# Patient Record
Sex: FEMALE | Race: WHITE | NOT HISPANIC OR LATINO | Employment: OTHER | URBAN - METROPOLITAN AREA
[De-identification: names, ages, dates, MRNs, and addresses within clinical notes are randomized per-mention and may not be internally consistent; named-entity substitution may affect disease eponyms.]

---

## 2022-07-20 ENCOUNTER — HOSPITAL ENCOUNTER (INPATIENT)
Facility: HOSPITAL | Age: 87
LOS: 1 days | Discharge: HOME | End: 2022-07-23
Attending: EMERGENCY MEDICINE | Admitting: INTERNAL MEDICINE

## 2022-07-20 ENCOUNTER — APPOINTMENT (OUTPATIENT)
Dept: GENERAL RADIOLOGY | Facility: HOSPITAL | Age: 87
End: 2022-07-20

## 2022-07-20 DIAGNOSIS — R06.09 EXERTIONAL DYSPNEA: ICD-10-CM

## 2022-07-20 DIAGNOSIS — R00.1 BRADYCARDIA: Primary | ICD-10-CM

## 2022-07-20 DIAGNOSIS — E11.65 UNCONTROLLED TYPE 2 DIABETES MELLITUS WITH HYPERGLYCEMIA: ICD-10-CM

## 2022-07-20 DIAGNOSIS — N18.31 CHRONIC KIDNEY DISEASE, STAGE 3A: ICD-10-CM

## 2022-07-20 DIAGNOSIS — I44.1 MOBITZ I: ICD-10-CM

## 2022-07-20 LAB
ALBUMIN SERPL-MCNC: 4.7 G/DL (ref 3.5–5.2)
ALBUMIN/GLOB SERPL: 1.7 G/DL
ALP SERPL-CCNC: 87 U/L (ref 39–117)
ALT SERPL W P-5'-P-CCNC: 25 U/L (ref 1–33)
ANION GAP SERPL CALCULATED.3IONS-SCNC: 15.1 MMOL/L (ref 5–15)
AST SERPL-CCNC: 21 U/L (ref 1–32)
B PARAPERT DNA SPEC QL NAA+PROBE: NOT DETECTED
B PERT DNA SPEC QL NAA+PROBE: NOT DETECTED
BASOPHILS # BLD AUTO: 0.05 10*3/MM3 (ref 0–0.2)
BASOPHILS NFR BLD AUTO: 0.7 % (ref 0–1.5)
BILIRUB SERPL-MCNC: 0.6 MG/DL (ref 0–1.2)
BUN SERPL-MCNC: 27 MG/DL (ref 8–23)
BUN/CREAT SERPL: 17.6 (ref 7–25)
C PNEUM DNA NPH QL NAA+NON-PROBE: NOT DETECTED
CALCIUM SPEC-SCNC: 10.4 MG/DL (ref 8.6–10.5)
CHLORIDE SERPL-SCNC: 99 MMOL/L (ref 98–107)
CO2 SERPL-SCNC: 22.9 MMOL/L (ref 22–29)
CREAT SERPL-MCNC: 1.53 MG/DL (ref 0.57–1)
DEPRECATED RDW RBC AUTO: 52.6 FL (ref 37–54)
EGFRCR SERPLBLD CKD-EPI 2021: 33 ML/MIN/1.73
EOSINOPHIL # BLD AUTO: 0.08 10*3/MM3 (ref 0–0.4)
EOSINOPHIL NFR BLD AUTO: 1.1 % (ref 0.3–6.2)
ERYTHROCYTE [DISTWIDTH] IN BLOOD BY AUTOMATED COUNT: 14.8 % (ref 12.3–15.4)
FLUAV SUBTYP SPEC NAA+PROBE: NOT DETECTED
FLUBV RNA ISLT QL NAA+PROBE: NOT DETECTED
GLOBULIN UR ELPH-MCNC: 2.7 GM/DL
GLUCOSE BLDC GLUCOMTR-MCNC: 305 MG/DL (ref 70–130)
GLUCOSE SERPL-MCNC: 278 MG/DL (ref 65–99)
HADV DNA SPEC NAA+PROBE: NOT DETECTED
HCOV 229E RNA SPEC QL NAA+PROBE: NOT DETECTED
HCOV HKU1 RNA SPEC QL NAA+PROBE: NOT DETECTED
HCOV NL63 RNA SPEC QL NAA+PROBE: NOT DETECTED
HCOV OC43 RNA SPEC QL NAA+PROBE: NOT DETECTED
HCT VFR BLD AUTO: 38.7 % (ref 34–46.6)
HGB BLD-MCNC: 12.5 G/DL (ref 12–15.9)
HMPV RNA NPH QL NAA+NON-PROBE: NOT DETECTED
HOLD SPECIMEN: NORMAL
HOLD SPECIMEN: NORMAL
HPIV1 RNA ISLT QL NAA+PROBE: NOT DETECTED
HPIV2 RNA SPEC QL NAA+PROBE: NOT DETECTED
HPIV3 RNA NPH QL NAA+PROBE: NOT DETECTED
HPIV4 P GENE NPH QL NAA+PROBE: NOT DETECTED
IMM GRANULOCYTES # BLD AUTO: 0.02 10*3/MM3 (ref 0–0.05)
IMM GRANULOCYTES NFR BLD AUTO: 0.3 % (ref 0–0.5)
LYMPHOCYTES # BLD AUTO: 1.46 10*3/MM3 (ref 0.7–3.1)
LYMPHOCYTES NFR BLD AUTO: 19.4 % (ref 19.6–45.3)
M PNEUMO IGG SER IA-ACNC: NOT DETECTED
MCH RBC QN AUTO: 31.8 PG (ref 26.6–33)
MCHC RBC AUTO-ENTMCNC: 32.3 G/DL (ref 31.5–35.7)
MCV RBC AUTO: 98.5 FL (ref 79–97)
MONOCYTES # BLD AUTO: 0.93 10*3/MM3 (ref 0.1–0.9)
MONOCYTES NFR BLD AUTO: 12.3 % (ref 5–12)
NEUTROPHILS NFR BLD AUTO: 5 10*3/MM3 (ref 1.7–7)
NEUTROPHILS NFR BLD AUTO: 66.2 % (ref 42.7–76)
NRBC BLD AUTO-RTO: 0 /100 WBC (ref 0–0.2)
NT-PROBNP SERPL-MCNC: 3250 PG/ML (ref 0–1800)
PLATELET # BLD AUTO: 141 10*3/MM3 (ref 140–450)
PMV BLD AUTO: 10.5 FL (ref 6–12)
POTASSIUM SERPL-SCNC: 4.2 MMOL/L (ref 3.5–5.2)
PROT SERPL-MCNC: 7.4 G/DL (ref 6–8.5)
RBC # BLD AUTO: 3.93 10*6/MM3 (ref 3.77–5.28)
RHINOVIRUS RNA SPEC NAA+PROBE: NOT DETECTED
RSV RNA NPH QL NAA+NON-PROBE: NOT DETECTED
SARS-COV-2 RNA NPH QL NAA+NON-PROBE: NOT DETECTED
SODIUM SERPL-SCNC: 137 MMOL/L (ref 136–145)
TROPONIN T SERPL-MCNC: <0.01 NG/ML (ref 0–0.03)
WBC NRBC COR # BLD: 7.54 10*3/MM3 (ref 3.4–10.8)
WHOLE BLOOD HOLD COAG: NORMAL
WHOLE BLOOD HOLD SPECIMEN: NORMAL

## 2022-07-20 PROCEDURE — G0378 HOSPITAL OBSERVATION PER HR: HCPCS

## 2022-07-20 PROCEDURE — 0202U NFCT DS 22 TRGT SARS-COV-2: CPT | Performed by: PHYSICIAN ASSISTANT

## 2022-07-20 PROCEDURE — 93010 ELECTROCARDIOGRAM REPORT: CPT | Performed by: INTERNAL MEDICINE

## 2022-07-20 PROCEDURE — 71045 X-RAY EXAM CHEST 1 VIEW: CPT

## 2022-07-20 PROCEDURE — 93005 ELECTROCARDIOGRAM TRACING: CPT | Performed by: PHYSICIAN ASSISTANT

## 2022-07-20 PROCEDURE — 99285 EMERGENCY DEPT VISIT HI MDM: CPT

## 2022-07-20 PROCEDURE — 82962 GLUCOSE BLOOD TEST: CPT

## 2022-07-20 PROCEDURE — 83880 ASSAY OF NATRIURETIC PEPTIDE: CPT | Performed by: PHYSICIAN ASSISTANT

## 2022-07-20 PROCEDURE — 84484 ASSAY OF TROPONIN QUANT: CPT | Performed by: PHYSICIAN ASSISTANT

## 2022-07-20 PROCEDURE — 80053 COMPREHEN METABOLIC PANEL: CPT | Performed by: PHYSICIAN ASSISTANT

## 2022-07-20 PROCEDURE — 85025 COMPLETE CBC W/AUTO DIFF WBC: CPT | Performed by: PHYSICIAN ASSISTANT

## 2022-07-20 RX ORDER — SODIUM CHLORIDE 0.9 % (FLUSH) 0.9 %
10 SYRINGE (ML) INJECTION AS NEEDED
Status: DISCONTINUED | OUTPATIENT
Start: 2022-07-20 | End: 2022-07-23 | Stop reason: HOSPADM

## 2022-07-21 PROBLEM — N18.31 CHRONIC KIDNEY DISEASE, STAGE 3A: Status: ACTIVE | Noted: 2022-07-21

## 2022-07-21 PROBLEM — J96.11 CHRONIC RESPIRATORY FAILURE WITH HYPOXIA: Status: ACTIVE | Noted: 2022-07-21

## 2022-07-21 PROBLEM — C83.30 DIFFUSE LARGE B CELL LYMPHOMA: Status: ACTIVE | Noted: 2022-07-21

## 2022-07-21 PROBLEM — E03.9 HYPOTHYROIDISM (ACQUIRED): Status: ACTIVE | Noted: 2022-07-21

## 2022-07-21 PROBLEM — Z95.1 S/P CABG (CORONARY ARTERY BYPASS GRAFT): Status: ACTIVE | Noted: 2022-07-21

## 2022-07-21 PROBLEM — E11.9 TYPE 2 DIABETES MELLITUS, WITHOUT LONG-TERM CURRENT USE OF INSULIN: Status: ACTIVE | Noted: 2022-07-21

## 2022-07-21 PROBLEM — J43.9 PULMONARY EMPHYSEMA: Status: ACTIVE | Noted: 2022-07-21

## 2022-07-21 PROBLEM — I25.10 CORONARY ARTERY DISEASE INVOLVING NATIVE CORONARY ARTERY WITHOUT ANGINA PECTORIS: Status: ACTIVE | Noted: 2022-07-21

## 2022-07-21 LAB
ANION GAP SERPL CALCULATED.3IONS-SCNC: 10.5 MMOL/L (ref 5–15)
BUN SERPL-MCNC: 24 MG/DL (ref 8–23)
BUN/CREAT SERPL: 20.9 (ref 7–25)
CALCIUM SPEC-SCNC: 10.1 MG/DL (ref 8.6–10.5)
CHLORIDE SERPL-SCNC: 104 MMOL/L (ref 98–107)
CO2 SERPL-SCNC: 22.5 MMOL/L (ref 22–29)
CREAT SERPL-MCNC: 1.15 MG/DL (ref 0.57–1)
DEPRECATED RDW RBC AUTO: 51.9 FL (ref 37–54)
EGFRCR SERPLBLD CKD-EPI 2021: 46.5 ML/MIN/1.73
ERYTHROCYTE [DISTWIDTH] IN BLOOD BY AUTOMATED COUNT: 14.6 % (ref 12.3–15.4)
GLUCOSE BLDC GLUCOMTR-MCNC: 141 MG/DL (ref 70–130)
GLUCOSE BLDC GLUCOMTR-MCNC: 200 MG/DL (ref 70–130)
GLUCOSE BLDC GLUCOMTR-MCNC: 240 MG/DL (ref 70–130)
GLUCOSE BLDC GLUCOMTR-MCNC: 288 MG/DL (ref 70–130)
GLUCOSE SERPL-MCNC: 147 MG/DL (ref 65–99)
HBA1C MFR BLD: 10 % (ref 4.8–5.6)
HCT VFR BLD AUTO: 38.2 % (ref 34–46.6)
HGB BLD-MCNC: 12.6 G/DL (ref 12–15.9)
MCH RBC QN AUTO: 32.1 PG (ref 26.6–33)
MCHC RBC AUTO-ENTMCNC: 33 G/DL (ref 31.5–35.7)
MCV RBC AUTO: 97.4 FL (ref 79–97)
PLATELET # BLD AUTO: 130 10*3/MM3 (ref 140–450)
PMV BLD AUTO: 10.3 FL (ref 6–12)
POTASSIUM SERPL-SCNC: 3.5 MMOL/L (ref 3.5–5.2)
PROCALCITONIN SERPL-MCNC: 0.15 NG/ML (ref 0–0.25)
PTH-INTACT SERPL-MCNC: 25.7 PG/ML (ref 15–65)
QT INTERVAL: 490 MS
RBC # BLD AUTO: 3.92 10*6/MM3 (ref 3.77–5.28)
SODIUM SERPL-SCNC: 137 MMOL/L (ref 136–145)
TSH SERPL DL<=0.05 MIU/L-ACNC: 4.39 UIU/ML (ref 0.27–4.2)
WBC NRBC COR # BLD: 9.2 10*3/MM3 (ref 3.4–10.8)

## 2022-07-21 PROCEDURE — 85027 COMPLETE CBC AUTOMATED: CPT | Performed by: NURSE PRACTITIONER

## 2022-07-21 PROCEDURE — 82962 GLUCOSE BLOOD TEST: CPT

## 2022-07-21 PROCEDURE — 63710000001 INSULIN LISPRO (HUMAN) PER 5 UNITS: Performed by: NURSE PRACTITIONER

## 2022-07-21 PROCEDURE — 83036 HEMOGLOBIN GLYCOSYLATED A1C: CPT | Performed by: NURSE PRACTITIONER

## 2022-07-21 PROCEDURE — G0378 HOSPITAL OBSERVATION PER HR: HCPCS

## 2022-07-21 PROCEDURE — 83970 ASSAY OF PARATHORMONE: CPT | Performed by: NURSE PRACTITIONER

## 2022-07-21 PROCEDURE — 99204 OFFICE O/P NEW MOD 45 MIN: CPT | Performed by: INTERNAL MEDICINE

## 2022-07-21 PROCEDURE — 80048 BASIC METABOLIC PNL TOTAL CA: CPT | Performed by: NURSE PRACTITIONER

## 2022-07-21 PROCEDURE — 84145 PROCALCITONIN (PCT): CPT | Performed by: INTERNAL MEDICINE

## 2022-07-21 PROCEDURE — 84443 ASSAY THYROID STIM HORMONE: CPT | Performed by: NURSE PRACTITIONER

## 2022-07-21 RX ORDER — HYDRALAZINE HYDROCHLORIDE 50 MG/1
100 TABLET, FILM COATED ORAL 3 TIMES DAILY
Status: DISCONTINUED | OUTPATIENT
Start: 2022-07-21 | End: 2022-07-23 | Stop reason: HOSPADM

## 2022-07-21 RX ORDER — ALLOPURINOL 100 MG/1
100 TABLET ORAL 2 TIMES DAILY
Status: DISCONTINUED | OUTPATIENT
Start: 2022-07-21 | End: 2022-07-23 | Stop reason: HOSPADM

## 2022-07-21 RX ORDER — FAMOTIDINE 20 MG/1
20 TABLET, FILM COATED ORAL 2 TIMES DAILY
Status: DISCONTINUED | OUTPATIENT
Start: 2022-07-21 | End: 2022-07-23

## 2022-07-21 RX ORDER — DEXTROSE MONOHYDRATE 25 G/50ML
25 INJECTION, SOLUTION INTRAVENOUS
Status: DISCONTINUED | OUTPATIENT
Start: 2022-07-21 | End: 2022-07-23 | Stop reason: HOSPADM

## 2022-07-21 RX ORDER — NICOTINE POLACRILEX 4 MG
15 LOZENGE BUCCAL
Status: DISCONTINUED | OUTPATIENT
Start: 2022-07-21 | End: 2022-07-23 | Stop reason: HOSPADM

## 2022-07-21 RX ORDER — LATANOPROST 50 UG/ML
1 SOLUTION/ DROPS OPHTHALMIC NIGHTLY
COMMUNITY

## 2022-07-21 RX ORDER — METOPROLOL TARTRATE 50 MG/1
50 TABLET, FILM COATED ORAL 2 TIMES DAILY
COMMUNITY

## 2022-07-21 RX ORDER — GABAPENTIN 300 MG/1
600 CAPSULE ORAL NIGHTLY
COMMUNITY

## 2022-07-21 RX ORDER — DORZOLAMIDE HYDROCHLORIDE AND TIMOLOL MALEATE 20; 5 MG/ML; MG/ML
SOLUTION/ DROPS OPHTHALMIC 2 TIMES DAILY
Status: DISCONTINUED | OUTPATIENT
Start: 2022-07-21 | End: 2022-07-23 | Stop reason: HOSPADM

## 2022-07-21 RX ORDER — GLIPIZIDE 5 MG/1
5 TABLET, FILM COATED, EXTENDED RELEASE ORAL 2 TIMES DAILY
COMMUNITY

## 2022-07-21 RX ORDER — HYDROCODONE BITARTRATE AND ACETAMINOPHEN 5; 325 MG/1; MG/1
1 TABLET ORAL EVERY 8 HOURS PRN
Status: DISCONTINUED | OUTPATIENT
Start: 2022-07-21 | End: 2022-07-23 | Stop reason: HOSPADM

## 2022-07-21 RX ORDER — LATANOPROST 50 UG/ML
1 SOLUTION/ DROPS OPHTHALMIC NIGHTLY
Status: DISCONTINUED | OUTPATIENT
Start: 2022-07-21 | End: 2022-07-23 | Stop reason: HOSPADM

## 2022-07-21 RX ORDER — ACETAMINOPHEN 160 MG/5ML
650 SOLUTION ORAL EVERY 4 HOURS PRN
Status: DISCONTINUED | OUTPATIENT
Start: 2022-07-21 | End: 2022-07-23 | Stop reason: HOSPADM

## 2022-07-21 RX ORDER — INSULIN LISPRO 100 [IU]/ML
0-9 INJECTION, SOLUTION INTRAVENOUS; SUBCUTANEOUS
Status: DISCONTINUED | OUTPATIENT
Start: 2022-07-21 | End: 2022-07-23 | Stop reason: HOSPADM

## 2022-07-21 RX ORDER — ALLOPURINOL 100 MG/1
100 TABLET ORAL 2 TIMES DAILY
COMMUNITY

## 2022-07-21 RX ORDER — SODIUM CHLORIDE 0.9 % (FLUSH) 0.9 %
10 SYRINGE (ML) INJECTION EVERY 12 HOURS SCHEDULED
Status: DISCONTINUED | OUTPATIENT
Start: 2022-07-21 | End: 2022-07-23 | Stop reason: HOSPADM

## 2022-07-21 RX ORDER — ONDANSETRON 2 MG/ML
4 INJECTION INTRAMUSCULAR; INTRAVENOUS EVERY 6 HOURS PRN
Status: DISCONTINUED | OUTPATIENT
Start: 2022-07-21 | End: 2022-07-23 | Stop reason: HOSPADM

## 2022-07-21 RX ORDER — LEVOTHYROXINE SODIUM 0.05 MG/1
50 TABLET ORAL DAILY
COMMUNITY

## 2022-07-21 RX ORDER — MULTIPLE VITAMINS W/ MINERALS TAB 9MG-400MCG
1 TAB ORAL DAILY
COMMUNITY

## 2022-07-21 RX ORDER — HYDRALAZINE HYDROCHLORIDE 100 MG/1
100 TABLET, FILM COATED ORAL 3 TIMES DAILY
COMMUNITY

## 2022-07-21 RX ORDER — AMLODIPINE BESYLATE 10 MG/1
10 TABLET ORAL
Status: DISCONTINUED | OUTPATIENT
Start: 2022-07-21 | End: 2022-07-23 | Stop reason: HOSPADM

## 2022-07-21 RX ORDER — SODIUM CHLORIDE 0.9 % (FLUSH) 0.9 %
10 SYRINGE (ML) INJECTION AS NEEDED
Status: DISCONTINUED | OUTPATIENT
Start: 2022-07-21 | End: 2022-07-23 | Stop reason: HOSPADM

## 2022-07-21 RX ORDER — HYDROCODONE BITARTRATE AND ACETAMINOPHEN 5; 325 MG/1; MG/1
1 TABLET ORAL EVERY 8 HOURS PRN
COMMUNITY

## 2022-07-21 RX ORDER — GABAPENTIN 300 MG/1
600 CAPSULE ORAL NIGHTLY
Status: DISCONTINUED | OUTPATIENT
Start: 2022-07-21 | End: 2022-07-23 | Stop reason: HOSPADM

## 2022-07-21 RX ORDER — HYDRALAZINE HYDROCHLORIDE 50 MG/1
100 TABLET, FILM COATED ORAL ONCE
Status: COMPLETED | OUTPATIENT
Start: 2022-07-21 | End: 2022-07-21

## 2022-07-21 RX ORDER — GABAPENTIN 300 MG/1
300 CAPSULE ORAL EVERY MORNING
COMMUNITY

## 2022-07-21 RX ORDER — DOCUSATE SODIUM 100 MG/1
100 CAPSULE, LIQUID FILLED ORAL 2 TIMES DAILY
COMMUNITY

## 2022-07-21 RX ORDER — ONDANSETRON 4 MG/1
4 TABLET, FILM COATED ORAL EVERY 6 HOURS PRN
Status: DISCONTINUED | OUTPATIENT
Start: 2022-07-21 | End: 2022-07-23 | Stop reason: HOSPADM

## 2022-07-21 RX ORDER — GABAPENTIN 300 MG/1
300 CAPSULE ORAL DAILY
Status: DISCONTINUED | OUTPATIENT
Start: 2022-07-21 | End: 2022-07-23 | Stop reason: HOSPADM

## 2022-07-21 RX ORDER — AMLODIPINE AND OLMESARTAN MEDOXOMIL 10; 40 MG/1; MG/1
1 TABLET ORAL DAILY
COMMUNITY
End: 2022-07-23 | Stop reason: HOSPADM

## 2022-07-21 RX ORDER — ROSUVASTATIN CALCIUM 40 MG/1
40 TABLET, COATED ORAL DAILY
Status: DISCONTINUED | OUTPATIENT
Start: 2022-07-21 | End: 2022-07-23

## 2022-07-21 RX ORDER — EZETIMIBE 10 MG/1
10 TABLET ORAL DAILY
COMMUNITY

## 2022-07-21 RX ORDER — ASPIRIN 81 MG/1
81 TABLET ORAL DAILY
Status: DISCONTINUED | OUTPATIENT
Start: 2022-07-21 | End: 2022-07-23 | Stop reason: HOSPADM

## 2022-07-21 RX ORDER — ASPIRIN 81 MG/1
81 TABLET ORAL DAILY
COMMUNITY

## 2022-07-21 RX ORDER — DOCUSATE SODIUM 100 MG/1
100 CAPSULE, LIQUID FILLED ORAL 2 TIMES DAILY
Status: DISCONTINUED | OUTPATIENT
Start: 2022-07-21 | End: 2022-07-23 | Stop reason: HOSPADM

## 2022-07-21 RX ORDER — CALCIUM CARBONATE 200(500)MG
2 TABLET,CHEWABLE ORAL 2 TIMES DAILY PRN
Status: DISCONTINUED | OUTPATIENT
Start: 2022-07-21 | End: 2022-07-23 | Stop reason: HOSPADM

## 2022-07-21 RX ORDER — ACETAMINOPHEN 325 MG/1
650 TABLET ORAL EVERY 4 HOURS PRN
Status: DISCONTINUED | OUTPATIENT
Start: 2022-07-21 | End: 2022-07-23 | Stop reason: HOSPADM

## 2022-07-21 RX ORDER — DORZOLAMIDE HYDROCHLORIDE AND TIMOLOL MALEATE 20; 5 MG/ML; MG/ML
1 SOLUTION/ DROPS OPHTHALMIC 2 TIMES DAILY
COMMUNITY

## 2022-07-21 RX ORDER — NITROGLYCERIN 0.4 MG/1
0.4 TABLET SUBLINGUAL
Status: DISCONTINUED | OUTPATIENT
Start: 2022-07-21 | End: 2022-07-23 | Stop reason: HOSPADM

## 2022-07-21 RX ORDER — LEVOTHYROXINE SODIUM 0.05 MG/1
50 TABLET ORAL DAILY
Status: DISCONTINUED | OUTPATIENT
Start: 2022-07-21 | End: 2022-07-23 | Stop reason: HOSPADM

## 2022-07-21 RX ORDER — FAMOTIDINE 20 MG/1
20 TABLET, FILM COATED ORAL 2 TIMES DAILY
COMMUNITY

## 2022-07-21 RX ORDER — ROSUVASTATIN CALCIUM 20 MG/1
40 TABLET, COATED ORAL DAILY
COMMUNITY

## 2022-07-21 RX ORDER — ACETAMINOPHEN 650 MG/1
650 SUPPOSITORY RECTAL EVERY 4 HOURS PRN
Status: DISCONTINUED | OUTPATIENT
Start: 2022-07-21 | End: 2022-07-23 | Stop reason: HOSPADM

## 2022-07-21 RX ADMIN — ALLOPURINOL 100 MG: 100 TABLET ORAL at 11:52

## 2022-07-21 RX ADMIN — HYDRALAZINE HYDROCHLORIDE 100 MG: 50 TABLET, FILM COATED ORAL at 04:01

## 2022-07-21 RX ADMIN — LINAGLIPTIN 5 MG: 5 TABLET, FILM COATED ORAL at 11:52

## 2022-07-21 RX ADMIN — ALLOPURINOL 100 MG: 100 TABLET ORAL at 20:34

## 2022-07-21 RX ADMIN — DORZOLAMIDE HYDROCHLORIDE: 20 SOLUTION/ DROPS OPHTHALMIC at 20:35

## 2022-07-21 RX ADMIN — FAMOTIDINE 20 MG: 20 TABLET ORAL at 11:53

## 2022-07-21 RX ADMIN — DOCUSATE SODIUM 100 MG: 100 CAPSULE, LIQUID FILLED ORAL at 11:53

## 2022-07-21 RX ADMIN — LATANOPROST 1 DROP: 50 SOLUTION/ DROPS OPHTHALMIC at 20:34

## 2022-07-21 RX ADMIN — ROSUVASTATIN CALCIUM 40 MG: 40 TABLET, FILM COATED ORAL at 11:52

## 2022-07-21 RX ADMIN — ASPIRIN 81 MG: 81 TABLET, COATED ORAL at 11:52

## 2022-07-21 RX ADMIN — AMLODIPINE BESYLATE 10 MG: 10 TABLET ORAL at 11:52

## 2022-07-21 RX ADMIN — INSULIN LISPRO 6 UNITS: 100 INJECTION, SOLUTION INTRAVENOUS; SUBCUTANEOUS at 20:49

## 2022-07-21 RX ADMIN — INSULIN LISPRO 4 UNITS: 100 INJECTION, SOLUTION INTRAVENOUS; SUBCUTANEOUS at 17:54

## 2022-07-21 RX ADMIN — FAMOTIDINE 20 MG: 20 TABLET ORAL at 20:34

## 2022-07-21 RX ADMIN — Medication 10 ML: at 09:36

## 2022-07-21 RX ADMIN — LEVOTHYROXINE SODIUM 50 MCG: 0.05 TABLET ORAL at 11:53

## 2022-07-21 RX ADMIN — HYDRALAZINE HYDROCHLORIDE 100 MG: 50 TABLET, FILM COATED ORAL at 20:34

## 2022-07-21 RX ADMIN — GABAPENTIN 300 MG: 300 CAPSULE ORAL at 11:53

## 2022-07-21 RX ADMIN — GABAPENTIN 600 MG: 300 CAPSULE ORAL at 20:34

## 2022-07-21 RX ADMIN — Medication 10 ML: at 20:35

## 2022-07-21 RX ADMIN — Medication 10 ML: at 04:03

## 2022-07-21 RX ADMIN — DORZOLAMIDE HYDROCHLORIDE: 20 SOLUTION/ DROPS OPHTHALMIC at 11:54

## 2022-07-21 RX ADMIN — DOCUSATE SODIUM 100 MG: 100 CAPSULE, LIQUID FILLED ORAL at 20:34

## 2022-07-21 RX ADMIN — INSULIN LISPRO 4 UNITS: 100 INJECTION, SOLUTION INTRAVENOUS; SUBCUTANEOUS at 11:53

## 2022-07-21 RX ADMIN — HYDRALAZINE HYDROCHLORIDE 100 MG: 50 TABLET, FILM COATED ORAL at 11:52

## 2022-07-21 RX ADMIN — HYDRALAZINE HYDROCHLORIDE 100 MG: 50 TABLET, FILM COATED ORAL at 15:49

## 2022-07-22 ENCOUNTER — APPOINTMENT (OUTPATIENT)
Dept: GENERAL RADIOLOGY | Facility: HOSPITAL | Age: 87
End: 2022-07-22

## 2022-07-22 PROBLEM — I44.1 MOBITZ I: Status: ACTIVE | Noted: 2022-07-20

## 2022-07-22 LAB
ANION GAP SERPL CALCULATED.3IONS-SCNC: 13.3 MMOL/L (ref 5–15)
BASOPHILS # BLD AUTO: 0.05 10*3/MM3 (ref 0–0.2)
BASOPHILS NFR BLD AUTO: 0.5 % (ref 0–1.5)
BUN SERPL-MCNC: 24 MG/DL (ref 8–23)
BUN/CREAT SERPL: 16.8 (ref 7–25)
CALCIUM SPEC-SCNC: 9.8 MG/DL (ref 8.6–10.5)
CHLORIDE SERPL-SCNC: 105 MMOL/L (ref 98–107)
CO2 SERPL-SCNC: 22.7 MMOL/L (ref 22–29)
CREAT SERPL-MCNC: 1.43 MG/DL (ref 0.57–1)
DEPRECATED RDW RBC AUTO: 52.1 FL (ref 37–54)
EGFRCR SERPLBLD CKD-EPI 2021: 35.8 ML/MIN/1.73
EOSINOPHIL # BLD AUTO: 0.09 10*3/MM3 (ref 0–0.4)
EOSINOPHIL NFR BLD AUTO: 0.9 % (ref 0.3–6.2)
ERYTHROCYTE [DISTWIDTH] IN BLOOD BY AUTOMATED COUNT: 14.7 % (ref 12.3–15.4)
GLUCOSE BLDC GLUCOMTR-MCNC: 157 MG/DL (ref 70–130)
GLUCOSE BLDC GLUCOMTR-MCNC: 161 MG/DL (ref 70–130)
GLUCOSE BLDC GLUCOMTR-MCNC: 344 MG/DL (ref 70–130)
GLUCOSE BLDC GLUCOMTR-MCNC: 467 MG/DL (ref 70–130)
GLUCOSE BLDC GLUCOMTR-MCNC: 472 MG/DL (ref 70–130)
GLUCOSE SERPL-MCNC: 134 MG/DL (ref 65–99)
HCT VFR BLD AUTO: 41.4 % (ref 34–46.6)
HGB BLD-MCNC: 13.7 G/DL (ref 12–15.9)
IMM GRANULOCYTES # BLD AUTO: 0.02 10*3/MM3 (ref 0–0.05)
IMM GRANULOCYTES NFR BLD AUTO: 0.2 % (ref 0–0.5)
LYMPHOCYTES # BLD AUTO: 1.4 10*3/MM3 (ref 0.7–3.1)
LYMPHOCYTES NFR BLD AUTO: 14.6 % (ref 19.6–45.3)
MCH RBC QN AUTO: 32.3 PG (ref 26.6–33)
MCHC RBC AUTO-ENTMCNC: 33.1 G/DL (ref 31.5–35.7)
MCV RBC AUTO: 97.6 FL (ref 79–97)
MONOCYTES # BLD AUTO: 1.3 10*3/MM3 (ref 0.1–0.9)
MONOCYTES NFR BLD AUTO: 13.5 % (ref 5–12)
NEUTROPHILS NFR BLD AUTO: 6.74 10*3/MM3 (ref 1.7–7)
NEUTROPHILS NFR BLD AUTO: 70.3 % (ref 42.7–76)
NRBC BLD AUTO-RTO: 0.1 /100 WBC (ref 0–0.2)
PLATELET # BLD AUTO: 149 10*3/MM3 (ref 140–450)
PMV BLD AUTO: 10.1 FL (ref 6–12)
POTASSIUM SERPL-SCNC: 3.8 MMOL/L (ref 3.5–5.2)
QT INTERVAL: 446 MS
RBC # BLD AUTO: 4.24 10*6/MM3 (ref 3.77–5.28)
SODIUM SERPL-SCNC: 141 MMOL/L (ref 136–145)
WBC NRBC COR # BLD: 9.6 10*3/MM3 (ref 3.4–10.8)

## 2022-07-22 PROCEDURE — 25010000002 VANCOMYCIN PER 500 MG

## 2022-07-22 PROCEDURE — 63710000001 INSULIN LISPRO (HUMAN) PER 5 UNITS: Performed by: NURSE PRACTITIONER

## 2022-07-22 PROCEDURE — 71045 X-RAY EXAM CHEST 1 VIEW: CPT

## 2022-07-22 PROCEDURE — 0JH606Z INSERTION OF PACEMAKER, DUAL CHAMBER INTO CHEST SUBCUTANEOUS TISSUE AND FASCIA, OPEN APPROACH: ICD-10-PCS | Performed by: INTERNAL MEDICINE

## 2022-07-22 PROCEDURE — 25010000002 MIDAZOLAM PER 1 MG: Performed by: INTERNAL MEDICINE

## 2022-07-22 PROCEDURE — 80048 BASIC METABOLIC PNL TOTAL CA: CPT | Performed by: INTERNAL MEDICINE

## 2022-07-22 PROCEDURE — G0378 HOSPITAL OBSERVATION PER HR: HCPCS

## 2022-07-22 PROCEDURE — 02H63JZ INSERTION OF PACEMAKER LEAD INTO RIGHT ATRIUM, PERCUTANEOUS APPROACH: ICD-10-PCS | Performed by: INTERNAL MEDICINE

## 2022-07-22 PROCEDURE — 99152 MOD SED SAME PHYS/QHP 5/>YRS: CPT | Performed by: INTERNAL MEDICINE

## 2022-07-22 PROCEDURE — C1894 INTRO/SHEATH, NON-LASER: HCPCS | Performed by: INTERNAL MEDICINE

## 2022-07-22 PROCEDURE — 25010000002 METHYLPREDNISOLONE PER 40 MG: Performed by: INTERNAL MEDICINE

## 2022-07-22 PROCEDURE — C1769 GUIDE WIRE: HCPCS | Performed by: INTERNAL MEDICINE

## 2022-07-22 PROCEDURE — 33208 INSRT HEART PM ATRIAL & VENT: CPT | Performed by: INTERNAL MEDICINE

## 2022-07-22 PROCEDURE — C1898 LEAD, PMKR, OTHER THAN TRANS: HCPCS | Performed by: INTERNAL MEDICINE

## 2022-07-22 PROCEDURE — 99153 MOD SED SAME PHYS/QHP EA: CPT | Performed by: INTERNAL MEDICINE

## 2022-07-22 PROCEDURE — 82962 GLUCOSE BLOOD TEST: CPT

## 2022-07-22 PROCEDURE — 25010000002 FENTANYL CITRATE (PF) 50 MCG/ML SOLUTION: Performed by: INTERNAL MEDICINE

## 2022-07-22 PROCEDURE — 85025 COMPLETE CBC W/AUTO DIFF WBC: CPT | Performed by: INTERNAL MEDICINE

## 2022-07-22 PROCEDURE — C1887 CATHETER, GUIDING: HCPCS | Performed by: INTERNAL MEDICINE

## 2022-07-22 PROCEDURE — C1785 PMKR, DUAL, RATE-RESP: HCPCS | Performed by: INTERNAL MEDICINE

## 2022-07-22 PROCEDURE — 02HK3JZ INSERTION OF PACEMAKER LEAD INTO RIGHT VENTRICLE, PERCUTANEOUS APPROACH: ICD-10-PCS | Performed by: INTERNAL MEDICINE

## 2022-07-22 PROCEDURE — 93005 ELECTROCARDIOGRAM TRACING: CPT

## 2022-07-22 PROCEDURE — 63710000001 INSULIN LISPRO (HUMAN) PER 5 UNITS

## 2022-07-22 DEVICE — IMPLANTABLE DEVICE: Type: IMPLANTABLE DEVICE | Status: FUNCTIONAL

## 2022-07-22 DEVICE — LD PM CAPSUREFIX NOVUS5076 52CM: Type: IMPLANTABLE DEVICE | Status: FUNCTIONAL

## 2022-07-22 DEVICE — GEN PM AZURE XT SURESCAN DR MRI: Type: IMPLANTABLE DEVICE | Status: FUNCTIONAL

## 2022-07-22 RX ORDER — METOPROLOL TARTRATE 50 MG/1
50 TABLET, FILM COATED ORAL EVERY 12 HOURS SCHEDULED
Status: DISCONTINUED | OUTPATIENT
Start: 2022-07-22 | End: 2022-07-23 | Stop reason: HOSPADM

## 2022-07-22 RX ORDER — MIDAZOLAM HYDROCHLORIDE 1 MG/ML
INJECTION INTRAMUSCULAR; INTRAVENOUS AS NEEDED
Status: DISCONTINUED | OUTPATIENT
Start: 2022-07-22 | End: 2022-07-22 | Stop reason: HOSPADM

## 2022-07-22 RX ORDER — NITROGLYCERIN 0.4 MG/1
0.4 TABLET SUBLINGUAL
Status: DISCONTINUED | OUTPATIENT
Start: 2022-07-22 | End: 2022-07-23 | Stop reason: HOSPADM

## 2022-07-22 RX ORDER — INSULIN LISPRO 100 [IU]/ML
3 INJECTION, SOLUTION INTRAVENOUS; SUBCUTANEOUS ONCE
Status: COMPLETED | OUTPATIENT
Start: 2022-07-22 | End: 2022-07-22

## 2022-07-22 RX ORDER — ONDANSETRON 2 MG/ML
4 INJECTION INTRAMUSCULAR; INTRAVENOUS EVERY 6 HOURS PRN
Status: DISCONTINUED | OUTPATIENT
Start: 2022-07-22 | End: 2022-07-23 | Stop reason: HOSPADM

## 2022-07-22 RX ORDER — METHYLPREDNISOLONE SODIUM SUCCINATE 40 MG/ML
20 INJECTION, POWDER, LYOPHILIZED, FOR SOLUTION INTRAMUSCULAR; INTRAVENOUS ONCE
Status: COMPLETED | OUTPATIENT
Start: 2022-07-22 | End: 2022-07-22

## 2022-07-22 RX ORDER — VANCOMYCIN HYDROCHLORIDE 1 G/200ML
15 INJECTION, SOLUTION INTRAVENOUS ONCE
Status: COMPLETED | OUTPATIENT
Start: 2022-07-22 | End: 2022-07-22

## 2022-07-22 RX ORDER — LIDOCAINE HYDROCHLORIDE AND EPINEPHRINE 10; 10 MG/ML; UG/ML
INJECTION, SOLUTION INFILTRATION; PERINEURAL AS NEEDED
Status: DISCONTINUED | OUTPATIENT
Start: 2022-07-22 | End: 2022-07-22 | Stop reason: HOSPADM

## 2022-07-22 RX ORDER — METOPROLOL TARTRATE 5 MG/5ML
INJECTION INTRAVENOUS AS NEEDED
Status: DISCONTINUED | OUTPATIENT
Start: 2022-07-22 | End: 2022-07-22 | Stop reason: HOSPADM

## 2022-07-22 RX ORDER — ACETAMINOPHEN 650 MG/1
650 SUPPOSITORY RECTAL EVERY 4 HOURS PRN
Status: DISCONTINUED | OUTPATIENT
Start: 2022-07-22 | End: 2022-07-23 | Stop reason: SDUPTHER

## 2022-07-22 RX ORDER — SODIUM CHLORIDE 9 MG/ML
75 INJECTION, SOLUTION INTRAVENOUS CONTINUOUS
Status: ACTIVE | OUTPATIENT
Start: 2022-07-22 | End: 2022-07-23

## 2022-07-22 RX ORDER — FENTANYL CITRATE 50 UG/ML
INJECTION, SOLUTION INTRAMUSCULAR; INTRAVENOUS AS NEEDED
Status: DISCONTINUED | OUTPATIENT
Start: 2022-07-22 | End: 2022-07-22 | Stop reason: HOSPADM

## 2022-07-22 RX ORDER — ACETAMINOPHEN 325 MG/1
650 TABLET ORAL EVERY 4 HOURS PRN
Status: DISCONTINUED | OUTPATIENT
Start: 2022-07-22 | End: 2022-07-23 | Stop reason: SDUPTHER

## 2022-07-22 RX ADMIN — DOCUSATE SODIUM 100 MG: 100 CAPSULE, LIQUID FILLED ORAL at 18:30

## 2022-07-22 RX ADMIN — SODIUM CHLORIDE 75 ML/HR: 9 INJECTION, SOLUTION INTRAVENOUS at 12:52

## 2022-07-22 RX ADMIN — Medication 10 ML: at 09:02

## 2022-07-22 RX ADMIN — DORZOLAMIDE HYDROCHLORIDE: 20 SOLUTION/ DROPS OPHTHALMIC at 20:26

## 2022-07-22 RX ADMIN — GABAPENTIN 600 MG: 300 CAPSULE ORAL at 20:35

## 2022-07-22 RX ADMIN — METHYLPREDNISOLONE SODIUM SUCCINATE 20 MG: 40 INJECTION, POWDER, FOR SOLUTION INTRAMUSCULAR; INTRAVENOUS at 12:52

## 2022-07-22 RX ADMIN — ALLOPURINOL 100 MG: 100 TABLET ORAL at 22:23

## 2022-07-22 RX ADMIN — HYDRALAZINE HYDROCHLORIDE 100 MG: 50 TABLET, FILM COATED ORAL at 18:29

## 2022-07-22 RX ADMIN — Medication 10 ML: at 20:28

## 2022-07-22 RX ADMIN — INSULIN LISPRO 9 UNITS: 100 INJECTION, SOLUTION INTRAVENOUS; SUBCUTANEOUS at 21:43

## 2022-07-22 RX ADMIN — DORZOLAMIDE HYDROCHLORIDE: 20 SOLUTION/ DROPS OPHTHALMIC at 09:01

## 2022-07-22 RX ADMIN — METOPROLOL TARTRATE 50 MG: 50 TABLET, FILM COATED ORAL at 20:34

## 2022-07-22 RX ADMIN — INSULIN LISPRO 7 UNITS: 100 INJECTION, SOLUTION INTRAVENOUS; SUBCUTANEOUS at 17:00

## 2022-07-22 RX ADMIN — LEVOTHYROXINE SODIUM 50 MCG: 0.05 TABLET ORAL at 18:30

## 2022-07-22 RX ADMIN — INSULIN LISPRO 3 UNITS: 100 INJECTION, SOLUTION INTRAVENOUS; SUBCUTANEOUS at 22:43

## 2022-07-22 RX ADMIN — VANCOMYCIN HYDROCHLORIDE 1000 MG: 1 INJECTION, SOLUTION INTRAVENOUS at 13:45

## 2022-07-22 RX ADMIN — FAMOTIDINE 20 MG: 20 TABLET ORAL at 20:35

## 2022-07-23 ENCOUNTER — READMISSION MANAGEMENT (OUTPATIENT)
Dept: CALL CENTER | Facility: HOSPITAL | Age: 87
End: 2022-07-23

## 2022-07-23 VITALS
TEMPERATURE: 98.7 F | RESPIRATION RATE: 16 BRPM | OXYGEN SATURATION: 96 % | DIASTOLIC BLOOD PRESSURE: 69 MMHG | HEART RATE: 73 BPM | HEIGHT: 61 IN | BODY MASS INDEX: 26.73 KG/M2 | SYSTOLIC BLOOD PRESSURE: 141 MMHG | WEIGHT: 141.6 LBS

## 2022-07-23 PROBLEM — Z95.0 PRESENCE OF CARDIAC PACEMAKER: Status: ACTIVE | Noted: 2022-07-23

## 2022-07-23 LAB
ANION GAP SERPL CALCULATED.3IONS-SCNC: 10.8 MMOL/L (ref 5–15)
BACTERIA UR QL AUTO: ABNORMAL /HPF
BASOPHILS # BLD AUTO: 0.02 10*3/MM3 (ref 0–0.2)
BASOPHILS NFR BLD AUTO: 0.2 % (ref 0–1.5)
BILIRUB UR QL STRIP: NEGATIVE
BUN SERPL-MCNC: 28 MG/DL (ref 8–23)
BUN/CREAT SERPL: 17.5 (ref 7–25)
CALCIUM SPEC-SCNC: 9.8 MG/DL (ref 8.6–10.5)
CHLORIDE SERPL-SCNC: 103 MMOL/L (ref 98–107)
CLARITY UR: ABNORMAL
CO2 SERPL-SCNC: 24.2 MMOL/L (ref 22–29)
COLOR UR: YELLOW
CREAT SERPL-MCNC: 1.6 MG/DL (ref 0.57–1)
DEPRECATED RDW RBC AUTO: 50.5 FL (ref 37–54)
EGFRCR SERPLBLD CKD-EPI 2021: 31.3 ML/MIN/1.73
EOSINOPHIL # BLD AUTO: 0 10*3/MM3 (ref 0–0.4)
EOSINOPHIL NFR BLD AUTO: 0 % (ref 0.3–6.2)
ERYTHROCYTE [DISTWIDTH] IN BLOOD BY AUTOMATED COUNT: 14.4 % (ref 12.3–15.4)
GLUCOSE BLDC GLUCOMTR-MCNC: 169 MG/DL (ref 70–130)
GLUCOSE BLDC GLUCOMTR-MCNC: 173 MG/DL (ref 70–130)
GLUCOSE BLDC GLUCOMTR-MCNC: 258 MG/DL (ref 70–130)
GLUCOSE SERPL-MCNC: 197 MG/DL (ref 65–99)
GLUCOSE UR STRIP-MCNC: ABNORMAL MG/DL
HCT VFR BLD AUTO: 41.1 % (ref 34–46.6)
HGB BLD-MCNC: 13.7 G/DL (ref 12–15.9)
HGB UR QL STRIP.AUTO: NEGATIVE
HYALINE CASTS UR QL AUTO: ABNORMAL /LPF
IMM GRANULOCYTES # BLD AUTO: 0.08 10*3/MM3 (ref 0–0.05)
IMM GRANULOCYTES NFR BLD AUTO: 0.6 % (ref 0–0.5)
KETONES UR QL STRIP: NEGATIVE
LEUKOCYTE ESTERASE UR QL STRIP.AUTO: ABNORMAL
LYMPHOCYTES # BLD AUTO: 1.63 10*3/MM3 (ref 0.7–3.1)
LYMPHOCYTES NFR BLD AUTO: 12.3 % (ref 19.6–45.3)
MCH RBC QN AUTO: 32.2 PG (ref 26.6–33)
MCHC RBC AUTO-ENTMCNC: 33.3 G/DL (ref 31.5–35.7)
MCV RBC AUTO: 96.7 FL (ref 79–97)
MONOCYTES # BLD AUTO: 1.46 10*3/MM3 (ref 0.1–0.9)
MONOCYTES NFR BLD AUTO: 11 % (ref 5–12)
NEUTROPHILS NFR BLD AUTO: 10.06 10*3/MM3 (ref 1.7–7)
NEUTROPHILS NFR BLD AUTO: 75.9 % (ref 42.7–76)
NITRITE UR QL STRIP: POSITIVE
NRBC BLD AUTO-RTO: 0 /100 WBC (ref 0–0.2)
PH UR STRIP.AUTO: 5.5 [PH] (ref 5–8)
PLATELET # BLD AUTO: 156 10*3/MM3 (ref 140–450)
PMV BLD AUTO: 10 FL (ref 6–12)
POTASSIUM SERPL-SCNC: 4.2 MMOL/L (ref 3.5–5.2)
PROT UR QL STRIP: ABNORMAL
RBC # BLD AUTO: 4.25 10*6/MM3 (ref 3.77–5.28)
RBC # UR STRIP: ABNORMAL /HPF
REF LAB TEST METHOD: ABNORMAL
SODIUM SERPL-SCNC: 138 MMOL/L (ref 136–145)
SP GR UR STRIP: 1.01 (ref 1–1.03)
SQUAMOUS #/AREA URNS HPF: ABNORMAL /HPF
UROBILINOGEN UR QL STRIP: ABNORMAL
WBC # UR STRIP: ABNORMAL /HPF
WBC NRBC COR # BLD: 13.25 10*3/MM3 (ref 3.4–10.8)

## 2022-07-23 PROCEDURE — 82962 GLUCOSE BLOOD TEST: CPT

## 2022-07-23 PROCEDURE — 81001 URINALYSIS AUTO W/SCOPE: CPT | Performed by: INTERNAL MEDICINE

## 2022-07-23 PROCEDURE — 80048 BASIC METABOLIC PNL TOTAL CA: CPT

## 2022-07-23 PROCEDURE — 99024 POSTOP FOLLOW-UP VISIT: CPT

## 2022-07-23 PROCEDURE — 85025 COMPLETE CBC W/AUTO DIFF WBC: CPT

## 2022-07-23 PROCEDURE — 63710000001 INSULIN LISPRO (HUMAN) PER 5 UNITS

## 2022-07-23 RX ORDER — CEFDINIR 300 MG/1
300 CAPSULE ORAL DAILY
Qty: 5 CAPSULE | Refills: 0 | Status: SHIPPED | OUTPATIENT
Start: 2022-07-23 | End: 2022-07-29

## 2022-07-23 RX ORDER — FAMOTIDINE 20 MG/1
20 TABLET, FILM COATED ORAL DAILY
Status: DISCONTINUED | OUTPATIENT
Start: 2022-07-24 | End: 2022-07-23 | Stop reason: HOSPADM

## 2022-07-23 RX ORDER — ROSUVASTATIN CALCIUM 10 MG/1
10 TABLET, COATED ORAL DAILY
Status: DISCONTINUED | OUTPATIENT
Start: 2022-07-24 | End: 2022-07-23 | Stop reason: HOSPADM

## 2022-07-23 RX ORDER — AMLODIPINE BESYLATE 10 MG/1
10 TABLET ORAL DAILY
Qty: 30 TABLET | Refills: 0 | Status: SHIPPED | OUTPATIENT
Start: 2022-07-23 | End: 2022-09-08 | Stop reason: ALTCHOICE

## 2022-07-23 RX ADMIN — DICLOFENAC SODIUM 2 G: 10 GEL TOPICAL at 17:16

## 2022-07-23 RX ADMIN — INSULIN LISPRO 2 UNITS: 100 INJECTION, SOLUTION INTRAVENOUS; SUBCUTANEOUS at 07:00

## 2022-07-23 RX ADMIN — DOCUSATE SODIUM 100 MG: 100 CAPSULE, LIQUID FILLED ORAL at 09:07

## 2022-07-23 RX ADMIN — DICLOFENAC SODIUM 2 G: 10 GEL TOPICAL at 09:10

## 2022-07-23 RX ADMIN — DORZOLAMIDE HYDROCHLORIDE: 20 SOLUTION/ DROPS OPHTHALMIC at 09:08

## 2022-07-23 RX ADMIN — INSULIN LISPRO 2 UNITS: 100 INJECTION, SOLUTION INTRAVENOUS; SUBCUTANEOUS at 11:49

## 2022-07-23 RX ADMIN — DICLOFENAC SODIUM 2 G: 10 GEL TOPICAL at 11:49

## 2022-07-23 RX ADMIN — GABAPENTIN 300 MG: 300 CAPSULE ORAL at 09:07

## 2022-07-23 RX ADMIN — INSULIN LISPRO 6 UNITS: 100 INJECTION, SOLUTION INTRAVENOUS; SUBCUTANEOUS at 16:47

## 2022-07-23 RX ADMIN — HYDRALAZINE HYDROCHLORIDE 100 MG: 50 TABLET, FILM COATED ORAL at 16:47

## 2022-07-23 RX ADMIN — AMLODIPINE BESYLATE 10 MG: 10 TABLET ORAL at 09:07

## 2022-07-23 RX ADMIN — LINAGLIPTIN 5 MG: 5 TABLET, FILM COATED ORAL at 09:07

## 2022-07-23 RX ADMIN — ASPIRIN 81 MG: 81 TABLET, COATED ORAL at 09:07

## 2022-07-23 RX ADMIN — FAMOTIDINE 20 MG: 20 TABLET ORAL at 09:07

## 2022-07-23 RX ADMIN — HYDRALAZINE HYDROCHLORIDE 100 MG: 50 TABLET, FILM COATED ORAL at 09:07

## 2022-07-23 RX ADMIN — ALLOPURINOL 100 MG: 100 TABLET ORAL at 09:07

## 2022-07-23 RX ADMIN — ROSUVASTATIN CALCIUM 40 MG: 40 TABLET, FILM COATED ORAL at 09:07

## 2022-07-23 RX ADMIN — METOPROLOL TARTRATE 50 MG: 50 TABLET, FILM COATED ORAL at 09:07

## 2022-07-23 RX ADMIN — Medication 10 ML: at 09:10

## 2022-07-23 RX ADMIN — LEVOTHYROXINE SODIUM 50 MCG: 0.05 TABLET ORAL at 06:53

## 2022-07-24 NOTE — OUTREACH NOTE
Prep Survey    Flowsheet Row Responses   [facility] facility patient discharged from? [place]   Is LACE score < 7 ? No   Emergency Room discharge w/ pulse ox? No   Eligibility Readm Mgmt   Discharge diagnosis Auid MCLEOD   Does the patient have one of the following disease processes/diagnoses(primary or secondary)? Other   Does the patient have Home health ordered? No   Is there a DME ordered? No   Medication alerts for this patient see AVS   General alerts for this patient history of HTN, Type 2 DM, CAD s/p CABG, carotid artery stenosis s/p CEA, Diffuse Large B Cell Lymphoma in remission, glaucoma, and hypothyroidism    Prep survey completed? Yes          PETER CLIFTON - Registered Nurse

## 2022-07-26 ENCOUNTER — TELEPHONE (OUTPATIENT)
Dept: CARDIOLOGY | Facility: CLINIC | Age: 87
End: 2022-07-26

## 2022-07-27 ENCOUNTER — READMISSION MANAGEMENT (OUTPATIENT)
Dept: CALL CENTER | Facility: HOSPITAL | Age: 87
End: 2022-07-27

## 2022-07-27 NOTE — OUTREACH NOTE
Medical Week 1 Survey    Flowsheet Row Responses   [facility] patient discharged from? [place]   Does the patient have one of the following disease processes/diagnoses(primary or secondary)? Other   Week 1 attempt successful? Yes   Call start time 1653   Call end time 1658   Discharge diagnosis Pacemaker SC new Medtronic   Is patient permission given to speak with other caregiver? Yes   List who call center can speak with ELINA NAVAS Daughter    Person spoke with today (if not patient) and relationship ELINA NAVAS Daughter    Meds reviewed with patient/caregiver? Yes   Does the patient have all medications ordered at discharge? Yes   Is the patient taking all medications as directed (includes completed medication regime)? Yes   Comments regarding appointments One week wound check appt 8/2   Does the patient have a primary care provider?  Yes   Does the patient have an appointment with their PCP within 7 days of discharge? Yes   Comments regarding PCP Jerry Canseco MD PCP. Appt tomorrow 7/28   Has the patient kept scheduled appointments due by today? N/A   Has home health visited the patient within 72 hours of discharge? N/A   Psychosocial issues? No   Did the patient receive a copy of their discharge instructions? Yes   Nursing interventions Reviewed instructions with patient   What is the patient's perception of their health status since discharge? Improving  [Reports pacemaker site looks good,  no increased drainage or bleeding. ]   Is the patient/caregiver able to teach back signs and symptoms related to disease process for when to call PCP? Yes   Is the patient/caregiver able to teach back the hierarchy of who to call/visit for symptoms/problems? PCP, Specialist, Home health nurse, Urgent Care, ED, 911 Yes   If the patient is a current smoker, are they able to teach back resources for cessation? Not a smoker   Week 1 call completed? Yes   Wrap up additional comments Verbalizes awareness of activity  restrictions with LUE post pacemaker insertion.           SHERRIE ZEPEDA - Registered Nurse

## 2022-08-02 ENCOUNTER — CLINICAL SUPPORT NO REQUIREMENTS (OUTPATIENT)
Dept: CARDIOLOGY | Facility: CLINIC | Age: 87
End: 2022-08-02

## 2022-08-02 DIAGNOSIS — I44.30 AV HEART BLOCK: Primary | ICD-10-CM

## 2022-08-02 PROCEDURE — 93280 PM DEVICE PROGR EVAL DUAL: CPT | Performed by: INTERNAL MEDICINE

## 2022-08-05 ENCOUNTER — READMISSION MANAGEMENT (OUTPATIENT)
Dept: CALL CENTER | Facility: HOSPITAL | Age: 87
End: 2022-08-05

## 2022-08-05 NOTE — OUTREACH NOTE
Medical Week 2 Survey    Flowsheet Row Responses   [facility] patient discharged from? [place]   Does the patient have one of the following disease processes/diagnoses(primary or secondary)? Other   Week 2 attempt successful? No   Unsuccessful attempts Attempt 1   General alerts for this patient history of HTN, Type 2 DM, CAD s/p CABG, carotid artery stenosis s/p CEA, Diffuse Large B Cell Lymphoma in remission, glaucoma, and hypothyroidism    Discharge diagnosis Pacemaker SC new Medmarely VILLEGAS - Registered Nurse

## 2022-08-09 ENCOUNTER — READMISSION MANAGEMENT (OUTPATIENT)
Dept: CALL CENTER | Facility: HOSPITAL | Age: 87
End: 2022-08-09

## 2022-08-09 NOTE — OUTREACH NOTE
Medical Week 2 Survey    Flowsheet Row Responses   [facility] patient discharged from? [place]   Does the patient have one of the following disease processes/diagnoses(primary or secondary)? Other   Week 2 attempt successful? Yes   Call start time 1340   General alerts for this patient history of HTN, Type 2 DM, CAD s/p CABG, carotid artery stenosis s/p CEA, Diffuse Large B Cell Lymphoma in remission, glaucoma, and hypothyroidism    Discharge diagnosis Pacemaker SC new Medtronic   Call end time 1343   Meds reviewed with patient/caregiver? Yes   Is the patient having any side effects they believe may be caused by any medication additions or changes? No   Does the patient have all medications ordered at discharge? Yes   Is the patient taking all medications as directed (includes completed medication regime)? Yes   Does the patient have a primary care provider?  Yes   Does the patient have an appointment with their PCP within 7 days of discharge? Yes   Has the patient kept scheduled appointments due by today? Yes   Has home health visited the patient within 72 hours of discharge? N/A   Psychosocial issues? No   Did the patient receive a copy of their discharge instructions? Yes   Nursing interventions Reviewed instructions with patient   What is the patient's perception of their health status since discharge? Improving   Is the patient/caregiver able to teach back signs and symptoms related to disease process for when to call PCP? Yes   Is the patient/caregiver able to teach back signs and symptoms related to disease process for when to call 911? Yes   Is the patient/caregiver able to teach back the hierarchy of who to call/visit for symptoms/problems? PCP, Specialist, Home health nurse, Urgent Care, ED, 911 Yes   If the patient is a current smoker, are they able to teach back resources for cessation? Not a smoker   Additional teach back comments PM site is black and blue but healing. She says she is having a  sneezing fit, eating better to reduce BS. Encouraged DM educators   Week 2 Call Completed? Yes   Wrap up additional comments She doesn't have any questions today.          KRISTINA CLIFTON - Registered Nurse

## 2022-08-17 PROCEDURE — 93294 REM INTERROG EVL PM/LDLS PM: CPT | Performed by: INTERNAL MEDICINE

## 2022-08-17 PROCEDURE — 93296 REM INTERROG EVL PM/IDS: CPT | Performed by: INTERNAL MEDICINE

## 2022-09-08 ENCOUNTER — OFFICE VISIT (OUTPATIENT)
Dept: CARDIOLOGY | Facility: CLINIC | Age: 87
End: 2022-09-08

## 2022-09-08 ENCOUNTER — CLINICAL SUPPORT NO REQUIREMENTS (OUTPATIENT)
Dept: CARDIOLOGY | Facility: CLINIC | Age: 87
End: 2022-09-08

## 2022-09-08 VITALS
HEIGHT: 61 IN | BODY MASS INDEX: 27.38 KG/M2 | DIASTOLIC BLOOD PRESSURE: 82 MMHG | HEART RATE: 73 BPM | SYSTOLIC BLOOD PRESSURE: 138 MMHG | WEIGHT: 145 LBS

## 2022-09-08 DIAGNOSIS — Z95.0 PRESENCE OF CARDIAC PACEMAKER: ICD-10-CM

## 2022-09-08 DIAGNOSIS — I44.30 AV HEART BLOCK: Primary | ICD-10-CM

## 2022-09-08 DIAGNOSIS — I44.1 SECOND DEGREE AV BLOCK: ICD-10-CM

## 2022-09-08 DIAGNOSIS — R00.1 BRADYCARDIA: Primary | ICD-10-CM

## 2022-09-08 PROCEDURE — 93000 ELECTROCARDIOGRAM COMPLETE: CPT | Performed by: NURSE PRACTITIONER

## 2022-09-08 PROCEDURE — 93280 PM DEVICE PROGR EVAL DUAL: CPT | Performed by: INTERNAL MEDICINE

## 2022-09-08 PROCEDURE — 99024 POSTOP FOLLOW-UP VISIT: CPT | Performed by: NURSE PRACTITIONER

## 2022-09-08 RX ORDER — MONTELUKAST SODIUM 10 MG/1
10 TABLET ORAL NIGHTLY
COMMUNITY

## 2022-09-08 RX ORDER — AMLODIPINE AND OLMESARTAN MEDOXOMIL 5; 20 MG/1; MG/1
1 TABLET ORAL 2 TIMES DAILY
COMMUNITY
End: 2022-09-23 | Stop reason: HOSPADM

## 2022-09-08 RX ORDER — CETIRIZINE HYDROCHLORIDE 10 MG/1
10 TABLET ORAL DAILY
COMMUNITY
End: 2022-09-08 | Stop reason: ALTCHOICE

## 2022-09-08 RX ORDER — AMOXICILLIN 500 MG/1
500 CAPSULE ORAL 3 TIMES DAILY
COMMUNITY
Start: 2022-09-06 | End: 2022-09-11

## 2022-09-08 NOTE — PROGRESS NOTES
Date of Office Visit: 2022  Encounter Provider: SORIN Parra  Place of Service: [facility]  Patient Name: Salma Hatfield  :1935    Chief Complaint   Patient presents with   • Slow Heart Rate     BHE F/U -   • husam I   • Pacemaker Check   :     HPI: Salma Hatfield is a 86 y.o. female who has followed with Dr. Santoro (cardiology) for CAD, s/p CABG () & PCI (), severe vascular disease (carotid disease and peripheral)--s/p aorto-iliac stent, renal stent and right CEA . She also has chronic hypoxic respiratory failure/COPD---chronic home oxgyen, HTN, HLD CKD and DM.     Hx of intrathoracic diffuse large B cell lymphoma---tx --no recurrence/treatment--follows with Dr. Suarez--[facility]    She presented to [facility]  after seeing oncologist, had some abnormal labs, spoke with PCP and recommended ED. She was found to be in heart block and underwent PPM by Dr. Eisenberg .     Presents for routine post procedure follow up.     Accompanied by daughter. She has very poor vision.     Reports that she is doing pretty good. No chest pain, breathing stable, no PND, orthopnea, edema or palpitations.     Device interrogation shows normal testing and function. A paced 98%, V paced 99.9%. No arrhythmia events.    She ambulates with a walker.       Past Medical History:   Diagnosis Date   • AAA (abdominal aortic aneurysm) (HCC)     stated in 2022 Dr. Lokesh Santoro office note   • Atherosclerotic heart disease     stated in 2022 Dr. Lokesh Santoro office note   • Bradycardia    • Carotid artery stenosis     stated in 2022 Dr. Lokesh Santoro office note   • Cataract Removal 10/27/2008    Right (10/27/08) and Left (08)   • Chronic kidney disease, stage 3a (HCC)    • Chronic respiratory failure with hypoxia ([facility])    • Coronary artery disease    • Coronary atherosclerosis     stated in 2022  Dr. Lokesh Santoro office note   • Diabetes mellitus (HCC)    • Diffuse large B cell lymphoma (HCC) 06/21/2016   • Elevated cholesterol    • H/O angioplasty 12/31/2007   • Hx of CABG 09/21/2009    Triple bypass   • Hyperlipidemia    • Hyperparathyroidism (HCC) 04/17/2017   • Hypertension    • Hypertensive disorder     stated in 2- Dr. Lokesh Santoro office note   • Hypothyroidism (acquired)    • Ischemic heart disease 12/15/2016   • Pulmonary emphysema (HCC)    • Retinal tear 02/06/2009    Right eye, repaired 06/2009   • S/P arterial stent 08/26/2009    Left leg and aorta   • S/P renal artery angioplasty 01/21/2008    Left   • Shingles 03/26/2014   • Status post carotid surgery 06/13/2018   • 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HCC)     stated in 2- Dr. Lokesh Santoro office note       Past Surgical History:   Procedure Laterality Date   • CARDIAC CATHETERIZATION     • CARDIAC ELECTROPHYSIOLOGY PROCEDURE N/A 07/22/2022    Procedure: Pacemaker SC new Medtronic;  Surgeon: Kevin Eisenberg MD;  Location: [facility] INVASIVE LOCATION;  Service: Cardiology;  Laterality: N/A;   • CAROTID ENDARTERECTOMY  2018   • CORONARY ARTERY BYPASS GRAFT  2008    Triple bypass   • CORONARY STENT PLACEMENT  10/2011   • ILIAC ARTERY STENT      Aorta-iliac stent 2009   • INSERT / REPLACE / REMOVE PACEMAKER  07/2022   • RENAL ARTERY STENT Left 2008       Social History     Socioeconomic History   • Marital status:    Tobacco Use   • Smoking status: Never Smoker   • Smokeless tobacco: Never Used   Vaping Use   • Vaping Use: Never used   Substance and Sexual Activity   • Alcohol use: Never   • Drug use: Never   • Sexual activity: Defer       History reviewed. No pertinent family history.    Review of Systems   Constitutional: Negative for chills, fever and malaise/fatigue.   Cardiovascular: Positive for dyspnea on exertion (stable). Negative for chest pain, leg swelling, near-syncope, orthopnea, palpitations,  paroxysmal nocturnal dyspnea and syncope.   Respiratory: Negative for cough and shortness of breath.    Musculoskeletal: Negative for joint pain, joint swelling and myalgias.   Gastrointestinal: Negative for abdominal pain, diarrhea, melena, nausea and vomiting.   Genitourinary: Negative for frequency and hematuria.   Neurological: Negative for light-headedness, numbness, paresthesias and seizures.   Allergic/Immunologic: Negative.    All other systems reviewed and are negative.      No Known Allergies      Current Outpatient Medications:   •  allopurinol (ZYLOPRIM) 100 MG tablet, Take 100 mg by mouth 2 (Two) Times a Day., Disp: , Rfl:   •  amlodipine-olmesartan (ANT) 5-20 MG per tablet, Take 0.5 tablets by mouth 2 (Two) Times a Day., Disp: , Rfl:   •  amoxicillin (AMOXIL) 500 MG capsule, Take 500 mg by mouth 3 (Three) Times a Day., Disp: , Rfl:   •  aspirin 81 MG EC tablet, Take 81 mg by mouth Daily., Disp: , Rfl:   •  Calcium Carbonate-Vitamin D 600-200 MG-UNIT tablet, Take 1 tablet by mouth Daily., Disp: , Rfl:   •  docusate sodium (COLACE) 100 MG capsule, Take 100 mg by mouth 2 (Two) Times a Day., Disp: , Rfl:   •  dorzolamide-timolol (COSOPT) 22.3-6.8 MG/ML ophthalmic solution, Administer 1 drop into the left eye 2 (Two) Times a Day., Disp: , Rfl:   •  ezetimibe (ZETIA) 10 MG tablet, Take 10 mg by mouth Daily., Disp: , Rfl:   •  famotidine (PEPCID) 20 MG tablet, Take 20 mg by mouth 2 (Two) Times a Day., Disp: , Rfl:   •  gabapentin (NEURONTIN) 300 MG capsule, Take 300 mg by mouth Daily., Disp: , Rfl:   •  gabapentin (NEURONTIN) 600 MG tablet, Take 600 mg by mouth Every Night., Disp: , Rfl:   •  glipizide (GLUCOTROL XL) 5 MG ER tablet, Take 5 mg by mouth Daily., Disp: , Rfl:   •  hydrALAZINE (APRESOLINE) 50 MG tablet, Take 100 mg by mouth 3 (Three) Times a Day., Disp: , Rfl:   •  HYDROcodone-acetaminophen (NORCO) 5-325 MG per tablet, Take 1 tablet by mouth Every 8 (Eight) Hours As Needed., Disp: , Rfl:   •   "latanoprost (XALATAN) 0.005 % ophthalmic solution, Administer 1 drop into the left eye Every Night., Disp: , Rfl:   •  levothyroxine (SYNTHROID, LEVOTHROID) 50 MCG tablet, Take 50 mcg by mouth Daily., Disp: , Rfl:   •  metoprolol tartrate (LOPRESSOR) 50 MG tablet, Take 50 mg by mouth 2 (Two) Times a Day., Disp: , Rfl:   •  montelukast (SINGULAIR) 10 MG tablet, Take 10 mg by mouth Every Night., Disp: , Rfl:   •  multivitamin with minerals tablet tablet, Take 1 tablet by mouth Daily., Disp: , Rfl:   •  rosuvastatin (CRESTOR) 20 MG tablet, Take 40 mg by mouth Daily., Disp: , Rfl:   •  SITagliptin (JANUVIA) 50 MG tablet, Take 50 mg by mouth Daily., Disp: , Rfl:       Objective:     Vitals:    22 0847   BP: 138/82   Pulse: 73   Weight: 65.8 kg (145 lb)   Height: 154.9 cm (61\")     Body mass index is 27.4 kg/m².    PHYSICAL EXAM:    Vitals Reviewed.   General Appearance: No acute distress.  Eyes: Conjunctiva and lids: No erythema, swelling, or discharge. Sclera non-icteric.   HENT: Atraumatic, normocephalic. External eyes, ears, and nose normal.   Respiratory: No signs of respiratory distress. Respiration rhythm and depth normal.   Decreased breath sounds through out.   Cardiovascular:  Heart Rate and Rhythm: Normal, Heart Sounds: S1 and S2.   Murmurs: No murmurs noted. No rubs, thrills, or gallops.   Lower Extremities: No edema noted.  Gastrointestinal:  Abdomen soft, non-distended, non-tender.   Musculoskeletal: Normal movement of extremities  Skin: Warm and dry.   Psychiatric: Patient alert and oriented to person, place, and time. Speech and behavior appropriate. Normal mood and affect.       ECG 12 Lead    Date/Time: 2022 10:06 AM  Performed by: Erica Spann APRN  Authorized by: Erica Spann APRN   Comparison: compared with previous ECG   Similar to previous ECG  Rhythm: paced  BPM: 73  Pacin% capture and dual chamber pacing            Assessment:       Diagnosis Plan   1. Bradycardia     2. " Second degree AV block     3. Presence of cardiac pacemaker            Plan:       1.-3. Bradycardia, high degree AV block, s/p PPM. Normal function doing well.     She has previously followed with Dr. TRISTEN Santoro for her primary cardiology but thinks she will continue to follow with us here at [facility].     Follow up with Dr. Eisenberg in 3 months w/device check.     As always, it has been a pleasure to participate in your patient's care.      Sincerely,         SORIN Solano

## 2022-09-21 ENCOUNTER — APPOINTMENT (OUTPATIENT)
Dept: GENERAL RADIOLOGY | Facility: HOSPITAL | Age: 87
End: 2022-09-21

## 2022-09-21 ENCOUNTER — HOSPITAL ENCOUNTER (OUTPATIENT)
Facility: HOSPITAL | Age: 87
Setting detail: OBSERVATION
Discharge: HOME | End: 2022-09-23
Attending: EMERGENCY MEDICINE | Admitting: HOSPITALIST

## 2022-09-21 DIAGNOSIS — M54.2 NECK PAIN: ICD-10-CM

## 2022-09-21 DIAGNOSIS — R07.9 CHEST PAIN WITH HIGH RISK FOR CARDIAC ETIOLOGY: Primary | ICD-10-CM

## 2022-09-21 DIAGNOSIS — R73.9 HYPERGLYCEMIA: ICD-10-CM

## 2022-09-21 DIAGNOSIS — N39.0 ACUTE UTI: ICD-10-CM

## 2022-09-21 PROBLEM — N18.32 STAGE 3B CHRONIC KIDNEY DISEASE: Chronic | Status: ACTIVE | Noted: 2022-07-21

## 2022-09-21 PROBLEM — Z86.16 HISTORY OF 2019 NOVEL CORONAVIRUS DISEASE (COVID-19): Chronic | Status: ACTIVE | Noted: 2022-09-21

## 2022-09-21 PROBLEM — I16.0 HYPERTENSIVE URGENCY: Status: ACTIVE | Noted: 2022-09-21

## 2022-09-21 LAB
ALBUMIN SERPL-MCNC: 4.3 G/DL (ref 3.5–5.2)
ALBUMIN/GLOB SERPL: 1.6 G/DL
ALP SERPL-CCNC: 72 U/L (ref 39–117)
ALT SERPL W P-5'-P-CCNC: 26 U/L (ref 1–33)
ANION GAP SERPL CALCULATED.3IONS-SCNC: 11.1 MMOL/L (ref 5–15)
AST SERPL-CCNC: 27 U/L (ref 1–32)
BACTERIA UR QL AUTO: ABNORMAL /HPF
BASOPHILS # BLD AUTO: 0.03 10*3/MM3 (ref 0–0.2)
BASOPHILS NFR BLD AUTO: 0.5 % (ref 0–1.5)
BILIRUB SERPL-MCNC: 0.5 MG/DL (ref 0–1.2)
BILIRUB UR QL STRIP: NEGATIVE
BUN SERPL-MCNC: 39 MG/DL (ref 8–23)
BUN/CREAT SERPL: 27.5 (ref 7–25)
CALCIUM SPEC-SCNC: 10.7 MG/DL (ref 8.6–10.5)
CHLORIDE SERPL-SCNC: 98 MMOL/L (ref 98–107)
CLARITY UR: CLEAR
CO2 SERPL-SCNC: 25.9 MMOL/L (ref 22–29)
COLOR UR: YELLOW
CREAT SERPL-MCNC: 1.42 MG/DL (ref 0.57–1)
CRP SERPL-MCNC: 1.07 MG/DL (ref 0–0.5)
D DIMER PPP FEU-MCNC: 0.97 MCGFEU/ML (ref 0–0.49)
DEPRECATED RDW RBC AUTO: 53.9 FL (ref 37–54)
EGFRCR SERPLBLD CKD-EPI 2021: 36.1 ML/MIN/1.73
EOSINOPHIL # BLD AUTO: 0.09 10*3/MM3 (ref 0–0.4)
EOSINOPHIL NFR BLD AUTO: 1.4 % (ref 0.3–6.2)
ERYTHROCYTE [DISTWIDTH] IN BLOOD BY AUTOMATED COUNT: 14.5 % (ref 12.3–15.4)
ERYTHROCYTE [SEDIMENTATION RATE] IN BLOOD: 17 MM/HR (ref 0–30)
FERRITIN SERPL-MCNC: 92.7 NG/ML (ref 13–150)
GLOBULIN UR ELPH-MCNC: 2.7 GM/DL
GLUCOSE BLDC GLUCOMTR-MCNC: 338 MG/DL (ref 70–130)
GLUCOSE BLDC GLUCOMTR-MCNC: 377 MG/DL (ref 70–130)
GLUCOSE SERPL-MCNC: 410 MG/DL (ref 65–99)
GLUCOSE UR STRIP-MCNC: ABNORMAL MG/DL
HCT VFR BLD AUTO: 39.8 % (ref 34–46.6)
HGB BLD-MCNC: 12.3 G/DL (ref 12–15.9)
HGB UR QL STRIP.AUTO: NEGATIVE
HYALINE CASTS UR QL AUTO: ABNORMAL /LPF
IMM GRANULOCYTES # BLD AUTO: 0.03 10*3/MM3 (ref 0–0.05)
IMM GRANULOCYTES NFR BLD AUTO: 0.5 % (ref 0–0.5)
KETONES UR QL STRIP: NEGATIVE
LEUKOCYTE ESTERASE UR QL STRIP.AUTO: ABNORMAL
LYMPHOCYTES # BLD AUTO: 0.98 10*3/MM3 (ref 0.7–3.1)
LYMPHOCYTES NFR BLD AUTO: 15.2 % (ref 19.6–45.3)
MAGNESIUM SERPL-MCNC: 2.2 MG/DL (ref 1.6–2.4)
MCH RBC QN AUTO: 31.6 PG (ref 26.6–33)
MCHC RBC AUTO-ENTMCNC: 30.9 G/DL (ref 31.5–35.7)
MCV RBC AUTO: 102.3 FL (ref 79–97)
MONOCYTES # BLD AUTO: 0.95 10*3/MM3 (ref 0.1–0.9)
MONOCYTES NFR BLD AUTO: 14.8 % (ref 5–12)
NEUTROPHILS NFR BLD AUTO: 4.35 10*3/MM3 (ref 1.7–7)
NEUTROPHILS NFR BLD AUTO: 67.6 % (ref 42.7–76)
NITRITE UR QL STRIP: NEGATIVE
NRBC BLD AUTO-RTO: 0.2 /100 WBC (ref 0–0.2)
NT-PROBNP SERPL-MCNC: 1891 PG/ML (ref 0–1800)
PH UR STRIP.AUTO: 8 [PH] (ref 5–8)
PLATELET # BLD AUTO: 119 10*3/MM3 (ref 140–450)
PMV BLD AUTO: 10.4 FL (ref 6–12)
POTASSIUM SERPL-SCNC: 4.4 MMOL/L (ref 3.5–5.2)
PROT SERPL-MCNC: 7 G/DL (ref 6–8.5)
PROT UR QL STRIP: ABNORMAL
QT INTERVAL: 401 MS
QT INTERVAL: 413 MS
RBC # BLD AUTO: 3.89 10*6/MM3 (ref 3.77–5.28)
RBC # UR STRIP: ABNORMAL /HPF
REF LAB TEST METHOD: ABNORMAL
SODIUM SERPL-SCNC: 135 MMOL/L (ref 136–145)
SP GR UR STRIP: 1.01 (ref 1–1.03)
SQUAMOUS #/AREA URNS HPF: ABNORMAL /HPF
TROPONIN T SERPL-MCNC: <0.01 NG/ML (ref 0–0.03)
TROPONIN T SERPL-MCNC: <0.01 NG/ML (ref 0–0.03)
UROBILINOGEN UR QL STRIP: ABNORMAL
WBC # UR STRIP: ABNORMAL /HPF
WBC NRBC COR # BLD: 6.43 10*3/MM3 (ref 3.4–10.8)

## 2022-09-21 PROCEDURE — 87086 URINE CULTURE/COLONY COUNT: CPT | Performed by: PHYSICIAN ASSISTANT

## 2022-09-21 PROCEDURE — 85652 RBC SED RATE AUTOMATED: CPT | Performed by: INTERNAL MEDICINE

## 2022-09-21 PROCEDURE — 93005 ELECTROCARDIOGRAM TRACING: CPT | Performed by: EMERGENCY MEDICINE

## 2022-09-21 PROCEDURE — 63710000001 INSULIN REGULAR HUMAN PER 5 UNITS: Performed by: PHYSICIAN ASSISTANT

## 2022-09-21 PROCEDURE — 84484 ASSAY OF TROPONIN QUANT: CPT | Performed by: PHYSICIAN ASSISTANT

## 2022-09-21 PROCEDURE — 87186 SC STD MICRODIL/AGAR DIL: CPT | Performed by: PHYSICIAN ASSISTANT

## 2022-09-21 PROCEDURE — 63710000001 INSULIN LISPRO (HUMAN) PER 5 UNITS: Performed by: INTERNAL MEDICINE

## 2022-09-21 PROCEDURE — 82728 ASSAY OF FERRITIN: CPT | Performed by: INTERNAL MEDICINE

## 2022-09-21 PROCEDURE — 93010 ELECTROCARDIOGRAM REPORT: CPT | Performed by: INTERNAL MEDICINE

## 2022-09-21 PROCEDURE — 84484 ASSAY OF TROPONIN QUANT: CPT | Performed by: INTERNAL MEDICINE

## 2022-09-21 PROCEDURE — 86140 C-REACTIVE PROTEIN: CPT | Performed by: INTERNAL MEDICINE

## 2022-09-21 PROCEDURE — 83880 ASSAY OF NATRIURETIC PEPTIDE: CPT | Performed by: PHYSICIAN ASSISTANT

## 2022-09-21 PROCEDURE — 99285 EMERGENCY DEPT VISIT HI MDM: CPT

## 2022-09-21 PROCEDURE — G0378 HOSPITAL OBSERVATION PER HR: HCPCS

## 2022-09-21 PROCEDURE — 71045 X-RAY EXAM CHEST 1 VIEW: CPT

## 2022-09-21 PROCEDURE — 96372 THER/PROPH/DIAG INJ SC/IM: CPT

## 2022-09-21 PROCEDURE — 83735 ASSAY OF MAGNESIUM: CPT | Performed by: PHYSICIAN ASSISTANT

## 2022-09-21 PROCEDURE — 87077 CULTURE AEROBIC IDENTIFY: CPT | Performed by: PHYSICIAN ASSISTANT

## 2022-09-21 PROCEDURE — 82962 GLUCOSE BLOOD TEST: CPT

## 2022-09-21 PROCEDURE — 85025 COMPLETE CBC W/AUTO DIFF WBC: CPT | Performed by: PHYSICIAN ASSISTANT

## 2022-09-21 PROCEDURE — 25010000002 ENOXAPARIN PER 10 MG: Performed by: INTERNAL MEDICINE

## 2022-09-21 PROCEDURE — 85379 FIBRIN DEGRADATION QUANT: CPT | Performed by: PHYSICIAN ASSISTANT

## 2022-09-21 PROCEDURE — 25010000002 CEFTRIAXONE PER 250 MG: Performed by: PHYSICIAN ASSISTANT

## 2022-09-21 PROCEDURE — 96365 THER/PROPH/DIAG IV INF INIT: CPT

## 2022-09-21 PROCEDURE — 80053 COMPREHEN METABOLIC PANEL: CPT | Performed by: PHYSICIAN ASSISTANT

## 2022-09-21 PROCEDURE — 93005 ELECTROCARDIOGRAM TRACING: CPT | Performed by: INTERNAL MEDICINE

## 2022-09-21 PROCEDURE — 81001 URINALYSIS AUTO W/SCOPE: CPT | Performed by: PHYSICIAN ASSISTANT

## 2022-09-21 RX ORDER — ACETAMINOPHEN 160 MG/5ML
650 SOLUTION ORAL EVERY 4 HOURS PRN
Status: DISCONTINUED | OUTPATIENT
Start: 2022-09-21 | End: 2022-09-23 | Stop reason: HOSPADM

## 2022-09-21 RX ORDER — HYDROCODONE BITARTRATE AND ACETAMINOPHEN 5; 325 MG/1; MG/1
1 TABLET ORAL EVERY 8 HOURS PRN
Status: DISCONTINUED | OUTPATIENT
Start: 2022-09-21 | End: 2022-09-23 | Stop reason: HOSPADM

## 2022-09-21 RX ORDER — GABAPENTIN 300 MG/1
300 CAPSULE ORAL EVERY MORNING
Status: DISCONTINUED | OUTPATIENT
Start: 2022-09-22 | End: 2022-09-23 | Stop reason: HOSPADM

## 2022-09-21 RX ORDER — CALCIUM CARBONATE 200(500)MG
2 TABLET,CHEWABLE ORAL 2 TIMES DAILY PRN
Status: DISCONTINUED | OUTPATIENT
Start: 2022-09-21 | End: 2022-09-23 | Stop reason: HOSPADM

## 2022-09-21 RX ORDER — HYDRALAZINE HYDROCHLORIDE 50 MG/1
100 TABLET, FILM COATED ORAL 3 TIMES DAILY
Status: DISCONTINUED | OUTPATIENT
Start: 2022-09-21 | End: 2022-09-23 | Stop reason: HOSPADM

## 2022-09-21 RX ORDER — GABAPENTIN 300 MG/1
600 CAPSULE ORAL NIGHTLY
Status: DISCONTINUED | OUTPATIENT
Start: 2022-09-21 | End: 2022-09-23 | Stop reason: HOSPADM

## 2022-09-21 RX ORDER — MORPHINE SULFATE 2 MG/ML
1 INJECTION, SOLUTION INTRAMUSCULAR; INTRAVENOUS EVERY 4 HOURS PRN
Status: DISCONTINUED | OUTPATIENT
Start: 2022-09-21 | End: 2022-09-23 | Stop reason: HOSPADM

## 2022-09-21 RX ORDER — ROSUVASTATIN CALCIUM 10 MG/1
10 TABLET, COATED ORAL DAILY
Status: DISCONTINUED | OUTPATIENT
Start: 2022-09-22 | End: 2022-09-23 | Stop reason: HOSPADM

## 2022-09-21 RX ORDER — DORZOLAMIDE HYDROCHLORIDE AND TIMOLOL MALEATE 20; 5 MG/ML; MG/ML
SOLUTION/ DROPS OPHTHALMIC 2 TIMES DAILY
Status: DISCONTINUED | OUTPATIENT
Start: 2022-09-21 | End: 2022-09-23 | Stop reason: HOSPADM

## 2022-09-21 RX ORDER — AMOXICILLIN 250 MG
2 CAPSULE ORAL 2 TIMES DAILY
Status: DISCONTINUED | OUTPATIENT
Start: 2022-09-21 | End: 2022-09-23 | Stop reason: HOSPADM

## 2022-09-21 RX ORDER — BISACODYL 5 MG/1
5 TABLET, DELAYED RELEASE ORAL DAILY PRN
Status: DISCONTINUED | OUTPATIENT
Start: 2022-09-21 | End: 2022-09-23 | Stop reason: HOSPADM

## 2022-09-21 RX ORDER — ACETAMINOPHEN 325 MG/1
650 TABLET ORAL EVERY 4 HOURS PRN
Status: DISCONTINUED | OUTPATIENT
Start: 2022-09-21 | End: 2022-09-23 | Stop reason: HOSPADM

## 2022-09-21 RX ORDER — DOCUSATE SODIUM 100 MG/1
100 CAPSULE, LIQUID FILLED ORAL 2 TIMES DAILY
Status: DISCONTINUED | OUTPATIENT
Start: 2022-09-21 | End: 2022-09-23 | Stop reason: HOSPADM

## 2022-09-21 RX ORDER — LEVOTHYROXINE SODIUM 0.05 MG/1
50 TABLET ORAL
Status: DISCONTINUED | OUTPATIENT
Start: 2022-09-22 | End: 2022-09-23 | Stop reason: HOSPADM

## 2022-09-21 RX ORDER — ALLOPURINOL 100 MG/1
100 TABLET ORAL 2 TIMES DAILY
Status: DISCONTINUED | OUTPATIENT
Start: 2022-09-21 | End: 2022-09-23 | Stop reason: HOSPADM

## 2022-09-21 RX ORDER — ASPIRIN 81 MG/1
243 TABLET, CHEWABLE ORAL ONCE
Status: COMPLETED | OUTPATIENT
Start: 2022-09-21 | End: 2022-09-21

## 2022-09-21 RX ORDER — ACETAMINOPHEN 650 MG/1
650 SUPPOSITORY RECTAL EVERY 4 HOURS PRN
Status: DISCONTINUED | OUTPATIENT
Start: 2022-09-21 | End: 2022-09-23 | Stop reason: HOSPADM

## 2022-09-21 RX ORDER — NALOXONE HCL 0.4 MG/ML
0.4 VIAL (ML) INJECTION
Status: DISCONTINUED | OUTPATIENT
Start: 2022-09-21 | End: 2022-09-23 | Stop reason: HOSPADM

## 2022-09-21 RX ORDER — GLIPIZIDE 5 MG/1
5 TABLET ORAL
Status: DISCONTINUED | OUTPATIENT
Start: 2022-09-21 | End: 2022-09-23 | Stop reason: HOSPADM

## 2022-09-21 RX ORDER — FLUTICASONE PROPIONATE 50 MCG
2 SPRAY, SUSPENSION (ML) NASAL DAILY
Status: DISCONTINUED | OUTPATIENT
Start: 2022-09-21 | End: 2022-09-23 | Stop reason: HOSPADM

## 2022-09-21 RX ORDER — DEXTROSE MONOHYDRATE 25 G/50ML
25 INJECTION, SOLUTION INTRAVENOUS
Status: DISCONTINUED | OUTPATIENT
Start: 2022-09-21 | End: 2022-09-23 | Stop reason: HOSPADM

## 2022-09-21 RX ORDER — IPRATROPIUM BROMIDE AND ALBUTEROL SULFATE 2.5; .5 MG/3ML; MG/3ML
3 SOLUTION RESPIRATORY (INHALATION) EVERY 4 HOURS PRN
Status: DISCONTINUED | OUTPATIENT
Start: 2022-09-21 | End: 2022-09-23 | Stop reason: HOSPADM

## 2022-09-21 RX ORDER — ASPIRIN 81 MG/1
324 TABLET, CHEWABLE ORAL ONCE
Status: DISCONTINUED | OUTPATIENT
Start: 2022-09-21 | End: 2022-09-21

## 2022-09-21 RX ORDER — INSULIN LISPRO 100 [IU]/ML
0-7 INJECTION, SOLUTION INTRAVENOUS; SUBCUTANEOUS
Status: DISCONTINUED | OUTPATIENT
Start: 2022-09-21 | End: 2022-09-23 | Stop reason: HOSPADM

## 2022-09-21 RX ORDER — ENOXAPARIN SODIUM 100 MG/ML
30 INJECTION SUBCUTANEOUS EVERY 24 HOURS
Status: DISCONTINUED | OUTPATIENT
Start: 2022-09-21 | End: 2022-09-23 | Stop reason: HOSPADM

## 2022-09-21 RX ORDER — ASPIRIN 81 MG/1
81 TABLET ORAL DAILY
Status: DISCONTINUED | OUTPATIENT
Start: 2022-09-22 | End: 2022-09-23 | Stop reason: HOSPADM

## 2022-09-21 RX ORDER — SODIUM CHLORIDE 0.9 % (FLUSH) 0.9 %
10 SYRINGE (ML) INJECTION EVERY 12 HOURS SCHEDULED
Status: DISCONTINUED | OUTPATIENT
Start: 2022-09-21 | End: 2022-09-23 | Stop reason: HOSPADM

## 2022-09-21 RX ORDER — GUAIFENESIN 600 MG/1
600 TABLET, EXTENDED RELEASE ORAL EVERY 12 HOURS SCHEDULED
Status: DISCONTINUED | OUTPATIENT
Start: 2022-09-21 | End: 2022-09-23 | Stop reason: HOSPADM

## 2022-09-21 RX ORDER — UREA 10 %
1 LOTION (ML) TOPICAL NIGHTLY PRN
Status: DISCONTINUED | OUTPATIENT
Start: 2022-09-21 | End: 2022-09-23 | Stop reason: HOSPADM

## 2022-09-21 RX ORDER — NICOTINE POLACRILEX 4 MG
15 LOZENGE BUCCAL
Status: DISCONTINUED | OUTPATIENT
Start: 2022-09-21 | End: 2022-09-23 | Stop reason: HOSPADM

## 2022-09-21 RX ORDER — POLYETHYLENE GLYCOL 3350 17 G/17G
17 POWDER, FOR SOLUTION ORAL DAILY PRN
Status: DISCONTINUED | OUTPATIENT
Start: 2022-09-21 | End: 2022-09-23 | Stop reason: HOSPADM

## 2022-09-21 RX ORDER — FAMOTIDINE 20 MG/1
20 TABLET, FILM COATED ORAL DAILY
Status: DISCONTINUED | OUTPATIENT
Start: 2022-09-21 | End: 2022-09-23 | Stop reason: HOSPADM

## 2022-09-21 RX ORDER — ONDANSETRON 2 MG/ML
4 INJECTION INTRAMUSCULAR; INTRAVENOUS EVERY 6 HOURS PRN
Status: DISCONTINUED | OUTPATIENT
Start: 2022-09-21 | End: 2022-09-23 | Stop reason: HOSPADM

## 2022-09-21 RX ORDER — IPRATROPIUM BROMIDE 21 UG/1
2 SPRAY, METERED NASAL EVERY 12 HOURS
Status: DISCONTINUED | OUTPATIENT
Start: 2022-09-21 | End: 2022-09-23 | Stop reason: HOSPADM

## 2022-09-21 RX ORDER — MONTELUKAST SODIUM 10 MG/1
10 TABLET ORAL NIGHTLY
Status: DISCONTINUED | OUTPATIENT
Start: 2022-09-21 | End: 2022-09-23 | Stop reason: HOSPADM

## 2022-09-21 RX ORDER — LATANOPROST 50 UG/ML
1 SOLUTION/ DROPS OPHTHALMIC NIGHTLY
Status: DISCONTINUED | OUTPATIENT
Start: 2022-09-21 | End: 2022-09-23 | Stop reason: HOSPADM

## 2022-09-21 RX ORDER — METOPROLOL TARTRATE 50 MG/1
50 TABLET, FILM COATED ORAL 2 TIMES DAILY
Status: DISCONTINUED | OUTPATIENT
Start: 2022-09-21 | End: 2022-09-23 | Stop reason: HOSPADM

## 2022-09-21 RX ORDER — IPRATROPIUM BROMIDE AND ALBUTEROL SULFATE 2.5; .5 MG/3ML; MG/3ML
3 SOLUTION RESPIRATORY (INHALATION) EVERY 4 HOURS PRN
Status: DISCONTINUED | OUTPATIENT
Start: 2022-09-21 | End: 2022-09-21 | Stop reason: SDUPTHER

## 2022-09-21 RX ORDER — ONDANSETRON 4 MG/1
4 TABLET, FILM COATED ORAL EVERY 6 HOURS PRN
Status: DISCONTINUED | OUTPATIENT
Start: 2022-09-21 | End: 2022-09-23 | Stop reason: HOSPADM

## 2022-09-21 RX ORDER — FLUTICASONE PROPIONATE 50 MCG
2 SPRAY, SUSPENSION (ML) NASAL DAILY
COMMUNITY

## 2022-09-21 RX ORDER — MULTIPLE VITAMINS W/ MINERALS TAB 9MG-400MCG
1 TAB ORAL DAILY
Status: DISCONTINUED | OUTPATIENT
Start: 2022-09-21 | End: 2022-09-23 | Stop reason: HOSPADM

## 2022-09-21 RX ORDER — NITROGLYCERIN 0.4 MG/1
0.4 TABLET SUBLINGUAL
Status: DISCONTINUED | OUTPATIENT
Start: 2022-09-21 | End: 2022-09-23 | Stop reason: HOSPADM

## 2022-09-21 RX ORDER — BISACODYL 10 MG
10 SUPPOSITORY, RECTAL RECTAL DAILY PRN
Status: DISCONTINUED | OUTPATIENT
Start: 2022-09-21 | End: 2022-09-23 | Stop reason: HOSPADM

## 2022-09-21 RX ADMIN — ALLOPURINOL 100 MG: 100 TABLET ORAL at 21:36

## 2022-09-21 RX ADMIN — GLIPIZIDE 5 MG: 5 TABLET ORAL at 21:36

## 2022-09-21 RX ADMIN — DOCUSATE SODIUM 100 MG: 100 CAPSULE, LIQUID FILLED ORAL at 21:35

## 2022-09-21 RX ADMIN — GUAIFENESIN 600 MG: 600 TABLET, EXTENDED RELEASE ORAL at 21:36

## 2022-09-21 RX ADMIN — IPRATROPIUM BROMIDE 2 SPRAY: 21 SPRAY, METERED NASAL at 21:43

## 2022-09-21 RX ADMIN — TIMOLOL MALEATE: 5 SOLUTION OPHTHALMIC at 21:43

## 2022-09-21 RX ADMIN — FLUTICASONE PROPIONATE 2 SPRAY: 50 SPRAY, METERED NASAL at 21:43

## 2022-09-21 RX ADMIN — METOPROLOL TARTRATE 50 MG: 50 TABLET, FILM COATED ORAL at 21:35

## 2022-09-21 RX ADMIN — Medication 10 ML: at 21:43

## 2022-09-21 RX ADMIN — INSULIN LISPRO 5 UNITS: 100 INJECTION, SOLUTION INTRAVENOUS; SUBCUTANEOUS at 21:36

## 2022-09-21 RX ADMIN — ASPIRIN 243 MG: 81 TABLET, CHEWABLE ORAL at 14:18

## 2022-09-21 RX ADMIN — GABAPENTIN 600 MG: 300 CAPSULE ORAL at 21:35

## 2022-09-21 RX ADMIN — MULTIPLE VITAMINS W/ MINERALS TAB 1 TABLET: TAB at 21:35

## 2022-09-21 RX ADMIN — ENOXAPARIN SODIUM 30 MG: 30 INJECTION SUBCUTANEOUS at 21:35

## 2022-09-21 RX ADMIN — FAMOTIDINE 20 MG: 20 TABLET ORAL at 21:35

## 2022-09-21 RX ADMIN — CEFTRIAXONE SODIUM 1 G: 1 INJECTION, POWDER, FOR SOLUTION INTRAMUSCULAR; INTRAVENOUS at 15:19

## 2022-09-21 RX ADMIN — INSULIN HUMAN 8 UNITS: 100 INJECTION, SOLUTION PARENTERAL at 14:31

## 2022-09-21 RX ADMIN — DOCUSATE SODIUM 50MG AND SENNOSIDES 8.6MG 2 TABLET: 8.6; 5 TABLET, FILM COATED ORAL at 21:35

## 2022-09-21 RX ADMIN — LATANOPROST 1 DROP: 50 SOLUTION OPHTHALMIC at 21:43

## 2022-09-21 RX ADMIN — MONTELUKAST SODIUM 10 MG: 10 TABLET, FILM COATED ORAL at 21:35

## 2022-09-21 RX ADMIN — HYDRALAZINE HYDROCHLORIDE 100 MG: 50 TABLET, FILM COATED ORAL at 21:35

## 2022-09-21 NOTE — PLAN OF CARE
Goal Outcome Evaluation:   Patient admitted with cp, uti, hyperglycemia. Cardiology was consulted, ssi ordered, iv abx ordered. Pacemaker present, av paced. Cr is elevated at 1.42. Pt is alert and oriented and up with standby assist. She is chronically on 3L o2 via NC. She will be on lovenox. Daily weights ordered as bnp was elevated. Patient not currently on any diuretics and home and appears euvolemic. Norco q8h prn for chronic pain. Very pleasant patient.

## 2022-09-21 NOTE — ED NOTES
Nursing report ED to floor  Salma Hatfield  86 y.o.  female    HPI :   Chief Complaint   Patient presents with   • Chest Pain       Admitting doctor:   Bronwyn Brito MD    Admitting diagnosis:   The primary encounter diagnosis was Chest pain with high risk for cardiac etiology. Diagnoses of Acute UTI and Hyperglycemia were also pertinent to this visit.    Code status:   Current Code Status     Date Active Code Status Order ID Comments User Context       Prior    Advance Care Planning Activity          Allergies:   Patient has no known allergies.    Isolation:   No active isolations    Intake and Output  No intake or output data in the 24 hours ending 09/21/22 1558    Weight:   There were no vitals filed for this visit.    Most recent vitals:   Vitals:    09/21/22 1231 09/21/22 1245 09/21/22 1250 09/21/22 1314   BP:    153/76   Pulse: 80 71 71 71   Resp:       Temp:       TempSrc:       SpO2: 94% 94% 94% 94%       Active LDAs/IV Access:   Lines, Drains & Airways     Active LDAs     Name Placement date Placement time Site Days    Peripheral IV 09/21/22 1250 Left Antecubital 09/21/22  1250  Antecubital  less than 1                Labs (abnormal labs have a star):   Labs Reviewed   COMPREHENSIVE METABOLIC PANEL - Abnormal; Notable for the following components:       Result Value    Glucose 410 (*)     BUN 39 (*)     Creatinine 1.42 (*)     Sodium 135 (*)     Calcium 10.7 (*)     BUN/Creatinine Ratio 27.5 (*)     eGFR 36.1 (*)     All other components within normal limits    Narrative:     GFR Normal >60  Chronic Kidney Disease <60  Kidney Failure <15     BNP (IN-HOUSE) - Abnormal; Notable for the following components:    proBNP 1,891.0 (*)     All other components within normal limits    Narrative:     Among patients with dyspnea, NT-proBNP is highly sensitive for the detection of acute congestive heart failure. In addition NT-proBNP of <300 pg/ml effectively rules out acute congestive heart failure with 99%  negative predictive value.    Results may be falsely decreased if patient taking Biotin.     CBC WITH AUTO DIFFERENTIAL - Abnormal; Notable for the following components:    .3 (*)     MCHC 30.9 (*)     Platelets 119 (*)     Lymphocyte % 15.2 (*)     Monocyte % 14.8 (*)     Monocytes, Absolute 0.95 (*)     All other components within normal limits   D-DIMER, QUANTITATIVE - Abnormal; Notable for the following components:    D-Dimer, Quantitative 0.97 (*)     All other components within normal limits    Narrative:     The Stago D-Dimer test used in conjunction with a clinical pretest probability (PTP) assessment model, has been approved by the FDA to rule out the presence of venous thromboembolism (VTE) in outpatients suspected of deep venous thrombosis (DVT) or pulmonary embolism (PE). The cut-off for negative predictive value is <0.50 MCGFEU/mL.   URINALYSIS W/ CULTURE IF INDICATED - Abnormal; Notable for the following components:    Glucose, UA >=1000 mg/dL (3+) (*)     Protein, UA 30 mg/dL (1+) (*)     Leuk Esterase, UA Small (1+) (*)     All other components within normal limits    Narrative:     In absence of clinical symptoms, the presence of pyuria, bacteria, and/or nitrites on the urinalysis result does not correlate with infection.   URINALYSIS, MICROSCOPIC ONLY - Abnormal; Notable for the following components:    WBC, UA Too Numerous to Count (*)     Bacteria, UA 2+ (*)     All other components within normal limits   POCT GLUCOSE FINGERSTICK - Abnormal; Notable for the following components:    Glucose 377 (*)     All other components within normal limits   TROPONIN (IN-HOUSE) - Normal    Narrative:     Troponin T Reference Range:  <= 0.03 ng/mL-   Negative for AMI  >0.03 ng/mL-     Abnormal for myocardial necrosis.  Clinicians would have to utilize clinical acumen, EKG, Troponin and serial changes to determine if it is an Acute Myocardial Infarction or myocardial injury due to an underlying chronic  condition.       Results may be falsely decreased if patient taking Biotin.     MAGNESIUM - Normal   URINE CULTURE   CBC AND DIFFERENTIAL    Narrative:     The following orders were created for panel order CBC & Differential.  Procedure                               Abnormality         Status                     ---------                               -----------         ------                     CBC Auto Differential[[id number]]        Abnormal            Final result                 Please view results for these tests on the individual orders.       EKG:   ECG 12 Lead   Final Result   HEART RATE= 70  bpm   RR Interval= 857  ms   [place] Interval= 168  ms   P Horizontal Axis= -70  deg   P Front Axis= 62  deg   QRSD Interval= 122  ms   QT Interval= 413  ms   QRS Axis= -44  deg   T Wave Axis= 49  deg   - ABNORMAL ECG -   Atrial-ventricular dual-paced complexes   No further analysis attempted due to paced rhythm   No change from previous tracing   Electronically Signed By: Stephen Mckeon ([facility]) 21-Sep-2022 13:33:29   Date and Time of Study: 2022-09-21 12:35:47          Meds given in ED:   Medications   aspirin chewable tablet 243 mg (243 mg Oral Given 9/21/22 1418)   insulin regular (humuLIN R,novoLIN R) injection 8 Units (8 Units Subcutaneous Given 9/21/22 1431)   cefTRIAXone (ROCEPHIN) 1 g in sodium chloride 0.9 % 100 mL IVPB-VTB (1 g Intravenous New Bag 9/21/22 1519)       Imaging results:  XR Chest 1 View    Result Date: 9/21/2022  No evidence for active disease in the chest.  This report was finalized on 9/21/2022 1:29 PM by Dr. Kam Salomon M.D.        Ambulatory status:   - assist x 2, purewick    Social issues:   Social History     Socioeconomic History   • Marital status:    Tobacco Use   • Smoking status: Never Smoker   • Smokeless tobacco: Never Used   Vaping Use   • Vaping Use: Never used   Substance and Sexual Activity   • Alcohol use: Never   • Drug use: Never   • Sexual activity: Defer       NIH  Stroke Scale:         Luiza Harden RN  09/21/22 15:58 EDT

## 2022-09-21 NOTE — ED PROVIDER NOTES
MD ATTESTATION NOTE    The CARLOS and I have discussed this patient's history, physical exam, and treatment plan.  I have reviewed the documentation and personally had a face to face interaction with the patient. I affirm the documentation and agree with the treatment and plan.  The attached note describes my personal findings.    I provided a substantive portion of the care of this patient. I personally performed the physical exam, in its entirety.    History  86-year-old female with chest pain after driving home from physical therapy.    Physical Exam  Vital Signs reviewed  GENERAL: Alert and pleasant female in no obvious distress.  Triage vitals reviewed  HENT: nares patent  EYES: no scleral icterus  CV: regular rhythm, regular rate  RESPIRATORY: normal effort  ABDOMEN: soft, obese/nontender  MUSCULOSKELETAL: no deformity  NEURO: Strength sensation and coordination are grossly intact.  Speech and mentation are unremarkable  SKIN: warm, dry    EKG          EKG time: 1235  Rhythm/Rate: Dual paced rhythm at 70 bpm  P waves and NJ: Atrial paced P waves  QRS, axis: Ventricular paced with interventricular conduction delay  ST and T waves: Unremarkable ST and T wave    Interpreted Contemporaneously by me, independently viewed  Not significantly changed compared to prior 7/22/2020    Disposition  I discussed treatment and evaluation of this patient with PURVI Monk.  Patient with multiple cardiac risk factors presents with chest pain rating to the head and neck and upper arms while driving back.  Currently pain-free.  Work-up so far notable for elevated glucose of 410 in a patient who does not take insulin.  Serum troponin fortunately normal and CBC unremarkable.  We will add on D-dimer as patient does have known history of abdominal aortic aneurysm.  Given patient's multiple cardiac risk factors will likely admit for observation and cardiology consultation.       Isidro Cisneros MD  09/21/22 3940

## 2022-09-21 NOTE — ED TRIAGE NOTES
All triage performed with this RN wearing appropriate PPE.  Pt placed in mask upon arrival to ED.  Patient c/o CP, neck pain, bilateral arm pain, and HA for 2 hours since leaving PT for her back.

## 2022-09-21 NOTE — PROGRESS NOTES
Clinical Pharmacy Services: Medication History    Salma Hatfield is a 86 y.o. female presenting to [facility] for   Chief Complaint   Patient presents with   • Chest Pain       She  has a past medical history of AAA (abdominal aortic aneurysm) ([facility]), Atherosclerotic heart disease, Bradycardia, Carotid artery stenosis, Cataract Removal (10/27/2008), Chronic kidney disease, stage 3a ([facility]), Chronic respiratory failure with hypoxia ([facility]), Coronary artery disease, Coronary atherosclerosis, Diabetes mellitus ([facility]), Diffuse large B cell lymphoma ([facility]) (06/21/2016), Elevated cholesterol, H/O angioplasty (12/31/2007), CABG (09/21/2009), Hyperlipidemia, Hyperparathyroidism ([facility]) (04/17/2017), Hypertension, Hypertensive disorder, Hypothyroidism (acquired), Ischemic heart disease (12/15/2016), Pulmonary emphysema ([facility]), Retinal tear (02/06/2009), S/P arterial stent (08/26/2009), S/P renal artery angioplasty (01/21/2008), Shingles (03/26/2014), Status post carotid surgery (06/13/2018), and 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[facility]).    Allergies as of 09/21/2022   • (No Known Allergies)       Medication information was obtained from: Patient   Pharmacy and Phone Number:     Prior to Admission Medications     Prescriptions Last Dose Informant Patient Reported? Taking?    allopurinol (ZYLOPRIM) 100 MG tablet  Self Yes Yes    Take 100 mg by mouth 2 (Two) Times a Day.    amlodipine-olmesartan (ANT) 5-20 MG per tablet  Self Yes Yes    Take 1 tablet by mouth 2 (Two) Times a Day.    aspirin 81 MG EC tablet  Self Yes Yes    Take 81 mg by mouth Daily.    Calcium Carbonate-Vitamin D 600-200 MG-UNIT tablet  Self Yes Yes    Take 1 tablet by mouth Daily.    docusate sodium (COLACE) 100 MG capsule  Self Yes Yes    Take 100 mg by mouth 2 (Two) Times a Day.    dorzolamide-timolol (COSOPT) 22.3-6.8 MG/ML ophthalmic solution  Self Yes Yes    Administer 1 drop into the left eye 2 (Two) Times a Day.    ezetimibe (ZETIA) 10 MG tablet  Self Yes  Yes    Take 10 mg by mouth Daily.    famotidine (PEPCID) 20 MG tablet  Self Yes Yes    Take 20 mg by mouth 2 (Two) Times a Day.    fluticasone (FLONASE) 50 MCG/ACT nasal spray  Self Yes Yes    2 sprays into the nostril(s) as directed by provider Daily.    gabapentin (NEURONTIN) 300 MG capsule  Self Yes Yes    Take 300 mg by mouth Every Morning.    gabapentin (NEURONTIN) 300 MG capsule  Self Yes Yes    Take 600 mg by mouth Every Night.    glipizide (GLUCOTROL XL) 5 MG ER tablet  Self Yes Yes    Take 5 mg by mouth 2 (Two) Times a Day.    hydrALAZINE (APRESOLINE) 100 MG tablet  Self Yes Yes    Take 100 mg by mouth 3 (Three) Times a Day.    HYDROcodone-acetaminophen (NORCO) 5-325 MG per tablet  Self Yes Yes    Take 1 tablet by mouth Every 8 (Eight) Hours As Needed.    latanoprost (XALATAN) 0.005 % ophthalmic solution  Self Yes Yes    Administer 1 drop into the left eye Every Night.    levothyroxine (SYNTHROID, LEVOTHROID) 50 MCG tablet  Self Yes Yes    Take 50 mcg by mouth Daily.    metoprolol tartrate (LOPRESSOR) 50 MG tablet  Self Yes Yes    Take 50 mg by mouth 2 (Two) Times a Day.    montelukast (SINGULAIR) 10 MG tablet  Self Yes Yes    Take 10 mg by mouth Every Night.    multivitamin with minerals tablet tablet  Self Yes Yes    Take 1 tablet by mouth Daily.    rosuvastatin (CRESTOR) 20 MG tablet  Self Yes Yes    Take 40 mg by mouth Daily.    SITagliptin (JANUVIA) 50 MG tablet  Self Yes Yes    Take 50 mg by mouth Daily.            Medication notes: Worked from patient's personal medication list. Did not get to ask patient when her last doses was.       This medication list is complete to the best of my knowledge as of 9/21/2022    Please call if questions.    Eric Edwards  Medication History Technician  [phone]    9/21/2022 15:53 EDT

## 2022-09-21 NOTE — H&P
PCP: Jerry Canseco MD    Chief complaint   Chief Complaint   Patient presents with   • Chest Pain       HPI  Patient is a 86 y.o. female presents with h/o diffuse B-cell lymphoma, chronic 3 L home O2, CKD 3B, diabetes, COPD, CAD with history of three-vessel CABG in 2008 presented to the ER due to chest pain.  Patient had a previous CABG in 2008, stent in October 2011, stress test at [place] in 2018.  She had a pacemaker placed July 2022 and had COVID approximately 5 weeks ago.  She also had the COVID shot vaccine 3 days ago.    Patient states that she woke up and she was fine, she went to physical therapy and she was fine but when she was walking out to the car she started having chest pain and while sitting in the car on the way home which lasted for about 15 minutes.  She had another couple times when she was moving and walking and when she laid down she felt better.  She got up and it started again.  She also had some sitting in the car on the way to the ER.  She denies any nausea she did have a little bit of shortness of air.  It radiated up from the middle of her chest to her right neck and across her arms.  It was a tightness and a discomfort.  She takes aspirin and Crestor.  Sitting down made it better, exertion made it worse.  Only new medications are Flonase and an allergy medicine.    She has been seen Dr. Eisenberg since she has had to have this pacemaker and changing over to their office for cardiology follow-up.    PAST MEDICAL HISTORY  Past Medical History:   Diagnosis Date   • AAA (abdominal aortic aneurysm) (HCC)     stated in 2- Dr. Lokesh Santoro office note   • Atherosclerotic heart disease     stated in 2- Dr. Lokesh Santoro office note   • Bradycardia    • Carotid artery stenosis     stated in 2- Dr. Lokesh Santoro office note   • Cataract Removal 10/27/2008    Right (10/27/08) and Left (11/25/08)   • Chronic kidney disease, stage 3a (HCC)    • Chronic  respiratory failure with hypoxia (HCC)    • Coronary artery disease    • Coronary atherosclerosis     stated in 2- Dr. Lokesh Santoro office note   • Diabetes mellitus (HCC)    • Diffuse large B cell lymphoma (HCC) 06/21/2016   • Elevated cholesterol    • H/O angioplasty 12/31/2007   • Hx of CABG 09/21/2009    Triple bypass   • Hyperlipidemia    • Hyperparathyroidism (HCC) 04/17/2017   • Hypertension    • Hypertensive disorder     stated in 2- Dr. Lokesh Santoro office note   • Hypothyroidism (acquired)    • Ischemic heart disease 12/15/2016   • Pulmonary emphysema (HCC)    • Retinal tear 02/06/2009    Right eye, repaired 06/2009   • S/P arterial stent 08/26/2009    Left leg and aorta   • S/P renal artery angioplasty 01/21/2008    Left   • Shingles 03/26/2014   • Status post carotid surgery 06/13/2018   • 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HCC)     stated in 2- Dr. Lokesh Santoro office note   Sees chronic pain management    PAST SURGICAL HISTORY  Past Surgical History:   Procedure Laterality Date   • CARDIAC CATHETERIZATION     • CARDIAC ELECTROPHYSIOLOGY PROCEDURE N/A 07/22/2022    Procedure: Pacemaker SC new [employer];  Surgeon: Kevin Eisenberg MD;  Location: [facility] IN[facility]AS[facility] LOCATION;  Service: Cardiology;  Laterality: N/A;   • CAROTID ENDARTERECTOMY  2018   • CORONARY ARTERY BYPASS GRAFT  2008    Triple bypass   • CORONARY STENT PLACEMENT  10/2011   • ILIAC ARTERY STENT      Aorta-iliac stent 2009   • INSERT / REPLACE / REMOVE PACEMAKER  07/2022   • RENAL ARTERY STENT Left 2008       FAMILY HISTORY  History reviewed. No pertinent family history.    SOCIAL HISTORY  Social History     Tobacco Use   • Smoking status: Never Smoker   • Smokeless tobacco: Never Used   Vaping Use   • Vaping Use: Never used   Substance Use Topics   • Alcohol use: Never   • Drug use: Never       MEDICATIONS:  Medications Prior to Admission   Medication Sig Dispense Refill Last Dose   • allopurinol  (ZYLOPRIM) 100 MG tablet Take 100 mg by mouth 2 (Two) Times a Day.   9/21/2022 at Unknown time   • amlodipine-olmesartan (ANT) 5-20 MG per tablet Take 1 tablet by mouth 2 (Two) Times a Day.   9/21/2022 at Unknown time   • aspirin 81 MG EC tablet Take 81 mg by mouth Daily.   9/21/2022 at Unknown time   • Calcium Carbonate-Vitamin D 600-200 MG-UNIT tablet Take 1 tablet by mouth Daily.   9/21/2022 at Unknown time   • docusate sodium (COLACE) 100 MG capsule Take 100 mg by mouth 2 (Two) Times a Day.   9/21/2022 at Unknown time   • dorzolamide-timolol (COSOPT) 22.3-6.8 MG/ML ophthalmic solution Administer 1 drop into the left eye 2 (Two) Times a Day.   9/21/2022 at Unknown time   • ezetimibe (ZETIA) 10 MG tablet Take 10 mg by mouth Daily.   9/21/2022 at Unknown time   • famotidine (PEPCID) 20 MG tablet Take 20 mg by mouth 2 (Two) Times a Day.   9/21/2022 at Unknown time   • fluticasone (FLONASE) 50 MCG/ACT nasal spray 2 sprays into the nostril(s) as directed by provider Daily.   9/21/2022 at Unknown time   • gabapentin (NEURONTIN) 300 MG capsule Take 300 mg by mouth Every Morning.   9/21/2022 at Unknown time   • gabapentin (NEURONTIN) 300 MG capsule Take 600 mg by mouth Every Night.   9/21/2022 at Unknown time   • glipizide (GLUCOTROL XL) 5 MG ER tablet Take 5 mg by mouth 2 (Two) Times a Day.   9/21/2022 at Unknown time   • hydrALAZINE (APRESOLINE) 100 MG tablet Take 100 mg by mouth 3 (Three) Times a Day.   9/21/2022 at Unknown time   • HYDROcodone-acetaminophen (NORCO) 5-325 MG per tablet Take 1 tablet by mouth Every 8 (Eight) Hours As Needed.   9/21/2022 at Unknown time   • latanoprost (XALATAN) 0.005 % ophthalmic solution Administer 1 drop into the left eye Every Night.   9/21/2022 at Unknown time   • levothyroxine (SYNTHROID, LEVOTHROID) 50 MCG tablet Take 50 mcg by mouth Daily.   9/21/2022 at Unknown time   • metoprolol tartrate (LOPRESSOR) 50 MG tablet Take 50 mg by mouth 2 (Two) Times a Day.   9/21/2022 at Unknown  time   • montelukast (SINGULAIR) 10 MG tablet Take 10 mg by mouth Every Night.   9/21/2022 at Unknown time   • multivitamin with minerals tablet tablet Take 1 tablet by mouth Daily.   9/21/2022 at Unknown time   • rosuvastatin (CRESTOR) 20 MG tablet Take 40 mg by mouth Daily.   9/21/2022 at Unknown time   • SITagliptin (JANUVIA) 50 MG tablet Take 50 mg by mouth Daily.   9/21/2022 at Unknown time       Allergies:  Patient has no known allergies.    Review of Systems:  Chronic pain, low back pain fatigue , constipation, permanent pacemaker, been having runny nose, ear popping and phlegm, s/p CEA  Negative for following (except as per HPI):  Constitution: chills, fevers,   Eyes: change of vision, loss of vision and discharge  ENT: ear drainage, ear ringing and facial trauma  Respiratory: cough, pleuritic pain, shortness of air  Cardiovascular: chest pressure, pain, lower extremity edema, palpitations  Gastrointestinal: constipation, diarrhea, nausea, vomiting, pain    Integument: rash and wound  Hematologic / Lymphatic: excessive bleeding and easy bruising  Musculoskeletal: joint pain, joint stiffness, joint swelling and muscle pain  Neurological: headaches, numbness, seizures and tremors  Behavioral / Psych: anxiety, depression and hallucinations         Vital Signs  Temp:  [96.7 °F (35.9 °C)-97.4 °F (36.3 °C)] 97.4 °F (36.3 °C)  Heart Rate:  [69-80] 69  Resp:  [16-20] 16  BP: (153-181)/(76-87) 158/77     There is no height or weight on file to calculate BMI.    Physical Exam:  General Appearance:    Alert, cooperative, in no acute distress   Head:    Normocephalic, without obvious abnormality, atraumatic   Eyes:         conjunctivae and sclerae normal, no icterus, PERRLA on LT, right eye deformed/eye lid drooping   ENT:    Ears grossly intact, oral mucosa moist,   Neck:   No adenopathy, supple, trachea midline,        Lungs:     Clear to auscultation,respirations regular, even and                   unlabored     Heart:    Regular rhythm and normal rate,  no murmur, normal S1 and S2, PPM   Abdomen:     Normal bowel sounds, no masses,  soft non-tender, non-distended,    Extremities:   Moves all extremities well, no cyanosis, trace edema,             Pulses:   Pulses palpable and equal bilaterally   Skin:   No bleeding, rash, bruising    Neurologic:    Psych:   Cranial nerves 2 - 12 grossly intact, sensation grossly intact,     Moves all extremities well, equal bilateral strength 4/5    Alert and Oriented x 3, Normal Affect     I used full protective equipment while examining this patient.  This includes N95 face mask /protective eyewear and protective gown when appropriate.  I washed/sanitized my hands before entering the room and immediately upon leaving the room.    LABS:  Admission on 09/21/2022   Component Date Value Ref Range Status   • QT Interval 09/21/2022 413  ms Final   • Glucose 09/21/2022 410 (A) 65 - 99 mg/dL Final   • BUN 09/21/2022 39 (A) 8 - 23 mg/dL Final   • Creatinine 09/21/2022 1.42 (A) 0.57 - 1.00 mg/dL Final   • Sodium 09/21/2022 135 (A) 136 - 145 mmol/L Final   • Potassium 09/21/2022 4.4  3.5 - 5.2 mmol/L Final    Slight hemolysis detected by analyzer. Results may be affected.   • Chloride 09/21/2022 98  98 - 107 mmol/L Final   • CO2 09/21/2022 25.9  22.0 - 29.0 mmol/L Final   • Calcium 09/21/2022 10.7 (A) 8.6 - 10.5 mg/dL Final   • Total Protein 09/21/2022 7.0  6.0 - 8.5 g/dL Final   • Albumin 09/21/2022 4.30  3.50 - 5.20 g/dL Final   • ALT (SGPT) 09/21/2022 26  1 - 33 U/L Final   • AST (SGOT) 09/21/2022 27  1 - 32 U/L Final   • Alkaline Phosphatase 09/21/2022 72  39 - 117 U/L Final   • Total Bilirubin 09/21/2022 0.5  0.0 - 1.2 mg/dL Final   • Globulin 09/21/2022 2.7  gm/dL Final   • A/G Ratio 09/21/2022 1.6  g/dL Final   • BUN/Creatinine Ratio 09/21/2022 27.5 (A) 7.0 - 25.0 Final   • Anion Gap 09/21/2022 11.1  5.0 - 15.0 mmol/L Final   • eGFR 09/21/2022 36.1 (A) >60.0 mL/min/1.73 Final    National  Kidney Foundation and American Society of Nephrology (ASN) Task Force recommended calculation based on the Chronic Kidney Disease Epidemiology Collaboration (CKD-EPI) equation refit without adjustment for race.   • Troponin T 09/21/2022 <0.010  0.000 - 0.030 ng/mL Final   • proBNP 09/21/2022 1,891.0 (A) 0.0 - 1,800.0 pg/mL Final   • Magnesium 09/21/2022 2.2  1.6 - 2.4 mg/dL Final   • WBC 09/21/2022 6.43  3.40 - 10.80 10*3/mm3 Final   • RBC 09/21/2022 3.89  3.77 - 5.28 10*6/mm3 Final   • Hemoglobin 09/21/2022 12.3  12.0 - 15.9 g/dL Final   • Hematocrit 09/21/2022 39.8  34.0 - 46.6 % Final   • MCV 09/21/2022 102.3 (A) 79.0 - 97.0 fL Final   • MCH 09/21/2022 31.6  26.6 - 33.0 pg Final   • MCHC 09/21/2022 30.9 (A) 31.5 - 35.7 g/dL Final   • RDW 09/21/2022 14.5  12.3 - 15.4 % Final   • RDW-SD 09/21/2022 53.9  37.0 - 54.0 fl Final   • MPV 09/21/2022 10.4  6.0 - 12.0 fL Final   • Platelets 09/21/2022 119 (A) 140 - 450 10*3/mm3 Final   • Neutrophil % 09/21/2022 67.6  42.7 - 76.0 % Final   • Lymphocyte % 09/21/2022 15.2 (A) 19.6 - 45.3 % Final   • Monocyte % 09/21/2022 14.8 (A) 5.0 - 12.0 % Final   • Eosinophil % 09/21/2022 1.4  0.3 - 6.2 % Final   • Basophil % 09/21/2022 0.5  0.0 - 1.5 % Final   • Immature Grans % 09/21/2022 0.5  0.0 - 0.5 % Final   • Neutrophils, Absolute 09/21/2022 4.35  1.70 - 7.00 10*3/mm3 Final   • Lymphocytes, Absolute 09/21/2022 0.98  0.70 - 3.10 10*3/mm3 Final   • Monocytes, Absolute 09/21/2022 0.95 (A) 0.10 - 0.90 10*3/mm3 Final   • Eosinophils, Absolute 09/21/2022 0.09  0.00 - 0.40 10*3/mm3 Final   • Basophils, Absolute 09/21/2022 0.03  0.00 - 0.20 10*3/mm3 Final   • Immature Grans, Absolute 09/21/2022 0.03  0.00 - 0.05 10*3/mm3 Final   • nRBC 09/21/2022 0.2  0.0 - 0.2 /100 WBC Final   • D-Dimer, Quantitative 09/21/2022 0.97 (A) 0.00 - 0.49 MCGFEU/mL Final   • Color, UA 09/21/2022 Yellow  Yellow, Straw Final   • Appearance, UA 09/21/2022 Clear  Clear Final   • pH, UA 09/21/2022 8.0  5.0 - 8.0  Final   • Specific Gravity, UA 09/21/2022 1.015  1.005 - 1.030 Final   • Glucose, UA 09/21/2022 >=1000 mg/dL (3+) (A) Negative Final   • Ketones, UA 09/21/2022 Negative  Negative Final   • Bilirubin, UA 09/21/2022 Negative  Negative Final   • Blood, UA 09/21/2022 Negative  Negative Final   • Protein, UA 09/21/2022 30 mg/dL (1+) (A) Negative Final   • Leuk Esterase, UA 09/21/2022 Small (1+) (A) Negative Final   • Nitrite, UA 09/21/2022 Negative  Negative Final   • Urobilinogen, UA 09/21/2022 0.2 E.U./dL  0.2 - 1.0 E.U./dL Final   • RBC, UA 09/21/2022 0-2  None Seen, 0-2 /HPF Final   • WBC, UA 09/21/2022 Too Numerous to Count (A) None Seen, 0-2 /HPF Final   • Bacteria, UA 09/21/2022 2+ (A) None Seen /HPF Final   • Squamous Epithelial Cells, UA 09/21/2022 0-2  None Seen, 0-2 /HPF Final   • Hyaline Casts, UA 09/21/2022 3-6  None Seen /LPF Final   • Methodology 09/21/2022 Automated Microscopy   Final   • Glucose 09/21/2022 377 (A) 70 - 130 mg/dL Final    Meter: [id number] : [id number] Fina Jarrett RN       DIAGNOSTICS:  XR Chest 1 View    Result Date: 9/21/2022  No evidence for active disease in the chest.  This report was finalized on 9/21/2022 1:29 PM by Dr. Kam Salomon M.D.             Results Review:   I reviewed the patient's new clinical results.  Discussed with ER physician  Old records reviewed / Medical Decision Making High Complexity  I personally viewed and interpreted the patient's EKG/Telemetry data- paced  ekg Atrial-ventricular dual-paced complexes  No further analysis attempted due to paced rhythm  No change from previous tracing    ASSESSMENT AND PLAN    Pulmonary emphysema (HCC)    Diffuse large B cell lymphoma (HCC)    S/P CABG (coronary artery bypass graft)    Chronic respiratory failure with hypoxia (HCC)    Type 2 diabetes mellitus, without long-term current use of insulin (HCC)    Hypothyroidism (acquired)    Stage 3b chronic kidney disease (HCC)    Presence of cardiac pacemaker     Chest pain with high risk for cardiac etiology    Hypertensive urgency    Acute UTI (urinary tract infection)    History of 2019 novel coronavirus disease (COVID-19)    COVID approximately 5 weeks ago.  She also had the COVID shot vaccine 3 days ago.    · Chest pain with exertion with a history of a three-vessel CABG and stents/coronary disease  -Differential diagnosis is ACS or ACS like due to myocarditis from COVID 5 weeks ago.  Patient also got COVID and flu vaccine 3 days ago or due to hypertensive urgency  --Medical Management   -Aspirin   -Cardiac monitoring   -Serial monitoring of troponins and EKGs   -Cardiology consult   - continue BB   -not on Ace-I due to poor renal fxn but may need to consider   -Check lipid panel, A1c in the morning        ·  Acute UTI (urinary tract infection)  -Patient had urinary frequency 5 times in the middle the night which is unlike the patient  -Continue Rocephin daily  -Await cultures       · Type 2 diabetes mellitus, without long-term current use of insulin with hyperglycemia  -Infection could be causing blood sugar issues  --Accu-Cheks  -hypoglycemia protocol  -sliding scale insulin to cover outlier blood sugars  -continue home medicine of Glucotrol and Januvia no metformin due to renal function    ·  Hypertensive urgency  -Restart home meds this also could be contributing    · Chronic respiratory failure with hypoxia/ COPD/3 L home O2  -Continue medical management with DuoNebs    · URI versus seasonal allergy  -Continue Flonase and allergy medicine  -Try nasal Atrovent and Mucinex    ·   Stage 3b chronic kidney disease  -Unclear if patient is established with a renal doc and being followed but will adjust medicines as needed    · HOME MEDS HELD:   -Restart when clinically appropriate      · Chronic Medical conditions being monitored- stable, continue medical managment  -Hypothyroidism  -B-cell lymphoma      +DVT proph: Lovenox 30  + Full code    I discussed the patients  findings and my recommendations with the patient and/or family.  Please reference all orders placed.    Bronwyn Brito MD  09/21/22  17:29 EDT      This document is intended for medical expert use only. Reading of this document by patients and/or patient's family without participating in medical staff guidance may result in misinterpretation and unintended morbidity. Any interpretation of such data is the responsibility of the patient and/or family member responsible for the patient in concert with their primary or specialist providers, and NOT to be left for sources of online searches such as [employer], [employer] or similar queries. Relying on these approaches to knowledge may result in misinterpretation, misguided goals of care and even death should patients or family members try recommendations outside of the realm of professional medical care in a supervised way.    Dictated utilizing Dragon dictation:  Much of this encounter note is an electronic transcription/translation of spoken language to printed text. The electronic translation of spoken language may permit erroneous, or at times, nonsensical words or phrases to be inadvertently transcribed; Although I have reviewed the note for such errors, some may still exist.

## 2022-09-21 NOTE — ED PROVIDER NOTES
EMERGENCY DEPARTMENT ENCOUNTER    Room Number: N427/1  Date Seen: 9/21/2022  Time Seen: 13:58 EDT  PCP: Jerry Canseco MD    Historian: patient, daughter      HISTORY OF PRESENT ILLNESS    Chief Complaint: chest pain    Context: Salma Hatfield is a 86 y.o. female with PMHx of CABG, cardiac pacemaker, chronic kidney disease, lymphoma who presents to the ED with c/o chest pain radiating into her right neck and bilateral arms that began when driving home from her physical therapy.  She denies having had any chest pain or feeling short of breath while at her physical therapy appointment.  She states when she had the chest pain she did not experience any nausea, vomiting, diaphoresis, vision changes or other symptoms.  She denies any current significant pain or shortness of breath.  She denies any fevers, chills.  Patient takes aspirin daily and has taken her meds today.      MEDICAL RECORD REVIEW:    Reviewed in epic    Pacemaker placed July 2022 for symptomatic bradycardia.  Bilateral carotid ultrasound in September 2021 shows no flow-limiting stenosis.  US imaging in September 2021 shows infrarenal abdominal aortic aneurysm measuring at 4.5 cm.    PAST MEDICAL HISTORY    Active Ambulatory Problems     Diagnosis Date Noted   • Bradycardia 07/20/2022   • Pulmonary emphysema (HCC) 07/21/2022   • Diffuse large B cell lymphoma ([facility]) 07/21/2022   • S/P CABG (coronary artery bypass graft) 07/21/2022   • Coronary artery disease involving native coronary artery without angina pectoris 07/21/2022   • Chronic respiratory failure with hypoxia ([facility]) 07/21/2022   • Type 2 diabetes mellitus, without long-term current use of insulin ([facility]) 07/21/2022   • Hypothyroidism (acquired) 07/21/2022   • Stage 3b chronic kidney disease ([facility]) 07/21/2022   • Second degree AV block 07/20/2022   • Presence of cardiac pacemaker 07/23/2022     Resolved Ambulatory Problems     Diagnosis Date Noted   • No Resolved Ambulatory Problems     Past  Medical History:   Diagnosis Date   • AAA (abdominal aortic aneurysm) ([facility])    • Atherosclerotic heart disease    • Carotid artery stenosis    • Cataract Removal 10/27/2008   • Chronic kidney disease, stage 3a ([facility])    • Coronary artery disease    • Coronary atherosclerosis    • Diabetes mellitus ([facility])    • Elevated cholesterol    • H/O angioplasty 12/31/2007   • Hx of CABG 09/21/2009   • Hyperlipidemia    • Hyperparathyroidism ([facility]) 04/17/2017   • Hypertension    • Hypertensive disorder    • Ischemic heart disease 12/15/2016   • Retinal tear 02/06/2009   • S/P arterial stent 08/26/2009   • S/P renal artery angioplasty 01/21/2008   • Shingles 03/26/2014   • Status post carotid surgery 06/13/2018   • 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[facility])          PAST SURGICAL HISTORY    Past Surgical History:   Procedure Laterality Date   • CARDIAC CATHETERIZATION     • CARDIAC ELECTROPHYSIOLOGY PROCEDURE N/A 07/22/2022    Procedure: Pacemaker SC new [employer];  Surgeon: Kevin Eisenberg MD;  Location: [facility] INVASIVE LOCATION;  Service: Cardiology;  Laterality: N/A;   • CAROTID ENDARTERECTOMY  2018   • CORONARY ARTERY BYPASS GRAFT  2008    Triple bypass   • CORONARY STENT PLACEMENT  10/2011   • ILIAC ARTERY STENT      Aorta-iliac stent 2009   • INSERT / REPLACE / REMOVE PACEMAKER  07/2022   • RENAL ARTERY STENT Left 2008         FAMILY HISTORY    History reviewed. No pertinent family history.      SOCIAL HISTORY    Social History     Socioeconomic History   • Marital status:    Tobacco Use   • Smoking status: Never Smoker   • Smokeless tobacco: Never Used   Vaping Use   • Vaping Use: Never used   Substance and Sexual Activity   • Alcohol use: Never   • Drug use: Never   • Sexual activity: Defer         ALLERGIES    Patient has no known allergies.      REVIEW OF SYSTEMS    Review of Systems   Constitutional: Negative for chills and fever.   Eyes: Negative for visual disturbance.   Respiratory: Negative for shortness of breath.     Cardiovascular: Positive for chest pain. Negative for leg swelling.   Gastrointestinal: Negative for abdominal pain, nausea and vomiting.   Neurological: Negative for dizziness and light-headedness.       All systems reviewed and negative except those discussed in HPI.      PHYSICAL EXAM    ED Triage Vitals   Temp Heart Rate Resp BP SpO2   09/21/22 1223 09/21/22 1223 09/21/22 1223 09/21/22 1230 09/21/22 1223   96.7 °F (35.9 °C) 79 20 170/87 92 %      Temp src Heart Rate Source Patient Position BP Location FiO2 (%)   09/21/22 1223 09/21/22 1223 -- -- --   Tympanic Monitor          I have reviewed the triage vital signs and nursing notes.    Constitutional: Well appearing, appears slightly uncomfortable  Head: Atraumatic, normocephalic  Neck: No midline tenderness, Full painless ROM  Eyes: No scleral icterus, no scleral injection  ENT: Nares patent  CV: Regular rate, regular rhythm, distal pulses symmetric  Respiratory/Chest: Mild tachypnea, CTAB, no chest wall tenderness  Abdomen: Abdomen soft, nontender  Back: No midline tenderness, Full ROM, No CVA tenderness  Extremities: No deformity, soft compartments, no edema  Skin: Warm, dry, no rash  Neuro: A&Ox4, moves all extremities, follows commands, no focal deficits  Psych: Normal mood      LAB RESULTS    Recent Results (from the past 24 hour(s))   ECG 12 Lead    Collection Time: 09/21/22 12:35 PM   Result Value Ref Range    QT Interval 413 ms   Comprehensive Metabolic Panel    Collection Time: 09/21/22 12:50 PM    Specimen: Blood   Result Value Ref Range    Glucose 410 (C) 65 - 99 mg/dL    BUN 39 (H) 8 - 23 mg/dL    Creatinine 1.42 (H) 0.57 - 1.00 mg/dL    Sodium 135 (L) 136 - 145 mmol/L    Potassium 4.4 3.5 - 5.2 mmol/L    Chloride 98 98 - 107 mmol/L    CO2 25.9 22.0 - 29.0 mmol/L    Calcium 10.7 (H) 8.6 - 10.5 mg/dL    Total Protein 7.0 6.0 - 8.5 g/dL    Albumin 4.30 3.50 - 5.20 g/dL    ALT (SGPT) 26 1 - 33 U/L    AST (SGOT) 27 1 - 32 U/L    Alkaline Phosphatase  72 39 - 117 U/L    Total Bilirubin 0.5 0.0 - 1.2 mg/dL    Globulin 2.7 gm/dL    A/G Ratio 1.6 g/dL    BUN/Creatinine Ratio 27.5 (H) 7.0 - 25.0    Anion Gap 11.1 5.0 - 15.0 mmol/L    eGFR 36.1 (L) >60.0 mL/min/1.73   Troponin    Collection Time: 09/21/22 12:50 PM    Specimen: Blood   Result Value Ref Range    Troponin T <0.010 0.000 - 0.030 ng/mL   BNP    Collection Time: 09/21/22 12:50 PM    Specimen: Blood   Result Value Ref Range    proBNP 1,891.0 (H) 0.0 - 1,800.0 pg/mL   Magnesium    Collection Time: 09/21/22 12:50 PM    Specimen: Blood   Result Value Ref Range    Magnesium 2.2 1.6 - 2.4 mg/dL   CBC Auto Differential    Collection Time: 09/21/22 12:50 PM    Specimen: Blood   Result Value Ref Range    WBC 6.43 3.40 - 10.80 10*3/mm3    RBC 3.89 3.77 - 5.28 10*6/mm3    Hemoglobin 12.3 12.0 - 15.9 g/dL    Hematocrit 39.8 34.0 - 46.6 %    .3 (H) 79.0 - 97.0 fL    MCH 31.6 26.6 - 33.0 pg    MCHC 30.9 (L) 31.5 - 35.7 g/dL    RDW 14.5 12.3 - 15.4 %    RDW-SD 53.9 37.0 - 54.0 fl    MPV 10.4 6.0 - 12.0 fL    Platelets 119 (L) 140 - 450 10*3/mm3    Neutrophil % 67.6 42.7 - 76.0 %    Lymphocyte % 15.2 (L) 19.6 - 45.3 %    Monocyte % 14.8 (H) 5.0 - 12.0 %    Eosinophil % 1.4 0.3 - 6.2 %    Basophil % 0.5 0.0 - 1.5 %    Immature Grans % 0.5 0.0 - 0.5 %    Neutrophils, Absolute 4.35 1.70 - 7.00 10*3/mm3    Lymphocytes, Absolute 0.98 0.70 - 3.10 10*3/mm3    Monocytes, Absolute 0.95 (H) 0.10 - 0.90 10*3/mm3    Eosinophils, Absolute 0.09 0.00 - 0.40 10*3/mm3    Basophils, Absolute 0.03 0.00 - 0.20 10*3/mm3    Immature Grans, Absolute 0.03 0.00 - 0.05 10*3/mm3    nRBC 0.2 0.0 - 0.2 /100 WBC   Sedimentation Rate    Collection Time: 09/21/22 12:50 PM    Specimen: Blood   Result Value Ref Range    Sed Rate 17 0 - 30 mm/hr   D-dimer, Quantitative    Collection Time: 09/21/22  2:23 PM    Specimen: Blood   Result Value Ref Range    D-Dimer, Quantitative 0.97 (H) 0.00 - 0.49 MCGFEU/mL   Urinalysis With Culture If Indicated -  Urine, Clean Catch    Collection Time: 09/21/22  2:38 PM    Specimen: Urine, Clean Catch   Result Value Ref Range    Color, UA Yellow Yellow, Straw    Appearance, UA Clear Clear    pH, UA 8.0 5.0 - 8.0    Specific Gravity, UA 1.015 1.005 - 1.030    Glucose, UA >=1000 mg/dL (3+) (A) Negative    Ketones, UA Negative Negative    Bilirubin, UA Negative Negative    Blood, UA Negative Negative    Protein, UA 30 mg/dL (1+) (A) Negative    Leuk Esterase, UA Small (1+) (A) Negative    Nitrite, UA Negative Negative    Urobilinogen, UA 0.2 E.U./dL 0.2 - 1.0 E.U./dL   Urinalysis, Microscopic Only - Urine, Clean Catch    Collection Time: 09/21/22  2:38 PM    Specimen: Urine, Clean Catch   Result Value Ref Range    RBC, UA 0-2 None Seen, 0-2 /HPF    WBC, UA Too Numerous to Count (A) None Seen, 0-2 /HPF    Bacteria, UA 2+ (A) None Seen /HPF    Squamous Epithelial Cells, UA 0-2 None Seen, 0-2 /HPF    Hyaline Casts, UA 3-6 None Seen /LPF    Methodology Automated Microscopy    POC Glucose Once    Collection Time: 09/21/22  3:03 PM    Specimen: Blood   Result Value Ref Range    Glucose 377 (H) 70 - 130 mg/dL   ECG 12 Lead    Collection Time: 09/21/22  5:48 PM   Result Value Ref Range    QT Interval 401 ms       I ordered the above labs and independently reviewed the results.    RADIOLOGY RESULTS    XR Chest 1 View    Result Date: 9/21/2022  CHEST SINGLE VIEW  HISTORY: Chest pain and shortness of breath  COMPARISON: AP chest 07/22/2022, 07/20/2022  FINDINGS: Sternotomy wires, left subclavian cardiac pacer are present. Lungs appear clear and there is no evidence for pulmonary edema or pleural effusion or infiltrate.      No evidence for active disease in the chest.  This report was finalized on 9/21/2022 1:29 PM by Dr. Kam Salomon M.D.        I ordered the above noted radiological studies and reviewed the images on the PACS system.     PROCEDURES    None    EKG    Interpreted by ED Physician. Please see their note for  documentation.    MEDICATIONS GIVEN IN ER    Medications   metoprolol tartrate (LOPRESSOR) tablet 50 mg (has no administration in time range)   hydrALAZINE (APRESOLINE) tablet 100 mg (has no administration in time range)   levothyroxine (SYNTHROID, LEVOTHROID) tablet 50 mcg (has no administration in time range)   allopurinol (ZYLOPRIM) tablet 100 mg (has no administration in time range)   docusate sodium (COLACE) capsule 100 mg (has no administration in time range)   famotidine (PEPCID) tablet 20 mg (has no administration in time range)   latanoprost (XALATAN) 0.005 % ophthalmic solution 1 drop (has no administration in time range)   multivitamin with minerals 1 tablet (has no administration in time range)   rosuvastatin (CRESTOR) tablet 10 mg (has no administration in time range)   gabapentin (NEURONTIN) capsule 300 mg (has no administration in time range)   gabapentin (NEURONTIN) capsule 600 mg (has no administration in time range)   HYDROcodone-acetaminophen (NORCO) 5-325 MG per tablet 1 tablet (has no administration in time range)   montelukast (SINGULAIR) tablet 10 mg (has no administration in time range)   fluticasone (FLONASE) 50 MCG/ACT nasal spray 2 spray (has no administration in time range)   PATIENT SUPPLIED MEDICATION (has no administration in time range)   linagliptin (TRADJENTA) tablet 5 mg (has no administration in time range)   glipizide (GLUCOTROL) tablet 5 mg (has no administration in time range)   calcium-vitamin D 500-200 MG-UNIT tablet 1 tablet (has no administration in time range)   amLODIPine (NORVASC) 5 mg, losartan (COZAAR) 50 mg (has no administration in time range)   dorzolamide (TRUSOPT) 2 % 1 drop, timolol (TIMOPTIC) 0.5 % 1 drop for Cosopt 22.3-6.8 mg/mL (has no administration in time range)   acetaminophen (TYLENOL) tablet 650 mg (has no administration in time range)     Or   acetaminophen (TYLENOL) 160 MG/5ML solution 650 mg (has no administration in time range)     Or    acetaminophen (TYLENOL) suppository 650 mg (has no administration in time range)   calcium carbonate (TUMS) chewable tablet 500 mg (200 mg elemental) (has no administration in time range)   sennosides-docusate (PERICOLACE) 8.6-50 MG per tablet 2 tablet (has no administration in time range)     And   polyethylene glycol (MIRALAX) packet 17 g (has no administration in time range)     And   bisacodyl (DULCOLAX) EC tablet 5 mg (has no administration in time range)     And   bisacodyl (DULCOLAX) suppository 10 mg (has no administration in time range)   ondansetron (ZOFRAN) tablet 4 mg (has no administration in time range)     Or   ondansetron (ZOFRAN) injection 4 mg (has no administration in time range)   melatonin tablet 1 mg (has no administration in time range)   dextrose (GLUTOSE) oral gel 15 g (has no administration in time range)   dextrose (D50W) (25 g/50 mL) IV injection 25 g (has no administration in time range)   glucagon (human recombinant) (GLUCAGEN DIAGNOSTIC) injection 1 mg (has no administration in time range)   Enoxaparin Sodium (LOVENOX) syringe 30 mg (has no administration in time range)   nitroglycerin (NITROSTAT) SL tablet 0.4 mg (has no administration in time range)   cefTRIAXone (ROCEPHIN) 1 g in sodium chloride 0.9 % 100 mL IVPB-VTB (has no administration in time range)   ipratropium (ATROVENT) nasal spray 0.03% (has no administration in time range)   guaiFENesin (MUCINEX) 12 hr tablet 600 mg (has no administration in time range)   sodium chloride 0.9 % flush 10 mL (has no administration in time range)   aspirin EC tablet 81 mg (has no administration in time range)   insulin lispro (ADMELOG) injection 0-7 Units (has no administration in time range)   morphine injection 1 mg (has no administration in time range)     And   naloxone (NARCAN) injection 0.4 mg (has no administration in time range)   ipratropium-albuterol (DUO-NEB) nebulizer solution 3 mL (has no administration in time range)   aspirin  chewable tablet 243 mg (243 mg Oral Given 9/21/22 1418)   insulin regular (humuLIN R,novoLIN R) injection 8 Units (8 Units Subcutaneous Given 9/21/22 1431)   cefTRIAXone (ROCEPHIN) 1 g in sodium chloride 0.9 % 100 mL IVPB-VTB (0 g Intravenous Stopped 9/21/22 1632)         PROGRESS, CONSULTS, and MEDICAL DECISION MAKING    DIFFERENTIAL DIAGNOSIS    Including but not limited to: ACS, carotid dissection, worsening AAA, pneumonia, uncontrolled DM, UTI      Vasculopath with high risk of cardiac etiology of chest pain. Previously noted to have 4.5 cm infrarenal AAA. D-dimer very minimally elevated however slight MARTIN limits ability for angiogram in ER. Low suspicion for PE, dissection or worsening AAA. Will defer to hospitalist service for additional imaging to avoid worsening MARTIN. Hyperglycemia worse over past week per patient. UA suspicious for infection and likely reason for uncontrolled glucose. Pt admitted to obs for further monitoring and management.    ED Course as of 09/21/22 1808   Wed Sep 21, 2022   1424 Glucose(!!): 410 [DC]      ED Course User Index  [DC] Hina Monk PA           DIAGNOSIS  Final diagnoses:   Chest pain with high risk for cardiac etiology   Acute UTI   Hyperglycemia       DISPOSITION  ED Disposition     ED Disposition   Decision to Admit    Condition   --    Comment   Level of Care: Telemetry [5]   Diagnosis: Chest pain with high risk for cardiac etiology [[id number]]   Admitting Physician: LONG BOO [[id number]]   Attending Physician: LONG BOO [[id number]]                   Patient was placed in face mask in first look. Patient was wearing facemask when I entered the room and throughout our encounter. I wore full protective equipment throughout this patient encounter including a face mask, and gloves. Hand hygiene was performed before donning protective equipment and after removal when leaving the room.    Dictated utilizing Dragon dictation.      Note Disclaimer: At  [facility], we believe that sharing information builds trust and better relationships. You are receiving this note because you recently visited [facility]. It is possible you will see health information before a provider has talked with you about it. This kind of information can be easy to misunderstand. To help you fully understand what it means for your health, we urge you to discuss this note with your provider.           Hina Monk PA  09/21/22 [id number]

## 2022-09-22 PROBLEM — E11.65 TYPE 2 DIABETES MELLITUS WITH HYPERGLYCEMIA, WITHOUT LONG-TERM CURRENT USE OF INSULIN: Status: ACTIVE | Noted: 2022-07-21

## 2022-09-22 LAB
ANION GAP SERPL CALCULATED.3IONS-SCNC: 10.8 MMOL/L (ref 5–15)
BASOPHILS # BLD AUTO: 0.03 10*3/MM3 (ref 0–0.2)
BASOPHILS NFR BLD AUTO: 0.4 % (ref 0–1.5)
BUN SERPL-MCNC: 35 MG/DL (ref 8–23)
BUN/CREAT SERPL: 25.9 (ref 7–25)
CALCIUM SPEC-SCNC: 10.3 MG/DL (ref 8.6–10.5)
CHLORIDE SERPL-SCNC: 100 MMOL/L (ref 98–107)
CHOLEST SERPL-MCNC: 98 MG/DL (ref 0–200)
CO2 SERPL-SCNC: 26.2 MMOL/L (ref 22–29)
CREAT SERPL-MCNC: 1.35 MG/DL (ref 0.57–1)
CRP SERPL-MCNC: 0.69 MG/DL (ref 0–0.5)
D DIMER PPP FEU-MCNC: 0.98 MCGFEU/ML (ref 0–0.49)
DEPRECATED RDW RBC AUTO: 49.9 FL (ref 37–54)
EGFRCR SERPLBLD CKD-EPI 2021: 38.4 ML/MIN/1.73
EOSINOPHIL # BLD AUTO: 0.21 10*3/MM3 (ref 0–0.4)
EOSINOPHIL NFR BLD AUTO: 2.8 % (ref 0.3–6.2)
ERYTHROCYTE [DISTWIDTH] IN BLOOD BY AUTOMATED COUNT: 14.5 % (ref 12.3–15.4)
GLUCOSE BLDC GLUCOMTR-MCNC: 135 MG/DL (ref 70–130)
GLUCOSE BLDC GLUCOMTR-MCNC: 182 MG/DL (ref 70–130)
GLUCOSE BLDC GLUCOMTR-MCNC: 235 MG/DL (ref 70–130)
GLUCOSE BLDC GLUCOMTR-MCNC: 265 MG/DL (ref 70–130)
GLUCOSE SERPL-MCNC: 139 MG/DL (ref 65–99)
HBA1C MFR BLD: 7.5 % (ref 4.8–5.6)
HCT VFR BLD AUTO: 35.7 % (ref 34–46.6)
HDLC SERPL-MCNC: 33 MG/DL (ref 40–60)
HGB BLD-MCNC: 12 G/DL (ref 12–15.9)
IMM GRANULOCYTES # BLD AUTO: 0.02 10*3/MM3 (ref 0–0.05)
IMM GRANULOCYTES NFR BLD AUTO: 0.3 % (ref 0–0.5)
LDLC SERPL CALC-MCNC: 27 MG/DL (ref 0–100)
LDLC/HDLC SERPL: 0.47 {RATIO}
LYMPHOCYTES # BLD AUTO: 1.3 10*3/MM3 (ref 0.7–3.1)
LYMPHOCYTES NFR BLD AUTO: 17.6 % (ref 19.6–45.3)
MAGNESIUM SERPL-MCNC: 2.4 MG/DL (ref 1.6–2.4)
MCH RBC QN AUTO: 31.6 PG (ref 26.6–33)
MCHC RBC AUTO-ENTMCNC: 33.6 G/DL (ref 31.5–35.7)
MCV RBC AUTO: 93.9 FL (ref 79–97)
MONOCYTES # BLD AUTO: 1.06 10*3/MM3 (ref 0.1–0.9)
MONOCYTES NFR BLD AUTO: 14.4 % (ref 5–12)
NEUTROPHILS NFR BLD AUTO: 4.75 10*3/MM3 (ref 1.7–7)
NEUTROPHILS NFR BLD AUTO: 64.5 % (ref 42.7–76)
NRBC BLD AUTO-RTO: 0 /100 WBC (ref 0–0.2)
PLATELET # BLD AUTO: 131 10*3/MM3 (ref 140–450)
PMV BLD AUTO: 10.5 FL (ref 6–12)
POTASSIUM SERPL-SCNC: 4.1 MMOL/L (ref 3.5–5.2)
RBC # BLD AUTO: 3.8 10*6/MM3 (ref 3.77–5.28)
SODIUM SERPL-SCNC: 137 MMOL/L (ref 136–145)
TRIGL SERPL-MCNC: 247 MG/DL (ref 0–150)
TROPONIN T SERPL-MCNC: 0.01 NG/ML (ref 0–0.03)
TSH SERPL DL<=0.05 MIU/L-ACNC: 1.35 UIU/ML (ref 0.27–4.2)
VLDLC SERPL-MCNC: 38 MG/DL (ref 5–40)
WBC NRBC COR # BLD: 7.37 10*3/MM3 (ref 3.4–10.8)

## 2022-09-22 PROCEDURE — 97530 THERAPEUTIC ACTIVITIES: CPT

## 2022-09-22 PROCEDURE — 86140 C-REACTIVE PROTEIN: CPT | Performed by: INTERNAL MEDICINE

## 2022-09-22 PROCEDURE — 85025 COMPLETE CBC W/AUTO DIFF WBC: CPT | Performed by: INTERNAL MEDICINE

## 2022-09-22 PROCEDURE — 63710000001 INSULIN LISPRO (HUMAN) PER 5 UNITS: Performed by: INTERNAL MEDICINE

## 2022-09-22 PROCEDURE — 82330 ASSAY OF CALCIUM: CPT | Performed by: INTERNAL MEDICINE

## 2022-09-22 PROCEDURE — 84443 ASSAY THYROID STIM HORMONE: CPT | Performed by: INTERNAL MEDICINE

## 2022-09-22 PROCEDURE — 97162 PT EVAL MOD COMPLEX 30 MIN: CPT

## 2022-09-22 PROCEDURE — 25010000002 ENOXAPARIN PER 10 MG: Performed by: INTERNAL MEDICINE

## 2022-09-22 PROCEDURE — 97110 THERAPEUTIC EXERCISES: CPT

## 2022-09-22 PROCEDURE — 82962 GLUCOSE BLOOD TEST: CPT

## 2022-09-22 PROCEDURE — 96372 THER/PROPH/DIAG INJ SC/IM: CPT

## 2022-09-22 PROCEDURE — 85379 FIBRIN DEGRADATION QUANT: CPT | Performed by: INTERNAL MEDICINE

## 2022-09-22 PROCEDURE — 25010000002 CEFTRIAXONE PER 250 MG: Performed by: INTERNAL MEDICINE

## 2022-09-22 PROCEDURE — 84484 ASSAY OF TROPONIN QUANT: CPT | Performed by: INTERNAL MEDICINE

## 2022-09-22 PROCEDURE — 99214 OFFICE O/P EST MOD 30 MIN: CPT | Performed by: NURSE PRACTITIONER

## 2022-09-22 PROCEDURE — 97165 OT EVAL LOW COMPLEX 30 MIN: CPT

## 2022-09-22 PROCEDURE — 80048 BASIC METABOLIC PNL TOTAL CA: CPT | Performed by: INTERNAL MEDICINE

## 2022-09-22 PROCEDURE — G0378 HOSPITAL OBSERVATION PER HR: HCPCS

## 2022-09-22 PROCEDURE — 80061 LIPID PANEL: CPT | Performed by: INTERNAL MEDICINE

## 2022-09-22 PROCEDURE — 83735 ASSAY OF MAGNESIUM: CPT | Performed by: INTERNAL MEDICINE

## 2022-09-22 PROCEDURE — 83036 HEMOGLOBIN GLYCOSYLATED A1C: CPT | Performed by: INTERNAL MEDICINE

## 2022-09-22 RX ORDER — LOSARTAN POTASSIUM 50 MG/1
50 TABLET ORAL ONCE
Status: COMPLETED | OUTPATIENT
Start: 2022-09-22 | End: 2022-09-22

## 2022-09-22 RX ORDER — ISOSORBIDE MONONITRATE 30 MG/1
30 TABLET, EXTENDED RELEASE ORAL DAILY
Status: DISCONTINUED | OUTPATIENT
Start: 2022-09-22 | End: 2022-09-23 | Stop reason: HOSPADM

## 2022-09-22 RX ORDER — LOSARTAN POTASSIUM 100 MG/1
100 TABLET ORAL
Status: DISCONTINUED | OUTPATIENT
Start: 2022-09-23 | End: 2022-09-23 | Stop reason: HOSPADM

## 2022-09-22 RX ORDER — AMLODIPINE BESYLATE 5 MG/1
5 TABLET ORAL
Status: DISCONTINUED | OUTPATIENT
Start: 2022-09-23 | End: 2022-09-23 | Stop reason: HOSPADM

## 2022-09-22 RX ADMIN — GUAIFENESIN 600 MG: 600 TABLET, EXTENDED RELEASE ORAL at 09:37

## 2022-09-22 RX ADMIN — CEFTRIAXONE SODIUM 1 G: 1 INJECTION, POWDER, FOR SOLUTION INTRAMUSCULAR; INTRAVENOUS at 15:50

## 2022-09-22 RX ADMIN — GLIPIZIDE 5 MG: 5 TABLET ORAL at 17:57

## 2022-09-22 RX ADMIN — FAMOTIDINE 20 MG: 20 TABLET ORAL at 09:37

## 2022-09-22 RX ADMIN — ENOXAPARIN SODIUM 30 MG: 30 INJECTION SUBCUTANEOUS at 17:57

## 2022-09-22 RX ADMIN — Medication 10 ML: at 09:41

## 2022-09-22 RX ADMIN — GLIPIZIDE 5 MG: 5 TABLET ORAL at 09:37

## 2022-09-22 RX ADMIN — LATANOPROST 1 DROP: 50 SOLUTION OPHTHALMIC at 21:07

## 2022-09-22 RX ADMIN — INSULIN LISPRO 3 UNITS: 100 INJECTION, SOLUTION INTRAVENOUS; SUBCUTANEOUS at 17:57

## 2022-09-22 RX ADMIN — CALCIUM CARBONATE-VITAMIN D TAB 500 MG-200 UNIT 1 TABLET: 500-200 TAB at 09:37

## 2022-09-22 RX ADMIN — Medication 10 ML: at 21:08

## 2022-09-22 RX ADMIN — ALLOPURINOL 100 MG: 100 TABLET ORAL at 21:07

## 2022-09-22 RX ADMIN — AMLODIPINE BESYLATE: 5 TABLET ORAL at 09:37

## 2022-09-22 RX ADMIN — MONTELUKAST SODIUM 10 MG: 10 TABLET, FILM COATED ORAL at 21:06

## 2022-09-22 RX ADMIN — DOCUSATE SODIUM 50MG AND SENNOSIDES 8.6MG 2 TABLET: 8.6; 5 TABLET, FILM COATED ORAL at 21:06

## 2022-09-22 RX ADMIN — FLUTICASONE PROPIONATE 2 SPRAY: 50 SPRAY, METERED NASAL at 09:38

## 2022-09-22 RX ADMIN — HYDRALAZINE HYDROCHLORIDE 100 MG: 50 TABLET, FILM COATED ORAL at 16:18

## 2022-09-22 RX ADMIN — LINAGLIPTIN 5 MG: 5 TABLET, FILM COATED ORAL at 09:37

## 2022-09-22 RX ADMIN — GABAPENTIN 300 MG: 300 CAPSULE ORAL at 09:37

## 2022-09-22 RX ADMIN — ISOSORBIDE MONONITRATE 30 MG: 30 TABLET, EXTENDED RELEASE ORAL at 12:19

## 2022-09-22 RX ADMIN — TIMOLOL MALEATE: 5 SOLUTION OPHTHALMIC at 09:41

## 2022-09-22 RX ADMIN — ALLOPURINOL 100 MG: 100 TABLET ORAL at 09:37

## 2022-09-22 RX ADMIN — METOPROLOL TARTRATE 50 MG: 50 TABLET, FILM COATED ORAL at 21:06

## 2022-09-22 RX ADMIN — ROSUVASTATIN CALCIUM 10 MG: 10 TABLET, FILM COATED ORAL at 09:41

## 2022-09-22 RX ADMIN — TIMOLOL MALEATE: 5 SOLUTION OPHTHALMIC at 21:07

## 2022-09-22 RX ADMIN — HYDRALAZINE HYDROCHLORIDE 100 MG: 50 TABLET, FILM COATED ORAL at 09:37

## 2022-09-22 RX ADMIN — GABAPENTIN 600 MG: 300 CAPSULE ORAL at 21:06

## 2022-09-22 RX ADMIN — ASPIRIN 81 MG: 81 TABLET, COATED ORAL at 09:37

## 2022-09-22 RX ADMIN — DOCUSATE SODIUM 100 MG: 100 CAPSULE, LIQUID FILLED ORAL at 09:37

## 2022-09-22 RX ADMIN — DOCUSATE SODIUM 50MG AND SENNOSIDES 8.6MG 2 TABLET: 8.6; 5 TABLET, FILM COATED ORAL at 09:41

## 2022-09-22 RX ADMIN — IPRATROPIUM BROMIDE 2 SPRAY: 21 SPRAY, METERED NASAL at 21:07

## 2022-09-22 RX ADMIN — MULTIPLE VITAMINS W/ MINERALS TAB 1 TABLET: TAB at 09:37

## 2022-09-22 RX ADMIN — DOCUSATE SODIUM 100 MG: 100 CAPSULE, LIQUID FILLED ORAL at 21:06

## 2022-09-22 RX ADMIN — INSULIN LISPRO 2 UNITS: 100 INJECTION, SOLUTION INTRAVENOUS; SUBCUTANEOUS at 12:19

## 2022-09-22 RX ADMIN — LOSARTAN POTASSIUM 50 MG: 50 TABLET, FILM COATED ORAL at 17:57

## 2022-09-22 RX ADMIN — IPRATROPIUM BROMIDE 2 SPRAY: 21 SPRAY, METERED NASAL at 09:38

## 2022-09-22 RX ADMIN — HYDRALAZINE HYDROCHLORIDE 100 MG: 50 TABLET, FILM COATED ORAL at 21:07

## 2022-09-22 RX ADMIN — LEVOTHYROXINE SODIUM 50 MCG: 0.05 TABLET ORAL at 09:37

## 2022-09-22 RX ADMIN — GUAIFENESIN 600 MG: 600 TABLET, EXTENDED RELEASE ORAL at 21:07

## 2022-09-22 RX ADMIN — METOPROLOL TARTRATE 50 MG: 50 TABLET, FILM COATED ORAL at 09:36

## 2022-09-22 NOTE — CONSULTS
Diabetes Education  Assessment/Teaching    Patient Name:  Salma Hatfield  YOB: 1935  MRN: [id number]  Admit Date:  9/21/2022      Assessment Date:  9/22/2022  Flowsheet Row Most Recent Value   General Information     Referral From: MD order   Weight 66.5 kg (146 lb 9.7 oz)   Weight Method Bed scale       Flowsheet Row Most Recent Value   DM Education Needs    Patient Response Other (comment)  [Pt declined any educational needs at this time.  Noted A1c 7.5% now, was 10% July 2022.  Discussed improvement.]        Electronically signed by:  Iliana Hernadez RN, [facility]  09/22/22 13:31 EDT

## 2022-09-22 NOTE — PLAN OF CARE
Goal Outcome Evaluation:              Outcome Evaluation: Pt admitted with chest pain, likely myocarditis from covid history per chart.  Pt reports using rollator, denies falls, 3L 02 at baseline.  Today pt demonstrates impaired gait pattern and activity tolerance from bselinebut able to ambulate 175' w/ contact to stand by assist using her rollator, slow shufflng s teps. Recommend DC to home with assist as needed, recommend ambulating in [place] w/ family or staff 2-3x/day.

## 2022-09-22 NOTE — CONSULTS
Electrophysiology Hospital Consult            Patient Name: Salma Hatfield  Age/Sex: 86 y.o. female  : 1935  MRN: [id number]    Date of Admission: 2022  Date of Encounter Visit: 22  Encounter Provider: SORIN Parra  Referring Provider: Bronwyn Brito, *  Place of Service: [facility] CARDIOLOGY  Patient Care Team:  Jerry Canseco MD as PCP - General (Internal Medicine)      Subjective:   EP Consultation for: Chest pain    Chief Complaint: Chest pain    History of Present Illness:  Salma Hatfield is a 86 y.o. female who has followed with Dr. Santoro (cardiology) for CAD, s/p CABG () & PCI (), severe vascular disease (carotid disease & peripheral)---s/p aorto-iliac stent, renal stent & right CEA . She also has chronic hypoxic respiratory failure/COPD----chronic home oxygen, HTN, HLD, CKD and DM.     Hx of intrathoracic diffuse large B cell lymphoma---treated , no recurrence after treatment---follows w/Dr. Suarez --[facility].     Dr. Eisenberg and I first met her when she presented to [facility] 2022 after seeing onc and PCP, abnormal labs and low HR, sent to ED and was in [facility] and underwent PPM by Dr. Eisenberg on .    I just saw her in the office  and she was doing okay.     She went to PT yesterday, did fine during session but walking out to car had chest pain that lasted about 15 minutes, had another couple episodes, laid down, felt better, got up, it returned so she came to ED.     Being treated for UTI, trop negative x 3. EKG with no ischemic changes.     No further episodes of chest discomfort and breathing is baseline.     Past Medical History:  Past Medical History:   Diagnosis Date   • AAA (abdominal aortic aneurysm) (HCC)     stated in 2022 Dr. Lokesh Santoro office note   • Atherosclerotic heart disease     stated in 2022 Dr. Lokesh Santoro office note   • Bradycardia    • Carotid  artery stenosis     stated in 2- Dr. Lokesh Santoro office note   • Cataract Removal 10/27/2008    Right (10/27/08) and Left (11/25/08)   • Chronic kidney disease, stage 3a (HCC)    • Chronic respiratory failure with hypoxia (HCC)    • Coronary artery disease    • Coronary atherosclerosis     stated in 2- Dr. Lokesh Santoro office note   • Diabetes mellitus (HCC)    • Diffuse large B cell lymphoma (HCC) 06/21/2016   • Elevated cholesterol    • H/O angioplasty 12/31/2007   • Hx of CABG 09/21/2009    Triple bypass   • Hyperlipidemia    • Hyperparathyroidism (HCC) 04/17/2017   • Hypertension    • Hypertensive disorder     stated in 2- Dr. Lokesh Santoro office note   • Hypothyroidism (acquired)    • Ischemic heart disease 12/15/2016   • Pulmonary emphysema (HCC)    • Retinal tear 02/06/2009    Right eye, repaired 06/2009   • S/P arterial stent 08/26/2009    Left leg and aorta   • S/P renal artery angioplasty 01/21/2008    Left   • Shingles 03/26/2014   • Status post carotid surgery 06/13/2018   • 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HCC)     stated in 2- Dr. Lokesh Santoro office note       Past Surgical History:   Procedure Laterality Date   • CARDIAC CATHETERIZATION     • CARDIAC ELECTROPHYSIOLOGY PROCEDURE N/A 07/22/2022    Procedure: Pacemaker SC new [employer];  Surgeon: Kevin Eisenberg MD;  Location: [facility] INVASIVE LOCATION;  Service: Cardiology;  Laterality: N/A;   • CAROTID ENDARTERECTOMY  2018   • CORONARY ARTERY BYPASS GRAFT  2008    Triple bypass   • CORONARY STENT PLACEMENT  10/2011   • ILIAC ARTERY STENT      Aorta-iliac stent 2009   • INSERT / REPLACE / REMOVE PACEMAKER  07/2022   • RENAL ARTERY STENT Left 2008       Home Medications:   Medications Prior to Admission   Medication Sig Dispense Refill Last Dose   • allopurinol (ZYLOPRIM) 100 MG tablet Take 100 mg by mouth 2 (Two) Times a Day.   9/21/2022 at Unknown time   • amlodipine-olmesartan (ANT) 5-20 MG  per tablet Take 1 tablet by mouth 2 (Two) Times a Day.   9/21/2022 at Unknown time   • aspirin 81 MG EC tablet Take 81 mg by mouth Daily.   9/21/2022 at Unknown time   • Calcium Carbonate-Vitamin D 600-200 MG-UNIT tablet Take 1 tablet by mouth Daily.   9/21/2022 at Unknown time   • docusate sodium (COLACE) 100 MG capsule Take 100 mg by mouth 2 (Two) Times a Day.   9/21/2022 at Unknown time   • dorzolamide-timolol (COSOPT) 22.3-6.8 MG/ML ophthalmic solution Administer 1 drop into the left eye 2 (Two) Times a Day.   9/21/2022 at Unknown time   • ezetimibe (ZETIA) 10 MG tablet Take 10 mg by mouth Daily.   9/21/2022 at Unknown time   • famotidine (PEPCID) 20 MG tablet Take 20 mg by mouth 2 (Two) Times a Day.   9/21/2022 at Unknown time   • fluticasone (FLONASE) 50 MCG/ACT nasal spray 2 sprays into the nostril(s) as directed by provider Daily.   9/21/2022 at Unknown time   • gabapentin (NEURONTIN) 300 MG capsule Take 300 mg by mouth Every Morning.   9/21/2022 at Unknown time   • gabapentin (NEURONTIN) 300 MG capsule Take 600 mg by mouth Every Night.   9/21/2022 at Unknown time   • glipizide (GLUCOTROL XL) 5 MG ER tablet Take 5 mg by mouth 2 (Two) Times a Day.   9/21/2022 at Unknown time   • hydrALAZINE (APRESOLINE) 100 MG tablet Take 100 mg by mouth 3 (Three) Times a Day.   9/21/2022 at Unknown time   • HYDROcodone-acetaminophen (NORCO) 5-325 MG per tablet Take 1 tablet by mouth Every 8 (Eight) Hours As Needed.   9/21/2022 at Unknown time   • latanoprost (XALATAN) 0.005 % ophthalmic solution Administer 1 drop into the left eye Every Night.   9/21/2022 at Unknown time   • levothyroxine (SYNTHROID, LEVOTHROID) 50 MCG tablet Take 50 mcg by mouth Daily.   9/21/2022 at Unknown time   • metoprolol tartrate (LOPRESSOR) 50 MG tablet Take 50 mg by mouth 2 (Two) Times a Day.   9/21/2022 at Unknown time   • montelukast (SINGULAIR) 10 MG tablet Take 10 mg by mouth Every Night.   9/21/2022 at Unknown time   • multivitamin with  minerals tablet tablet Take 1 tablet by mouth Daily.   9/21/2022 at Unknown time   • rosuvastatin (CRESTOR) 20 MG tablet Take 40 mg by mouth Daily.   9/21/2022 at Unknown time   • SITagliptin (JANUVIA) 50 MG tablet Take 50 mg by mouth Daily.   9/21/2022 at Unknown time       Allergies:  No Known Allergies    Past Social History:  Social History     Socioeconomic History   • Marital status:    Tobacco Use   • Smoking status: Never Smoker   • Smokeless tobacco: Never Used   Vaping Use   • Vaping Use: Never used   Substance and Sexual Activity   • Alcohol use: Never   • Drug use: Never   • Sexual activity: Defer       Past Family History:  History reviewed. No pertinent family history.    Review of Systems: All systems reviewed. Pertinent positives identified in HPI. All other systems are negative.     14 point ROS was performed and is negative except as outlined in HPI.     Objective:     Objective:  Vital Signs (last 24 hours)       09/21 0700  09/22 0659 09/22 0700  09/22 0755   Most Recent      Temp (°F) 96.7 -  97.5      97.2     97.2 (36.2) 09/22 0740    Heart Rate 69 -  83      70     70 09/22 0740    Resp 16 -  20      18     18 09/22 0740    /76 -  181/84      164/89     164/89 09/22 0740    SpO2 (%) 92 -  97      96     96 09/22 0740        Temp:  [96.7 °F (35.9 °C)-97.5 °F (36.4 °C)] 97.2 °F (36.2 °C)  Heart Rate:  [69-83] 70  Resp:  [16-20] 18  BP: (153-181)/(76-89) 164/89  Body mass index is 27.7 kg/m².        Physical Exam:     General Appearance: No acute distress  Eyes: Conjunctiva and lids: No erythema, swelling, or discharge. Sclera non-icteric.   HENT: Atraumatic, normocephalic. External eyes, ears, and nose normal.   Respiratory: No signs of respiratory distress. Respiration rhythm and depth normal.   Decreased throughout.   Cardiovascular:  Heart Rate and Rhythm: Normal, Heart Sounds: S1 and S2.   Murmurs: No murmurs noted. No rubs, thrills, or gallops.    Lower Extremities: No edema  noted.  Gastrointestinal:  Abdomen soft, non-distended, non-tender.  Musculoskeletal: Moves all extremities.   Skin: Warm and dry.   Psychiatric: Patient alert and oriented to person, place, and time. Speech and behavior appropriate. Normal mood and affect.    Labs:   Lab Review:     Results from last 7 days   Lab Units 22  0419 22  1250   SODIUM mmol/L 137 135*   POTASSIUM mmol/L 4.1 4.4   CHLORIDE mmol/L 100 98   CO2 mmol/L 26.2 25.9   BUN mg/dL 35* 39*   CREATININE mg/dL 1.35* 1.42*   GLUCOSE mg/dL 139* 410*   CALCIUM mg/dL 10.3 10.7*   AST (SGOT) U/L  --  27   ALT (SGPT) U/L  --  26     Results from last 7 days   Lab Units 22  0419 22  1250   TROPONIN T ng/mL 0.011 <0.010 <0.010     Results from last 7 days   Lab Units 22  0419   WBC 10*3/mm3 7.37   HEMOGLOBIN g/dL 12.0   HEMATOCRIT % 35.7   PLATELETS 10*3/mm3 131*         Results from last 7 days   Lab Units 22  0419   CHOLESTEROL mg/dL 98     Results from last 7 days   Lab Units 22  0419   MAGNESIUM mg/dL 2.4     Results from last 7 days   Lab Units 22  0419   CHOLESTEROL mg/dL 98   TRIGLYCERIDES mg/dL 247*   HDL CHOL mg/dL 33*   LDL CHOL mg/dL 27     Results from last 7 days   Lab Units 22  1250   PROBNP pg/mL 1,891.0*         Results from last 7 days   Lab Units 22  0419   TSH uIU/mL 1.350       Imagin2022 CXR:    CHEST SINGLE VIEW     HISTORY: Chest pain and shortness of breath     COMPARISON: AP chest 2022, 2022     FINDINGS: Sternotomy wires, left subclavian cardiac pacer are present.  Lungs appear clear and there is no evidence for pulmonary edema or  pleural effusion or infiltrate.     IMPRESSION:  No evidence for active disease in the chest.     This report was finalized on 2022 1:29 PM by Dr. Kam Salomon M.D.       EKG:           I personally viewed and interpreted the patient's EKG/Telemetry tracings.    Assessment:       Pulmonary emphysema  (HCC)    Diffuse large B cell lymphoma (HCC)    S/P CABG (coronary artery bypass graft)    Chronic respiratory failure with hypoxia (HCC)    Type 2 diabetes mellitus, without long-term current use of insulin (HCC)    Hypothyroidism (acquired)    Stage 3b chronic kidney disease (HCC)    Presence of cardiac pacemaker    Chest pain with high risk for cardiac etiology    Hypertensive urgency    Acute UTI (urinary tract infection)    History of 2019 novel coronavirus disease (COVID-19)        Plan:     Dr. Eisenberg and I saw patient this morning--    --Chest pain, resolved, no recurrence, trop negative x 3, EKG, no ischemic changes.     IV antibiotics for UTI per primary team.     She was working with PT when we saw her and feeling well--no chest pain, breathing baseline. She has chronic hypoxic respiratory failure, on home oxygen 24/7.     She has a complex past medical history, known CAD and severe peripheral vascular disease.     Given her age, co-morbidities, negative trops and no ischemic EKG's, no further chest pain will treat conservatively---add isosorbide. No plan for any further cardiac testing at this time.     Thank you for allowing me to participate in the care of Salma Hatfield. Feel free to contact me directly with any further questions or concerns.    SORIN Parra  [facility]  09/22/22  07:55 EDT

## 2022-09-22 NOTE — THERAPY DISCHARGE NOTE
Acute Care - Occupational Therapy Discharge  [facility]    Patient Name: Salma Hatfield  : 1935    MRN: [id number]                              Today's Date: 2022       Admit Date: 2022    Visit Dx:     ICD-10-CM ICD-9-CM   1. Chest pain with high risk for cardiac etiology  R07.9 786.50   2. Acute UTI  N39.0 599.0   3. Hyperglycemia  R73.9 790.29     Patient Active Problem List   Diagnosis   • Bradycardia   • Pulmonary emphysema (HCC)   • Diffuse large B cell lymphoma (HCC)   • S/P CABG (coronary artery bypass graft)   • Coronary artery disease involving native coronary artery without angina pectoris   • Chronic respiratory failure with hypoxia (HCC)   • Type 2 diabetes mellitus, without long-term current use of insulin (HCC)   • Hypothyroidism (acquired)   • Stage 3b chronic kidney disease (HCC)   • Second degree AV block   • Presence of cardiac pacemaker   • Chest pain with high risk for cardiac etiology   • Hypertensive urgency   • Acute UTI (urinary tract infection)   • History of 2019 novel coronavirus disease (COVID-19)     Past Medical History:   Diagnosis Date   • AAA (abdominal aortic aneurysm) ([facility])     stated in 2022 Dr. Lokesh Santoro office note   • Atherosclerotic heart disease     stated in 2022 Dr. Lokesh Santoro office note   • Bradycardia    • Carotid artery stenosis     stated in 2022 Dr. Lokesh Santoro office note   • Cataract Removal 10/27/2008    Right (10/27/08) and Left (08)   • Chronic kidney disease, stage 3a (HCC)    • Chronic respiratory failure with hypoxia (HCC)    • Coronary artery disease    • Coronary atherosclerosis     stated in 2022 Dr. Lokesh Santoro office note   • Diabetes mellitus (HCC)    • Diffuse large B cell lymphoma (HCC) 2016   • Elevated cholesterol    • H/O angioplasty 2007   • Hx of CABG 2009    Triple bypass   • Hyperlipidemia    • Hyperparathyroidism (HCC) 2017   •  Hypertension    • Hypertensive disorder     stated in 2- Dr. Lokesh Santoro office note   • Hypothyroidism (acquired)    • Ischemic heart disease 12/15/2016   • Pulmonary emphysema (HCC)    • Retinal tear 02/06/2009    Right eye, repaired 06/2009   • S/P arterial stent 08/26/2009    Left leg and aorta   • S/P renal artery angioplasty 01/21/2008    Left   • Shingles 03/26/2014   • Status post carotid surgery 06/13/2018   • 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HCC)     stated in 2- Dr. Lokesh Santoro office note     Past Surgical History:   Procedure Laterality Date   • CARDIAC CATHETERIZATION     • CARDIAC ELECTROPHYSIOLOGY PROCEDURE N/A 07/22/2022    Procedure: Pacemaker SC new [employer];  Surgeon: Kevin Eisenberg MD;  Location: [facility] INVASIVE LOCATION;  Service: Cardiology;  Laterality: N/A;   • CAROTID ENDARTERECTOMY  2018   • CORONARY ARTERY BYPASS GRAFT  2008    Triple bypass   • CORONARY STENT PLACEMENT  10/2011   • ILIAC ARTERY STENT      Aorta-iliac stent 2009   • INSERT / REPLACE / REMOVE PACEMAKER  07/2022   • RENAL ARTERY STENT Left 2008      General Information     Row Name 09/22/22 1103          OT Time and Intention    Document Type discharge evaluation/summary  -MW     Mode of Treatment individual therapy;occupational therapy  -MW     Row Name 09/22/22 1103          General Information    Patient Profile Reviewed yes  -MW     Prior Level of Function independent:;cooking;home management;all household mobility;community mobility;transfer  daughter drives pt to appts, rollator at baseline  -MW     Existing Precautions/Restrictions fall  -MW     Barriers to Rehab none identified  -MW     Row Name 09/22/22 1103          Living Environment    People in Home child(shyam), adult  -MW     Row Name 09/22/22 1103          Home Main Entrance    Number of Stairs, Main Entrance --  ramp  -MW     Row Name 09/22/22 1103          Cognition    Orientation Status (Cognition) oriented x 4  -MW            User Key  (r) = Recorded By, (t) = Taken By, (c) = Cosigned By    Initials Name Provider Type     Marni Camarillo OT Occupational Therapist               Mobility/ADL's     Row Name 09/22/22 1104          Bed Mobility    Bed Mobility supine-sit  -     Supine-Sit [place] (Bed Mobility) modified independence  -     Assistive Device (Bed Mobility) bed rails  -     Comment, (Bed Mobility) UIC at end of session  -     Row Name 09/22/22 1104          Transfers    Transfers sit-stand transfer;stand-sit transfer  -     Sit-Stand [place] (Transfers) standby assist  -     Stand-Sit [place] (Transfers) standby assist  -[facility] Name 09/22/22 1104          Sit-Stand Transfer    Assistive Device (Sit-Stand Transfers) --  rollator  -MW     Row Name 09/22/22 1104          Stand-Sit Transfer    Assistive Device (Stand-Sit Transfers) walker, 4-wheeled  -[facility] Name 09/22/22 1104          Functional Mobility    Functional Mobility- Ind. Level standby assist;supervision required  -     Functional Mobility- Device walker, 4-wheeled  -     Functional Mobility- Comment pt demo functional mobility around room to recliner chair with SPV and use of rollator with no safety awareness deficits  -[facility] Name 09/22/22 1104          Activities of Daily Living    BADL Assessment/Intervention lower body dressing;feeding  -MW     Row Name 09/22/22 1104          Lower Body Dressing Assessment/Training    Comment, (Lower Body Dressing) donned socks with s/up, donned new brief with s/up in sitting/standing with SPV and rollator  -[facility] Name 09/22/22 1104          Self-Feeding Assessment/Training    Comment, (Feeding) (I) hand to mouth  -           User Key  (r) = Recorded By, (t) = Taken By, (c) = Cosigned By    Initials Name Provider Type    Marni Grover OT Occupational Therapist               Obj/Interventions     Row Name 09/22/22 1105          Sensory Assessment (Somatosensory)     Sensory Assessment (Somatosensory) UE sensation intact  -MW     Row Name 09/22/22 1105          Vision Assessment/Intervention    Vision Assessment 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  -MW     Row Name 09/22/22 1105          Range of Motion Comprehensive    General Range of Motion bilateral upper extremity ROM WNL  -MW     Row Name 09/22/22 1105          Strength Comprehensive (MMT)    General Manual Muscle Testing (MMT) Assessment no strength deficits identified  -MW     Row Name 09/22/22 1105          Motor Skills    Motor Skills functional endurance  -     Functional Endurance 3LO2 baseline  -MW     Row Name 09/22/22 1105          Balance    Balance Assessment sitting static balance;sitting dynamic balance;sit to stand dynamic balance;standing static balance;standing dynamic balance  -     Static Sitting Balance modified independence  -     Dynamic Sitting Balance supervision  -     Position, Sitting Balance unsupported;sitting edge of bed  -     Sit to Stand Dynamic Balance supervision  -     Static Standing Balance supervision  -     Dynamic Standing Balance supervision  -     Position/Device Used, Standing Balance supported;walker, 4-wheeled  -     Balance Interventions sitting;sit to stand;supported;static;dynamic;minimal challenge;standing;occupation based/functional task  -     Comment, Balance no LOBs noted or unsteadiness  -           User Key  (r) = Recorded By, (t) = Taken By, (c) = Cosigned By    Initials Name Provider Type    Marni Grover OT Occupational Therapist               Goals/Plan     Row Name 09/22/22 1109          Transfer Goal 1 (OT)    Activity/Assistive Device (Transfer Goal 1, OT) sit-to-stand/stand-to-sit  -     [place] Level/Cues Needed (Transfer Goal 1, OT) supervision required  -     Time Frame (Transfer Goal 1, OT) short term goal (STG);1 day  -     Progress/Outcome (Transfer Goal 1, OT) goal met  -           User Key  (r) =  Recorded By, (t) = Taken By, (c) = Cosigned By    Initials Name Provider Type    MW Marni Camarillo, ROBBIN Occupational Therapist               Clinical Impression     Row Name 09/22/22 1106          Pain Assessment    Pretreatment Pain Rating 0/10 - no pain  -MW     Posttreatment Pain Rating 0/10 - no pain  -MW     Row Name 09/22/22 1106          Plan of Care Review    Plan of Care Reviewed With patient  -MW     Progress improving  -     Outcome Evaluation Pt is an 87 yo female admitted for chest pain. Pt current with OPPT. Hx CABG, pacemaker july 2022, COVID, CKD and 3LO2 at baseline. Pt seen for OT eval this date, A&Ox4, reports she lives at home with ramp, lives with her grandson who is 25, rollator at baseline, (I) with ADLs. Pt completed bed mob this date with mod (I), LBD socks and brief with s/up and SPV in standing position, multiple STS with SPV and functional mobility with rollator and SPV. Pt is at her baseline, no concerns for d/c home at this time with ADLs. OT to s/o.  -MW     Row Name 09/22/22 1106          Therapy Assessment/Plan (OT)    Criteria for Skilled Therapeutic Interventions Met (OT) no problems identified which require skilled intervention  -     Row Name 09/22/22 1106          Therapy Plan Review/Discharge Plan (OT)    Anticipated Discharge Disposition (OT) home  -     Row Name 09/22/22 1106          Vital Signs    Pre SpO2 (%) 100  -MW     O2 Delivery Pre Treatment nasal cannula  -MW     O2 Delivery Intra Treatment nasal cannula  -MW     O2 Delivery Post Treatment nasal cannula  -MW     Pre Patient Position Supine  -MW     Intra Patient Position Standing  -MW     Post Patient Position Sitting  -     Row Name 09/22/22 1106          Positioning and Restraints    Pre-Treatment Position in bed  -MW     Post Treatment Position chair  -MW     In Chair notified nsg;reclined;call light within reach;encouraged to call for assist;exit alarm on  -MW           User Key  (r) = Recorded By, (t)  = Taken By, (c) = Cosigned By    Initials Name Provider Type    Marni Grover OT Occupational Therapist               Outcome Measures     Row Name 09/22/22 1110          How much help from another is currently needed...    Putting on and taking off regular lower body clothing? 4  -MW     Bathing (including washing, rinsing, and drying) 4  -MW     Toileting (which includes using toilet bed pan or urinal) 4  -MW     Putting on and taking off regular upper body clothing 4  -MW     Taking care of personal grooming (such as brushing teeth) 4  -MW     Row Name 09/22/22 1110          Modified Hendricks Scale    Modified Hendricks Scale 1 - No significant disability despite symptoms.  Able to carry out all usual duties and activities.  -     Row Name 09/22/22 1110          Functional Assessment    Outcome Measure Options AM-PAC 6 Clicks Daily Activity (OT);Modified Hendricks  -MW           User Key  (r) = Recorded By, (t) = Taken By, (c) = Cosigned By    Initials Name Provider Type    Marni Grover OT Occupational Therapist              Occupational Therapy Education                 Title: PT OT SLP Therapies (Done)     Topic: Occupational Therapy (Done)     Point: ADL training (Done)     Description:   Instruct learner(s) on proper safety adaptation and remediation techniques during self care or transfers.   Instruct in proper use of assistive devices.              Learning Progress Summary           Patient Acceptance, E, VU by RED at 9/22/2022 1110    Comment: role of OT                   Point: Home exercise program (Done)     Description:   Instruct learner(s) on appropriate technique for monitoring, assisting and/or progressing therapeutic exercises/activities.              Learning Progress Summary           Patient Acceptance, E, VU by RED at 9/22/2022 1110    Comment: role of OT                   Point: Precautions (Done)     Description:   Instruct learner(s) on prescribed precautions during self-care and  functional transfers.              Learning Progress Summary           Patient Acceptance, E, VU by  at 9/22/2022 1110    Comment: role of OT                   Point: Body mechanics (Done)     Description:   Instruct learner(s) on proper positioning and spine alignment during self-care, functional mobility activities and/or exercises.              Learning Progress Summary           Patient Acceptance, E, VU by  at 9/22/2022 1110    Comment: role of OT                               User Key     Initials Effective Dates Name Provider Type Discipline    RED 08/20/21 -  Marni Camarillo, ROBBIN Occupational Therapist OT              OT Recommendation and Plan     Plan of Care Review  Plan of Care Reviewed With: patient  Progress: improving  Outcome Evaluation: Pt is an 87 yo female admitted for chest pain. Pt current with OPPT. Hx CABG, pacemaker july 2022, COVID, CKD and 3LO2 at baseline. Pt seen for OT eval this date, A&Ox4, reports she lives at home with ramp, lives with her grandson who is 25, rollator at baseline, (I) with ADLs. Pt completed bed mob this date with mod (I), LBD socks and brief with s/up and SPV in standing position, multiple STS with SPV and functional mobility with rollator and SPV. Pt is at her baseline, no concerns for d/c home at this time with ADLs. OT to s/o.  Plan of Care Reviewed With: patient  Outcome Evaluation: Pt is an 87 yo female admitted for chest pain. Pt current with OPPT. Hx CABG, pacemaker july 2022, COVID, CKD and 3LO2 at baseline. Pt seen for OT eval this date, A&Ox4, reports she lives at home with ramp, lives with her grandson who is 25, rollator at baseline, (I) with ADLs. Pt completed bed mob this date with mod (I), LBD socks and brief with s/up and SPV in standing position, multiple STS with SPV and functional mobility with rollator and SPV. Pt is at her baseline, no concerns for d/c home at this time with ADLs. OT to s/o.     Time Calculation:    Time Calculation- OT      Row Name 09/22/22 1111             Time Calculation- OT    OT Start Time 0844  -MW      OT Stop Time 0907  -MW      OT Time Calculation (min) 23 min  -MW      Total Timed Code Minutes- OT 16 minute(s)  -MW      OT Received On 09/22/22  -MW              Timed Charges    50122 - OT Therapeutic Activity Minutes 8  -MW      27834 - OT Self Care/Mgmt Minutes 8  -MW              Untimed Charges    OT Eval/Re-eval Minutes 7  -MW              Total Minutes    Timed Charges Total Minutes 16  -MW      Untimed Charges Total Minutes 7  -MW       Total Minutes 23  -MW            User Key  (r) = Recorded By, (t) = Taken By, (c) = Cosigned By    Initials Name Provider Type    MW Marni Camarillo OT Occupational Therapist              Therapy Charges for Today     Code Description Service Date Service Provider Modifiers Qty    08970632868 HC OT THERAPEUTIC ACT EA 15 MIN 9/22/2022 Marni Camarillo OT GO 1    51121310644 HC OT EVAL LOW COMPLEXITY 2 9/22/2022 Marni Camarillo OT GO 1             OT Discharge Summary  Anticipated Discharge Disposition (OT): home    Marni Camarillo OT  9/22/2022

## 2022-09-22 NOTE — THERAPY EVALUATION
Patient Name: Salma Hatfield  : 1935    MRN: [id number]                              Today's Date: 2022       Admit Date: 2022    Visit Dx:     ICD-10-CM ICD-9-CM   1. Chest pain with high risk for cardiac etiology  R07.9 786.50   2. Acute UTI  N39.0 599.0   3. Hyperglycemia  R73.9 790.29     Patient Active Problem List   Diagnosis   • Bradycardia   • Pulmonary emphysema (HCC)   • Diffuse large B cell lymphoma (HCC)   • S/P CABG (coronary artery bypass graft)   • Coronary artery disease involving native coronary artery without angina pectoris   • Chronic respiratory failure with hypoxia (HCC)   • Type 2 diabetes mellitus, without long-term current use of insulin (HCC)   • Hypothyroidism (acquired)   • Stage 3b chronic kidney disease (HCC)   • Second degree AV block   • Presence of cardiac pacemaker   • Chest pain with high risk for cardiac etiology   • Hypertensive urgency   • Acute UTI (urinary tract infection)   • History of 2019 novel coronavirus disease (COVID-19)     Past Medical History:   Diagnosis Date   • AAA (abdominal aortic aneurysm) ([facility])     stated in 2022 Dr. Lokesh Santoro office note   • Atherosclerotic heart disease     stated in 2022 Dr. Lokesh Santoro office note   • Bradycardia    • Carotid artery stenosis     stated in 2022 Dr. Lokesh Santoro office note   • Cataract Removal 10/27/2008    Right (10/27/08) and Left (08)   • Chronic kidney disease, stage 3a (HCC)    • Chronic respiratory failure with hypoxia (HCC)    • Coronary artery disease    • Coronary atherosclerosis     stated in 2022 Dr. Lokesh Santoro office note   • Diabetes mellitus (HCC)    • Diffuse large B cell lymphoma (HCC) 2016   • Elevated cholesterol    • H/O angioplasty 2007   • Hx of CABG 2009    Triple bypass   • Hyperlipidemia    • Hyperparathyroidism (HCC) 2017   • Hypertension    • Hypertensive disorder     stated in 2022  Dr. Lokesh Santoro office note   • Hypothyroidism (acquired)    • Ischemic heart disease 12/15/2016   • Pulmonary emphysema (HCC)    • Retinal tear 02/06/2009    Right eye, repaired 06/2009   • S/P arterial stent 08/26/2009    Left leg and aorta   • S/P renal artery angioplasty 01/21/2008    Left   • Shingles 03/26/2014   • Status post carotid surgery 06/13/2018   • 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HCC)     stated in 2- Dr. Lokesh Santoro office note     Past Surgical History:   Procedure Laterality Date   • CARDIAC CATHETERIZATION     • CARDIAC ELECTROPHYSIOLOGY PROCEDURE N/A 07/22/2022    Procedure: Pacemaker SC new [employer];  Surgeon: Kevin Eisenberg MD;  Location: [facility] CATH INVASIVE LOCATION;  Service: Cardiology;  Laterality: N/A;   • CAROTID ENDARTERECTOMY  2018   • CORONARY ARTERY BYPASS GRAFT  2008    Triple bypass   • CORONARY STENT PLACEMENT  10/2011   • ILIAC ARTERY STENT      Aorta-iliac stent 2009   • INSERT / REPLACE / REMOVE PACEMAKER  07/2022   • RENAL ARTERY STENT Left 2008      General Information     Row Name 09/22/22 1554          Physical Therapy Time and Intention    Document Type evaluation  -AR     Mode of Treatment physical therapy  -AR     Row Name 09/22/22 1558          General Information    Patient Profile Reviewed yes  -AR     Prior Level of Function independent:  rollator, 3L 02, no recent falls  -AR     Existing Precautions/Restrictions fall  -AR     Barriers to Rehab none identified  -AR     Row Name 09/22/22 1554          Living Environment    People in Home grandchild(shyam)  adult  -AR     Row Name 09/22/22 1554          Home Main Entrance    Number of Stairs, Main Entrance --  ramp  -AR     Row Name 09/22/22 1558          Cognition    Orientation Status (Cognition) oriented x 4  -AR     Row Name 09/22/22 1558          Safety Issues, Functional Mobility    Impairments Affecting Function (Mobility) balance;strength;endurance/activity tolerance  -AR           User Key   (r) = Recorded By, (t) = Taken By, (c) = Cosigned By    Initials Name Provider Type    AR Vilma Toussaint PT Physical Therapist               Mobility     Row Name 09/22/22 1555          Bed Mobility    Bed Mobility supine-sit  -AR     Supine-Sit [place] (Bed Mobility) modified independence  -AR     Assistive Device (Bed Mobility) bed rails  -AR     Row Name 09/22/22 1555          Sit-Stand Transfer    Sit-Stand [place] (Transfers) standby assist  -AR     Assistive Device (Sit-Stand Transfers) walker, 4-wheeled  -AR     Row Name 09/22/22 1555          Gait/Stairs (Locomotion)    [place] Level (Gait) standby assist  -AR     Assistive Device (Gait) walker, 4-wheeled  -AR     Distance in Feet (Gait) 175  -AR     Deviations/Abnormal Patterns (Gait) festinating/shuffling;chandana decreased  -AR     Bilateral Gait Deviations forward flexed posture  -AR           User Key  (r) = Recorded By, (t) = Taken By, (c) = Cosigned By    Initials Name Provider Type    AR Vilma Toussaint PT Physical Therapist               Obj/Interventions     Row Name 09/22/22 1555          Range of Motion Comprehensive    Comment, General Range of Motion B LE WFL  -AR     Row Name 09/22/22 1555          Strength Comprehensive (MMT)    Comment, General Manual Muscle Testing (MMT) Assessment B LE 4/5  -AR     Row Name 09/22/22 1555          Motor Skills    Therapeutic Exercise --  B AP, LAQ 10x  -AR     Row Name 09/22/22 1555          Balance    Dynamic Standing Balance contact guard  -AR     Position/Device Used, Standing Balance walker, 4-wheeled  -AR           User Key  (r) = Recorded By, (t) = Taken By, (c) = Cosigned By    Initials Name Provider Type    AR Vilma Toussaint, PT Physical Therapist               Goals/Plan     Row Name 09/22/22 1557          Transfer Goal 1 (PT)    Activity/Assistive Device (Transfer Goal 1, PT) sit-to-stand/stand-to-sit;bed-to-chair/chair-to-bed;walker, rolling  -AR     [place] Level/Cues  Needed (Transfer Goal 1, PT) modified independence  -AR     Time Frame (Transfer Goal 1, PT) 1 week  -AR     Row Name 09/22/22 1557          Gait Training Goal 1 (PT)    Activity/Assistive Device (Gait Training Goal 1, PT) gait (walking locomotion);walker, rolling  -AR     [place] Level (Gait Training Goal 1, PT) standby assist  -AR     Distance (Gait Training Goal 1, PT) 300  -AR     Time Frame (Gait Training Goal 1, PT) 1 week  -AR     Row Name 09/22/22 5487          Therapy Assessment/Plan (PT)    Planned Therapy Interventions (PT) balance training;bed mobility training;gait training;home exercise program;patient/family education;transfer training;ROM (range of motion);strengthening  -AR           User Key  (r) = Recorded By, (t) = Taken By, (c) = Cosigned By    Initials Name Provider Type    Vilma Yao, PT Physical Therapist               Clinical Impression     Row Name 09/22/22 5214          Pain    Pretreatment Pain Rating 0/10 - no pain  -AR     Posttreatment Pain Rating 0/10 - no pain  -AR     Pain Intervention(s) Repositioned  -AR     Row Name 09/22/22 1828          Plan of Care Review    Outcome Evaluation Pt admitted with chest pain, likely myocarditis from covid history per chart.  Pt reports using rollator, denies falls, 3L 02 at baseline.  Today pt demonstrates impaired gait pattern and activity tolerance from bselinebut able to ambulate 175' w/ contact to stand by assist using her rollator, slow shufflng s teps. Recommend DC to home with assist as needed, recommend ambulating in [place] w/ family or staff 2-3x/day.  -AR     Row Name 09/22/22 9696          Therapy Assessment/Plan (PT)    Rehab Potential (PT) good, to achieve stated therapy goals  -AR     Criteria for Skilled Interventions Met (PT) yes  -AR     Therapy Frequency (PT) 3 times/wk  -AR     Row Name 09/22/22 6022          Vital Signs    O2 Delivery Pre Treatment supplemental O2  -AR     Post SpO2 (%) 97  -AR     O2 Delivery Post  Treatment supplemental O2  -AR     Row Name 09/22/22 1555          Positioning and Restraints    Pre-Treatment Position in bed  -AR     Post Treatment Position bed  -AR     In Bed notified nsg;supine;call light within reach;encouraged to call for assist;exit alarm on  -AR           User Key  (r) = Recorded By, (t) = Taken By, (c) = Cosigned By    Initials Name Provider Type    AR Vilma Toussaint, PT Physical Therapist               Outcome Measures     Row Name 09/22/22 1558          How much help from another person do you currently need...    Turning from your back to your side while in flat bed without using bedrails? 4  -AR     Moving from lying on back to sitting on the side of a flat bed without bedrails? 4  -AR     Moving to and from a bed to a chair (including a wheelchair)? 3  -AR     Standing up from a chair using your arms (e.g., wheelchair, bedside chair)? 3  -AR     Climbing 3-5 steps with a railing? 2  -AR     To walk in hospital room? 3  -AR     AM-PAC 6 Clicks Score (PT) 19  -AR     Highest level of mobility 6 --> Walked 10 steps or more  -AR     Row Name 09/22/22 1110          Modified [place] Scale    Modified [place] Scale 1 - No significant disability despite symptoms.  Able to carry out all usual duties and activities.  -     Row Name 09/22/22 1558 09/22/22 1110       Functional Assessment    Outcome Measure Options AM-PAC 6 Clicks Basic Mobility (PT)  -AR AM-PAC 6 Clicks Daily Activity (OT);Modified [place]  -          User Key  (r) = Recorded By, (t) = Taken By, (c) = Cosigned By    Initials Name Provider Type    Vilma Yao, PT Physical Therapist    Marni Grover OT Occupational Therapist                               PT Recommendation and Plan  Planned Therapy Interventions (PT): balance training, bed mobility training, gait training, home exercise program, patient/family education, transfer training, ROM (range of motion), strengthening  Outcome Evaluation: Pt admitted  with chest pain, likely myocarditis from covid history per chart.  Pt reports using rollator, denies falls, 3L 02 at baseline.  Today pt demonstrates impaired gait pattern and activity tolerance from bselinebut able to ambulate 175' w/ contact to stand by assist using her rollator, slow shufflng s teps. Recommend DC to home with assist as needed, recommend ambulating in [place] w/ family or staff 2-3x/day.     Time Calculation:    PT Charges     Row Name 09/22/22 1553             Time Calculation    Start Time 1330  -AR      Stop Time 1358  -AR      Time Calculation (min) 28 min  -AR      PT Received On 09/22/22  -AR      PT - Next Appointment 09/24/22  -AR      PT Goal Re-Cert Due Date 09/29/22  -AR            User Key  (r) = Recorded By, (t) = Taken By, (c) = Cosigned By    Initials Name Provider Type    AR Vilma Toussaint, PT Physical Therapist              Therapy Charges for Today     Code Description Service Date Service Provider Modifiers Qty    99940130738 HC PT EVAL MOD COMPLEXITY 4 9/22/2022 Vilma Toussaint, PT GP 1    39781907000 HC PT THER PROC EA 15 MIN 9/22/2022 Vilma Tous[place]it, PT GP 1          PT G-Codes  Outcome Measure Options: AM-PAC 6 Clicks Basic Mobility (PT)  AM-PAC 6 Clicks Score (PT): 19  Modified [place] Scale: 1 - No significant disability despite symptoms.  Able to carry out all usual duties and activities.    Vilma Toussaint PT  9/22/2022

## 2022-09-22 NOTE — PROGRESS NOTES
Name: Salma Hatfield ADMIT: 2022   : 1935  PCP: Jerry Canseco MD    MRN: [id number] LOS: 0 days   AGE/SEX: 86 y.o. female  ROOM: [facility]     Subjective   Subjective   No chest pain or shortness of breath today.  Reports BP typically 150s and 160s at home    Does report some urinary frequency recently but no dysuria and did not know she had an infection prior to coming in    Reports compliance with her diabetic meds.  A1c much better than it was a few months ago    Review of Systems     Objective   Objective   Vital Signs  Temp:  [97.2 °F (36.2 °C)-97.7 °F (36.5 °C)] 97.7 °F (36.5 °C)  Heart Rate:  [69-83] 76  Resp:  [16-18] 18  BP: (132-181)/(77-89) 132/77  SpO2:  [94 %-97 %] 96 %  on  Flow (L/min):  [3] 3;   Device (Oxygen Therapy): nasal cannula  Body mass index is 27.7 kg/m².  Physical Exam  Vitals reviewed.   Constitutional:       General: She is not in acute distress.     Appearance: Normal appearance. She is not ill-appearing.   HENT:      Head: Normocephalic and atraumatic.   Eyes:      General: No scleral icterus.  Cardiovascular:      Rate and Rhythm: Normal rate and regular rhythm.      Heart sounds: No murmur heard.  Pulmonary:      Effort: No respiratory distress.      Breath sounds: No wheezing.      Comments: Diminished breath sounds  Abdominal:      General: Bowel sounds are normal. There is no distension.      Palpations: Abdomen is soft.      Tenderness: There is no abdominal tenderness.   Musculoskeletal:      Right lower leg: No edema.      Left lower leg: No edema.   Skin:     General: Skin is warm and dry.   Neurological:      Mental Status: She is alert and oriented to person, place, and time.   Psychiatric:         Mood and Affect: Mood normal.         Behavior: Behavior normal.       Results Review     I reviewed the patient's new clinical results.  Results from last 7 days   Lab Units 22  0419 22  1250   WBC 10*3/mm3 7.37 6.43   HEMOGLOBIN g/dL 12.0 12.3    PLATELETS 10*3/mm3 131* 119*     Results from last 7 days   Lab Units 09/22/22  0419 09/21/22  1250   SODIUM mmol/L 137 135*   POTASSIUM mmol/L 4.1 4.4   CHLORIDE mmol/L 100 98   CO2 mmol/L 26.2 25.9   BUN mg/dL 35* 39*   CREATININE mg/dL 1.35* 1.42*   GLUCOSE mg/dL 139* 410*   EGFR mL/min/1.73 38.4* 36.1*     Results from last 7 days   Lab Units 09/21/22  1250   ALBUMIN g/dL 4.30   BILIRUBIN mg/dL 0.5   ALK PHOS U/L 72   AST (SGOT) U/L 27   ALT (SGPT) U/L 26     Results from last 7 days   Lab Units 09/22/22  0419 09/21/22  1250   CALCIUM mg/dL 10.3 10.7*   ALBUMIN g/dL  --  4.30   MAGNESIUM mg/dL 2.4 2.2       Hemoglobin A1C   Date/Time Value Ref Range Status   09/22/2022 0419 7.50 (H) 4.80 - 5.60 % Final     Glucose   Date/Time Value Ref Range Status   09/22/2022 1132 182 (H) 70 - 130 mg/dL Final     Comment:     Meter: [id number] : [id number] Aamir Elizabeth    09/22/2022 0617 135 (H) 70 - 130 mg/dL Final     Comment:     Meter: [id number] : [id number] Rama Pickard    09/21/2022 2114 338 (H) 70 - 130 mg/dL Final     Comment:     Meter: [id number] : [id number] Rama Pickard    09/21/2022 1503 377 (H) 70 - 130 mg/dL Final     Comment:     Meter: [id number] : [id number] Fina Jarrett RN       XR Chest 1 View    Result Date: 9/21/2022  No evidence for active disease in the chest.  This report was finalized on 9/21/2022 1:29 PM by Dr. Kam Salomon M.D.      Scheduled Medications  allopurinol, 100 mg, Oral, BID  amLODIPine-losartan 5-50 combo dose, , Oral, Daily  aspirin, 81 mg, Oral, Daily  calcium-vitamin D, 1 tablet, Oral, Daily  cefTRIAXone, 1 g, Intravenous, Q24H  docusate sodium, 100 mg, Oral, BID  dorzolamide-timolol, , Left Eye, BID  enoxaparin, 30 mg, Subcutaneous, Q24H  famotidine, 20 mg, Oral, Daily  fluticasone, 2 spray, Nasal, Daily  gabapentin, 300 mg, Oral, QAM  gabapentin, 600 mg, Oral, Nightly  glipizide, 5 mg, Oral, BID AC  guaiFENesin, 600 mg, Oral,  Q12H  hydrALAZINE, 100 mg, Oral, TID  insulin lispro, 0-7 Units, Subcutaneous, TID AC  ipratropium, 2 spray, Each Nare, Q12H  isosorbide mononitrate, 30 mg, Oral, Daily  latanoprost, 1 drop, Left Eye, Nightly  levothyroxine, 50 mcg, Oral, Q AM  linagliptin, 5 mg, Oral, Daily  metoprolol tartrate, 50 mg, Oral, BID  montelukast, 10 mg, Oral, Nightly  multivitamin with minerals, 1 tablet, Oral, Daily  rosuvastatin, 10 mg, Oral, Daily  senna-docusate sodium, 2 tablet, Oral, BID  sodium chloride, 10 mL, Intravenous, Q12H    Infusions   Diet  Diet Regular; Consistent Carbohydrate       Assessment/Plan     Active Hospital Problems    Diagnosis  POA   • Chest pain with high risk for cardiac etiology [R07.9]  Yes   • Hypertensive urgency [I16.0]  Yes   • Acute UTI (urinary tract infection) [N39.0]  Yes   • History of 2019 novel coronavirus disease (COVID-19) [Z86.16]  Yes   • Presence of cardiac pacemaker [Z95.0]  Yes   • Diffuse large B cell lymphoma (HCC) [C83.30]  Yes   • S/P CABG (coronary artery bypass graft) [Z95.1]  Not Applicable   • Type 2 diabetes mellitus, without long-term current use of insulin (HCC) [E11.9]  Yes   • Hypothyroidism (acquired) [E03.9]  Yes   • Stage 3b chronic kidney disease (HCC) [N18.32]  Yes   • Chronic respiratory failure with hypoxia (HCC) [J96.11]  Yes   • Pulmonary emphysema (HCC) [J43.9]  Yes      Resolved Hospital Problems   No resolved problems to display.       86 y.o. female admitted with chest pain.    Suspect chest pain related to uncontrolled hypertension.  Troponin negative and no EKG changes.  Cardiology does not plan any additional work-up  - Will work on better BP control.  Increase losartan to monitor renal function    DM2 with hyperglycemia- not sure the reason for the uncontrolled sugars on admission as it looks like she has been doing much better outpatient.  May be related to UTI, continue current meds as she seems better today    UTI-GNR on cultures.  Continue ceftriaxone  for now    Lovenox 30 mg daily for DVT prophylaxis  Disposition-probably home with family tomorrow.      Salazar Perez MD  [facility]  09/22/22  16:06 EDT

## 2022-09-22 NOTE — PLAN OF CARE
Goal Outcome Evaluation:  Plan of Care Reviewed With: patient        Progress: improving  Outcome Evaluation: Pt is an 87 yo female admitted for chest pain. Pt current with OPPT. Hx CABG, pacemaker july 2022, COVID, CKD and 3LO2 at baseline. Pt seen for OT eval this date, A&Ox4, reports she lives at home with ramp, lives with her grandson who is 25, rollator at baseline, (I) with ADLs. Pt completed bed mob this date with mod (I), LBD socks and brief with s/up and SPV in standing position, multiple STS with SPV and functional mobility with rollator and SPV. Pt is at her baseline, no concerns for d/c home at this time with ADLs. OT to s/o.

## 2022-09-22 NOTE — CASE MANAGEMENT/SOCIAL WORK
Discharge Planning Assessment  [facility]     Patient Name: Salma Hatfield  MRN: [id number]  Today's Date: 9/22/2022    Admit Date: 9/21/2022    Plan: Plans home; denies needs.   Discharge Needs Assessment     Row Name 09/22/22 1049       Living Environment    People in Home child(shyam), adult    Name(s) of People in Home 25 year old grandson    Current Living Arrangements home    Primary Care Provided by self;child(shyam)    Provides Primary Care For no one, unable/limited ability to care for self    Family Caregiver if Needed child(shyam), adult    Family Caregiver Names son Glenn Hatfield [phone] and daughter Blossom Conde [phone]    Quality of Family Relationships helpful;involved;supportive    Able to Return to Prior Arrangements yes       Resource/Environmental Concerns    Resource/Environmental Concerns none    Transportation Concerns no car       Transition Planning    Patient/Family Anticipates Transition to home with family    Patient/Family Anticipated Services at Transition none    Transportation Anticipated family or friend will provide       Discharge Needs Assessment    Equipment Currently Used at Home rollator;oxygen;shower chair;wheelchair    Concerns to be Addressed discharge planning    Anticipated Changes Related to Illness none    Equipment Needed After Discharge none    Current Discharge Risk physical impairment               Discharge Plan     Row Name 09/22/22 1047       Plan    Plan Plans home; denies needs.    Patient/Family in Agreement with Plan yes    Provided Post Acute Provider List? N/A    N/A Provider List Comment Denies any HH/SNF needs.    Provided Post Acute Provider Quality & Resource List? N/A    N/A Quality & Resource List Comment Denies any HH/SNF needs.    Plan Comments Met with the patient at bedside; explained role of CCP, verified facesheet, checked [employer] notice and discussed discharge planning needs. The patient plans to return home upon d/c with assistance from her  family if needed.  The patient’s 25 year old grand-son resides with her and is always home as he works from home.  The patient has a rollator, 3 liters of O2 from [employer], a shower chair and a wheelchair.  The patient has a ramp to enter her single story home, PCP is Jerry Canseco, pharmacy is [facility] on [address] and she denies any trouble remembering to take her medication or with affording her medication. The patient states that she has her groceries delivered to her home. The patient is unsure who she has used for HH in the past, denies any SNF history, was encouraged to bring her POA documents, her son Glenn Hatfield [phone] and daughter Blossom Conde [phone] transport her to her appointments and will transport her home upon d/c. The patient currently denies any d/c needs.  CCP will follow to see if the patient will need any IV antibiotics as she is currently on IV Rocephin, if she needs any Lovenox or a nebulizer upon d/c.  T.KYE CHAPMAN              Continued Care and Services - Admitted Since 9/21/2022    Coordination has not been started for this encounter.       Expected Discharge Date and Time     Expected Discharge Date Expected Discharge Time    Sep 23, 2022          Demographic Summary     Row Name 09/22/22 1045       General Information    Admission Type observation    Arrived From home    Referral Source admission list    Reason for Consult discharge planning    Preferred Language English               Functional Status     Row Name 09/22/22 1048       Functional Status    Usual Activity Tolerance moderate    Current Activity Tolerance fair       Functional Status, IADL    Medications assistive equipment    Meal Preparation assistive equipment    Housekeeping assistive equipment    Laundry assistive equipment    Shopping assistive equipment       Mental Status    General Appearance WDL WDL       Mental Status Summary    Recent Changes in Mental Status/Cognitive Functioning no changes                Psychosocial    No documentation.                Abuse/Neglect    No documentation.                Legal    No documentation.                Substance Abuse    No documentation.                Patient Forms    No documentation.                   KYE Barron

## 2022-09-22 NOTE — PLAN OF CARE
Goal Outcome Evaluation:  Plan of Care Reviewed With: patient        Progress: improving  Outcome Evaluation: Possible discharge home tomorrow. positive urine culture, iv antibotics. BP meds adjusted, imdur added. up in the room with a walker and to the bathroom.

## 2022-09-22 NOTE — PLAN OF CARE
Goal Outcome Evaluation:  Plan of Care Reviewed With: patient        Progress: improving  Outcome Evaluation: Pt had no c/o pain this shift. BG improving. Remains on 3L NC, home dose. Continues on rocephin for UTI. Safety maintained.

## 2022-09-23 ENCOUNTER — READMISSION MANAGEMENT (OUTPATIENT)
Dept: CALL CENTER | Facility: HOSPITAL | Age: 87
End: 2022-09-23

## 2022-09-23 VITALS
HEART RATE: 69 BPM | SYSTOLIC BLOOD PRESSURE: 140 MMHG | TEMPERATURE: 97.5 F | WEIGHT: 143.1 LBS | RESPIRATION RATE: 18 BRPM | BODY MASS INDEX: 27.04 KG/M2 | OXYGEN SATURATION: 98 % | DIASTOLIC BLOOD PRESSURE: 73 MMHG

## 2022-09-23 PROBLEM — R07.9 CHEST PAIN WITH HIGH RISK FOR CARDIAC ETIOLOGY: Status: RESOLVED | Noted: 2022-09-21 | Resolved: 2022-09-23

## 2022-09-23 PROBLEM — I16.0 HYPERTENSIVE URGENCY: Status: RESOLVED | Noted: 2022-09-21 | Resolved: 2022-09-23

## 2022-09-23 LAB
ANION GAP SERPL CALCULATED.3IONS-SCNC: 8.6 MMOL/L (ref 5–15)
BACTERIA SPEC AEROBE CULT: ABNORMAL
BASOPHILS # BLD AUTO: 0.05 10*3/MM3 (ref 0–0.2)
BASOPHILS NFR BLD AUTO: 0.8 % (ref 0–1.5)
BUN SERPL-MCNC: 38 MG/DL (ref 8–23)
BUN/CREAT SERPL: 25.3 (ref 7–25)
CA-I SERPL ISE-MCNC: 6.1 MG/DL (ref 4.5–5.6)
CALCIUM SPEC-SCNC: 9.4 MG/DL (ref 8.6–10.5)
CHLORIDE SERPL-SCNC: 103 MMOL/L (ref 98–107)
CO2 SERPL-SCNC: 26.4 MMOL/L (ref 22–29)
CREAT SERPL-MCNC: 1.5 MG/DL (ref 0.57–1)
DEPRECATED RDW RBC AUTO: 52.4 FL (ref 37–54)
EGFRCR SERPLBLD CKD-EPI 2021: 33.8 ML/MIN/1.73
EOSINOPHIL # BLD AUTO: 0.17 10*3/MM3 (ref 0–0.4)
EOSINOPHIL NFR BLD AUTO: 2.8 % (ref 0.3–6.2)
ERYTHROCYTE [DISTWIDTH] IN BLOOD BY AUTOMATED COUNT: 14.5 % (ref 12.3–15.4)
GLUCOSE BLDC GLUCOMTR-MCNC: 203 MG/DL (ref 70–130)
GLUCOSE SERPL-MCNC: 225 MG/DL (ref 65–99)
HCT VFR BLD AUTO: 35.3 % (ref 34–46.6)
HGB BLD-MCNC: 11.5 G/DL (ref 12–15.9)
IMM GRANULOCYTES # BLD AUTO: 0.02 10*3/MM3 (ref 0–0.05)
IMM GRANULOCYTES NFR BLD AUTO: 0.3 % (ref 0–0.5)
LYMPHOCYTES # BLD AUTO: 1.41 10*3/MM3 (ref 0.7–3.1)
LYMPHOCYTES NFR BLD AUTO: 23.2 % (ref 19.6–45.3)
MCH RBC QN AUTO: 31.9 PG (ref 26.6–33)
MCHC RBC AUTO-ENTMCNC: 32.6 G/DL (ref 31.5–35.7)
MCV RBC AUTO: 97.8 FL (ref 79–97)
MONOCYTES # BLD AUTO: 1 10*3/MM3 (ref 0.1–0.9)
MONOCYTES NFR BLD AUTO: 16.4 % (ref 5–12)
NEUTROPHILS NFR BLD AUTO: 3.44 10*3/MM3 (ref 1.7–7)
NEUTROPHILS NFR BLD AUTO: 56.5 % (ref 42.7–76)
NRBC BLD AUTO-RTO: 0 /100 WBC (ref 0–0.2)
PLATELET # BLD AUTO: 139 10*3/MM3 (ref 140–450)
PMV BLD AUTO: 10.8 FL (ref 6–12)
POTASSIUM SERPL-SCNC: 4.3 MMOL/L (ref 3.5–5.2)
RBC # BLD AUTO: 3.61 10*6/MM3 (ref 3.77–5.28)
SODIUM SERPL-SCNC: 138 MMOL/L (ref 136–145)
WBC NRBC COR # BLD: 6.09 10*3/MM3 (ref 3.4–10.8)

## 2022-09-23 PROCEDURE — 80048 BASIC METABOLIC PNL TOTAL CA: CPT | Performed by: INTERNAL MEDICINE

## 2022-09-23 PROCEDURE — 99213 OFFICE O/P EST LOW 20 MIN: CPT | Performed by: NURSE PRACTITIONER

## 2022-09-23 PROCEDURE — 85025 COMPLETE CBC W/AUTO DIFF WBC: CPT | Performed by: INTERNAL MEDICINE

## 2022-09-23 PROCEDURE — 82962 GLUCOSE BLOOD TEST: CPT

## 2022-09-23 PROCEDURE — G0378 HOSPITAL OBSERVATION PER HR: HCPCS

## 2022-09-23 PROCEDURE — 63710000001 INSULIN LISPRO (HUMAN) PER 5 UNITS: Performed by: INTERNAL MEDICINE

## 2022-09-23 RX ORDER — LOSARTAN POTASSIUM 100 MG/1
100 TABLET ORAL DAILY
Qty: 30 TABLET | Refills: 1 | Status: SHIPPED | OUTPATIENT
Start: 2022-09-23

## 2022-09-23 RX ORDER — ISOSORBIDE MONONITRATE 30 MG/1
30 TABLET, EXTENDED RELEASE ORAL DAILY
Qty: 30 TABLET | Refills: 1 | Status: SHIPPED | OUTPATIENT
Start: 2022-09-24

## 2022-09-23 RX ORDER — AMLODIPINE BESYLATE 5 MG/1
5 TABLET ORAL DAILY
Qty: 30 TABLET | Refills: 1 | Status: SHIPPED | OUTPATIENT
Start: 2022-09-23

## 2022-09-23 RX ORDER — CEFDINIR 300 MG/1
300 CAPSULE ORAL DAILY
Qty: 3 CAPSULE | Refills: 0 | Status: SHIPPED | OUTPATIENT
Start: 2022-09-23

## 2022-09-23 RX ADMIN — INSULIN LISPRO 3 UNITS: 100 INJECTION, SOLUTION INTRAVENOUS; SUBCUTANEOUS at 09:05

## 2022-09-23 RX ADMIN — ISOSORBIDE MONONITRATE 30 MG: 30 TABLET, EXTENDED RELEASE ORAL at 09:03

## 2022-09-23 RX ADMIN — LOSARTAN POTASSIUM 100 MG: 100 TABLET, FILM COATED ORAL at 09:03

## 2022-09-23 RX ADMIN — ALLOPURINOL 100 MG: 100 TABLET ORAL at 09:03

## 2022-09-23 RX ADMIN — Medication 10 ML: at 09:05

## 2022-09-23 RX ADMIN — MULTIPLE VITAMINS W/ MINERALS TAB 1 TABLET: TAB at 09:02

## 2022-09-23 RX ADMIN — LINAGLIPTIN 5 MG: 5 TABLET, FILM COATED ORAL at 09:02

## 2022-09-23 RX ADMIN — IPRATROPIUM BROMIDE 2 SPRAY: 21 SPRAY, METERED NASAL at 09:12

## 2022-09-23 RX ADMIN — ROSUVASTATIN CALCIUM 10 MG: 10 TABLET, FILM COATED ORAL at 09:03

## 2022-09-23 RX ADMIN — TIMOLOL MALEATE: 5 SOLUTION OPHTHALMIC at 09:13

## 2022-09-23 RX ADMIN — FLUTICASONE PROPIONATE 2 SPRAY: 50 SPRAY, METERED NASAL at 09:12

## 2022-09-23 RX ADMIN — GUAIFENESIN 600 MG: 600 TABLET, EXTENDED RELEASE ORAL at 09:03

## 2022-09-23 RX ADMIN — METOPROLOL TARTRATE 50 MG: 50 TABLET, FILM COATED ORAL at 09:02

## 2022-09-23 RX ADMIN — CALCIUM CARBONATE-VITAMIN D TAB 500 MG-200 UNIT 1 TABLET: 500-200 TAB at 09:03

## 2022-09-23 RX ADMIN — AMLODIPINE BESYLATE 5 MG: 5 TABLET ORAL at 09:03

## 2022-09-23 RX ADMIN — DOCUSATE SODIUM 100 MG: 100 CAPSULE, LIQUID FILLED ORAL at 09:04

## 2022-09-23 RX ADMIN — HYDRALAZINE HYDROCHLORIDE 100 MG: 50 TABLET, FILM COATED ORAL at 09:03

## 2022-09-23 RX ADMIN — FAMOTIDINE 20 MG: 20 TABLET ORAL at 09:02

## 2022-09-23 RX ADMIN — GLIPIZIDE 5 MG: 5 TABLET ORAL at 09:03

## 2022-09-23 RX ADMIN — DOCUSATE SODIUM 50MG AND SENNOSIDES 8.6MG 2 TABLET: 8.6; 5 TABLET, FILM COATED ORAL at 09:04

## 2022-09-23 RX ADMIN — ASPIRIN 81 MG: 81 TABLET, COATED ORAL at 09:02

## 2022-09-23 RX ADMIN — GABAPENTIN 300 MG: 300 CAPSULE ORAL at 09:02

## 2022-09-23 NOTE — CASE MANAGEMENT/SOCIAL WORK
Case Management Discharge Note      Final Note: Home. Received orders for OP PT    Provided Post Acute Provider List?: N/A  N/A Provider List Comment: Denies any HH/SNF needs.  Provided Post Acute Provider Quality & Resource List?: N/A  N/A Quality & Resource List Comment: Denies any HH/SNF needs.    Selected Continued Care - Discharged on 9/23/2022 Admission date: 9/21/2022 - Discharge disposition: Home or Self Care    Destination    No services have been selected for the patient.              Durable Medical Equipment    No services have been selected for the patient.              Dialysis/Infusion    No services have been selected for the patient.              Home Medical Care    No services have been selected for the patient.              Therapy    No services have been selected for the patient.              Community Resources    No services have been selected for the patient.              Community & DME    No services have been selected for the patient.                  Transportation Services  Private: Car    Final Discharge Disposition Code: 01 - home or self-care

## 2022-09-23 NOTE — PLAN OF CARE
Goal Outcome Evaluation:              Outcome Evaluation: hopeful for dc this am. vss. denies any pain or discomfort. cont to monitor

## 2022-09-23 NOTE — DISCHARGE SUMMARY
Patient Name: Salma Hatfield  : 1935  MRN: [id number]    Date of Admission: 2022  Date of Discharge:  2022  Primary Care Physician: Jerry Canseco MD      Chief Complaint:   Chest Pain      Discharge Diagnoses     Active Hospital Problems    Diagnosis  POA   • Acute UTI (urinary tract infection) [N39.0]  Yes   • History of 2019 novel coronavirus disease (COVID-19) [Z86.16]  Yes   • Presence of cardiac pacemaker [Z95.0]  Yes   • Diffuse large B cell lymphoma (HCC) [C83.30]  Yes   • S/P CABG (coronary artery bypass graft) [Z95.1]  Not Applicable   • Type 2 diabetes mellitus with hyperglycemia, without long-term current use of insulin (HCC) [E11.65]  Yes   • Hypothyroidism (acquired) [E03.9]  Yes   • Stage 3b chronic kidney disease (HCC) [N18.32]  Yes   • Chronic respiratory failure with hypoxia (HCC) [J96.11]  Yes   • Pulmonary emphysema (HCC) [J43.9]  Yes      Resolved Hospital Problems    Diagnosis Date Resolved POA   • Chest pain with high risk for cardiac etiology [R07.9] 2022 Yes   • Hypertensive urgency [I16.0] 2022 Yes        Hospital Course     Ms. Hatfield is a 86 y.o. female with a history of pacemaker, CAD/CABG, DM2, CKD 3B, chronic hypoxic respiratory failure with COPD and recent COVID infection who presented to [facility] initially complaining of chest pain.  Please see the admitting history and physical for further details.  She came to ED where troponins were negative.  EKG appeared unchanged from prior.  She was seen by cardiology who did not recommend any invasive work-up.  They added Imdur to her medical regimen.  Blood pressures were poorly controlled initially which was thought to be contributory toward her chest pain.  Reviewed notes from her PCP and looks like she has had poorly controlled pressures for a while.  She seems to have done better with losartan here so I am going to increase her to 100 mg losartan and continue amlodipine 5 mg.  She  was previously on a combination Bianca pill.  The Imdur should also help.  Blood sugars were markedly uncontrolled on admission, greater than 400.  However A1c actually was only 7.5 which is quite a bit better than it was earlier this year.  At her age I do not wish to get too aggressive with this and would recommend continuing her previous regimen.  I think her current hyperglycemia is more related to UTI.  She was given Rocephin while here and is feeling better from that standpoint.  Culture is growing gram-negative rods which have not yet been identified.  Regardless she should be stable for discharge home today with completion of outpatient antibiotics and close follow-up.  Patient requested referral to PT for neck and shoulder pain which I placed.        Day of Discharge     Subjective:  Feeling good, no complaints    Physical Exam:  Temp:  [97.5 °F (36.4 °C)-97.9 °F (36.6 °C)] 97.5 °F (36.4 °C)  Heart Rate:  [69-76] 69  Resp:  [18] 18  BP: (116-158)/(55-86) 140/73  Body mass index is 27.04 kg/m².  Physical Exam  Vitals reviewed.   Constitutional:       General: She is not in acute distress.     Appearance: She is not ill-appearing.   Cardiovascular:      Rate and Rhythm: Normal rate and regular rhythm.      Heart sounds: No murmur heard.  Pulmonary:      Effort: No respiratory distress.      Breath sounds: Normal breath sounds.   Abdominal:      General: Bowel sounds are normal. There is no distension.      Palpations: Abdomen is soft.      Tenderness: There is no abdominal tenderness.   Skin:     General: Skin is warm and dry.      Findings: No rash.   Neurological:      Mental Status: She is alert and oriented to person, place, and time.   Psychiatric:         Mood and Affect: Mood normal.         Behavior: Behavior normal.         Consultants     Consult Orders (all) (From admission, onward)     Start     Ordered    09/21/22 5865  Inpatient Diabetes Educator Consult  Once        Provider:  (Not yet assigned)     09/21/22 1728    09/21/22 1717  Inpatient Consult to Case Management   Once        Provider:  (Not yet assigned)    09/21/22 1726    09/21/22 1717  Inpatient Cardiology Consult  Once        Specialty:  Cardiology  Provider:  Kevin Eisenberg MD    09/21/22 1726    09/21/22 1509  [facility] (on-call MD unless specified) Details  Once,   Status:  Canceled        Specialty:  Hospitalist  Provider:  Bronwyn Brito MD    09/21/22 1508              Procedures       Imaging Results (All)     Procedure Component Value Units Date/Time    XR Chest 1 View [[id number]] Collected: 09/21/22 1315     Updated: 09/21/22 1332    Narrative:      CHEST SINGLE VIEW     HISTORY: Chest pain and shortness of breath     COMPARISON: AP chest 07/22/2022, 07/20/2022     FINDINGS: Sternotomy wires, left subclavian cardiac pacer are present.  Lungs appear clear and there is no evidence for pulmonary edema or  pleural effusion or infiltrate.       Impression:      No evidence for active disease in the chest.     This report was finalized on 9/21/2022 1:29 PM by Dr. Kam Salomon M.D.               Pertinent Labs     Results from last 7 days   Lab Units 09/23/22  0414 09/22/22  0419 09/21/22  1250   WBC 10*3/mm3 6.09 7.37 6.43   HEMOGLOBIN g/dL 11.5* 12.0 12.3   PLATELETS 10*3/mm3 139* 131* 119*     Results from last 7 days   Lab Units 09/23/22  0414 09/22/22  0419 09/21/22  1250   SODIUM mmol/L 138 137 135*   POTASSIUM mmol/L 4.3 4.1 4.4   CHLORIDE mmol/L 103 100 98   CO2 mmol/L 26.4 26.2 25.9   BUN mg/dL 38* 35* 39*   CREATININE mg/dL 1.50* 1.35* 1.42*   GLUCOSE mg/dL 225* 139* 410*   EGFR mL/min/1.73 33.8* 38.4* 36.1*     Results from last 7 days   Lab Units 09/21/22  1250   ALBUMIN g/dL 4.30   BILIRUBIN mg/dL 0.5   ALK PHOS U/L 72   AST (SGOT) U/L 27   ALT (SGPT) U/L 26     Results from last 7 days   Lab Units 09/23/22  0414 09/22/22  0419 09/21/22  1250   CALCIUM mg/dL 9.4 10.3 10.7*   ALBUMIN g/dL  --   --  4.30    MAGNESIUM mg/dL  --  2.4 2.2       Results from last 7 days   Lab Units 09/22/22  0419 09/21/22  2113 09/21/22  1423 09/21/22  1250   TROPONIN T ng/mL 0.011 <0.010  --  <0.010   PROBNP pg/mL  --   --   --  1,891.0*   D DIMER QUANT MCGFEU/mL 0.98*  --  0.97*  --        Results from last 7 days   Lab Units 09/22/22  0419   CHOLESTEROL mg/dL 98   TRIGLYCERIDES mg/dL 247*   HDL CHOL mg/dL 33*   LDL CHOL mg/dL 27     Results from last 7 days   Lab Units 09/21/22  1438   URINECX  >100,000 CFU/mL Gram Negative Bacilli*         Test Results Pending at Discharge     Pending Labs     Order Current Status    Calcium, Ionized In process    Urine Culture - Urine, Urine, Clean Catch Preliminary result          Discharge Details        Discharge Medications      New Medications      Instructions Start Date   amLODIPine 5 MG tablet  Commonly known as: NORVASC   5 mg, Oral, Daily      cefdinir 300 MG capsule  Commonly known as: OMNICEF   300 mg, Oral, Daily      isosorbide mononitrate 30 MG 24 hr tablet  Commonly known as: IMDUR   30 mg, Oral, Daily   Start Date: September 24, 2022     losartan 100 MG tablet  Commonly known as: Cozaar   100 mg, Oral, Daily         Continue These Medications      Instructions Start Date   allopurinol 100 MG tablet  Commonly known as: ZYLOPRIM   100 mg, Oral, 2 Times Daily      aspirin 81 MG EC tablet   81 mg, Oral, Daily      Calcium Carbonate-Vitamin D 600-200 MG-UNIT tablet   1 tablet, Oral, Daily      docusate sodium 100 MG capsule  Commonly known as: COLACE   100 mg, Oral, 2 Times Daily      dorzolamide-timolol 22.3-6.8 MG/ML ophthalmic solution  Commonly known as: COSOPT   1 drop, Left Eye, 2 Times Daily      ezetimibe 10 MG tablet  Commonly known as: ZETIA   10 mg, Oral, Daily      famotidine 20 MG tablet  Commonly known as: PEPCID   20 mg, Oral, 2 Times Daily      fluticasone 50 MCG/ACT nasal spray  Commonly known as: FLONASE   2 sprays, Nasal, Daily      gabapentin 300 MG capsule  Commonly  known as: NEURONTIN   300 mg, Oral, Every Morning      gabapentin 300 MG capsule  Commonly known as: NEURONTIN   600 mg, Oral, Nightly      glipizide 5 MG ER tablet  Commonly known as: GLUCOTROL XL   5 mg, Oral, 2 Times Daily      hydrALAZINE 100 MG tablet  Commonly known as: APRESOLINE   100 mg, Oral, 3 Times Daily      HYDROcodone-acetaminophen 5-325 MG per tablet  Commonly known as: NORCO   1 tablet, Oral, Every 8 Hours PRN      latanoprost 0.005 % ophthalmic solution  Commonly known as: XALATAN   1 drop, Left Eye, Nightly      levothyroxine 50 MCG tablet  Commonly known as: SYNTHROID, LEVOTHROID   50 mcg, Oral, Daily      metoprolol tartrate 50 MG tablet  Commonly known as: LOPRESSOR   50 mg, Oral, 2 Times Daily      montelukast 10 MG tablet  Commonly known as: SINGULAIR   10 mg, Oral, Nightly      multivitamin with minerals tablet tablet   1 tablet, Oral, Daily      rosuvastatin 20 MG tablet  Commonly known as: CRESTOR   40 mg, Oral, Daily      SITagliptin 50 MG tablet  Commonly known as: JANUVIA   50 mg, Oral, Daily         Stop These Medications    amlodipine-olmesartan 5-20 MG per tablet  Commonly known as: ANT            No Known Allergies    Discharge Disposition:  Home or Self Care      Discharge Diet:  Diet Order   Procedures   • Diet Regular; Consistent Carbohydrate       Discharge Activity:       CODE STATUS:    Code Status and Medical Interventions:   Ordered at: 09/21/22 1768     Medical Intervention Limits:    NO intubation (DNI)     Level Of Support Discussed With:    Patient     Code Status (Patient has no pulse and is not breathing):    No CPR (Do Not Attempt to Resuscitate)     Medical Interventions (Patient has pulse or is breathing):    Limited Support       No future appointments.  Additional Instructions for the Follow-ups that You Need to Schedule     Ambulatory Referral to Physical Therapy Evaluate and treat   As directed      Patient needs PT for neck and shoulder issues    Order  Comments: Patient needs PT for neck and shoulder issues     Specialty needed: Evaluate and treat    Follow-up needed: Yes         Discharge Follow-up with PCP   As directed       Currently Documented PCP:    Jerry Canseco MD    PCP Phone Number:    [phone]     Follow Up Details: 1 week for BP and glucose check            Follow-up Information     Jerry Canseco MD .    Specialty: Internal Medicine  Contact information:  [address] [id number] [phone]                         Additional Instructions for the Follow-ups that You Need to Schedule     Ambulatory Referral to Physical Therapy Evaluate and treat   As directed      Patient needs PT for neck and shoulder issues    Order Comments: Patient needs PT for neck and shoulder issues     Specialty needed: Evaluate and treat    Follow-up needed: Yes         Discharge Follow-up with PCP   As directed       Currently Documented PCP:    Jerry Canseco MD    PCP Phone Number:    [phone]     Follow Up Details: 1 week for BP and glucose check           Time Spent on Discharge:  Greater than 30 minutes      Salazar Perez MD  [facility]  09/23/22  09:43 EDT

## 2022-09-23 NOTE — PROGRESS NOTES
Electrophysiology Follow-Up Note      Patient Name: Salma Hatfield  Age/Sex: 86 y.o. female  : 1935  MRN: [id number]      Day of Service: 22       Chief Complaint/Follow-up: Chest pain    Interval History: No acute events overnight, feels good this morning, no further chest pain, breathing stable,        Temp:  [97.5 °F (36.4 °C)-97.9 °F (36.6 °C)] 97.5 °F (36.4 °C)  Heart Rate:  [69-76] 69  Resp:  [18] 18  BP: (116-158)/(55-86) 140/73     PHYSICAL EXAM:    General Appearance: No acute distress, well developed and well nourished.   Eyes: Conjunctiva and lids: No erythema, swelling, or discharge. Sclera non-icteric.   HENT: Atraumatic, normocephalic. External eyes, ears, and nose normal.   Respiratory: No signs of respiratory distress. Respiration rhythm and depth normal.   Decreased but clear.   Cardiovascular:  Heart Rate and Rhythm: Normal, Heart Sounds: Normal S1 and S2.  Lower Extremities: No edema noted.  Gastrointestinal:  Abdomen soft, non-distended, non-tender.  Musculoskeletal: Normal movement of extremities  Skin: Warm and dry.   Psychiatric: Patient alert and oriented to person, place, and time. Speech and behavior appropriate. Normal mood and affect.       ECG/TELE:           Results from last 7 days   Lab Units 22  0414 22  0419 22  1250   SODIUM mmol/L 138 137 135*   POTASSIUM mmol/L 4.3 4.1 4.4   CHLORIDE mmol/L 103 100 98   CO2 mmol/L 26.4 26.2 25.9   BUN mg/dL 38* 35* 39*   CREATININE mg/dL 1.50* 1.35* 1.42*   GLUCOSE mg/dL 225* 139* 410*   CALCIUM mg/dL 9.4 10.3 10.7*     Results from last 7 days   Lab Units 22  0414 22  0419 22  1250   WBC 10*3/mm3 6.09 7.37 6.43   HEMOGLOBIN g/dL 11.5* 12.0 12.3   HEMATOCRIT % 35.3 35.7 39.8   PLATELETS 10*3/mm3 139* 131* 119*         Results from last 7 days   Lab Units 22  0419 22  2113 22  1250   TROPONIN T ng/mL 0.011 <0.010 <0.010     Results from last 7 days   Lab Units  09/22/22  0419   TSH uIU/mL 1.350           Current Medications:   Scheduled Meds:allopurinol, 100 mg, Oral, BID  amLODIPine, 5 mg, Oral, Q24H  aspirin, 81 mg, Oral, Daily  calcium-vitamin D, 1 tablet, Oral, Daily  cefTRIAXone, 1 g, Intravenous, Q24H  docusate sodium, 100 mg, Oral, BID  dorzolamide-timolol, , Left Eye, BID  enoxaparin, 30 mg, Subcutaneous, Q24H  famotidine, 20 mg, Oral, Daily  fluticasone, 2 spray, Nasal, Daily  gabapentin, 300 mg, Oral, QAM  gabapentin, 600 mg, Oral, Nightly  glipizide, 5 mg, Oral, BID AC  guaiFENesin, 600 mg, Oral, Q12H  hydrALAZINE, 100 mg, Oral, TID  insulin lispro, 0-7 Units, Subcutaneous, TID AC  ipratropium, 2 spray, Each Nare, Q12H  isosorbide mononitrate, 30 mg, Oral, Daily  latanoprost, 1 drop, Left Eye, Nightly  levothyroxine, 50 mcg, Oral, Q AM  linagliptin, 5 mg, Oral, Daily  losartan, 100 mg, Oral, Q24H  metoprolol tartrate, 50 mg, Oral, BID  montelukast, 10 mg, Oral, Nightly  multivitamin with minerals, 1 tablet, Oral, Daily  rosuvastatin, 10 mg, Oral, Daily  senna-docusate sodium, 2 tablet, Oral, BID  sodium chloride, 10 mL, Intravenous, Q12H            Pulmonary emphysema (HCC)    Diffuse large B cell lymphoma (HCC)    S/P CABG (coronary artery bypass graft)    Chronic respiratory failure with hypoxia (HCC)    Type 2 diabetes mellitus with hyperglycemia, without long-term current use of insulin (HCC)    Hypothyroidism (acquired)    Stage 3b chronic kidney disease (HCC)    Presence of cardiac pacemaker    Chest pain with high risk for cardiac etiology    Hypertensive urgency    Acute UTI (urinary tract infection)    History of 2019 novel coronavirus disease (COVID-19)       Plan:     --Chest pain, negative trop and no EKG changes, likely related to HTN and UTI---add Imdur and tolerating well, no recurrence.     --CHB, s/p PPM in July, normal function.     --HTN, improved.     --DM, glucose elevated 410 on admission, per primary team.     Cardiac status stable, ok to  dc from our standpoint, she has scheduled follow up already.     Erica Spann, APRN  09/23/22  08:17 EDT

## 2022-09-24 NOTE — OUTREACH NOTE
Prep Survey    Flowsheet Row Responses   [facility] patient discharged from? [place]   Is LACE score < 7 ? No   Emergency Room discharge w/ pulse ox? No   Eligibility Readm Mgmt   Discharge diagnosis chest pain, hypertensive urgency, Acute UTI, chronic hypoxic resp failure, emphysema,    Does the patient have one of the following disease processes/diagnoses(primary or secondary)? Other   Does the patient have Home health ordered? No   Is there a DME ordered? No   Prep survey completed? Yes          DAGMAR VILLEGAS - Registered Nurse

## 2022-09-27 ENCOUNTER — READMISSION MANAGEMENT (OUTPATIENT)
Dept: CALL CENTER | Facility: HOSPITAL | Age: 87
End: 2022-09-27

## 2022-09-27 NOTE — OUTREACH NOTE
Medical Week 1 Survey    Flowsheet Row Responses   [facility] patient discharged from? [place]   Does the patient have one of the following disease processes/diagnoses(primary or secondary)? Other   Week 1 attempt successful? Yes   Call end time 0853   Discharge diagnosis chest pain, hypertensive urgency, Acute UTI, chronic hypoxic resp failure, emphysema,    Meds reviewed with patient/caregiver? Yes   Is the patient having any side effects they believe may be caused by any medication additions or changes? No   Does the patient have all medications ordered at discharge? Yes   Is the patient taking all medications as directed (includes completed medication regime)? Yes   Does the patient have a primary care provider?  Yes   Does the patient have an appointment with their PCP within 7 days of discharge? Yes   Has the patient kept scheduled appointments due by today? N/A   Has home health visited the patient within 72 hours of discharge? N/A   Psychosocial issues? No   Did the patient receive a copy of their discharge instructions? No   What is the patient's perception of their health status since discharge? Improving   Is the patient/caregiver able to teach back the hierarchy of who to call/visit for symptoms/problems? PCP, Specialist, Home health nurse, Urgent Care, ED, 911 Yes   If the patient is a current smoker, are they able to teach back resources for cessation? Not a smoker   Week 1 call completed? Yes          LENA VILLEGAS - Registered Nurse

## 2022-10-05 ENCOUNTER — READMISSION MANAGEMENT (OUTPATIENT)
Dept: CALL CENTER | Facility: HOSPITAL | Age: 87
End: 2022-10-05

## 2022-10-05 NOTE — OUTREACH NOTE
Medical Week 2 Survey    Flowsheet Row Responses   [facility] patient discharged from? [place]   Does the patient have one of the following disease processes/diagnoses(primary or secondary)? Other   Week 2 attempt successful? Yes   Call start time 0842   Discharge diagnosis chest pain, hypertensive urgency, Acute UTI, chronic hypoxic resp failure, emphysema,    Call end time 0851   Meds reviewed with patient/caregiver? Yes   Is the patient having any side effects they believe may be caused by any medication additions or changes? No   Does the patient have all medications ordered at discharge? Yes   Is the patient taking all medications as directed (includes completed medication regime)? Yes   Does the patient have a primary care provider?  Yes   Has the patient kept scheduled appointments due by today? Yes   Has home health visited the patient within 72 hours of discharge? N/A   Psychosocial issues? No   Did the patient receive a copy of their discharge instructions? Yes   Nursing interventions Reviewed instructions with patient   What is the patient's perception of their health status since discharge? Improving   Is the patient/caregiver able to teach back signs and symptoms related to disease process for when to call PCP? Yes   Is the patient/caregiver able to teach back signs and symptoms related to disease process for when to call 911? Yes   Is the patient/caregiver able to teach back the hierarchy of who to call/visit for symptoms/problems? PCP, Specialist, Home health nurse, Urgent Care, ED, 911 Yes   If the patient is a current smoker, are they able to teach back resources for cessation? Not a smoker   Week 2 Call Completed? Yes   Wrap up additional comments Pt. reports doing good. Instructed on diabetic/heart healthy diet. Informed any questions contact PCP office, v/u.          LAKESHA LEON - Registered Nurse

## 2022-11-23 ENCOUNTER — TRANSCRIBE ORDERS (OUTPATIENT)
Dept: ADMINISTRATIVE | Facility: HOSPITAL | Age: 87
End: 2022-11-23

## 2022-11-23 DIAGNOSIS — I71.40 ABDOMINAL AORTIC ANEURYSM (AAA) WITHOUT RUPTURE, UNSPECIFIED PART: Primary | ICD-10-CM

## 2023-02-15 PROCEDURE — 93296 REM INTERROG EVL PM/IDS: CPT | Performed by: INTERNAL MEDICINE

## 2023-02-15 PROCEDURE — 93294 REM INTERROG EVL PM/LDLS PM: CPT | Performed by: INTERNAL MEDICINE

## 2023-05-17 ENCOUNTER — APPOINTMENT (OUTPATIENT)
Dept: CARDIOLOGY | Facility: HOSPITAL | Age: 88
End: 2023-05-17
Payer: MEDICARE

## 2023-05-17 ENCOUNTER — APPOINTMENT (OUTPATIENT)
Dept: CT IMAGING | Facility: HOSPITAL | Age: 88
End: 2023-05-17
Payer: MEDICARE

## 2023-05-17 ENCOUNTER — APPOINTMENT (OUTPATIENT)
Dept: GENERAL RADIOLOGY | Facility: HOSPITAL | Age: 88
End: 2023-05-17
Payer: MEDICARE

## 2023-05-17 ENCOUNTER — HOSPITAL ENCOUNTER (INPATIENT)
Facility: HOSPITAL | Age: 88
LOS: 4 days | Discharge: HOME | End: 2023-05-21
Attending: EMERGENCY MEDICINE | Admitting: INTERNAL MEDICINE
Payer: MEDICARE

## 2023-05-17 DIAGNOSIS — R09.89 PULMONARY VASCULAR CONGESTION: ICD-10-CM

## 2023-05-17 DIAGNOSIS — J44.1 COPD EXACERBATION: ICD-10-CM

## 2023-05-17 DIAGNOSIS — E11.65 TYPE 2 DIABETES MELLITUS WITH HYPERGLYCEMIA, WITHOUT LONG-TERM CURRENT USE OF INSULIN: ICD-10-CM

## 2023-05-17 DIAGNOSIS — I10 HYPERTENSION, UNSPECIFIED TYPE: ICD-10-CM

## 2023-05-17 DIAGNOSIS — J96.21 ACUTE ON CHRONIC RESPIRATORY FAILURE WITH HYPOXIA: Primary | ICD-10-CM

## 2023-05-17 PROBLEM — I50.33 DIASTOLIC CHF, ACUTE ON CHRONIC: Status: ACTIVE | Noted: 2023-05-17

## 2023-05-17 PROBLEM — I73.9 PVD (PERIPHERAL VASCULAR DISEASE): Status: ACTIVE | Noted: 2023-05-17

## 2023-05-17 LAB
ALBUMIN SERPL-MCNC: 4.8 G/DL (ref 3.5–5.2)
ALBUMIN/GLOB SERPL: 1.9 G/DL
ALP SERPL-CCNC: 73 U/L (ref 39–117)
ALT SERPL W P-5'-P-CCNC: 42 U/L (ref 1–33)
ANION GAP SERPL CALCULATED.3IONS-SCNC: 10.1 MMOL/L (ref 5–15)
ANION GAP SERPL CALCULATED.3IONS-SCNC: 17.2 MMOL/L (ref 5–15)
ARTERIAL PATENCY WRIST A: ABNORMAL
AST SERPL-CCNC: 46 U/L (ref 1–32)
ATMOSPHERIC PRESS: 744.5 MMHG
B PARAPERT DNA SPEC QL NAA+PROBE: NOT DETECTED
B PERT DNA SPEC QL NAA+PROBE: NOT DETECTED
BACTERIA UR QL AUTO: ABNORMAL /HPF
BASE EXCESS BLDA CALC-SCNC: 0.5 MMOL/L (ref 0–2)
BASOPHILS # BLD AUTO: 0.03 10*3/MM3 (ref 0–0.2)
BASOPHILS NFR BLD AUTO: 0.3 % (ref 0–1.5)
BDY SITE: ABNORMAL
BILIRUB SERPL-MCNC: 0.8 MG/DL (ref 0–1.2)
BILIRUB UR QL STRIP: NEGATIVE
BUN SERPL-MCNC: 20 MG/DL (ref 8–23)
BUN SERPL-MCNC: 21 MG/DL (ref 8–23)
BUN/CREAT SERPL: 20.6 (ref 7–25)
BUN/CREAT SERPL: 21.3 (ref 7–25)
C PNEUM DNA NPH QL NAA+NON-PROBE: NOT DETECTED
CALCIUM SPEC-SCNC: 10 MG/DL (ref 8.6–10.5)
CALCIUM SPEC-SCNC: 10.2 MG/DL (ref 8.6–10.5)
CHLORIDE SERPL-SCNC: 100 MMOL/L (ref 98–107)
CHLORIDE SERPL-SCNC: 103 MMOL/L (ref 98–107)
CLARITY UR: ABNORMAL
CO2 SERPL-SCNC: 22.8 MMOL/L (ref 22–29)
CO2 SERPL-SCNC: 26.9 MMOL/L (ref 22–29)
COLOR UR: YELLOW
CREAT SERPL-MCNC: 0.94 MG/DL (ref 0.57–1)
CREAT SERPL-MCNC: 1.02 MG/DL (ref 0.57–1)
D DIMER PPP FEU-MCNC: 1.62 MCGFEU/ML (ref 0–0.87)
DEPRECATED RDW RBC AUTO: 47 FL (ref 37–54)
DEPRECATED RDW RBC AUTO: 50.5 FL (ref 37–54)
EGFRCR SERPLBLD CKD-EPI 2021: 53.4 ML/MIN/1.73
EGFRCR SERPLBLD CKD-EPI 2021: 58.8 ML/MIN/1.73
EOSINOPHIL # BLD AUTO: 0.07 10*3/MM3 (ref 0–0.4)
EOSINOPHIL NFR BLD AUTO: 0.7 % (ref 0.3–6.2)
ERYTHROCYTE [DISTWIDTH] IN BLOOD BY AUTOMATED COUNT: 13.3 % (ref 12.3–15.4)
ERYTHROCYTE [DISTWIDTH] IN BLOOD BY AUTOMATED COUNT: 13.8 % (ref 12.3–15.4)
FLUAV SUBTYP SPEC NAA+PROBE: NOT DETECTED
FLUBV RNA ISLT QL NAA+PROBE: NOT DETECTED
GAS FLOW AIRWAY: 15 LPM
GEN 5 2HR TROPONIN T REFLEX: 33 NG/L
GLOBULIN UR ELPH-MCNC: 2.5 GM/DL
GLUCOSE BLDC GLUCOMTR-MCNC: 309 MG/DL (ref 70–130)
GLUCOSE BLDC GLUCOMTR-MCNC: 371 MG/DL (ref 70–130)
GLUCOSE BLDC GLUCOMTR-MCNC: 493 MG/DL (ref 70–130)
GLUCOSE SERPL-MCNC: 287 MG/DL (ref 65–99)
GLUCOSE SERPL-MCNC: 313 MG/DL (ref 65–99)
GLUCOSE UR STRIP-MCNC: ABNORMAL MG/DL
HADV DNA SPEC NAA+PROBE: NOT DETECTED
HCO3 BLDA-SCNC: 25.4 MMOL/L (ref 22–28)
HCOV 229E RNA SPEC QL NAA+PROBE: NOT DETECTED
HCOV HKU1 RNA SPEC QL NAA+PROBE: NOT DETECTED
HCOV NL63 RNA SPEC QL NAA+PROBE: NOT DETECTED
HCOV OC43 RNA SPEC QL NAA+PROBE: NOT DETECTED
HCT VFR BLD AUTO: 47 % (ref 34–46.6)
HCT VFR BLD AUTO: 47.8 % (ref 34–46.6)
HGB BLD-MCNC: 14.8 G/DL (ref 12–15.9)
HGB BLD-MCNC: 15 G/DL (ref 12–15.9)
HGB UR QL STRIP.AUTO: NEGATIVE
HMPV RNA NPH QL NAA+NON-PROBE: NOT DETECTED
HPIV1 RNA ISLT QL NAA+PROBE: NOT DETECTED
HPIV2 RNA SPEC QL NAA+PROBE: NOT DETECTED
HPIV3 RNA NPH QL NAA+PROBE: NOT DETECTED
HPIV4 P GENE NPH QL NAA+PROBE: NOT DETECTED
HYALINE CASTS UR QL AUTO: ABNORMAL /LPF
IMM GRANULOCYTES # BLD AUTO: 0.06 10*3/MM3 (ref 0–0.05)
IMM GRANULOCYTES NFR BLD AUTO: 0.6 % (ref 0–0.5)
KETONES UR QL STRIP: NEGATIVE
LEUKOCYTE ESTERASE UR QL STRIP.AUTO: ABNORMAL
LYMPHOCYTES # BLD AUTO: 0.93 10*3/MM3 (ref 0.7–3.1)
LYMPHOCYTES NFR BLD AUTO: 8.9 % (ref 19.6–45.3)
M PNEUMO IGG SER IA-ACNC: NOT DETECTED
MCH RBC QN AUTO: 30.5 PG (ref 26.6–33)
MCH RBC QN AUTO: 30.7 PG (ref 26.6–33)
MCHC RBC AUTO-ENTMCNC: 31 G/DL (ref 31.5–35.7)
MCHC RBC AUTO-ENTMCNC: 31.9 G/DL (ref 31.5–35.7)
MCV RBC AUTO: 96.1 FL (ref 79–97)
MCV RBC AUTO: 98.6 FL (ref 79–97)
MODALITY: ABNORMAL
MONOCYTES # BLD AUTO: 0.98 10*3/MM3 (ref 0.1–0.9)
MONOCYTES NFR BLD AUTO: 9.4 % (ref 5–12)
NEUTROPHILS NFR BLD AUTO: 8.41 10*3/MM3 (ref 1.7–7)
NEUTROPHILS NFR BLD AUTO: 80.1 % (ref 42.7–76)
NITRITE UR QL STRIP: NEGATIVE
NRBC BLD AUTO-RTO: 0 /100 WBC (ref 0–0.2)
NT-PROBNP SERPL-MCNC: 5785 PG/ML (ref 0–1800)
PCO2 BLDA: 40.8 MM HG (ref 35–45)
PH BLDA: 7.4 PH UNITS (ref 7.35–7.45)
PH UR STRIP.AUTO: 7 [PH] (ref 5–8)
PLATELET # BLD AUTO: 123 10*3/MM3 (ref 140–450)
PLATELET # BLD AUTO: 138 10*3/MM3 (ref 140–450)
PMV BLD AUTO: 10.1 FL (ref 6–12)
PMV BLD AUTO: 9.7 FL (ref 6–12)
PO2 BLDA: 71 MM HG (ref 80–100)
POTASSIUM SERPL-SCNC: 3.8 MMOL/L (ref 3.5–5.2)
POTASSIUM SERPL-SCNC: 4.6 MMOL/L (ref 3.5–5.2)
PROCALCITONIN SERPL-MCNC: 0.07 NG/ML (ref 0–0.25)
PROT SERPL-MCNC: 7.3 G/DL (ref 6–8.5)
PROT UR QL STRIP: ABNORMAL
QT INTERVAL: 393 MS
RBC # BLD AUTO: 4.85 10*6/MM3 (ref 3.77–5.28)
RBC # BLD AUTO: 4.89 10*6/MM3 (ref 3.77–5.28)
RBC # UR STRIP: ABNORMAL /HPF
REF LAB TEST METHOD: ABNORMAL
RHINOVIRUS RNA SPEC NAA+PROBE: NOT DETECTED
RSV RNA NPH QL NAA+NON-PROBE: NOT DETECTED
SAO2 % BLDCOA: 94 % (ref 92–99)
SARS-COV-2 RNA NPH QL NAA+NON-PROBE: NOT DETECTED
SODIUM SERPL-SCNC: 140 MMOL/L (ref 136–145)
SODIUM SERPL-SCNC: 140 MMOL/L (ref 136–145)
SP GR UR STRIP: 1.01 (ref 1–1.03)
SQUAMOUS #/AREA URNS HPF: ABNORMAL /HPF
TOTAL RATE: 24 BREATHS/MINUTE
TROPONIN T DELTA: 1 NG/L
TROPONIN T SERPL HS-MCNC: 32 NG/L
TROPONIN T SERPL HS-MCNC: 32 NG/L
UROBILINOGEN UR QL STRIP: ABNORMAL
WBC # UR STRIP: ABNORMAL /HPF
WBC NRBC COR # BLD: 10.48 10*3/MM3 (ref 3.4–10.8)
WBC NRBC COR # BLD: 8.68 10*3/MM3 (ref 3.4–10.8)

## 2023-05-17 PROCEDURE — 94799 UNLISTED PULMONARY SVC/PX: CPT

## 2023-05-17 PROCEDURE — 80053 COMPREHEN METABOLIC PANEL: CPT | Performed by: PHYSICIAN ASSISTANT

## 2023-05-17 PROCEDURE — 82948 REAGENT STRIP/BLOOD GLUCOSE: CPT

## 2023-05-17 PROCEDURE — 99285 EMERGENCY DEPT VISIT HI MDM: CPT

## 2023-05-17 PROCEDURE — 25010000002 FUROSEMIDE PER 20 MG: Performed by: PHYSICIAN ASSISTANT

## 2023-05-17 PROCEDURE — 25010000002 HYDRALAZINE PER 20 MG

## 2023-05-17 PROCEDURE — 93306 TTE W/DOPPLER COMPLETE: CPT

## 2023-05-17 PROCEDURE — 93010 ELECTROCARDIOGRAM REPORT: CPT | Performed by: INTERNAL MEDICINE

## 2023-05-17 PROCEDURE — 25010000002 METHYLPREDNISOLONE PER 125 MG: Performed by: NURSE PRACTITIONER

## 2023-05-17 PROCEDURE — 85025 COMPLETE CBC W/AUTO DIFF WBC: CPT | Performed by: PHYSICIAN ASSISTANT

## 2023-05-17 PROCEDURE — 94761 N-INVAS EAR/PLS OXIMETRY MLT: CPT

## 2023-05-17 PROCEDURE — 93005 ELECTROCARDIOGRAM TRACING: CPT

## 2023-05-17 PROCEDURE — 36415 COLL VENOUS BLD VENIPUNCTURE: CPT | Performed by: NURSE PRACTITIONER

## 2023-05-17 PROCEDURE — 36600 WITHDRAWAL OF ARTERIAL BLOOD: CPT

## 2023-05-17 PROCEDURE — 81001 URINALYSIS AUTO W/SCOPE: CPT | Performed by: PHYSICIAN ASSISTANT

## 2023-05-17 PROCEDURE — 82803 BLOOD GASES ANY COMBINATION: CPT

## 2023-05-17 PROCEDURE — 84484 ASSAY OF TROPONIN QUANT: CPT | Performed by: PHYSICIAN ASSISTANT

## 2023-05-17 PROCEDURE — 83880 ASSAY OF NATRIURETIC PEPTIDE: CPT | Performed by: PHYSICIAN ASSISTANT

## 2023-05-17 PROCEDURE — 84484 ASSAY OF TROPONIN QUANT: CPT | Performed by: NURSE PRACTITIONER

## 2023-05-17 PROCEDURE — 84145 PROCALCITONIN (PCT): CPT | Performed by: PHYSICIAN ASSISTANT

## 2023-05-17 PROCEDURE — 93306 TTE W/DOPPLER COMPLETE: CPT | Performed by: INTERNAL MEDICINE

## 2023-05-17 PROCEDURE — 85027 COMPLETE CBC AUTOMATED: CPT | Performed by: NURSE PRACTITIONER

## 2023-05-17 PROCEDURE — 63710000001 INSULIN LISPRO (HUMAN) PER 5 UNITS: Performed by: NURSE PRACTITIONER

## 2023-05-17 PROCEDURE — 25010000002 HYDRALAZINE PER 20 MG: Performed by: PHYSICIAN ASSISTANT

## 2023-05-17 PROCEDURE — 85379 FIBRIN DEGRADATION QUANT: CPT | Performed by: INTERNAL MEDICINE

## 2023-05-17 PROCEDURE — 93005 ELECTROCARDIOGRAM TRACING: CPT | Performed by: EMERGENCY MEDICINE

## 2023-05-17 PROCEDURE — 94640 AIRWAY INHALATION TREATMENT: CPT

## 2023-05-17 PROCEDURE — 94664 DEMO&/EVAL PT USE INHALER: CPT

## 2023-05-17 PROCEDURE — 25010000002 FUROSEMIDE PER 20 MG: Performed by: NURSE PRACTITIONER

## 2023-05-17 PROCEDURE — 94760 N-INVAS EAR/PLS OXIMETRY 1: CPT

## 2023-05-17 PROCEDURE — 71275 CT ANGIOGRAPHY CHEST: CPT

## 2023-05-17 PROCEDURE — 71045 X-RAY EXAM CHEST 1 VIEW: CPT

## 2023-05-17 PROCEDURE — 25010000002 ENOXAPARIN PER 10 MG: Performed by: HOSPITALIST

## 2023-05-17 PROCEDURE — 25010000002 METHYLPREDNISOLONE PER 125 MG: Performed by: PHYSICIAN ASSISTANT

## 2023-05-17 PROCEDURE — 25510000001 IOPAMIDOL PER 1 ML: Performed by: HOSPITALIST

## 2023-05-17 PROCEDURE — 0202U NFCT DS 22 TRGT SARS-COV-2: CPT | Performed by: PHYSICIAN ASSISTANT

## 2023-05-17 RX ORDER — METHYLPREDNISOLONE SODIUM SUCCINATE 125 MG/2ML
125 INJECTION, POWDER, LYOPHILIZED, FOR SOLUTION INTRAMUSCULAR; INTRAVENOUS ONCE
Status: COMPLETED | OUTPATIENT
Start: 2023-05-17 | End: 2023-05-17

## 2023-05-17 RX ORDER — CALCIUM CARBONATE 500 MG/1
2 TABLET, CHEWABLE ORAL 2 TIMES DAILY PRN
Status: DISCONTINUED | OUTPATIENT
Start: 2023-05-17 | End: 2023-05-21 | Stop reason: HOSPADM

## 2023-05-17 RX ORDER — ACETAMINOPHEN 325 MG/1
650 TABLET ORAL EVERY 4 HOURS PRN
Status: DISCONTINUED | OUTPATIENT
Start: 2023-05-17 | End: 2023-05-21 | Stop reason: HOSPADM

## 2023-05-17 RX ORDER — SODIUM CHLORIDE 0.9 % (FLUSH) 0.9 %
10 SYRINGE (ML) INJECTION EVERY 12 HOURS SCHEDULED
Status: DISCONTINUED | OUTPATIENT
Start: 2023-05-17 | End: 2023-05-21 | Stop reason: HOSPADM

## 2023-05-17 RX ORDER — DOCUSATE SODIUM 100 MG/1
100 CAPSULE, LIQUID FILLED ORAL 2 TIMES DAILY
Status: DISCONTINUED | OUTPATIENT
Start: 2023-05-17 | End: 2023-05-21 | Stop reason: HOSPADM

## 2023-05-17 RX ORDER — NICOTINE POLACRILEX 4 MG
15 LOZENGE BUCCAL
Status: DISCONTINUED | OUTPATIENT
Start: 2023-05-17 | End: 2023-05-21 | Stop reason: HOSPADM

## 2023-05-17 RX ORDER — NITROGLYCERIN 0.4 MG/1
0.4 TABLET SUBLINGUAL
Status: DISCONTINUED | OUTPATIENT
Start: 2023-05-17 | End: 2023-05-21 | Stop reason: HOSPADM

## 2023-05-17 RX ORDER — HYDRALAZINE HYDROCHLORIDE 50 MG/1
50 TABLET, FILM COATED ORAL EVERY 8 HOURS SCHEDULED
Status: DISCONTINUED | OUTPATIENT
Start: 2023-05-17 | End: 2023-05-19

## 2023-05-17 RX ORDER — ONDANSETRON 2 MG/ML
4 INJECTION INTRAMUSCULAR; INTRAVENOUS EVERY 6 HOURS PRN
Status: DISCONTINUED | OUTPATIENT
Start: 2023-05-17 | End: 2023-05-21 | Stop reason: HOSPADM

## 2023-05-17 RX ORDER — DORZOLAMIDE HYDROCHLORIDE AND TIMOLOL MALEATE 20; 5 MG/ML; MG/ML
SOLUTION/ DROPS OPHTHALMIC 2 TIMES DAILY
Status: DISCONTINUED | OUTPATIENT
Start: 2023-05-17 | End: 2023-05-21 | Stop reason: HOSPADM

## 2023-05-17 RX ORDER — ENOXAPARIN SODIUM 100 MG/ML
40 INJECTION SUBCUTANEOUS NIGHTLY
Status: DISCONTINUED | OUTPATIENT
Start: 2023-05-17 | End: 2023-05-18

## 2023-05-17 RX ORDER — IPRATROPIUM BROMIDE AND ALBUTEROL SULFATE 2.5; .5 MG/3ML; MG/3ML
3 SOLUTION RESPIRATORY (INHALATION)
Status: DISCONTINUED | OUTPATIENT
Start: 2023-05-17 | End: 2023-05-21 | Stop reason: HOSPADM

## 2023-05-17 RX ORDER — DEXTROSE MONOHYDRATE 25 G/50ML
25 INJECTION, SOLUTION INTRAVENOUS
Status: DISCONTINUED | OUTPATIENT
Start: 2023-05-17 | End: 2023-05-21 | Stop reason: HOSPADM

## 2023-05-17 RX ORDER — METHYLPREDNISOLONE SODIUM SUCCINATE 125 MG/2ML
60 INJECTION, POWDER, LYOPHILIZED, FOR SOLUTION INTRAMUSCULAR; INTRAVENOUS EVERY 12 HOURS
Status: DISCONTINUED | OUTPATIENT
Start: 2023-05-17 | End: 2023-05-17

## 2023-05-17 RX ORDER — FUROSEMIDE 10 MG/ML
40 INJECTION INTRAMUSCULAR; INTRAVENOUS EVERY 12 HOURS
Status: DISCONTINUED | OUTPATIENT
Start: 2023-05-17 | End: 2023-05-18

## 2023-05-17 RX ORDER — GLIPIZIDE 5 MG/1
2.5 TABLET ORAL
Status: DISCONTINUED | OUTPATIENT
Start: 2023-05-17 | End: 2023-05-19

## 2023-05-17 RX ORDER — ISOSORBIDE MONONITRATE 30 MG/1
30 TABLET, EXTENDED RELEASE ORAL DAILY
Status: DISCONTINUED | OUTPATIENT
Start: 2023-05-17 | End: 2023-05-21 | Stop reason: HOSPADM

## 2023-05-17 RX ORDER — IPRATROPIUM BROMIDE AND ALBUTEROL SULFATE 2.5; .5 MG/3ML; MG/3ML
3 SOLUTION RESPIRATORY (INHALATION) ONCE
Status: COMPLETED | OUTPATIENT
Start: 2023-05-17 | End: 2023-05-17

## 2023-05-17 RX ORDER — ALBUTEROL SULFATE 2.5 MG/3ML
2.5 SOLUTION RESPIRATORY (INHALATION)
Status: COMPLETED | OUTPATIENT
Start: 2023-05-17 | End: 2023-05-17

## 2023-05-17 RX ORDER — FUROSEMIDE 10 MG/ML
40 INJECTION INTRAMUSCULAR; INTRAVENOUS ONCE
Status: COMPLETED | OUTPATIENT
Start: 2023-05-17 | End: 2023-05-17

## 2023-05-17 RX ORDER — ALLOPURINOL 100 MG/1
100 TABLET ORAL 2 TIMES DAILY
Status: DISCONTINUED | OUTPATIENT
Start: 2023-05-17 | End: 2023-05-21 | Stop reason: HOSPADM

## 2023-05-17 RX ORDER — GABAPENTIN 300 MG/1
300 CAPSULE ORAL EVERY MORNING
Status: DISCONTINUED | OUTPATIENT
Start: 2023-05-17 | End: 2023-05-21 | Stop reason: HOSPADM

## 2023-05-17 RX ORDER — FLUTICASONE PROPIONATE 50 MCG
2 SPRAY, SUSPENSION (ML) NASAL DAILY
Status: DISCONTINUED | OUTPATIENT
Start: 2023-05-17 | End: 2023-05-21 | Stop reason: HOSPADM

## 2023-05-17 RX ORDER — GABAPENTIN 300 MG/1
300 CAPSULE ORAL NIGHTLY
Status: DISCONTINUED | OUTPATIENT
Start: 2023-05-17 | End: 2023-05-21 | Stop reason: HOSPADM

## 2023-05-17 RX ORDER — MULTIPLE VITAMINS W/ MINERALS TAB 9MG-400MCG
1 TAB ORAL DAILY
Status: DISCONTINUED | OUTPATIENT
Start: 2023-05-17 | End: 2023-05-21 | Stop reason: HOSPADM

## 2023-05-17 RX ORDER — HYDRALAZINE HYDROCHLORIDE 20 MG/ML
20 INJECTION INTRAMUSCULAR; INTRAVENOUS ONCE
Status: COMPLETED | OUTPATIENT
Start: 2023-05-17 | End: 2023-05-17

## 2023-05-17 RX ORDER — ACETAMINOPHEN 160 MG/5ML
650 SOLUTION ORAL EVERY 4 HOURS PRN
Status: DISCONTINUED | OUTPATIENT
Start: 2023-05-17 | End: 2023-05-21 | Stop reason: HOSPADM

## 2023-05-17 RX ORDER — LEVOTHYROXINE SODIUM 0.05 MG/1
50 TABLET ORAL
Status: DISCONTINUED | OUTPATIENT
Start: 2023-05-17 | End: 2023-05-21 | Stop reason: HOSPADM

## 2023-05-17 RX ORDER — HYDRALAZINE HYDROCHLORIDE 20 MG/ML
10 INJECTION INTRAMUSCULAR; INTRAVENOUS ONCE
Status: COMPLETED | OUTPATIENT
Start: 2023-05-17 | End: 2023-05-17

## 2023-05-17 RX ORDER — SODIUM CHLORIDE 0.9 % (FLUSH) 0.9 %
10 SYRINGE (ML) INJECTION AS NEEDED
Status: DISCONTINUED | OUTPATIENT
Start: 2023-05-17 | End: 2023-05-21 | Stop reason: HOSPADM

## 2023-05-17 RX ORDER — IBUPROFEN 600 MG/1
1 TABLET ORAL
Status: DISCONTINUED | OUTPATIENT
Start: 2023-05-17 | End: 2023-05-21 | Stop reason: HOSPADM

## 2023-05-17 RX ORDER — SODIUM CHLORIDE 9 MG/ML
40 INJECTION, SOLUTION INTRAVENOUS AS NEEDED
Status: DISCONTINUED | OUTPATIENT
Start: 2023-05-17 | End: 2023-05-21 | Stop reason: HOSPADM

## 2023-05-17 RX ORDER — HYDRALAZINE HYDROCHLORIDE 20 MG/ML
INJECTION INTRAMUSCULAR; INTRAVENOUS
Status: COMPLETED
Start: 2023-05-17 | End: 2023-05-17

## 2023-05-17 RX ORDER — MONTELUKAST SODIUM 10 MG/1
10 TABLET ORAL NIGHTLY
Status: DISCONTINUED | OUTPATIENT
Start: 2023-05-17 | End: 2023-05-21 | Stop reason: HOSPADM

## 2023-05-17 RX ORDER — METOPROLOL TARTRATE 50 MG/1
50 TABLET, FILM COATED ORAL 2 TIMES DAILY
Status: DISCONTINUED | OUTPATIENT
Start: 2023-05-17 | End: 2023-05-21 | Stop reason: HOSPADM

## 2023-05-17 RX ORDER — LABETALOL HYDROCHLORIDE 5 MG/ML
20 INJECTION, SOLUTION INTRAVENOUS ONCE
Status: COMPLETED | OUTPATIENT
Start: 2023-05-17 | End: 2023-05-17

## 2023-05-17 RX ORDER — FAMOTIDINE 20 MG/1
20 TABLET, FILM COATED ORAL
Status: DISCONTINUED | OUTPATIENT
Start: 2023-05-17 | End: 2023-05-21 | Stop reason: HOSPADM

## 2023-05-17 RX ORDER — HYDRALAZINE HYDROCHLORIDE 20 MG/ML
10 INJECTION INTRAMUSCULAR; INTRAVENOUS ONCE
Status: DISCONTINUED | OUTPATIENT
Start: 2023-05-17 | End: 2023-05-17

## 2023-05-17 RX ORDER — ROSUVASTATIN CALCIUM 40 MG/1
40 TABLET, COATED ORAL DAILY
Status: DISCONTINUED | OUTPATIENT
Start: 2023-05-17 | End: 2023-05-21 | Stop reason: HOSPADM

## 2023-05-17 RX ORDER — LOSARTAN POTASSIUM 100 MG/1
100 TABLET ORAL DAILY
Status: DISCONTINUED | OUTPATIENT
Start: 2023-05-17 | End: 2023-05-19

## 2023-05-17 RX ORDER — ONDANSETRON 4 MG/1
4 TABLET, FILM COATED ORAL EVERY 6 HOURS PRN
Status: DISCONTINUED | OUTPATIENT
Start: 2023-05-17 | End: 2023-05-21 | Stop reason: HOSPADM

## 2023-05-17 RX ORDER — INSULIN LISPRO 100 [IU]/ML
2-7 INJECTION, SOLUTION INTRAVENOUS; SUBCUTANEOUS
Status: DISCONTINUED | OUTPATIENT
Start: 2023-05-17 | End: 2023-05-21 | Stop reason: HOSPADM

## 2023-05-17 RX ORDER — HYDROCODONE BITARTRATE AND ACETAMINOPHEN 5; 325 MG/1; MG/1
1 TABLET ORAL EVERY 8 HOURS PRN
Status: DISCONTINUED | OUTPATIENT
Start: 2023-05-17 | End: 2023-05-21 | Stop reason: HOSPADM

## 2023-05-17 RX ORDER — AMLODIPINE BESYLATE 5 MG/1
5 TABLET ORAL DAILY
Status: DISCONTINUED | OUTPATIENT
Start: 2023-05-17 | End: 2023-05-17

## 2023-05-17 RX ORDER — AMLODIPINE BESYLATE 10 MG/1
10 TABLET ORAL DAILY
Status: DISCONTINUED | OUTPATIENT
Start: 2023-05-17 | End: 2023-05-19

## 2023-05-17 RX ORDER — IPRATROPIUM BROMIDE AND ALBUTEROL SULFATE 2.5; .5 MG/3ML; MG/3ML
3 SOLUTION RESPIRATORY (INHALATION) EVERY 4 HOURS PRN
Status: DISCONTINUED | OUTPATIENT
Start: 2023-05-17 | End: 2023-05-21 | Stop reason: HOSPADM

## 2023-05-17 RX ORDER — LATANOPROST 50 UG/ML
1 SOLUTION/ DROPS OPHTHALMIC NIGHTLY
Status: DISCONTINUED | OUTPATIENT
Start: 2023-05-17 | End: 2023-05-21 | Stop reason: HOSPADM

## 2023-05-17 RX ADMIN — AMLODIPINE BESYLATE 10 MG: 10 TABLET ORAL at 11:21

## 2023-05-17 RX ADMIN — LEVOTHYROXINE SODIUM 50 MCG: 0.05 TABLET ORAL at 11:21

## 2023-05-17 RX ADMIN — IOPAMIDOL 95 ML: 755 INJECTION, SOLUTION INTRAVENOUS at 17:52

## 2023-05-17 RX ADMIN — INSULIN LISPRO 6 UNITS: 100 INJECTION, SOLUTION INTRAVENOUS; SUBCUTANEOUS at 13:32

## 2023-05-17 RX ADMIN — FAMOTIDINE 20 MG: 20 TABLET ORAL at 17:36

## 2023-05-17 RX ADMIN — LOSARTAN POTASSIUM 100 MG: 100 TABLET, FILM COATED ORAL at 11:22

## 2023-05-17 RX ADMIN — IPRATROPIUM BROMIDE AND ALBUTEROL SULFATE 3 ML: 2.5; .5 SOLUTION RESPIRATORY (INHALATION) at 01:13

## 2023-05-17 RX ADMIN — ALBUTEROL SULFATE 2.5 MG: 2.5 SOLUTION RESPIRATORY (INHALATION) at 01:50

## 2023-05-17 RX ADMIN — GABAPENTIN 300 MG: 300 CAPSULE ORAL at 21:57

## 2023-05-17 RX ADMIN — ALBUTEROL SULFATE 2.5 MG: 2.5 SOLUTION RESPIRATORY (INHALATION) at 01:28

## 2023-05-17 RX ADMIN — FUROSEMIDE 40 MG: 10 INJECTION, SOLUTION INTRAMUSCULAR; INTRAVENOUS at 02:21

## 2023-05-17 RX ADMIN — LATANOPROST 1 DROP: 50 SOLUTION OPHTHALMIC at 22:00

## 2023-05-17 RX ADMIN — FUROSEMIDE 40 MG: 10 INJECTION, SOLUTION INTRAMUSCULAR; INTRAVENOUS at 08:56

## 2023-05-17 RX ADMIN — Medication 10 ML: at 21:58

## 2023-05-17 RX ADMIN — METHYLPREDNISOLONE SODIUM SUCCINATE 60 MG: 125 INJECTION, POWDER, FOR SOLUTION INTRAMUSCULAR; INTRAVENOUS at 08:56

## 2023-05-17 RX ADMIN — GLIPIZIDE 2.5 MG: 5 TABLET ORAL at 17:35

## 2023-05-17 RX ADMIN — TIMOLOL MALEATE: 5 SOLUTION OPHTHALMIC at 22:00

## 2023-05-17 RX ADMIN — LABETALOL HYDROCHLORIDE 20 MG: 5 INJECTION, SOLUTION INTRAVENOUS at 02:31

## 2023-05-17 RX ADMIN — ASPIRIN 81 MG: 81 TABLET, COATED ORAL at 11:21

## 2023-05-17 RX ADMIN — FUROSEMIDE 40 MG: 10 INJECTION, SOLUTION INTRAMUSCULAR; INTRAVENOUS at 21:57

## 2023-05-17 RX ADMIN — HYDRALAZINE HYDROCHLORIDE 10 MG: 20 INJECTION INTRAMUSCULAR; INTRAVENOUS at 04:14

## 2023-05-17 RX ADMIN — HYDRALAZINE HYDROCHLORIDE 10 MG: 20 INJECTION INTRAMUSCULAR; INTRAVENOUS at 03:05

## 2023-05-17 RX ADMIN — DOCUSATE SODIUM 100 MG: 100 CAPSULE, LIQUID FILLED ORAL at 21:57

## 2023-05-17 RX ADMIN — Medication 10 ML: at 08:56

## 2023-05-17 RX ADMIN — ALLOPURINOL 100 MG: 100 TABLET ORAL at 13:29

## 2023-05-17 RX ADMIN — METOPROLOL TARTRATE 50 MG: 50 TABLET, FILM COATED ORAL at 11:21

## 2023-05-17 RX ADMIN — HYDRALAZINE HYDROCHLORIDE 50 MG: 50 TABLET, FILM COATED ORAL at 13:28

## 2023-05-17 RX ADMIN — HYDRALAZINE HYDROCHLORIDE 50 MG: 50 TABLET, FILM COATED ORAL at 21:57

## 2023-05-17 RX ADMIN — GLIPIZIDE 2.5 MG: 5 TABLET ORAL at 11:21

## 2023-05-17 RX ADMIN — METOPROLOL TARTRATE 50 MG: 50 TABLET, FILM COATED ORAL at 21:57

## 2023-05-17 RX ADMIN — INSULIN LISPRO 7 UNITS: 100 INJECTION, SOLUTION INTRAVENOUS; SUBCUTANEOUS at 17:36

## 2023-05-17 RX ADMIN — MONTELUKAST SODIUM 10 MG: 10 TABLET, FILM COATED ORAL at 21:57

## 2023-05-17 RX ADMIN — ROSUVASTATIN CALCIUM 40 MG: 40 TABLET, FILM COATED ORAL at 13:28

## 2023-05-17 RX ADMIN — MULTIPLE VITAMINS W/ MINERALS TAB 1 TABLET: TAB at 13:28

## 2023-05-17 RX ADMIN — IPRATROPIUM BROMIDE AND ALBUTEROL SULFATE 3 ML: 2.5; .5 SOLUTION RESPIRATORY (INHALATION) at 19:34

## 2023-05-17 RX ADMIN — INSULIN LISPRO 5 UNITS: 100 INJECTION, SOLUTION INTRAVENOUS; SUBCUTANEOUS at 08:55

## 2023-05-17 RX ADMIN — ALLOPURINOL 100 MG: 100 TABLET ORAL at 21:57

## 2023-05-17 RX ADMIN — METHYLPREDNISOLONE SODIUM SUCCINATE 125 MG: 125 INJECTION, POWDER, FOR SOLUTION INTRAMUSCULAR; INTRAVENOUS at 01:15

## 2023-05-17 RX ADMIN — TIMOLOL MALEATE: 5 SOLUTION OPHTHALMIC at 13:39

## 2023-05-17 RX ADMIN — GABAPENTIN 300 MG: 300 CAPSULE ORAL at 13:27

## 2023-05-17 RX ADMIN — IPRATROPIUM BROMIDE AND ALBUTEROL SULFATE 3 ML: 2.5; .5 SOLUTION RESPIRATORY (INHALATION) at 07:30

## 2023-05-17 RX ADMIN — LINAGLIPTIN 5 MG: 5 TABLET, FILM COATED ORAL at 11:22

## 2023-05-17 RX ADMIN — ENOXAPARIN SODIUM 40 MG: 100 INJECTION SUBCUTANEOUS at 21:57

## 2023-05-17 RX ADMIN — HYDROCODONE BITARTRATE AND ACETAMINOPHEN 1 TABLET: 5; 325 TABLET ORAL at 11:22

## 2023-05-17 RX ADMIN — FLUTICASONE PROPIONATE 2 SPRAY: 50 SPRAY, METERED NASAL at 13:29

## 2023-05-17 RX ADMIN — ISOSORBIDE MONONITRATE 30 MG: 30 TABLET, EXTENDED RELEASE ORAL at 11:22

## 2023-05-17 RX ADMIN — DOCUSATE SODIUM 100 MG: 100 CAPSULE, LIQUID FILLED ORAL at 13:28

## 2023-05-17 RX ADMIN — HYDRALAZINE HYDROCHLORIDE 20 MG: 20 INJECTION INTRAMUSCULAR; INTRAVENOUS at 03:48

## 2023-05-17 RX ADMIN — IPRATROPIUM BROMIDE AND ALBUTEROL SULFATE 3 ML: 2.5; .5 SOLUTION RESPIRATORY (INHALATION) at 11:34

## 2023-05-17 NOTE — NURSING NOTE
.  Nursing report ED to floor  Salma Hatfield  87 y.o.  female    HPI :   Chief Complaint   Patient presents with   • Shortness of Breath       Admitting doctor:   Chacho Jacob MD    Admitting diagnosis:   The primary encounter diagnosis was Acute on chronic respiratory failure with hypoxia. Diagnoses of COPD exacerbation, Pulmonary vascular congestion, and Hypertension, unspecified type were also pertinent to this visit.    Code status:   Current Code Status     Date Active Code Status Order ID Comments User Context       Prior          Allergies:   Patient has no known allergies.    Isolation:   No active isolations    Intake and Output  No intake or output data in the 24 hours ending 05/17/23 0359    Weight:       05/17/23  0018   Weight: 64.4 kg (142 lb)       Most recent vitals:   Vitals:    05/17/23 0312 05/17/23 0331 05/17/23 0348 05/17/23 0351   BP:  (!) 189/85 (!) 200/93 (!) 202/85   BP Location:    Left arm   Patient Position:    Sitting   Pulse: 69 69 69 69   Resp:    20   Temp:       TempSrc:       SpO2: 92% 93%  94%   Weight:       Height:           Active LDAs/IV Access:   Lines, Drains & Airways     Active LDAs     Name Placement date Placement time Site Days    Peripheral IV 05/17/23 0050 Right Antecubital 05/17/23  0050  Antecubital  less than 1                Labs (abnormal labs have a star):   Labs Reviewed   COMPREHENSIVE METABOLIC PANEL - Abnormal; Notable for the following components:       Result Value    Glucose 287 (*)     Creatinine 1.02 (*)     ALT (SGPT) 42 (*)     AST (SGOT) 46 (*)     eGFR 53.4 (*)     All other components within normal limits    Narrative:     GFR Normal >60  Chronic Kidney Disease <60  Kidney Failure <15    The GFR formula is only valid for adults with stable renal function between ages 18 and 70.   URINALYSIS W/ MICROSCOPIC IF INDICATED (NO CULTURE) - Abnormal; Notable for the following components:    Appearance, UA Cloudy (*)     Glucose,  mg/dL (2+) (*)      Protein,  mg/dL (2+) (*)     Leuk Esterase, UA Trace (*)     All other components within normal limits   BNP (IN-HOUSE) - Abnormal; Notable for the following components:    proBNP 5,785.0 (*)     All other components within normal limits    Narrative:     Among patients with dyspnea, NT-proBNP is highly sensitive for the detection of acute congestive heart failure. In addition NT-proBNP of <300 pg/ml effectively rules out acute congestive heart failure with 99% negative predictive value.    Results may be falsely decreased if patient taking Biotin.     TROPONIN - Abnormal; Notable for the following components:    HS Troponin T 32 (*)     All other components within normal limits    Narrative:     High Sensitive Troponin T Reference Range:  <10.0 ng/L- Negative Female for AMI  <15.0 ng/L- Negative Male for AMI  >=10 - Abnormal Female indicating possible myocardial injury.  >=15 - Abnormal Male indicating possible myocardial injury.   Clinicians would have to utilize clinical acumen, EKG, Troponin, and serial changes to determine if it is an Acute Myocardial Infarction or myocardial injury due to an underlying chronic condition.        CBC WITH AUTO DIFFERENTIAL - Abnormal; Notable for the following components:    Hematocrit 47.8 (*)     MCV 98.6 (*)     MCHC 31.0 (*)     Platelets 138 (*)     Neutrophil % 80.1 (*)     Lymphocyte % 8.9 (*)     Immature Grans % 0.6 (*)     Neutrophils, Absolute 8.41 (*)     Monocytes, Absolute 0.98 (*)     Immature Grans, Absolute 0.06 (*)     All other components within normal limits   BLOOD GAS, ARTERIAL - Abnormal; Notable for the following components:    pO2, Arterial 71.0 (*)     All other components within normal limits   HIGH SENSITIVITIY TROPONIN T 2HR - Abnormal; Notable for the following components:    HS Troponin T 33 (*)     All other components within normal limits    Narrative:     High Sensitive Troponin T Reference Range:  <10.0 ng/L- Negative Female for  "AMI  <15.0 ng/L- Negative Male for AMI  >=10 - Abnormal Female indicating possible myocardial injury.  >=15 - Abnormal Male indicating possible myocardial injury.   Clinicians would have to utilize clinical acumen, EKG, Troponin, and serial changes to determine if it is an Acute Myocardial Infarction or myocardial injury due to an underlying chronic condition.        URINALYSIS, MICROSCOPIC ONLY - Abnormal; Notable for the following components:    Bacteria, UA 4+ (*)     All other components within normal limits   RESPIRATORY PANEL PCR W/ COVID-19 (SARS-COV-2) DAVID/ANANT/APOLINAR/PAD/COR/MAD/CRISTIAN IN-HOUSE, NP SWAB IN UTM/VTP, 3-4 HR TAT - Normal    Narrative:     In the setting of a positive respiratory panel with a viral infection PLUS a negative procalcitonin without other underlying concern for bacterial infection, consider observing off antibiotics or discontinuation of antibiotics and continue supportive care. If the respiratory panel is positive for atypical bacterial infection (Bordetella pertussis, Chlamydophila pneumoniae, or Mycoplasma pneumoniae), consider antibiotic de-escalation to target atypical bacterial infection.   PROCALCITONIN - Normal    Narrative:     As a Marker for Sepsis (Non-Neonates):    1. <0.5 ng/mL represents a low risk of severe sepsis and/or septic shock.  2. >2 ng/mL represents a high risk of severe sepsis and/or septic shock.    As a Marker for Lower Respiratory Tract Infections that require antibiotic therapy:    PCT on Admission    Antibiotic Therapy       6-12 Hrs later    >0.5                Strongly Recommended  >0.25 - <0.5        Recommended   0.1 - 0.25          Discouraged              Remeasure/reassess PCT  <0.1                Strongly Discouraged     Remeasure/reassess PCT    As 28 day mortality risk marker: \"Change in Procalcitonin Result\" (>80% or <=80%) if Day 0 (or Day 1) and Day 4 values are available. Refer to http://www.[employer].tos-pct-calculator.com    Change in PCT <=80%  A " decrease of PCT levels below or equal to 80% defines a positive change in PCT test result representing a higher risk for 28-day all-cause mortality of patients diagnosed with severe sepsis for septic shock.    Change in PCT >80%  A decrease of PCT levels of more than 80% defines a negative change in PCT result representing a lower risk for 28-day all-cause mortality of patients diagnosed with severe sepsis or septic shock.      BLOOD GAS, ARTERIAL   CBC AND DIFFERENTIAL    Narrative:     The following orders were created for panel order CBC & Differential.  Procedure                               Abnormality         Status                     ---------                               -----------         ------                     CBC Auto Differential[[id number]]        Abnormal            Final result                 Please view results for these tests on the individual orders.       EKG:   ECG 12 Lead Dyspnea   Preliminary Result   HEART RATE= 75  bpm   RR Interval= 800  ms   IN Interval= 224  ms   P Horizontal Axis= -22  deg   P Front Axis= 62  deg   QRSD Interval= 120  ms   QT Interval= 393  ms   QRS Axis= -43  deg   T Wave Axis= 100  deg   - ABNORMAL ECG -   Atrial-sensed ventricular-paced rhythm   No further analysis attempted due to paced rhythm   Electronically Signed By:    Date and Time of Study: 2023-05-17 00:30:52      ECG 12 Lead Dyspnea    (Results Pending)       Meds given in ED:   Medications   sodium chloride 0.9 % flush 10 mL (has no administration in time range)   methylPREDNISolone sodium succinate (SOLU-Medrol) injection 125 mg (125 mg Intravenous Given 5/17/23 0115)   albuterol (PROVENTIL) nebulizer solution 0.083% 2.5 mg/3mL (2.5 mg Nebulization Given 5/17/23 0150)   ipratropium-albuterol (DUO-NEB) nebulizer solution 3 mL (3 mL Nebulization Given 5/17/23 0113)   furosemide (LASIX) injection 40 mg (40 mg Intravenous Given 5/17/23 0221)   labetalol (NORMODYNE,TRANDATE) injection 20 mg (20 mg  Intravenous Given 5/17/23 0231)   hydrALAZINE (APRESOLINE) injection 10 mg (10 mg Intravenous Given 5/17/23 0305)   hydrALAZINE (APRESOLINE) injection 20 mg (20 mg Intravenous Given 5/17/23 0348)       Imaging results:  XR Chest 1 View    Result Date: 5/17/2023   1. Possible vascular congestion. 2. Nonspecific bibasilar consolidation. Pneumonia is not excluded.  This report was finalized on 5/17/2023 12:59 AM by Dr. Silvana Hernandez M.D.        Ambulatory status:   - Bedrest    Social issues:   Social History     Socioeconomic History   • Marital status:    Tobacco Use   • Smoking status: Never   • Smokeless tobacco: Never   Vaping Use   • Vaping Use: Never used   Substance and Sexual Activity   • Alcohol use: Never   • Drug use: Never   • Sexual activity: Defer       NIH Stroke Scale:         Nikki Paige RN  05/17/23 03:59 EDT

## 2023-05-17 NOTE — DISCHARGE PLACEMENT REQUEST
"Salma Mendoza (87 y.o. Female)     Date of Birth   1935    Social Security Number       Address   [address] [place] [address]    Home Phone   [phone]    MRN   [id number]       [facility]   None    Marital Status                               Admission Date   5/17/23    Admission Type   Emergency    Admitting Provider   Chacho Jacob MD    Attending Provider   Salazar Perez MD    Department, Room/Bed   [address], [address]       Discharge Date       Discharge Disposition       Discharge Destination                               Attending Provider: Salazar Perez MD    Allergies: No Known Allergies    Isolation: None   Infection: None   Code Status: CPR    Ht: 154.9 cm (61\")   Wt: 68.4 kg (150 lb 11.2 oz)    Admission Cmt: None   Principal Problem: Acute on chronic respiratory failure with hypoxia [J96.21]                 Active Insurance as of 5/17/2023     Primary Coverage     Payor Plan Insurance Group Employer/Plan Group    HU[employer] MEDICARE REPLACEMENT HU[employer] MEDICARE REPLACEMENT [id number]     Payor Plan Address Payor Plan Phone Number Payor Plan Fax Number Effective Dates    [address] [address] [phone]  1/1/2018 - None Entered    Formerly [facility] [address]       Subscriber Name Subscriber Birth Date Member ID       SALMA MENDOZA 1935 [id number]                 Emergency Contacts      (Rel.) Home Phone Work Phone Mobile Phone    THALIA MENDOZA (Son) [phone] -- [phone]    Marlyn Mendoza (Relative) -- -- [phone]    ELINA NAVAS (Daughter) [phone] -- [phone]            "

## 2023-05-17 NOTE — ED PROVIDER NOTES
EMERGENCY DEPARTMENT ENCOUNTER    Room Number:  03/03  Date seen:  5/17/2023  PCP: Provider, No Known      HPI:  Chief Complaint: Shortness of breath  A complete HPI/ROS/PMH/PSH/SH/FH are unobtainable due to: Nothing  Context: Salma Hatfield is a 87 y.o. female who presents to the ED c/o shortness of breath is presently using getting worse over the course of the past 24 hours.  According to EMS and the patient she is on 3 L nasal cannula chronically.  Tonight she becameexcessively short of breath and decided to only EMS for further evaluation.  Upon their arrival patient was found to be significantly hypoxic.  MS reports the patient apparently had her O2 concentrator replaced by her oxygen company yesterday and the machine was not plugged then so the patient had been without supplemental oxygen for roughly 24 hours.    Patient denies cough, fever, chills.  She states her symptoms are exacerbated with even slight movement or activities of daily living.  Symptoms also seem to be slightly exacerbated when she lies down in the evening.    She denies fever, chills, chest pain, palpitations, nausea, vomiting, abdominal pain, unilateral leg swelling or significant pedal edema associated with her shortness of breath.    Reviewing her chart she does have a past medical history of pulmonary emphysema, CABG, coronary artery disease, type 2 diabetes, stage IIIb kidney disease, second-degree AV block.  During this admission patient was evaluated and had pacemaker placed on 7/22/2022 by cardiology.  Patient was noted to have new infiltrates on chest x-ray at this visit but respiratory status remained at baseline.  She was to follow-up for noncontrast CT in 4 to 6 weeks as an outpatient.          PAST MEDICAL HISTORY  Active Ambulatory Problems     Diagnosis Date Noted   • Bradycardia 07/20/2022   • Pulmonary emphysema 07/21/2022   • Diffuse large B cell lymphoma 07/21/2022   • S/P CABG (coronary artery bypass graft) 07/21/2022    • Coronary artery disease involving native coronary artery without angina pectoris 07/21/2022   • Chronic respiratory failure with hypoxia 07/21/2022   • Type 2 diabetes mellitus with hyperglycemia, without long-term current use of insulin 07/21/2022   • Hypothyroidism (acquired) 07/21/2022   • Stage 3b chronic kidney disease 07/21/2022   • Second degree AV block 07/20/2022   • Presence of cardiac pacemaker 07/23/2022   • Acute UTI (urinary tract infection) 09/21/2022   • History of 2019 novel coronavirus disease (COVID-19) 09/21/2022     Resolved Ambulatory Problems     Diagnosis Date Noted   • Chest pain with high risk for cardiac etiology 09/21/2022   • Hypertensive urgency 09/21/2022     Past Medical History:   Diagnosis Date   • AAA (abdominal aortic aneurysm)    • Atherosclerotic heart disease    • Carotid artery stenosis    • Cataract Removal 10/27/2008   • Chronic kidney disease, stage 3a    • Coronary artery disease    • Coronary atherosclerosis    • Diabetes mellitus    • Elevated cholesterol    • H/O angioplasty 12/31/2007   • Hx of CABG 09/21/2009   • Hyperlipidemia    • Hyperparathyroidism 04/17/2017   • Hypertension    • Hypertensive disorder    • Ischemic heart disease 12/15/2016   • Retinal tear 02/06/2009   • S/P arterial stent 08/26/2009   • S/P renal artery angioplasty 01/21/2008   • Shingles 03/26/2014   • Status post carotid surgery 06/13/2018   • Stenosis of iliac artery          PAST SURGICAL HISTORY  Past Surgical History:   Procedure Laterality Date   • CARDIAC CATHETERIZATION     • CARDIAC ELECTROPHYSIOLOGY PROCEDURE N/A 07/22/2022    Procedure: Pacemaker SC new [employer];  Surgeon: Kevin Eisenberg MD;  Location: [facility] INVASIVE LOCATION;  Service: Cardiology;  Laterality: N/A;   • CAROTID ENDARTERECTOMY  2018   • CORONARY ARTERY BYPASS GRAFT  2008    Triple bypass   • CORONARY STENT PLACEMENT  10/2011   • ILIAC ARTERY STENT      Aorta-iliac stent 2009   • INSERT / REPLACE / REMOVE  PACEMAKER  07/2022   • RENAL ARTERY STENT Left 2008         FAMILY HISTORY  History reviewed. No pertinent family history.      SOCIAL HISTORY  Social History     Socioeconomic History   • Marital status:    Tobacco Use   • Smoking status: Never   • Smokeless tobacco: Never   Vaping Use   • Vaping Use: Never used   Substance and Sexual Activity   • Alcohol use: Never   • Drug use: Never   • Sexual activity: Defer         ALLERGIES  Patient has no known allergies.        REVIEW OF SYSTEMS  Review of Systems     All systems reviewed and negative except for those discussed in HPI.       PHYSICAL EXAM  ED Triage Vitals [05/17/23 0018]   Temp Heart Rate Resp BP SpO2   97.7 °F (36.5 °C) 96 20 177/89 97 %      Temp src Heart Rate Source Patient Position BP Location FiO2 (%)   Tympanic Monitor Lying Right arm --       Physical Exam      GENERAL: Frail appearance, nontoxic, mild to moderate respiratory distress  HENT: nares patent  EYES: no scleral icterus  CV: regular rhythm, normal rate  RESPIRATORY: Tachypneic, use of accessory muscles, diminished breath sounds.    ABDOMEN: soft  MUSCULOSKELETAL: no deformity  NEURO: alert, moves all extremities, follows commands  PSYCH:  calm, cooperative  SKIN: warm, dry    Vital signs and nursing notes reviewed.          LAB RESULTS  Recent Results (from the past 24 hour(s))   ECG 12 Lead Dyspnea    Collection Time: 05/17/23 12:30 AM   Result Value Ref Range    QT Interval 393 ms   Comprehensive Metabolic Panel    Collection Time: 05/17/23  1:00 AM    Specimen: Blood   Result Value Ref Range    Glucose 287 (H) 65 - 99 mg/dL    BUN 21 8 - 23 mg/dL    Creatinine 1.02 (H) 0.57 - 1.00 mg/dL    Sodium 140 136 - 145 mmol/L    Potassium 4.6 3.5 - 5.2 mmol/L    Chloride 103 98 - 107 mmol/L    CO2 26.9 22.0 - 29.0 mmol/L    Calcium 10.2 8.6 - 10.5 mg/dL    Total Protein 7.3 6.0 - 8.5 g/dL    Albumin 4.8 3.5 - 5.2 g/dL    ALT (SGPT) 42 (H) 1 - 33 U/L    AST (SGOT) 46 (H) 1 - 32 U/L     Alkaline Phosphatase 73 39 - 117 U/L    Total Bilirubin 0.8 0.0 - 1.2 mg/dL    Globulin 2.5 gm/dL    A/G Ratio 1.9 g/dL    BUN/Creatinine Ratio 20.6 7.0 - 25.0    Anion Gap 10.1 5.0 - 15.0 mmol/L    eGFR 53.4 (L) >60.0 mL/min/1.73   BNP    Collection Time: 05/17/23  1:00 AM    Specimen: Blood   Result Value Ref Range    proBNP 5,785.0 (H) 0.0 - 1,800.0 pg/mL   High Sensitivity Troponin T    Collection Time: 05/17/23  1:00 AM    Specimen: Blood   Result Value Ref Range    HS Troponin T 32 (H) <10 ng/L   CBC Auto Differential    Collection Time: 05/17/23  1:00 AM    Specimen: Blood   Result Value Ref Range    WBC 10.48 3.40 - 10.80 10*3/mm3    RBC 4.85 3.77 - 5.28 10*6/mm3    Hemoglobin 14.8 12.0 - 15.9 g/dL    Hematocrit 47.8 (H) 34.0 - 46.6 %    MCV 98.6 (H) 79.0 - 97.0 fL    MCH 30.5 26.6 - 33.0 pg    MCHC 31.0 (L) 31.5 - 35.7 g/dL    RDW 13.8 12.3 - 15.4 %    RDW-SD 50.5 37.0 - 54.0 fl    MPV 9.7 6.0 - 12.0 fL    Platelets 138 (L) 140 - 450 10*3/mm3    Neutrophil % 80.1 (H) 42.7 - 76.0 %    Lymphocyte % 8.9 (L) 19.6 - 45.3 %    Monocyte % 9.4 5.0 - 12.0 %    Eosinophil % 0.7 0.3 - 6.2 %    Basophil % 0.3 0.0 - 1.5 %    Immature Grans % 0.6 (H) 0.0 - 0.5 %    Neutrophils, Absolute 8.41 (H) 1.70 - 7.00 10*3/mm3    Lymphocytes, Absolute 0.93 0.70 - 3.10 10*3/mm3    Monocytes, Absolute 0.98 (H) 0.10 - 0.90 10*3/mm3    Eosinophils, Absolute 0.07 0.00 - 0.40 10*3/mm3    Basophils, Absolute 0.03 0.00 - 0.20 10*3/mm3    Immature Grans, Absolute 0.06 (H) 0.00 - 0.05 10*3/mm3    nRBC 0.0 0.0 - 0.2 /100 WBC   Procalcitonin    Collection Time: 05/17/23  1:00 AM    Specimen: Blood   Result Value Ref Range    Procalcitonin 0.07 0.00 - 0.25 ng/mL   Respiratory Panel PCR w/COVID-19(SARS-CoV-2) DAVID/ANANT/APOLINAR/PAD/COR/MAD/CRISTIAN In-House, NP Swab in [facility], 3-4 HR TAT - Swab, Nasopharynx    Collection Time: 05/17/23  1:02 AM    Specimen: Nasopharynx; Swab   Result Value Ref Range    ADENOVIRUS, PCR Not Detected Not Detected     Coronavirus 229E Not Detected Not Detected    Coronavirus HKU1 Not Detected Not Detected    Coronavirus NL63 Not Detected Not Detected    Coronavirus OC43 Not Detected Not Detected    COVID19 Not Detected Not Detected - Ref. Range    Human Metapneumovirus Not Detected Not Detected    Human Rhinovirus/Enterovirus Not Detected Not Detected    Influenza A PCR Not Detected Not Detected    Influenza B PCR Not Detected Not Detected    Parainfluenza Virus 1 Not Detected Not Detected    Parainfluenza Virus 2 Not Detected Not Detected    Parainfluenza Virus 3 Not Detected Not Detected    Parainfluenza Virus 4 Not Detected Not Detected    RSV, PCR Not Detected Not Detected    Bordetella pertussis pcr Not Detected Not Detected    Bordetella parapertussis PCR Not Detected Not Detected    Chlamydophila pneumoniae PCR Not Detected Not Detected    Mycoplasma pneumo by PCR Not Detected Not Detected   Blood Gas, Arterial -    Collection Time: 05/17/23  1:14 AM    Specimen: Arterial Blood   Result Value Ref Range    Site Arterial: right brachial     Jalil's Test N/A     pH, Arterial 7.401 7.350 - 7.450 pH units    pCO2, Arterial 40.8 35.0 - 45.0 mm Hg    pO2, Arterial 71.0 (L) 80.0 - 100.0 mm Hg    HCO3, Arterial 25.4 22.0 - 28.0 mmol/L    Base Excess, Arterial 0.5 0.0 - 2.0 mmol/L    O2 Saturation Calculated 94.0 92.0 - 99.0 %    Barometric Pressure for Blood Gas 744.5 mmHg    Modality NRB     Flow Rate 15 lpm    Rate 24 Breaths/minute   Urinalysis With Microscopic If Indicated (No Culture) - Urine, Clean Catch    Collection Time: 05/17/23  2:45 AM    Specimen: Urine, Clean Catch   Result Value Ref Range    Color, UA Yellow Yellow, Straw    Appearance, UA Cloudy (A) Clear    pH, UA 7.0 5.0 - 8.0    Specific Gravity, UA 1.014 1.005 - 1.030    Glucose,  mg/dL (2+) (A) Negative    Ketones, UA Negative Negative    Bilirubin, UA Negative Negative    Blood, UA Negative Negative    Protein,  mg/dL (2+) (A) Negative    Leuk  Esterase, UA Trace (A) Negative    Nitrite, UA Negative Negative    Urobilinogen, UA 0.2 E.U./dL 0.2 - 1.0 E.U./dL   Urinalysis, Microscopic Only - Urine, Clean Catch    Collection Time: 05/17/23  2:45 AM    Specimen: Urine, Clean Catch   Result Value Ref Range    RBC, UA 0-2 None Seen, 0-2 /HPF    WBC, UA 0-2 None Seen, 0-2 /HPF    Bacteria, UA 4+ (A) None Seen /HPF    Squamous Epithelial Cells, UA 0-2 None Seen, 0-2 /HPF    Hyaline Casts, UA None Seen None Seen /LPF    Methodology Automated Microscopy    High Sensitivity Troponin T 2Hr    Collection Time: 05/17/23  2:47 AM    Specimen: Blood   Result Value Ref Range    HS Troponin T 33 (H) <10 ng/L    Troponin T Delta 1 >=-4 - <+4 ng/L       Ordered the above labs and reviewed the results.        RADIOLOGY  XR Chest 1 View    Result Date: 5/17/2023  SINGLE VIEW OF THE CHEST  HISTORY: Shortness of air  COMPARISON: 09/21/2022  FINDINGS: There is cardiomegaly. There is calcification of the aorta. Left-sided pacemaker is noted. No pneumothorax is seen. Patient has background changes of COPD. There may be some vascular congestion. Patient does have focal airspace consolidation at the lung bases bilaterally, pneumonia is not excluded. There do appear to be pleural effusions.       1. Possible vascular congestion. 2. Nonspecific bibasilar consolidation. Pneumonia is not excluded.  This report was finalized on 5/17/2023 12:59 AM by Dr. Silvana Hernandez M.D.        Ordered the above noted radiological studies. Reviewed by me in PACS.          PROCEDURES  Critical Care  Performed by: Norbert Stewart III, PA  Authorized by: José Antonio Benoit MD     Critical care provider statement:     Critical care time (minutes):  30    Critical care was necessary to treat or prevent imminent or life-threatening deterioration of the following conditions:  Respiratory failure    Critical care was time spent personally by me on the following activities:  Blood draw for specimens,  development of treatment plan with patient or surrogate, discussions with consultants, evaluation of patient's response to treatment, examination of patient, obtaining history from patient or surrogate, ordering and performing treatments and interventions, ordering and review of laboratory studies, ordering and review of radiographic studies, pulse oximetry, re-evaluation of patient's condition and review of old charts    I assumed direction of critical care for this patient from another provider in my specialty: yes                MEDICATIONS GIVEN IN ER  Medications   sodium chloride 0.9 % flush 10 mL (has no administration in time range)   sodium chloride 0.9 % flush 10 mL (has no administration in time range)   sodium chloride 0.9 % flush 10 mL (has no administration in time range)   sodium chloride 0.9 % infusion 40 mL (has no administration in time range)   nitroglycerin (NITROSTAT) SL tablet 0.4 mg (has no administration in time range)   acetaminophen (TYLENOL) tablet 650 mg (has no administration in time range)     Or   acetaminophen (TYLENOL) 160 MG/5ML solution 650 mg (has no administration in time range)     Or   acetaminophen (TYLENOL) suppository 650 mg (has no administration in time range)   ondansetron (ZOFRAN) tablet 4 mg (has no administration in time range)     Or   ondansetron (ZOFRAN) injection 4 mg (has no administration in time range)   calcium carbonate (TUMS) chewable tablet 500 mg (200 mg elemental) (has no administration in time range)   furosemide (LASIX) injection 40 mg (has no administration in time range)   ipratropium-albuterol (DUO-NEB) nebulizer solution 3 mL (has no administration in time range)   ipratropium-albuterol (DUO-NEB) nebulizer solution 3 mL (has no administration in time range)   methylPREDNISolone sodium succinate (SOLU-Medrol) injection 60 mg (has no administration in time range)   methylPREDNISolone sodium succinate (SOLU-Medrol) injection 125 mg (125 mg Intravenous  Given 5/17/23 0115)   albuterol (PROVENTIL) nebulizer solution 0.083% 2.5 mg/3mL (2.5 mg Nebulization Given 5/17/23 0150)   ipratropium-albuterol (DUO-NEB) nebulizer solution 3 mL (3 mL Nebulization Given 5/17/23 0113)   furosemide (LASIX) injection 40 mg (40 mg Intravenous Given 5/17/23 0221)   labetalol (NORMODYNE,TRANDATE) injection 20 mg (20 mg Intravenous Given 5/17/23 0231)   hydrALAZINE (APRESOLINE) injection 10 mg (10 mg Intravenous Given 5/17/23 0305)   hydrALAZINE (APRESOLINE) injection 20 mg (20 mg Intravenous Given 5/17/23 0348)         MEDICAL DECISION MAKING, PROGRESS, and CONSULTS    Discussion below represents my analysis of pertinent findings related to patient's condition, differential diagnosis, treatment plan and final disposition.      Orders placed during this visit:  Orders Placed This Encounter   Procedures   • Critical Care   • Respiratory Panel PCR w/COVID-19(SARS-CoV-2) DAVID/ANANT/APOLINAR/PAD/COR/MAD/CRISTIAN In-House, NP Swab in UTM/VTM, 3-4 HR TAT - Swab, Nasopharynx   • XR Chest 1 View   • Comprehensive Metabolic Panel   • Urinalysis With Microscopic If Indicated (No Culture) - Urine, Clean Catch   • BNP   • High Sensitivity Troponin T   • CBC Auto Differential   • Blood Gas, Arterial -   • Blood Gas, Arterial -   • Procalcitonin   • High Sensitivity Troponin T 2Hr   • Urinalysis, Microscopic Only - Urine, Clean Catch   • Basic Metabolic Panel   • CBC (No Diff)   • High Sensitivity Troponin T   • Urinalysis With Culture If Indicated -   • Diet: Regular/House Diet; Texture: Regular Texture (IDDSI 7); Fluid Consistency: Thin (IDDSI 0)   • Pulse Oximetry, Continuous   • Vital Signs   • Intake & Output   • Weigh Patient   • Oral Care   • Place Sequential Compression Device   • Maintain Sequential Compression Device   • Telemetry - Maintain IV Access   • Telemetry - Place Orders & Notify Provider of Results When Patient Experiences Acute Chest Pain, Dysrhythmia or Respiratory Distress   • May Be Off  Telemetry for Tests   • Code Status and Medical Interventions:   • LHA (on-call MD unless specified) Details   • Inpatient Pulmonology Consult   • Inpatient Cardiology Consult   • Oxygen Therapy- Nasal Cannula; Titrate 1-6 LPM Per SpO2; 90 - 95%   • ECG 12 Lead Dyspnea   • ECG 12 Lead Dyspnea   • Insert Peripheral IV   • Insert Peripheral IV   • Initiate Observation Status   • CBC & Differential         Additional sources:  - Discussed/obtained information from independent historians: No family member present presumed to be son confirms timeline of events and that patient is significantly more short of breath than when she is at her baseline.      Additional information was obtained to confirm the patient's history.    - External (non-ED) record review: See above in the main body of my note      - Chronic or social conditions impacting care: None        Differential diagnosis:    Acute COPD exacerbation, COVID-19, CHF, ACS, PNA, PTX.  Will obtain CBC, CMP, BNP, EKG, troponin, portable chest x-ray, respiratory panel, procalcitonin, UA to initiate evaluation.  We will also obtain ABG to evaluate for hypercapnia.          Independent interpretation of labs, radiology studies, and discussions with consultants:  ED Course as of 05/17/23 0402   Wed May 17, 2023   0041 BP: 177/89 [RC]   0042 Temp: 97.7 °F (36.5 °C) [RC]   0042 Heart Rate: 96 [RC]   0042 SpO2: 97 %  Nonrebreather [RC]   0118 I viewed the patient's chest x-ray.  My independent interpretation and is vascular congestion versus consolidation at the bases.  The radiologist official read is as follows:IMPRESSION:  1. Possible vascular congestion.  2. Nonspecific bibasilar consolidation. Pneumonia is not excluded.  This report was finalized on 5/17/2023 12:59 AM by Dr. Silvana Hernandze M.D.  Signed by: Silvana Hernandez MD on 5/17/2023 12:59 AM   [RC]   0119 WBC: 10.48 [RC]   0120 RBC: 4.85 [RC]   0120 Hemoglobin: 14.8 [RC]   0120 Hematocrit(!): 47.8 [RC]    0120 Platelets(!): 138 [RC]   0120 pH, Arterial: 7.401 [RC]   0120 pCO2, Arterial: 40.8 [RC]   0120 pO2, Arterial(!): 71.0 [RC]   0120 HCO3, Arterial: 25.4 [RC]   0120 O2 Saturation Calculated: 94.0 [RC]   0200 After neb treatments patient is no longer tachypneic.  She is still requiring 13 L not high flow nasal cannula which is clearly off her line.  So far her work-up is more in line with acute COPD exacerbation and possible underlying CHF. [RC]   0201 proBNP(!): 5,785.0  This is noted to be elevated from baseline.  Patient's BUN and creatinine are at baseline.  There is some vascular congestion on x-ray.  Although I suspect this is primarily a COPD exacerbation I will go ahead and give a dose of Lasix. [RC]   0202 Respiratory panel unremarkable.  We will wait on procalcitonin.  If elevated will cover with antibiotics given the chest x-ray read. [RC]   0254 Procalcitonin: 0.07  Doubt bacterial pneumonia.  Will treat for CHF and acute COPD exacerbation.  Working diagnosis will be acute on chronic respiratory failure with hypoxia and CHF. [RC]   0310 Discussed the patient's case with RADHA Saldaña with A.  To admit to Dr. Jacob's care in a telemetry bed. [RC]   0327 Patient is noted to have +4 bacteria in her urinalysis.  We will go ahead and cover this with a gram of Rocephin. [RC]      ED Course User Index  [RC] Norbert Stewart III, PA               DIAGNOSIS  Final diagnoses:   Acute on chronic respiratory failure with hypoxia   COPD exacerbation   Pulmonary vascular congestion   Hypertension, unspecified type         DISPOSITION  ADMISSION    Discussed treatment plan and reason for admission with pt/family and admitting physician.  Pt/family voiced understanding of the plan for admission for further testing/treatment as needed.         Latest Documented Vital Signs:  As of 04:02 EDT  BP- (!) 202/85 HR- 69 Temp- 97.7 °F (36.5 °C) (Tympanic) O2 sat- 94%      --    Please note that portions of  this were completed with a voice recognition program.       Note Disclaimer: At [facility], we believe that sharing information builds trust and better relationships. You are receiving this note because you are receiving care at [facility] or recently visited. It is possible you will see health information before a provider has talked with you about it. This kind of information can be easy to misunderstand. To help you fully understand what it means for your health, we urge you to discuss this note with your provider.       Norbert Stewart III, PA  05/17/23 040

## 2023-05-17 NOTE — DISCHARGE PLACEMENT REQUEST
"Salma Mendoza (87 y.o. Female)     Date of Birth   1935    Social Security Number       Address   [address] [place] [address]    Home Phone   [phone]    MRN   [id number]       Catholic   None    Marital Status                               Admission Date   5/17/23    Admission Type   Emergency    Admitting Provider   Chacho Jacob MD    Attending Provider   Salazar Perez MD    Department, Room/Bed   [address], E464/1       Discharge Date       Discharge Disposition       Discharge Destination                               Attending Provider: Salazar Perez MD    Allergies: No Known Allergies    Isolation: None   Infection: None   Code Status: CPR    Ht: 154.9 cm (61\")   Wt: 68.4 kg (150 lb 11.2 oz)    Admission Cmt: None   Principal Problem: Acute on chronic respiratory failure with hypoxia [J96.21]                 Active Insurance as of 5/17/2023     Primary Coverage     Payor Plan Insurance Group Employer/Plan Group    HUMANA MEDICARE REPLACEMENT HU[employer]NA MEDICARE REPLACEMENT [id number]     Payor Plan Address Payor Plan Phone Number Payor Plan Fax Number Effective Dates    [address] [address] [phone]  1/1/2018 - None Entered    [facility] [id number]       Subscriber Name Subscriber Birth Date Member ID       SALMA MENDOZA 1935 [id number]                 Emergency Contacts      (Rel.) Home Phone Work Phone Mobile Phone    THALIA MENDOZA (Son) [phone] -- [phone]    Marlyn Mendoza (Relative) -- -- [phone]    ELINA NAVAS (Daughter) [phone] -- [phone]            "

## 2023-05-17 NOTE — CONSULTS
Salma Hatfield   87 y.o.  female    LOS: 0 days   Patient Care Team:  Provider, No Known as PCP - General      Subjective     Chief Complaint: soa    History of Present Illness:  Ms Hatfield is an 87 y.o. female who follows with Dr. Santoro with PMH CAD, s/p CABG (2009) & PCI (2007), severe vascular disease (carotid disease and peripheral)--s/p aorto-iliac stent, renal stent and right CEA 2018. She also has chronic hypoxic respiratory failure/COPD---chronic home oxgyen, HTN, HLD CKD and DM. She states she presented with soa of 2 days duration. She wears o2 24/7 and her concentrator went out and she has been without oxygen at home. She had them come fix it but they did it wrong and she was still without o2.     PMH  Hx of intrathoracic diffuse large B cell lymphoma---tx 2016--no recurrence/treatment--follows with Dr. 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 Lokesh Santoro office note   • Atherosclerotic heart disease     stated in 2- Dr. Lokesh Santoro office note   • Bradycardia    • Carotid artery stenosis     stated in 2- Dr. Lokesh Santoro office note   • Cataract Removal 10/27/2008    Right (10/27/08) and Left (11/25/08)   • Chronic kidney disease, stage 3a    • Chronic respiratory failure with hypoxia    • Coronary artery disease    • Coronary atherosclerosis     stated in 2- Dr. Lokesh Santoro office note   • Diabetes mellitus    • Diffuse large B cell lymphoma 06/21/2016   • Elevated cholesterol    • H/O angioplasty 12/31/2007   • Hx of CABG 09/21/2009    Triple bypass   • Hyperlipidemia    • Hyperparathyroidism 04/17/2017   • Hypertension    • Hypertensive disorder     stated in 2- Dr. Lokesh Santoro office note   • Hypothyroidism (acquired)    • Ischemic  heart disease 12/15/2016   • Pulmonary emphysema    • Retinal tear 02/06/2009    Right eye, repaired 06/2009   • S/P arterial stent 08/26/2009    Left leg and aorta   • S/P renal artery angioplasty 01/21/2008    Left   • Shingles 03/26/2014   • Status post carotid surgery 06/13/2018   • Stenosis of iliac artery     stated in 2- Dr. Lokesh Santoro office note     Past Surgical History:   Procedure Laterality Date   • CARDIAC CATHETERIZATION     • CARDIAC ELECTROPHYSIOLOGY PROCEDURE N/A 07/22/2022    Procedure: Pacemaker SC new [employer];  Surgeon: Kevin Eisenberg MD;  Location: [facility] CATH INVASIVE LOCATION;  Service: Cardiology;  Laterality: N/A;   • CAROTID ENDARTERECTOMY  2018   • CORONARY ARTERY BYPASS GRAFT  2008    Triple bypass   • CORONARY STENT PLACEMENT  10/2011   • ILIAC ARTERY STENT      Aorta-iliac stent 2009   • INSERT / REPLACE / REMOVE PACEMAKER  07/2022   • RENAL ARTERY STENT Left 2008     Medications Prior to Admission   Medication Sig Dispense Refill Last Dose   • aspirin 81 MG EC tablet Take 1 tablet by mouth Daily.   5/16/2023   • hydrALAZINE (APRESOLINE) 100 MG tablet Take 1 tablet by mouth 3 (Three) Times a Day.   5/16/2023   • latanoprost (XALATAN) 0.005 % ophthalmic solution Administer 1 drop into the left eye Every Night.   5/16/2023   • metoprolol tartrate (LOPRESSOR) 50 MG tablet Take 1 tablet by mouth 2 (Two) Times a Day.   5/16/2023   • montelukast (SINGULAIR) 10 MG tablet Take 1 tablet by mouth Every Night.   5/16/2023   • allopurinol (ZYLOPRIM) 100 MG tablet Take 100 mg by mouth 2 (Two) Times a Day.      • amLODIPine (NORVASC) 5 MG tablet Take 1 tablet by mouth Daily. 30 tablet 1    • Calcium Carbonate-Vitamin D 600-200 MG-UNIT tablet Take 1 tablet by mouth Daily.      • docusate sodium (COLACE) 100 MG capsule Take 100 mg by mouth 2 (Two) Times a Day.      • dorzolamide-timolol (COSOPT) 22.3-6.8 MG/ML ophthalmic solution Administer 1 drop into the left eye 2 (Two) Times a  Day.      • ezetimibe (ZETIA) 10 MG tablet Take 10 mg by mouth Daily.      • famotidine (PEPCID) 20 MG tablet Take 20 mg by mouth 2 (Two) Times a Day.      • fluticasone (FLONASE) 50 MCG/ACT nasal spray 2 sprays into the nostril(s) as directed by provider Daily.      • gabapentin (NEURONTIN) 300 MG capsule Take 300 mg by mouth Every Morning.      • gabapentin (NEURONTIN) 300 MG capsule Take 600 mg by mouth Every Night.      • glipizide (GLUCOTROL XL) 5 MG ER tablet Take 5 mg by mouth 2 (Two) Times a Day.      • HYDROcodone-acetaminophen (NORCO) 5-325 MG per tablet Take 1 tablet by mouth Every 8 (Eight) Hours As Needed.      • isosorbide mononitrate (IMDUR) 30 MG 24 hr tablet Take 1 tablet by mouth Daily. 30 tablet 1    • levothyroxine (SYNTHROID, LEVOTHROID) 50 MCG tablet Take 50 mcg by mouth Daily.      • losartan (Cozaar) 100 MG tablet Take 1 tablet by mouth Daily. 30 tablet 1    • multivitamin with minerals tablet tablet Take 1 tablet by mouth Daily.      • rosuvastatin (CRESTOR) 20 MG tablet Take 40 mg by mouth Daily.      • SITagliptin (JANUVIA) 50 MG tablet Take 50 mg by mouth Daily.          History reviewed. No pertinent family history.  Social History     Socioeconomic History   • Marital status:    Tobacco Use   • Smoking status: Never   • Smokeless tobacco: Never   Vaping Use   • Vaping Use: Never used   Substance and Sexual Activity   • Alcohol use: Never   • Drug use: Never   • Sexual activity: Defer     Objective       Review of Systems:   Constitutional: Negative for diaphoresis, fatigue, fever and unexpected weight change.   HENT: Negative.    Eyes: Negative.    Respiratory: Negative for cough, shortness of breath and wheezing.    Cardiovascular: Negative for chest pain, palpitations and leg swelling.   Gastrointestinal: Negative for abdominal pain, blood in stool, constipation, diarrhea, nausea and vomiting.   Endocrine: Negative.    Genitourinary: Negative for difficulty urinating, dysuria  and frequency.   Musculoskeletal: Negative.    Skin: Negative.    Allergic/Immunologic: Negative for environmental allergies and food allergies.   Neurological: Negative.    Hematological: Negative.    Psychiatric/Behavioral: Negative.        Current Facility-Administered Medications:   •  acetaminophen (TYLENOL) tablet 650 mg, 650 mg, Oral, Q4H PRN **OR** acetaminophen (TYLENOL) 160 MG/5ML solution 650 mg, 650 mg, Oral, Q4H PRN **OR** acetaminophen (TYLENOL) suppository 650 mg, 650 mg, Rectal, Q4H PRN, Steffi Nunez APRN  •  calcium carbonate (TUMS) chewable tablet 500 mg (200 mg elemental), 2 tablet, Oral, BID PRN, Steffi Nunez APRN  •  dextrose (D50W) (25 g/50 mL) IV injection 25 g, 25 g, Intravenous, Q15 Min PRN, Steffi Nunez APRN  •  dextrose (GLUTOSE) oral gel 15 g, 15 g, Oral, Q15 Min PRN, Steffi Nunez APRN  •  furosemide (LASIX) injection 40 mg, 40 mg, Intravenous, Q12H, Steffi Nunez APRN  •  glucagon (GLUCAGEN) injection 1 mg, 1 mg, Intramuscular, Q15 Min PRN, Steffi Nunez APRN  •  insulin lispro (HUMALOG/ADMELOG) injection 2-7 Units, 2-7 Units, Subcutaneous, TID With Meals, Steffi Nunez APRN  •  ipratropium-albuterol (DUO-NEB) nebulizer solution 3 mL, 3 mL, Nebulization, Q4H PRN, Steffi Nunez APRN  •  ipratropium-albuterol (DUO-NEB) nebulizer solution 3 mL, 3 mL, Nebulization, Q6H While Awake - RT, Steffi Nunez APRN, 3 mL at 05/17/23 0730  •  methylPREDNISolone sodium succinate (SOLU-Medrol) injection 60 mg, 60 mg, Intravenous, Q12H, Steffi Nunez APRN  •  nitroglycerin (NITROSTAT) SL tablet 0.4 mg, 0.4 mg, Sublingual, Q5 Min PRN, Steffi Nunez APRN  •  ondansetron (ZOFRAN) tablet 4 mg, 4 mg, Oral, Q6H PRN **OR** ondansetron (ZOFRAN) injection 4 mg, 4 mg, Intravenous, Q6H PRN, Steffi Nunez APRN  •  [COMPLETED] Insert Peripheral IV, , , Once **AND** sodium chloride 0.9 % flush 10 mL, 10 mL, Intravenous,  STEPHANIE, Norbert Stewart III, PA  •  sodium chloride 0.9 % flush 10 mL, 10 mL, Intravenous, Q12H, Steffi Nunez APRN  •  sodium chloride 0.9 % flush 10 mL, 10 mL, Intravenous, PRN, Steffi Nunez APRN  •  sodium chloride 0.9 % infusion 40 mL, 40 mL, Intravenous, PRN, Steffi Nunez APRN      Physical Exam:   Vital Sign Min/Max for last 24 hours  Temp  Min: 97.6 °F (36.4 °C)  Max: 98.2 °F (36.8 °C)   BP  Min: 162/76  Max: 217/107    Pulse  Min: 69  Max: 96     Wt Readings from Last 3 Encounters:   05/17/23 68.4 kg (150 lb 11.2 oz)   09/23/22 64.9 kg (143 lb 1.6 oz)   09/08/22 65.8 kg (145 lb)       General Appearance:  Awake,  Alert, cooperative, elderly female in no acute distress   Head:  Normocephalic, without obvious abnormality, atraumatic   Eyes:          Conjunctivae normal, anicteric, eom intact    Neck: No adenopathy, supple, trachea midline, no thyromegaly, no   carotid bruit, no JVD, no elevated cvp   Lungs:   Decreased post leticia,respirations regular, even and                  unlabored    Heart:  Av paced, normal S1 and S2,  No murmur, no gallop, no rub, no click    Chest Wall:  No abnormalities observed   Abdomen:   Normal bowel sounds, no masses, soft nontender, nondistended                    Rectal:   Deferred   Extremities: No edema. Moves all extremities well, no cyanosis, no erythema   Pulses: Pulses palpable and equal bilaterally   Skin: No bleeding, bruising or rash   Neurologic: Speech clear and appropriate, no facial drooping     : voids      MONITOR: 100% paced    Results Review:     Sodium Sodium   Date Value Ref Range Status   05/17/2023 140 136 - 145 mmol/L Final   05/17/2023 140 136 - 145 mmol/L Final      Potassium Potassium   Date Value Ref Range Status   05/17/2023 3.8 3.5 - 5.2 mmol/L Final   05/17/2023 4.6 3.5 - 5.2 mmol/L Final     Comment:     Slight hemolysis detected by analyzer. Results may be affected.      Chloride Chloride   Date Value Ref Range  Status   05/17/2023 100 98 - 107 mmol/L Final   05/17/2023 103 98 - 107 mmol/L Final      Bicarbonate No results found for: PLASMABICARB   BUN BUN   Date Value Ref Range Status   05/17/2023 20 8 - 23 mg/dL Final   05/17/2023 21 8 - 23 mg/dL Final      Creatinine Creatinine   Date Value Ref Range Status   05/17/2023 0.94 0.57 - 1.00 mg/dL Final   05/17/2023 1.02 (H) 0.57 - 1.00 mg/dL Final      Calcium Calcium   Date Value Ref Range Status   05/17/2023 10.0 8.6 - 10.5 mg/dL Final   05/17/2023 10.2 8.6 - 10.5 mg/dL Final      Magnesium No results found for: MG     Results from last 7 days   Lab Units 05/17/23  0532   WBC 10*3/mm3 8.68   HEMOGLOBIN g/dL 15.0   HEMATOCRIT % 47.0*   PLATELETS 10*3/mm3 123*     Lab Results   Lab Value Date/Time    TROPONINT 32 (H) 05/17/2023 0532    TROPONINT 33 (H) 05/17/2023 0247    TROPONINT 32 (H) 05/17/2023 0100    TROPONINT 0.011 09/22/2022 0419    TROPONINT <0.010 09/21/2022 2113    TROPONINT <0.010 09/21/2022 1250    TROPONINT <0.010 07/20/2022 1957     Lab Results   Component Value Date    CHOL 98 09/22/2022     Lab Results   Component Value Date    HDL 33 (L) 09/22/2022     Lab Results   Component Value Date    LDL 27 09/22/2022     Lab Results   Component Value Date    TRIG 247 (H) 09/22/2022     No components found for: CHOLHDL  No results found for: PTT  No components found for: PT/INR  Lab Results   Component Value Date    HGBA1C 7.50 (H) 09/22/2022      Lab Results   Component Value Date    TSH 1.350 09/22/2022        Echo EF Estimated  )No results found for: ECHOEFEST      Assessment/ Plan  1.  Acute on chronic hypoxic respiratory failure/COPD  ---chronic home oxgyen,  Hs trop 32 <- 33  cxr  Possible vascular congestion. Nonspecific bibasilar consolidation. Pneumonia is not excluded.    2.  Hypertensive urgency    3. HLD    4.  CKD stage 3    5. DM    Plan  She is currently on iv lasix bid, she has no obvious jvd and no edema, 2d echo pending  No mi      Tabitha Vela,  SORIN  05/17/23  08:30 EDT    Discussed with Dr Chery  Time:     Patient seen and examined by me  87 yr old lady, followed with Dr Santoro, last seen 1-2023 was stable then cv wise  Prior cabg, last echo 2018 and stress 2014 were normal.   Also extensive PAD history as above  Copd on home oxygen, prior lung cancer and chemo  Pacemaker in 7-2022  She was out of her oxygen for ~48 hours at home, started to get shortness of air, aand chest tightness hence  On arrival here bp 200s, ecg paced, cxr with vascular congestion, labs stable    COPD treatment with steroids/oxygen.inhalers  +JVD, no edema IV lasix atleast today  Echo pending will review  Hopefully home in 1-2 days  bp meds adjusted

## 2023-05-17 NOTE — PLAN OF CARE
Goal Outcome Evaluation:  Plan of Care Reviewed With: patient        Progress: improving  Outcome Evaluation: Pt arrived 4E around 0430. C/o pain in abdomen where renal stent put in, started since ER. BP improving. Stable on 13 L HF. Son at bedside. Admission done, awaiting on home meds to be reviewed. Cardiology and pulmonolgy consulted per order in AM.

## 2023-05-17 NOTE — H&P
Patient Name:  Salma Hatfield  YOB: 1935  MRN:  [id number]  Admit Date:  5/17/2023  Patient Care Team:  Provider, No Known as PCP - General      Subjective   History Present Illness     Chief Complaint   Patient presents with   • Shortness of Breath       Ms. Hatfield is a 87 y.o. female with a history of COPD and chronic hypoxic respiratory failure requiring 3 L among other issues that presents to [facility] complaining of shortness of breath.  History is a little difficult to wait through but basically she woke up yesterday morning and noted that her O2 concentrator had stopped working.  She did have a portable tank at home and started using it while she contacted the oxygen company to fix her concentrator.  Apparently they came late afternoon yesterday and she thought they had fixed it so she switched back to the concentrator.  She felt like she was not getting enough air after this happened and was very short of breath.  At some point over the course the evening her grandson whom she lives with came home and noted that the machine was not hooked up properly.  Apparently patient's oxygen saturations were very low and she was short of breath by this point and was brought into the hospital.  Work-up here has been more suggestive of CHF with vascular congestion on x-ray and elevated BNP.  She has received some IV Lasix and has improved significantly though she is still pretty hypoxic requiring 10 L at the time I saw her this morning.  She did complain of some chest pressure yesterday but none currently.  She does not really have any significant lower extremity edema.  She denies any cough or sputum production, fevers or chills.  Family thinks that she has actually been sick for about a week, says she skipped a family gathering last weekend which is unlike her.  She had complained of some sinus congestion.      Review of Systems   Constitutional: Positive for fatigue. Negative for  chills and fever.   HENT: Positive for congestion.    Respiratory: Positive for chest tightness, shortness of breath and wheezing.    Cardiovascular: Negative for leg swelling.   Gastrointestinal: Negative for diarrhea, nausea and vomiting.   Skin: Negative for rash.   Psychiatric/Behavioral: Negative for confusion.   All other systems reviewed and are negative.       Personal History     Past Medical History:   Diagnosis Date   • AAA (abdominal aortic aneurysm)     stated in 2- Dr. Lokesh Santoro office note   • Atherosclerotic heart disease     stated in 2- Dr. Lokesh Santoro office note   • Bradycardia    • Carotid artery stenosis     stated in 2- Dr. Lokesh Santoro office note   • Cataract Removal 10/27/2008    Right (10/27/08) and Left (11/25/08)   • Chronic kidney disease, stage 3a    • Chronic respiratory failure with hypoxia    • Coronary artery disease    • Coronary atherosclerosis     stated in 2- Dr. Lokesh Santoro office note   • Diabetes mellitus    • Diffuse large B cell lymphoma 06/21/2016   • Elevated cholesterol    • H/O angioplasty 12/31/2007   • Hx of CABG 09/21/2009    Triple bypass   • Hyperlipidemia    • Hyperparathyroidism 04/17/2017   • Hypertension    • Hypertensive disorder     stated in 2- Dr. Lokesh Santoro office note   • Hypothyroidism (acquired)    • Ischemic heart disease 12/15/2016   • Pulmonary emphysema    • Retinal tear 02/06/2009    Right eye, repaired 06/2009   • S/P arterial stent 08/26/2009    Left leg and aorta   • S/P renal artery angioplasty 01/21/2008    Left   • Shingles 03/26/2014   • Status post carotid surgery 06/13/2018   • Stenosis of iliac artery     stated in 2- Dr. Lokesh Santoro office note     Past Surgical History:   Procedure Laterality Date   • CARDIAC CATHETERIZATION     • CARDIAC ELECTROPHYSIOLOGY PROCEDURE N/A 07/22/2022    Procedure: Pacemaker SC new [employer];  Surgeon:  Kevin Eisenberg MD;  Location: [facility] CATH INVASIVE LOCATION;  Service: Cardiology;  Laterality: N/A;   • CAROTID ENDARTERECTOMY  2018   • CORONARY ARTERY BYPASS GRAFT  2008    Triple bypass   • CORONARY STENT PLACEMENT  10/2011   • ILIAC ARTERY STENT      Aorta-iliac stent 2009   • INSERT / REPLACE / REMOVE PACEMAKER  07/2022   • RENAL ARTERY STENT Left 2008     History reviewed. No pertinent family history.  Social History     Tobacco Use   • Smoking status: Never   • Smokeless tobacco: Never   Vaping Use   • Vaping Use: Never used   Substance Use Topics   • Alcohol use: Never   • Drug use: Never     No current facility-administered medications on file prior to encounter.     Current Outpatient Medications on File Prior to Encounter   Medication Sig Dispense Refill   • aspirin 81 MG EC tablet Take 1 tablet by mouth Daily.     • hydrALAZINE (APRESOLINE) 100 MG tablet Take 1 tablet by mouth 3 (Three) Times a Day.     • latanoprost (XALATAN) 0.005 % ophthalmic solution Administer 1 drop into the left eye Every Night.     • metoprolol tartrate (LOPRESSOR) 50 MG tablet Take 1 tablet by mouth 2 (Two) Times a Day.     • montelukast (SINGULAIR) 10 MG tablet Take 1 tablet by mouth Every Night.     • allopurinol (ZYLOPRIM) 100 MG tablet Take 100 mg by mouth 2 (Two) Times a Day.     • amLODIPine (NORVASC) 5 MG tablet Take 1 tablet by mouth Daily. 30 tablet 1   • Calcium Carbonate-Vitamin D 600-200 MG-UNIT tablet Take 1 tablet by mouth Daily.     • docusate sodium (COLACE) 100 MG capsule Take 100 mg by mouth 2 (Two) Times a Day.     • dorzolamide-timolol (COSOPT) 22.3-6.8 MG/ML ophthalmic solution Administer 1 drop into the left eye 2 (Two) Times a Day.     • ezetimibe (ZETIA) 10 MG tablet Take 10 mg by mouth Daily.     • famotidine (PEPCID) 20 MG tablet Take 20 mg by mouth 2 (Two) Times a Day.     • fluticasone (FLONASE) 50 MCG/ACT nasal spray 2 sprays into the nostril(s) as directed by provider Daily.     • gabapentin  (NEURONTIN) 300 MG capsule Take 300 mg by mouth Every Morning.     • gabapentin (NEURONTIN) 300 MG capsule Take 600 mg by mouth Every Night.     • glipizide (GLUCOTROL XL) 5 MG ER tablet Take 5 mg by mouth 2 (Two) Times a Day.     • HYDROcodone-acetaminophen (NORCO) 5-325 MG per tablet Take 1 tablet by mouth Every 8 (Eight) Hours As Needed.     • isosorbide mononitrate (IMDUR) 30 MG 24 hr tablet Take 1 tablet by mouth Daily. 30 tablet 1   • levothyroxine (SYNTHROID, LEVOTHROID) 50 MCG tablet Take 50 mcg by mouth Daily.     • losartan (Cozaar) 100 MG tablet Take 1 tablet by mouth Daily. 30 tablet 1   • multivitamin with minerals tablet tablet Take 1 tablet by mouth Daily.     • rosuvastatin (CRESTOR) 20 MG tablet Take 40 mg by mouth Daily.     • SITagliptin (JANUVIA) 50 MG tablet Take 50 mg by mouth Daily.     • [DISCONTINUED] cefdinir (OMNICEF) 300 MG capsule Take 1 capsule by mouth Daily. 3 capsule 0     No Known Allergies    Objective    Objective     Vital Signs  Temp:  [97.6 °F (36.4 °C)-98.2 °F (36.8 °C)] 98.2 °F (36.8 °C)  Heart Rate:  [69-96] 85  Resp:  [18-24] 18  BP: (162-217)/() 162/76  SpO2:  [88 %-97 %] 92 %  on  Flow (L/min):  [8-15] 10;   Device (Oxygen Therapy): humidified;high-flow nasal cannula  Body mass index is 28.47 kg/m².    Physical Exam  Vitals reviewed.   Constitutional:       General: She is not in acute distress.     Appearance: She is ill-appearing.   Cardiovascular:      Rate and Rhythm: Normal rate and regular rhythm.   Pulmonary:      Comments: Bibasilar Rales, right greater than left  Abdominal:      General: Bowel sounds are normal. There is no distension.      Palpations: Abdomen is soft.      Tenderness: There is no abdominal tenderness.   Musculoskeletal:      Right lower leg: No edema.      Left lower leg: No edema.   Skin:     General: Skin is warm and dry.   Neurological:      Mental Status: She is alert and oriented to person, place, and time.   Psychiatric:         Mood  and Affect: Mood normal.         Results Review:  I reviewed the patient's new clinical results.  I reviewed the patient's new imaging results and agree with the interpretation.  I reviewed the patient's other test results and agree with the interpretation  I personally viewed and interpreted the patient's EKG/Telemetry data  .    Lab Results (last 24 hours)     Procedure Component Value Units Date/Time    CBC & Differential [[id number]]  (Abnormal) Collected: 05/17/23 0100    Specimen: Blood Updated: 05/17/23 0112    Narrative:      The following orders were created for panel order CBC & Differential.  Procedure                               Abnormality         Status                     ---------                               -----------         ------                     CBC Auto Differential[[id number]]        Abnormal            Final result                 Please view results for these tests on the individual orders.    Comprehensive Metabolic Panel [[id number]]  (Abnormal) Collected: 05/17/23 0100    Specimen: Blood Updated: 05/17/23 0132     Glucose 287 mg/dL      BUN 21 mg/dL      Creatinine 1.02 mg/dL      Sodium 140 mmol/L      Potassium 4.6 mmol/L      Comment: Slight hemolysis detected by analyzer. Results may be affected.        Chloride 103 mmol/L      CO2 26.9 mmol/L      Calcium 10.2 mg/dL      Total Protein 7.3 g/dL      Albumin 4.8 g/dL      ALT (SGPT) 42 U/L      AST (SGOT) 46 U/L      Alkaline Phosphatase 73 U/L      Total Bilirubin 0.8 mg/dL      Globulin 2.5 gm/dL      A/G Ratio 1.9 g/dL      BUN/Creatinine Ratio 20.6     Anion Gap 10.1 mmol/L      eGFR 53.4 mL/min/1.73     Narrative:      GFR Normal >60  Chronic Kidney Disease <60  Kidney Failure <15    The GFR formula is only valid for adults with stable renal function between ages 18 and 70.    BNP [[id number]]  (Abnormal) Collected: 05/17/23 0100    Specimen: Blood Updated: 05/17/23 0130     proBNP 5,785.0 pg/mL     Narrative:      Among  patients with dyspnea, NT-proBNP is highly sensitive for the detection of acute congestive heart failure. In addition NT-proBNP of <300 pg/ml effectively rules out acute congestive heart failure with 99% negative predictive value.    Results may be falsely decreased if patient taking Biotin.      High Sensitivity Troponin T [[id number]]  (Abnormal) Collected: 05/17/23 0100    Specimen: Blood Updated: 05/17/23 0132     HS Troponin T 32 ng/L     Narrative:      High Sensitive Troponin T Reference Range:  <10.0 ng/L- Negative Female for AMI  <15.0 ng/L- Negative Male for AMI  >=10 - Abnormal Female indicating possible myocardial injury.  >=15 - Abnormal Male indicating possible myocardial injury.   Clinicians would have to utilize clinical acumen, EKG, Troponin, and serial changes to determine if it is an Acute Myocardial Infarction or myocardial injury due to an underlying chronic condition.         CBC Auto Differential [[id number]]  (Abnormal) Collected: 05/17/23 0100    Specimen: Blood Updated: 05/17/23 0112     WBC 10.48 10*3/mm3      RBC 4.85 10*6/mm3      Hemoglobin 14.8 g/dL      Hematocrit 47.8 %      MCV 98.6 fL      MCH 30.5 pg      MCHC 31.0 g/dL      RDW 13.8 %      RDW-SD 50.5 fl      MPV 9.7 fL      Platelets 138 10*3/mm3      Neutrophil % 80.1 %      Lymphocyte % 8.9 %      Monocyte % 9.4 %      Eosinophil % 0.7 %      Basophil % 0.3 %      Immature Grans % 0.6 %      Neutrophils, Absolute 8.41 10*3/mm3      Lymphocytes, Absolute 0.93 10*3/mm3      Monocytes, Absolute 0.98 10*3/mm3      Eosinophils, Absolute 0.07 10*3/mm3      Basophils, Absolute 0.03 10*3/mm3      Immature Grans, Absolute 0.06 10*3/mm3      nRBC 0.0 /100 WBC     Procalcitonin [[id number]]  (Normal) Collected: 05/17/23 0100    Specimen: Blood Updated: 05/17/23 0253     Procalcitonin 0.07 ng/mL     Narrative:      As a Marker for Sepsis (Non-Neonates):    1. <0.5 ng/mL represents a low risk of severe sepsis and/or septic shock.  2. >2 ng/mL  "represents a high risk of severe sepsis and/or septic shock.    As a Marker for Lower Respiratory Tract Infections that require antibiotic therapy:    PCT on Admission    Antibiotic Therapy       6-12 Hrs later    >0.5                Strongly Recommended  >0.25 - <0.5        Recommended   0.1 - 0.25          Discouraged              Remeasure/reassess PCT  <0.1                Strongly Discouraged     Remeasure/reassess PCT    As 28 day mortality risk marker: \"Change in Procalcitonin Result\" (>80% or <=80%) if Day 0 (or Day 1) and Day 4 values are available. Refer to http://www.[facility]-pct-calculator.com    Change in PCT <=80%  A decrease of PCT levels below or equal to 80% defines a positive change in PCT test result representing a higher risk for 28-day all-cause mortality of patients diagnosed with severe sepsis for septic shock.    Change in PCT >80%  A decrease of PCT levels of more than 80% defines a negative change in PCT result representing a lower risk for 28-day all-cause mortality of patients diagnosed with severe sepsis or septic shock.       Respiratory Panel PCR w/COVID-19(SARS-CoV-2) DAVID/ANANT/APOLINAR/PAD/COR/MAD/CRISTIAN In-House, NP Swab in UTM/VTM, 3-4 HR TAT - Swab, Nasopharynx [[id number]]  (Normal) Collected: 05/17/23 0102    Specimen: Swab from Nasopharynx Updated: 05/17/23 0155     ADENOVIRUS, PCR Not Detected     Coronavirus 229E Not Detected     Coronavirus HKU1 Not Detected     Coronavirus NL63 Not Detected     Coronavirus OC43 Not Detected     COVID19 Not Detected     Human Metapneumovirus Not Detected     Human Rhinovirus/Enterovirus Not Detected     Influenza A PCR Not Detected     Influenza B PCR Not Detected     Parainfluenza Virus 1 Not Detected     Parainfluenza Virus 2 Not Detected     Parainfluenza Virus 3 Not Detected     Parainfluenza Virus 4 Not Detected     RSV, PCR Not Detected     Bordetella pertussis pcr Not Detected     Bordetella parapertussis PCR Not Detected     Chlamydophila " pneumoniae PCR Not Detected     Mycoplasma pneumo by PCR Not Detected    Narrative:      In the setting of a positive respiratory panel with a viral infection PLUS a negative procalcitonin without other underlying concern for bacterial infection, consider observing off antibiotics or discontinuation of antibiotics and continue supportive care. If the respiratory panel is positive for atypical bacterial infection (Bordetella pertussis, Chlamydophila pneumoniae, or Mycoplasma pneumoniae), consider antibiotic de-escalation to target atypical bacterial infection.    Blood Gas, Arterial - [[id number]]  (Abnormal) Collected: 05/17/23 0114    Specimen: Arterial Blood Updated: 05/17/23 0117     Site Arterial: right brachial     Jalil's Test N/A     pH, Arterial 7.401 pH units      pCO2, Arterial 40.8 mm Hg      pO2, Arterial 71.0 mm Hg      HCO3, Arterial 25.4 mmol/L      Base Excess, Arterial 0.5 mmol/L      O2 Saturation Calculated 94.0 %      Comment: SPO2 94% Meter: [id number] : [id number] Treasure Merissa        Barometric Pressure for Blood Gas 744.5 mmHg      Modality NRB     Flow Rate 15 lpm      Rate 24 Breaths/minute     Urinalysis With Microscopic If Indicated (No Culture) - Urine, Clean Catch [[id number]]  (Abnormal) Collected: 05/17/23 0245    Specimen: Urine, Clean Catch Updated: 05/17/23 0317     Color, UA Yellow     Appearance, UA Cloudy     pH, UA 7.0     Specific Gravity, UA 1.014     Glucose,  mg/dL (2+)     Ketones, UA Negative     Bilirubin, UA Negative     Blood, UA Negative     Protein,  mg/dL (2+)     Leuk Esterase, UA Trace     Nitrite, UA Negative     Urobilinogen, UA 0.2 E.U./dL    Urinalysis, Microscopic Only - Urine, Clean Catch [[id number]]  (Abnormal) Collected: 05/17/23 0245    Specimen: Urine, Clean Catch Updated: 05/17/23 0317     RBC, UA 0-2 /HPF      WBC, UA 0-2 /HPF      Bacteria, UA 4+ /HPF      Squamous Epithelial Cells, UA 0-2 /HPF      Hyaline Casts, UA None Seen /LPF       Methodology Automated Microscopy    High Sensitivity Troponin T 2Hr [[id number]]  (Abnormal) Collected: 05/17/23 0247    Specimen: Blood Updated: 05/17/23 0322     HS Troponin T 33 ng/L      Troponin T Delta 1 ng/L     Narrative:      High Sensitive Troponin T Reference Range:  <10.0 ng/L- Negative Female for AMI  <15.0 ng/L- Negative Male for AMI  >=10 - Abnormal Female indicating possible myocardial injury.  >=15 - Abnormal Male indicating possible myocardial injury.   Clinicians would have to utilize clinical acumen, EKG, Troponin, and serial changes to determine if it is an Acute Myocardial Infarction or myocardial injury due to an underlying chronic condition.         Basic Metabolic Panel [[id number]]  (Abnormal) Collected: 05/17/23 0532    Specimen: Blood Updated: 05/17/23 0643     Glucose 313 mg/dL      BUN 20 mg/dL      Creatinine 0.94 mg/dL      Sodium 140 mmol/L      Potassium 3.8 mmol/L      Chloride 100 mmol/L      CO2 22.8 mmol/L      Calcium 10.0 mg/dL      BUN/Creatinine Ratio 21.3     Anion Gap 17.2 mmol/L      eGFR 58.8 mL/min/1.73     Narrative:      GFR Normal >60  Chronic Kidney Disease <60  Kidney Failure <15    The GFR formula is only valid for adults with stable renal function between ages 18 and 70.    CBC (No Diff) [[id number]]  (Abnormal) Collected: 05/17/23 0532    Specimen: Blood Updated: 05/17/23 0621     WBC 8.68 10*3/mm3      RBC 4.89 10*6/mm3      Hemoglobin 15.0 g/dL      Hematocrit 47.0 %      MCV 96.1 fL      MCH 30.7 pg      MCHC 31.9 g/dL      RDW 13.3 %      RDW-SD 47.0 fl      MPV 10.1 fL      Platelets 123 10*3/mm3     High Sensitivity Troponin T [[id number]]  (Abnormal) Collected: 05/17/23 0532    Specimen: Blood Updated: 05/17/23 0640     HS Troponin T 32 ng/L     Narrative:      High Sensitive Troponin T Reference Range:  <10.0 ng/L- Negative Female for AMI  <15.0 ng/L- Negative Male for AMI  >=10 - Abnormal Female indicating possible myocardial injury.  >=15 - Abnormal  Male indicating possible myocardial injury.   Clinicians would have to utilize clinical acumen, EKG, Troponin, and serial changes to determine if it is an Acute Myocardial Infarction or myocardial injury due to an underlying chronic condition.         POC Glucose Once [[id number]]  (Abnormal) Collected: 05/17/23 0823    Specimen: Blood Updated: 05/17/23 0825     Glucose 309 mg/dL      Comment: Meter: [id number] : [id number] Everardo Mireles NA       POC Glucose Once [[id number]]  (Abnormal) Collected: 05/17/23 1224    Specimen: Blood Updated: 05/17/23 1226     Glucose 371 mg/dL      Comment: Meter: [id number] : [id number] Ari CALLOWAY       D-dimer, Quantitative [[id number]] Collected: 05/17/23 1440    Specimen: Blood Updated: 05/17/23 1529          Imaging Results (Last 24 Hours)     Procedure Component Value Units Date/Time    XR Chest 1 View [[id number]] Collected: 05/17/23 0058     Updated: 05/17/23 0102    Narrative:      SINGLE VIEW OF THE CHEST     HISTORY: Shortness of air     COMPARISON: 09/21/2022     FINDINGS:  There is cardiomegaly. There is calcification of the aorta. Left-sided  pacemaker is noted. No pneumothorax is seen. Patient has background  changes of COPD. There may be some vascular congestion. Patient does  have focal airspace consolidation at the lung bases bilaterally,  pneumonia is not excluded. There do appear to be pleural effusions.       Impression:         1. Possible vascular congestion.  2. Nonspecific bibasilar consolidation. Pneumonia is not excluded.     This report was finalized on 5/17/2023 12:59 AM by Dr. Silvana Hernandez M.D.                 ECG 12 Lead Dyspnea   Final Result   HEART RATE= 75  bpm   RR Interval= 800  ms   ND Interval= 224  ms   P Horizontal Axis= -22  deg   P Front Axis= 62  deg   QRSD Interval= 120  ms   QT Interval= 393  ms   QRS Axis= -43  deg   T Wave Axis= 100  deg   - ABNORMAL ECG -   Atrial-sensed ventricular-paced rhythm   No further analysis attempted  due to paced rhythm   No significant changes since previous EKG.   Electronically Signed By: Allan Thrasher ([facility]) 17-May-2023 10:11:34   Date and Time of Study: 2023-05-17 00:30:52      SCANNED - TELEMETRY     Final Result           Assessment/Plan     Active Hospital Problems    Diagnosis  POA   • **Acute on chronic respiratory failure with hypoxia [J96.21]  Yes   • PVD (peripheral vascular disease) [I73.9]  Unknown   • Diastolic CHF, acute on chronic [I50.33]  Unknown   • Type 2 diabetes mellitus with hyperglycemia, without long-term current use of insulin [E11.65]  Yes   • Stage 3b chronic kidney disease [N18.32]  Yes   • Pulmonary emphysema [J43.9]  Yes   • Hypothyroidism (acquired) [E03.9]  Yes   • S/P CABG (coronary artery bypass graft) [Z95.1]  Not Applicable      Resolved Hospital Problems   No resolved problems to display.       Ms. Hatfield is a 87 y.o. female with COPD and chronic hypoxic respiratory failure presenting with worsening shortness of breath and acute on chronic hypoxic respiratory failure, likely exacerbation of CHF but possibly multifactorial    · Reviewed imaging, most consistent with pulmonary edema.  BNP elevated.  No echo that I could find in system but according to cardiology notes EF was previously normal a few years ago.  May have some valvular disease.  · No real suggestion of infection, RVP negative, WBC and Pro-Stephen normal.  · Not wheezing.  Probably no role for steroids so I discontinued them.  Continue scheduled nebs  · Cardiology and pulmonary consulted.  Discussed personally with Dr. Chery  · Echocardiogram to be repeated  · Diabetes poorly controlled, likely related to steroids.  Will resume oral home meds and allow correctional insulin as needed.  Check A1c  · Hypertension uncontrolled last night, could have exacerbated the pulmonary edema.  We will restart home meds and adjust as needed.  · Discussed with son at bedside.    VTE Prophylaxis - Lovenox 40 mg SC daily.  Code  Status - DNR.       Salazar Perez MD  [facility]  05/17/23  15:37 EDT

## 2023-05-17 NOTE — ED PROVIDER NOTES
MD ATTESTATION NOTE    The CARLOS and I have discussed this patient's history, physical exam, and treatment plan.  I have reviewed the documentation and personally had a face to face interaction with the patient. I affirm the documentation and agree with the treatment and plan.  The attached note describes my personal findings.      I provided a substantive portion of the care of the patient.  I personally performed the physical exam in its entirety, and below are my findings.  For this patient encounter, the patient wore surgical mask, I wore full protective PPE including N95 and eye protection.      Brief HPI: This patient is a 87-year-old female presenting to the emergency room today with shortness of breath.  The patient does have a history of COPD and is chronically on 3 L of oxygen by nasal cannula.  She denies any associated chest pain but does report a nonproductive cough.      PHYSICAL EXAM  ED Triage Vitals [05/17/23 0018]   Temp Heart Rate Resp BP SpO2   97.7 °F (36.5 °C) 96 20 177/89 97 %      Temp src Heart Rate Source Patient Position BP Location FiO2 (%)   Tympanic Monitor Lying Right arm --         GENERAL: In mild distress, nontoxic in appearance  HENT: nares patent  EYES: no scleral icterus  CV: regular rhythm, normal rate, no M/R/G  RESPIRATORY: normal effort, coarse rales and rhonchi throughout  ABDOMEN: soft, nontender, no rebound or guarding  MUSCULOSKELETAL: no deformity, no edema  NEURO: alert, moves all extremities, follows commands  PSYCH:  calm, cooperative  SKIN: warm, dry    Vital signs and nursing notes reviewed.        Plan: We will treat the patient with IV steroids as well as duo nebulizer treatments as we obtain an EKG, labs, as well as chest x-ray in the emergency room today.  We will monitor and reassess following.       José Antonio Benoit MD  05/17/23 0228

## 2023-05-17 NOTE — CASE MANAGEMENT/SOCIAL WORK
Continued Stay Note  [facility]     Patient Name: Salma Hatfield  MRN: [id number]  Today's Date: 5/17/2023    Admit Date: 5/17/2023    Plan: Home with Grandson. Referral to [facility]  pending. Normally uses O2@3LNC at home through [employer]. Currently on 10LHFNC. If nebs ordered at D/C will need nebulizer machine.   Discharge Plan     Row Name 05/17/23 1545       Plan    Plan Home with Grandson. Referral to [facility] pending. Normally uses O2@3LNC at home through [employer]. Currently on 10LHFNC. If nebs ordered at D/C will need nebulizer machine.    Patient/Family in Agreement with Plan yes    Plan Comments Met with pt. at bedside. Explained roll of . Face sheet and pharmacy verified. Pt states her adult grandson lives with her. States her grandson is never home.  Pt has a ramp to enter home. Home DME equipment includes O2@3LNC through [employer], shower chair, rollator, grab bars, wheelchair, and ramp.  Pt is independent with ADLs using her rollator. Pt has never been to Rehab. States she has used [facility] in the past. Pt states if HH needed will use [facility] again.  Pt’s PCP is Stephen Long MD with [facility]. Pt does not drive. States her family transports her to appointments. At discharge, Family will transport. Pt currently on O2@10LHFNC. If nebulizers ordered at D/C will need nebulizer machine. Explained that CCP would follow to assess for discharge needs. Called and spoke to [employer] regrading pt stating new home O2 concentrator not connected properly and pt left without getting O2 for prolonged period of time.  Ori Singletary RN-BC               Discharge Codes    No documentation.               Expected Discharge Date and Time     Expected Discharge Date Expected Discharge Time    May 20, 2023             Ori Singletary RN

## 2023-05-17 NOTE — CASE MANAGEMENT/SOCIAL WORK
Discharge Planning Assessment  [facility]     Patient Name: Salma Hatfield  MRN: [id number]  Today's Date: 5/17/2023    Admit Date: 5/17/2023    Plan: Home with Grandson. Referral to [facility] pending. Normally uses O2@3LNC at home through [employer]. Currently on 10LHFNC. If nebs ordered at D/C will need nebulizer machine.   Discharge Needs Assessment     Row Name 05/17/23 1533       Living Environment    People in Home grandchild(shyam)    Name(s) of People in Home Pt states her adult grandson lives with her.    Current Living Arrangements home    Potentially Unsafe Housing Conditions none    Primary Care Provided by self    Provides Primary Care For no one, unable/limited ability to care for self    Family Caregiver if Needed child(shyam), adult;grandchild(shyam), adult    Quality of Family Relationships unable to assess    Able to Return to Prior Arrangements yes       Resource/Environmental Concerns    Resource/Environmental Concerns none    Transportation Concerns none       Transition Planning    Patient/Family Anticipates Transition to home with family    Patient/Family Anticipated Services at Transition none    Transportation Anticipated family or friend will provide       Discharge Needs Assessment    Readmission Within the Last 30 Days no previous admission in last 30 days    Equipment Currently Used at Home ramp;shower chair;wheelchair;grab bar;oxygen;rollator    Concerns to be Addressed care coordination/care conferences;discharge planning    Anticipated Changes Related to Illness none    Equipment Needed After Discharge none    Discharge Facility/Level of Care Needs home with home health    Provided Post Acute Provider List? Refused    Refused Provider List Comment Has used [facility] in the past and will use again    Current Discharge Risk chronically ill;lack of support system/caregiver               Discharge Plan     Row Name 05/17/23 2345       Plan    Plan Home with Grandson. Referral to [facility]  pending. Normally uses O2@3LNC at home through [employer]. Currently on 10LHFNC. If nebs ordered at D/C will need nebulizer machine.    Patient/Family in Agreement with Plan yes    Plan Comments Met with pt. at bedside. Explained roll of . Face sheet and pharmacy verified. Pt states her adult grandson lives with her. States her grandson is never home.  Pt has a ramp to enter home. Home DME equipment includes O2@3LNC through [employer], shower chair, rollator, grab bars, wheelchair, and ramp.  Pt is independent with ADLs using her rollator. Pt has never been to Rehab. States she has used [facility] in the past. Pt states if HH needed will use [facility] again.  Pt’s PCP is Stephen Long MD with [facility]. Pt does not drive. States her family transports her to appointments. At discharge, Family will transport. Pt currently on O2@10LHFNC. If nebulizers ordered at D/C will need nebulizer machine. Explained that CCP would follow to assess for discharge needs. Called and spoke to [employer] re[facility]ing pt stating new home O2 concentrator not connected properly and pt left without getting O2 for prolonged period of time.  Ori Singletary RN-BC              Continued Care and Services - Admitted Since 5/17/2023     Durable Medical Equipment     Service Provider Request Status Selected Services Address Phone Fax Patient Preferred    [facility] Pending - Request Sent N/A [id number], [facility] [address] [phone] --          Home Medical Care     Service Provider Request Status Selected Services Address Phone Fax Patient Preferred    [facility] Pending - Request Sent N/A [address] [place] [address] [address] [phone] --              Expected Discharge Date and Time     Expected Discharge Date Expected Discharge Time    May 20, 2023          Demographic Summary     Row Name 05/17/23 1532       General Information    Admission Type inpatient     Arrived From emergency department    Required Notices Provided Important Message from Medicare    Reason for Consult discharge planning;decision-making;care coordination/care conference    Preferred Language English               Functional Status     Row Name 05/17/23 1532       Functional Status    Usual Activity Tolerance moderate    Current Activity Tolerance fair       Functional Status, IADL    Medications assistive equipment    Meal Preparation assistive equipment    Housekeeping assistive equipment    Laundry assistive equipment    Shopping assistive equipment       Mental Status    General Appearance WDL WDL       Mental Status Summary    Recent Changes in Mental Status/Cognitive Functioning no changes       Employment/    Employment Status retired                  Ori Singletary RN

## 2023-05-17 NOTE — NURSING NOTE
Wound/ostomy - consult received regarding skin to coccyx. Patient is laying tilted onto R side, she can position self, skin to sacral area is normal color, intact, no indication of pressure related skin damage. Patient does have a prominent coccyx upon palpation, she reports she has fractured her coccyx before. Recommend to protect skin with zinc barrier cream, loose skin to gluteals so cleft can be prone to moisture if not kept clean and dry and cream applied to prevent moisture/friciton.

## 2023-05-17 NOTE — ED TRIAGE NOTES
Pt to ER from home via EMS, c/o SOA, pt's O2 concentrator was replaced by [employer] company yesterday and they did not plug the machine in so the pt has been without o2 x 24 hours.  Pt is usually on 3lpm via NC.

## 2023-05-17 NOTE — CONSULTS
Group: [facility]         CONSULT NOTE    Patient Identification:    Salma Hatfield  87 y.o.  female  1935  [id number]            Patient Care Team:  Provider, No Known as PCP - General    Requesting physician: Dr. Heredia    Reason for Consultation: Acute on chronic respiratory failure with    CC: Worsening shortness of breath    History of Present Illness: Patient is a 87-year-old elderly obese white female with a complex past medical history significant for COPD, chronic hypoxic respiratory failure, CAD requiring CABG, PAD requiring multiple stents, CKD who reportedly was at her baseline health and doing fairly well until the last few days when she started having issues with her concentrator and her family contacted the DME company who reportedly came in and fixed it but son felt that he was not working well stable and given ongoing issues patient was brought to the ER and admitted to the hospital.  Patient noted to have severe respiratory failure.  Chest x-ray shows changes concerning for congestive heart failure and cardiology has been consulted and they have already started the patient on diuretics.  I have reviewed the chest x-ray as well.    Patient states that she is feeling a little better but is having significant issues.  States that she did feel air coming through her nasal cannula while at home but she had persistent symptoms at which point she decided to come    I have reviewed the H&P as well as the notes from the other consultants taking care of the patient this admission as well as previous hospital notes (if any available) from our group physicians and summarized above      Objective     Review of Systems:  Constitutional: No fever, chills, weight loss or loss of appetite.   ENMT: No sinus congestion, postnasal drip, epistaxis, sore throat  Cardiovascular: No chest pain, palpitation or legs swelling.    Respiratory: No hemoptysis. + orthopnea, PND.  Gastrointestinal: No  constipation, diarrhea or abdominal pain   Neurology: No headache, focal weakness, numbness or dizziness.   Musculoskeletal: No joint stiffness or swelling.   Psychiatry: No agitation or behavioral changes  Lymphatic: No swollen neck glands.  Integumentary: No rash.    Past Medical History:  Past Medical History:   Diagnosis Date   • AAA (abdominal aortic aneurysm)     stated in 2- Dr. Lokesh Santoro office note   • Atherosclerotic heart disease     stated in 2- Dr. Lokesh Santoro office note   • Bradycardia    • Carotid artery stenosis     stated in 2- Dr. Lokesh Santoro office note   • Cataract Removal 10/27/2008    Right (10/27/08) and Left (11/25/08)   • Chronic kidney disease, stage 3a    • Chronic respiratory failure with hypoxia    • Coronary artery disease    • Coronary atherosclerosis     stated in 2- Dr. Lokesh Santoro office note   • Diabetes mellitus    • Diffuse large B cell lymphoma 06/21/2016   • Elevated cholesterol    • H/O angioplasty 12/31/2007   • Hx of CABG 09/21/2009    Triple bypass   • Hyperlipidemia    • Hyperparathyroidism 04/17/2017   • Hypertension    • Hypertensive disorder     stated in 2- Dr. Lokesh Santoro office note   • Hypothyroidism (acquired)    • Ischemic heart disease 12/15/2016   • Pulmonary emphysema    • Retinal tear 02/06/2009    Right eye, repaired 06/2009   • S/P arterial stent 08/26/2009    Left leg and aorta   • S/P renal artery angioplasty 01/21/2008    Left   • Shingles 03/26/2014   • Status post carotid surgery 06/13/2018   • Stenosis of iliac artery     stated in 2- Dr. Lokesh Santoro office note       Past Surgical History:  Past Surgical History:   Procedure Laterality Date   • CARDIAC CATHETERIZATION     • CARDIAC ELECTROPHYSIOLOGY PROCEDURE N/A 07/22/2022    Procedure: Pacemaker SC new [employer];  Surgeon: Kevin Eisenberg MD;  Location: [facility] INVASIVE LOCATION;  Service:  Cardiology;  Laterality: N/A;   • CAROTID ENDARTERECTOMY  2018   • CORONARY ARTERY BYPASS GRAFT  2008    Triple bypass   • CORONARY STENT PLACEMENT  10/2011   • ILIAC ARTERY STENT      Aorta-iliac stent 2009   • INSERT / REPLACE / REMOVE PACEMAKER  07/2022   • RENAL ARTERY STENT Left 2008        Home Meds:  Medications Prior to Admission   Medication Sig Dispense Refill Last Dose   • aspirin 81 MG EC tablet Take 1 tablet by mouth Daily.   5/16/2023   • hydrALAZINE (APRESOLINE) 100 MG tablet Take 1 tablet by mouth 3 (Three) Times a Day.   5/16/2023   • latanoprost (XALATAN) 0.005 % ophthalmic solution Administer 1 drop into the left eye Every Night.   5/16/2023   • metoprolol tartrate (LOPRESSOR) 50 MG tablet Take 1 tablet by mouth 2 (Two) Times a Day.   5/16/2023   • montelukast (SINGULAIR) 10 MG tablet Take 1 tablet by mouth Every Night.   5/16/2023   • allopurinol (ZYLOPRIM) 100 MG tablet Take 100 mg by mouth 2 (Two) Times a Day.      • amLODIPine (NORVASC) 5 MG tablet Take 1 tablet by mouth Daily. 30 tablet 1    • Calcium Carbonate-Vitamin D 600-200 MG-UNIT tablet Take 1 tablet by mouth Daily.      • docusate sodium (COLACE) 100 MG capsule Take 100 mg by mouth 2 (Two) Times a Day.      • dorzolamide-timolol (COSOPT) 22.3-6.8 MG/ML ophthalmic solution Administer 1 drop into the left eye 2 (Two) Times a Day.      • ezetimibe (ZETIA) 10 MG tablet Take 10 mg by mouth Daily.      • famotidine (PEPCID) 20 MG tablet Take 20 mg by mouth 2 (Two) Times a Day.      • fluticasone (FLONASE) 50 MCG/ACT nasal spray 2 sprays into the nostril(s) as directed by provider Daily.      • gabapentin (NEURONTIN) 300 MG capsule Take 300 mg by mouth Every Morning.      • gabapentin (NEURONTIN) 300 MG capsule Take 600 mg by mouth Every Night.      • glipizide (GLUCOTROL XL) 5 MG ER tablet Take 5 mg by mouth 2 (Two) Times a Day.      • HYDROcodone-acetaminophen (NORCO) 5-325 MG per tablet Take 1 tablet by mouth Every 8 (Eight) Hours As  "Needed.      • isosorbide mononitrate (IMDUR) 30 MG 24 hr tablet Take 1 tablet by mouth Daily. 30 tablet 1    • levothyroxine (SYNTHROID, LEVOTHROID) 50 MCG tablet Take 50 mcg by mouth Daily.      • losartan (Cozaar) 100 MG tablet Take 1 tablet by mouth Daily. 30 tablet 1    • multivitamin with minerals tablet tablet Take 1 tablet by mouth Daily.      • rosuvastatin (CRESTOR) 20 MG tablet Take 40 mg by mouth Daily.      • SITagliptin (JANUVIA) 50 MG tablet Take 50 mg by mouth Daily.          Allergies:  No Known Allergies    Social History:   Social History     Socioeconomic History   • Marital status:    Tobacco Use   • Smoking status: Never   • Smokeless tobacco: Never   Vaping Use   • Vaping Use: Never used   Substance and Sexual Activity   • Alcohol use: Never   • Drug use: Never   • Sexual activity: Defer       Family History:  History reviewed. No pertinent family history.    Physical Exam:  /76 (BP Location: Right arm, Patient Position: Lying)   Pulse 85   Temp 98.2 °F (36.8 °C) (Oral)   Resp 18   Ht 154.9 cm (61\")   Wt 68.4 kg (150 lb 11.2 oz)   SpO2 92%   BMI 28.47 kg/m²    Body mass index is 28.47 kg/m². 92% 68.4 kg (150 lb 11.2 oz)    Vent Settings                                           Physical Exam     Constitutional: Middle-aged pt in bed,++ accessory muscle use/resp distress   Head: - NCAT  Eyes: No pallor, anicteric conjunctivae  ENMT:  Mallampati 3, no oral thrush. Moist MM.   NECK: Trachea midline, No thyromegaly, no palpable cervical lymphadenopathy  Heart: RRR, no murmur.  Trace pedal edema   Lungs: MORELIA +, distant breath sounds no wheezes/ crackles heard    Abdomen: Soft. No tenderness, guarding or rigidity. No palpable masses  Extremities: Extremities warm and well perfused. No cyanosis/ clubbing  Neuro: Conscious, answers appropriately, no gross focal neuro deficits  Psych: Mood and affect appropriate    PPE recommended per [facility] [place] infectious disease Isolation " protocol for the current clinical scenario (as mentioned below) was followed.     LABS:  Results from last 7 days   Lab Units 05/17/23  0532 05/17/23  0100   SODIUM mmol/L 140 140   POTASSIUM mmol/L 3.8 4.6   CHLORIDE mmol/L 100 103   CO2 mmol/L 22.8 26.9   BUN mg/dL 20 21   CREATININE mg/dL 0.94 1.02*   CALCIUM mg/dL 10.0 10.2   BILIRUBIN mg/dL  --  0.8   ALK PHOS U/L  --  73   ALT (SGPT) U/L  --  42*   AST (SGOT) U/L  --  46*   GLUCOSE mg/dL 313* 287*   WBC 10*3/mm3 8.68 10.48   HEMOGLOBIN g/dL 15.0 14.8   PLATELETS 10*3/mm3 123* 138*   PROBNP pg/mL  --  5,785.0*   PROCALCITONIN ng/mL  --  0.07       Lab Results   Component Value Date    CALCIUM 10.0 05/17/2023             Results from last 7 days   Lab Units 05/17/23  0114   PH, ARTERIAL pH units 7.401   PO2 ART mm Hg 71.0*   PCO2, ARTERIAL mm Hg 40.8   HCO3 ART mmol/L 25.4        Result Review      I have personally reviewed the results from the time of this admission to 5/17/2023 14:13 EDT and agree with these findings:  [x]  Laboratory  [x]  Microbiology  [x]  Radiology  []  EKG/Telemetry   [x]  Cardiology/Vascular   []  Pathology  []  Old records  []  Other:    ASSESSMENT  Acute on chronic hypoxic respiratory failure (3 L nasal cannula)  Acute congestive heart failure  COPD not in exacerbation  CAD s/p CABG  Severe PAD s/p aortoiliac, renal stent  Carotid disease s/p CEA  Heart block s/p PPM  Remote lymphoma -in remission  CKD  Diabetes    RECOMMENDATIONS:  Patient with significant oxygen needs and does seem to have some pulm edema changes on the chest x-ray but not significantly volume overloaded.  Already on diuretics from cardiology with not much improvement still.  Echocardiogram has been ordered and will await input.  Negative respiratory viral panel and procalcitonin  We will need to rule out other etiologies.  We will get a D-dimer and consider CT angiogram if abnormal.  Continue bronchodilators for COPD  Out of bed as tolerated  DVT  prophylaxis  Guarded prognosis    Thank you for letting me participate in the care of this pleasant patient.  I have discussed my findings and recommendations with patient.     Ronald Velez MD  5/17/2023  14:13 EDT

## 2023-05-18 PROBLEM — N17.9 AKI (ACUTE KIDNEY INJURY): Status: ACTIVE | Noted: 2023-05-18

## 2023-05-18 LAB
ANION GAP SERPL CALCULATED.3IONS-SCNC: 11 MMOL/L (ref 5–15)
BUN SERPL-MCNC: 35 MG/DL (ref 8–23)
BUN/CREAT SERPL: 22.9 (ref 7–25)
CALCIUM SPEC-SCNC: 10.5 MG/DL (ref 8.6–10.5)
CHLORIDE SERPL-SCNC: 99 MMOL/L (ref 98–107)
CO2 SERPL-SCNC: 32 MMOL/L (ref 22–29)
CREAT SERPL-MCNC: 1.53 MG/DL (ref 0.57–1)
DEPRECATED RDW RBC AUTO: 46.7 FL (ref 37–54)
EGFRCR SERPLBLD CKD-EPI 2021: 32.8 ML/MIN/1.73
ERYTHROCYTE [DISTWIDTH] IN BLOOD BY AUTOMATED COUNT: 13.2 % (ref 12.3–15.4)
GLUCOSE BLDC GLUCOMTR-MCNC: 160 MG/DL (ref 70–130)
GLUCOSE BLDC GLUCOMTR-MCNC: 250 MG/DL (ref 70–130)
GLUCOSE BLDC GLUCOMTR-MCNC: 252 MG/DL (ref 70–130)
GLUCOSE SERPL-MCNC: 266 MG/DL (ref 65–99)
HBA1C MFR BLD: 7 % (ref 4.8–5.6)
HCT VFR BLD AUTO: 40.6 % (ref 34–46.6)
HGB BLD-MCNC: 13.6 G/DL (ref 12–15.9)
MCH RBC QN AUTO: 32.1 PG (ref 26.6–33)
MCHC RBC AUTO-ENTMCNC: 33.5 G/DL (ref 31.5–35.7)
MCV RBC AUTO: 95.8 FL (ref 79–97)
PLATELET # BLD AUTO: 136 10*3/MM3 (ref 140–450)
PMV BLD AUTO: 9.9 FL (ref 6–12)
POTASSIUM SERPL-SCNC: 4.1 MMOL/L (ref 3.5–5.2)
RBC # BLD AUTO: 4.24 10*6/MM3 (ref 3.77–5.28)
SODIUM SERPL-SCNC: 142 MMOL/L (ref 136–145)
WBC NRBC COR # BLD: 14.14 10*3/MM3 (ref 3.4–10.8)

## 2023-05-18 PROCEDURE — 25010000002 ENOXAPARIN PER 10 MG: Performed by: HOSPITALIST

## 2023-05-18 PROCEDURE — 97162 PT EVAL MOD COMPLEX 30 MIN: CPT

## 2023-05-18 PROCEDURE — 97530 THERAPEUTIC ACTIVITIES: CPT

## 2023-05-18 PROCEDURE — 82948 REAGENT STRIP/BLOOD GLUCOSE: CPT

## 2023-05-18 PROCEDURE — 94799 UNLISTED PULMONARY SVC/PX: CPT

## 2023-05-18 PROCEDURE — 80048 BASIC METABOLIC PNL TOTAL CA: CPT | Performed by: HOSPITALIST

## 2023-05-18 PROCEDURE — 94761 N-INVAS EAR/PLS OXIMETRY MLT: CPT

## 2023-05-18 PROCEDURE — 83036 HEMOGLOBIN GLYCOSYLATED A1C: CPT | Performed by: HOSPITALIST

## 2023-05-18 PROCEDURE — 94760 N-INVAS EAR/PLS OXIMETRY 1: CPT

## 2023-05-18 PROCEDURE — 25010000002 FUROSEMIDE PER 20 MG: Performed by: NURSE PRACTITIONER

## 2023-05-18 PROCEDURE — 94664 DEMO&/EVAL PT USE INHALER: CPT

## 2023-05-18 PROCEDURE — 85027 COMPLETE CBC AUTOMATED: CPT | Performed by: HOSPITALIST

## 2023-05-18 PROCEDURE — 63710000001 INSULIN LISPRO (HUMAN) PER 5 UNITS: Performed by: NURSE PRACTITIONER

## 2023-05-18 RX ORDER — ENOXAPARIN SODIUM 100 MG/ML
30 INJECTION SUBCUTANEOUS NIGHTLY
Status: DISCONTINUED | OUTPATIENT
Start: 2023-05-18 | End: 2023-05-21 | Stop reason: HOSPADM

## 2023-05-18 RX ADMIN — LEVOTHYROXINE SODIUM 50 MCG: 0.05 TABLET ORAL at 06:36

## 2023-05-18 RX ADMIN — TIMOLOL MALEATE: 5 SOLUTION OPHTHALMIC at 20:26

## 2023-05-18 RX ADMIN — LOSARTAN POTASSIUM 100 MG: 100 TABLET, FILM COATED ORAL at 09:15

## 2023-05-18 RX ADMIN — MONTELUKAST SODIUM 10 MG: 10 TABLET, FILM COATED ORAL at 20:27

## 2023-05-18 RX ADMIN — TIMOLOL MALEATE: 5 SOLUTION OPHTHALMIC at 12:40

## 2023-05-18 RX ADMIN — IPRATROPIUM BROMIDE AND ALBUTEROL SULFATE 3 ML: 2.5; .5 SOLUTION RESPIRATORY (INHALATION) at 11:41

## 2023-05-18 RX ADMIN — HYDRALAZINE HYDROCHLORIDE 50 MG: 50 TABLET, FILM COATED ORAL at 15:11

## 2023-05-18 RX ADMIN — FAMOTIDINE 20 MG: 20 TABLET ORAL at 17:26

## 2023-05-18 RX ADMIN — GLIPIZIDE 2.5 MG: 5 TABLET ORAL at 06:36

## 2023-05-18 RX ADMIN — GLIPIZIDE 2.5 MG: 5 TABLET ORAL at 17:26

## 2023-05-18 RX ADMIN — Medication 10 ML: at 09:15

## 2023-05-18 RX ADMIN — ISOSORBIDE MONONITRATE 30 MG: 30 TABLET, EXTENDED RELEASE ORAL at 09:15

## 2023-05-18 RX ADMIN — DOCUSATE SODIUM 100 MG: 100 CAPSULE, LIQUID FILLED ORAL at 20:27

## 2023-05-18 RX ADMIN — LINAGLIPTIN 5 MG: 5 TABLET, FILM COATED ORAL at 09:15

## 2023-05-18 RX ADMIN — DOCUSATE SODIUM 100 MG: 100 CAPSULE, LIQUID FILLED ORAL at 09:16

## 2023-05-18 RX ADMIN — LATANOPROST 1 DROP: 50 SOLUTION OPHTHALMIC at 20:26

## 2023-05-18 RX ADMIN — HYDRALAZINE HYDROCHLORIDE 50 MG: 50 TABLET, FILM COATED ORAL at 23:33

## 2023-05-18 RX ADMIN — ROSUVASTATIN CALCIUM 40 MG: 40 TABLET, FILM COATED ORAL at 09:15

## 2023-05-18 RX ADMIN — GABAPENTIN 300 MG: 300 CAPSULE ORAL at 06:35

## 2023-05-18 RX ADMIN — ENOXAPARIN SODIUM 30 MG: 100 INJECTION SUBCUTANEOUS at 20:26

## 2023-05-18 RX ADMIN — GABAPENTIN 300 MG: 300 CAPSULE ORAL at 20:27

## 2023-05-18 RX ADMIN — AMLODIPINE BESYLATE 10 MG: 10 TABLET ORAL at 09:15

## 2023-05-18 RX ADMIN — ASPIRIN 81 MG: 81 TABLET, COATED ORAL at 09:16

## 2023-05-18 RX ADMIN — IPRATROPIUM BROMIDE AND ALBUTEROL SULFATE 3 ML: 2.5; .5 SOLUTION RESPIRATORY (INHALATION) at 07:03

## 2023-05-18 RX ADMIN — FUROSEMIDE 40 MG: 10 INJECTION, SOLUTION INTRAMUSCULAR; INTRAVENOUS at 09:24

## 2023-05-18 RX ADMIN — ALLOPURINOL 100 MG: 100 TABLET ORAL at 20:27

## 2023-05-18 RX ADMIN — FLUTICASONE PROPIONATE 2 SPRAY: 50 SPRAY, METERED NASAL at 09:16

## 2023-05-18 RX ADMIN — INSULIN LISPRO 2 UNITS: 100 INJECTION, SOLUTION INTRAVENOUS; SUBCUTANEOUS at 09:15

## 2023-05-18 RX ADMIN — METOPROLOL TARTRATE 50 MG: 50 TABLET, FILM COATED ORAL at 09:16

## 2023-05-18 RX ADMIN — ALLOPURINOL 100 MG: 100 TABLET ORAL at 09:15

## 2023-05-18 RX ADMIN — Medication 10 ML: at 20:26

## 2023-05-18 RX ADMIN — INSULIN LISPRO 4 UNITS: 100 INJECTION, SOLUTION INTRAVENOUS; SUBCUTANEOUS at 12:39

## 2023-05-18 RX ADMIN — INSULIN LISPRO 4 UNITS: 100 INJECTION, SOLUTION INTRAVENOUS; SUBCUTANEOUS at 17:26

## 2023-05-18 RX ADMIN — METOPROLOL TARTRATE 50 MG: 50 TABLET, FILM COATED ORAL at 21:59

## 2023-05-18 RX ADMIN — MULTIPLE VITAMINS W/ MINERALS TAB 1 TABLET: TAB at 09:15

## 2023-05-18 RX ADMIN — IPRATROPIUM BROMIDE AND ALBUTEROL SULFATE 3 ML: 2.5; .5 SOLUTION RESPIRATORY (INHALATION) at 20:10

## 2023-05-18 RX ADMIN — FAMOTIDINE 20 MG: 20 TABLET ORAL at 06:36

## 2023-05-18 RX ADMIN — HYDRALAZINE HYDROCHLORIDE 50 MG: 50 TABLET, FILM COATED ORAL at 06:36

## 2023-05-18 NOTE — PROGRESS NOTES
Name: Salma Hatfield ADMIT: 2023   : 1935  PCP: Stephen Long APRN    MRN: [id number] LOS: 1 days   AGE/SEX: 87 y.o. female  ROOM: [facility]     Subjective   Subjective   Feels much better.  O2 sats improved on just 4 L now.    Review of Systems     Objective   Objective   Vital Signs  Temp:  [97.6 °F (36.4 °C)-98.2 °F (36.8 °C)] 97.8 °F (36.6 °C)  Heart Rate:  [69-87] 81  Resp:  [18-20] 18  BP: (103-149)/(60-98) 103/62  SpO2:  [87 %-96 %] 90 %  on  Flow (L/min):  [4-13] 6;   Device (Oxygen Therapy): nasal cannula;humidified  Body mass index is 28.34 kg/m².  Physical Exam  Vitals and nursing note reviewed.   Constitutional:       General: She is not in acute distress.     Appearance: She is obese. She is not ill-appearing.   Cardiovascular:      Rate and Rhythm: Normal rate and regular rhythm.   Pulmonary:      Effort: Pulmonary effort is normal.      Comments: Diminished breath sound  Abdominal:      General: Bowel sounds are normal. There is no distension.      Palpations: Abdomen is soft.      Tenderness: There is no abdominal tenderness.   Musculoskeletal:      Right lower leg: No edema.      Left lower leg: No edema.   Skin:     General: Skin is warm and dry.      Findings: No rash.   Neurological:      General: No focal deficit present.      Mental Status: She is alert and oriented to person, place, and time.       Results Review     I reviewed the patient's new clinical results.  Results from last 7 days   Lab Units 23  0511 23  0532 23  0100   WBC 10*3/mm3 14.14* 8.68 10.48   HEMOGLOBIN g/dL 13.6 15.0 14.8   PLATELETS 10*3/mm3 136* 123* 138*     Results from last 7 days   Lab Units 23  0511 23  0532 23  0100   SODIUM mmol/L 142 140 140   POTASSIUM mmol/L 4.1 3.8 4.6   CHLORIDE mmol/L 99 100 103   CO2 mmol/L 32.0* 22.8 26.9   BUN mg/dL 35* 20 21   CREATININE mg/dL 1.53* 0.94 1.02*   GLUCOSE mg/dL 266* 313* 287*   EGFR mL/min/1.73 32.8* 58.8* 53.4*      Results from last 7 days   Lab Units 05/17/23  0100   ALBUMIN g/dL 4.8   BILIRUBIN mg/dL 0.8   ALK PHOS U/L 73   AST (SGOT) U/L 46*   ALT (SGPT) U/L 42*     Results from last 7 days   Lab Units 05/18/23  0511 05/17/23  0532 05/17/23  0100   CALCIUM mg/dL 10.5 10.0 10.2   ALBUMIN g/dL  --   --  4.8     Results from last 7 days   Lab Units 05/17/23  0100   PROCALCITONIN ng/mL 0.07     Hemoglobin A1C   Date/Time Value Ref Range Status   05/18/2023 0511 7.00 (H) 4.80 - 5.60 % Final     Glucose   Date/Time Value Ref Range Status   05/18/2023 1220 250 (H) 70 - 130 mg/dL Final     Comment:     Meter: [id number] : [id number] Luis May NA   05/18/2023 0811 160 (H) 70 - 130 mg/dL Final     Comment:     Meter: [id number] : [id number] Smith Lalo NA   05/17/2023 1709 493 (C) 70 - 130 mg/dL Final     Comment:     RN Notified R and V Meter: [id number] : [id number] Chapman Johanna NA   05/17/2023 1224 371 (H) 70 - 130 mg/dL Final     Comment:     Meter: [id number] : [id number] Chapman Johanna NA   05/17/2023 0823 309 (H) 70 - 130 mg/dL Final     Comment:     Meter: [id number] : [id number] [facility] NA       XR Chest 1 View    Result Date: 5/17/2023   1. Possible vascular congestion. 2. Nonspecific bibasilar consolidation. Pneumonia is not excluded.  This report was finalized on 5/17/2023 12:59 AM by Dr. Silvana Hernandez M.D.      CT Angiogram Chest    Result Date: 5/17/2023   1. No pulmonary embolism. Emphysematous changes of the lungs. 2. Cystic lesion of the pancreas.  This report was finalized on 5/17/2023 6:08 PM by Dr. Kenton Vallejo M.D.      I have personally reviewed all medications:  Scheduled Medications  allopurinol, 100 mg, Oral, BID  amLODIPine, 10 mg, Oral, Daily  aspirin, 81 mg, Oral, Daily  docusate sodium, 100 mg, Oral, BID  dorzolamide-timolol, , Left Eye, BID  enoxaparin, 30 mg, Subcutaneous, Nightly  famotidine, 20 mg, Oral, BID AC  fluticasone, 2 spray, Nasal, Daily  gabapentin, 300 mg,  Oral, QAM  gabapentin, 300 mg, Oral, Nightly  glipizide, 2.5 mg, Oral, BID AC  hydrALAZINE, 50 mg, Oral, Q8H  insulin lispro, 2-7 Units, Subcutaneous, TID With Meals  ipratropium-albuterol, 3 mL, Nebulization, Q6H While Awake - RT  isosorbide mononitrate, 30 mg, Oral, Daily  latanoprost, 1 drop, Left Eye, Nightly  levothyroxine, 50 mcg, Oral, Q AM  linagliptin, 5 mg, Oral, Daily  losartan, 100 mg, Oral, Daily  metoprolol tartrate, 50 mg, Oral, BID  montelukast, 10 mg, Oral, Nightly  multivitamin with minerals, 1 tablet, Oral, Daily  rosuvastatin, 40 mg, Oral, Daily  sodium chloride, 10 mL, Intravenous, Q12H    Infusions   Diet  Diet: Regular/House Diet, Cardiac Diets, Diabetic Diets; Healthy Heart (2-3 Na+); Consistent Carbohydrate; Texture: Regular Texture (IDDSI 7); Fluid Consistency: Thin (IDDSI 0)    I have personally reviewed:  [x]  Laboratory   [x]  Microbiology   [x]  Radiology   [x]  EKG/Telemetry  [x]  Cardiology/Vascular   []  Pathology    []  Records       Assessment/Plan     Active Hospital Problems    Diagnosis  POA   • **Acute on chronic respiratory failure with hypoxia [J96.21]  Yes   • PVD (peripheral vascular disease) [I73.9]  Yes   • Diastolic CHF, acute on chronic [I50.33]  Yes   • Type 2 diabetes mellitus with hyperglycemia, without long-term current use of insulin [E11.65]  Yes   • Stage 3b chronic kidney disease [N18.32]  Yes   • Pulmonary emphysema [J43.9]  Yes   • Hypothyroidism (acquired) [E03.9]  Yes   • S/P CABG (coronary artery bypass graft) [Z95.1]  Not Applicable      Resolved Hospital Problems   No resolved problems to display.       87 y.o. female admitted with Acute on chronic respiratory failure with hypoxia.    Suspect primary issue acute on chronic diastolic failure.  - Echo done, reading not available yet.  - Cardiology note appreciated.  IV diuretic held due to change in creatinine.  Plans to start oral diuretic tomorrow if creatinine is okay  - Continue to wean O2 as tolerated.   Baseline level 3 L    CKD 3B-reviewed prior creatinines and looks like she runs between 1.3-1.5 so this is not far from baseline.  We will recheck in a.m. to document stability    DM2 with marked hyperglycemia overnight, likely related to IV steroids given in ED.  No further steroids planned.  Continue oral glipizide correctional sliding scale as needed for now.    COPD appears relatively stable.  Still not wheezing.  Continue scheduled nebs    Hypertension is much better.  Continue home meds    · Lovenox 30 mg SC daily for DVT prophylaxis.  · Disposition: Possibly home tomorrow/Saturday if O2 sats back to baseline.  Also depends on renal function.    Expected Discharge  Expected Discharge Date: 5/20/2023; Expected Discharge Time:         Salazar Perez MD  [facility]  05/18/23  15:53 EDT

## 2023-05-18 NOTE — THERAPY EVALUATION
Patient Name: Salma Hatfield  : 1935    MRN: [id number]                              Today's Date: 2023       Admit Date: 2023    Visit Dx:     ICD-10-CM ICD-9-CM   1. Acute on chronic respiratory failure with hypoxia  J96.21 518.84     799.02   2. COPD exacerbation  J44.1 491.21   3. Pulmonary vascular congestion  R09.89 514   4. Hypertension, unspecified type  I10 401.9     Patient Active Problem List   Diagnosis   • Bradycardia   • Pulmonary emphysema   • Diffuse large B cell lymphoma   • S/P CABG (coronary artery bypass graft)   • Coronary artery disease involving native coronary artery without angina pectoris   • Chronic respiratory failure with hypoxia   • Type 2 diabetes mellitus with hyperglycemia, without long-term current use of insulin   • Hypothyroidism (acquired)   • Stage 3b chronic kidney disease   • Second degree AV block   • Presence of cardiac pacemaker   • Acute UTI (urinary tract infection)   • History of 2019 novel coronavirus disease (COVID-19)   • Acute on chronic respiratory failure with hypoxia   • PVD (peripheral vascular disease)   • Diastolic CHF, acute on chronic     Past Medical History:   Diagnosis Date   • AAA (abdominal aortic aneurysm)     stated in 2022 Dr. Lokesh Santoro office note   • Atherosclerotic heart disease     stated in 2022 Dr. Lokesh Santoro office note   • Bradycardia    • Carotid artery stenosis     stated in 2022 Dr. Lokesh Santoro office note   • Cataract Removal 10/27/2008    Right (10/27/08) and Left (08)   • Chronic kidney disease, stage 3a    • Chronic respiratory failure with hypoxia    • Coronary artery disease    • Coronary atherosclerosis     stated in 2022 Dr. Lokesh Santoro office note   • Diabetes mellitus    • Diffuse large B cell lymphoma 2016   • Elevated cholesterol    • H/O angioplasty 2007   • Hx of CABG 2009    Triple bypass   • Hyperlipidemia    •  Hyperparathyroidism 04/17/2017   • Hypertension    • Hypertensive disorder     stated in 2- Dr. Lokesh Santoro office note   • Hypothyroidism (acquired)    • Ischemic heart disease 12/15/2016   • Pulmonary emphysema    • Retinal tear 02/06/2009    Right eye, repaired 06/2009   • S/P arterial stent 08/26/2009    Left leg and aorta   • S/P renal artery angioplasty 01/21/2008    Left   • Shingles 03/26/2014   • Status post carotid surgery 06/13/2018   • Stenosis of iliac artery     stated in 2- Dr. Lokesh Santoro office note     Past Surgical History:   Procedure Laterality Date   • CARDIAC CATHETERIZATION     • CARDIAC ELECTROPHYSIOLOGY PROCEDURE N/A 07/22/2022    Procedure: Pacemaker SC new [employer];  Surgeon: Kevin Eisenberg MD;  Location: [facility] INVASIVE LOC[facility];  Service: Cardiology;  Laterality: N/A;   • CAROTID ENDARTERECTOMY  2018   • CORONARY ARTERY BYPASS GRAFT  2008    Triple bypass   • CORONARY STENT PLACEMENT  10/2011   • ILIAC ARTERY STENT      Aorta-iliac stent 2009   • INSERT / REPLACE / REMOVE PACEMAKER  07/2022   • RENAL ARTERY STENT Left 2008      General Information     Row Name 05/18/23 1154          Physical Therapy Time and Intention    Document Type evaluation  -ST     Mode of Treatment individual therapy;physical therapy  -ST     Row Name 05/18/23 1154          General Information    Patient Profile Reviewed yes  -ST     Prior Level of Function independent:  -ST     Existing Precautions/Restrictions oxygen therapy device and L/min;fall  -ST     Barriers to Rehab medically complex  -ST     Row Name 05/18/23 1154          Living Environment    People in Home grandchild(shyam)  -ST     Row Name 05/18/23 1154          Home Main Entrance    Number of Stairs, Main Entrance other (see comments)  ramp  -ST     Stair Railings, Main Entrance none  -ST     Row Name 05/18/23 1154          Cognition    Orientation Status (Cognition) oriented x 4  -ST     Row Name 05/18/23 1151           Safety Issues, Functional Mobility    Impairments Affecting Function (Mobility) endurance/activity tolerance  -ST     Comment, Safety Issues/Impairments (Mobility) gait belt, nonskid socks donned  -ST           User Key  (r) = Recorded By, (t) = Taken By, (c) = Cosigned By    Initials Name Provider Type    ST Emmy Gandara, PT Physical Therapist               Mobility     Row Name 05/18/23 1155          Bed Mobility    Comment, (Bed Mobility) sitting EOB upon arrival  -     Row Name 05/18/23 1155          Sit-Stand Transfer    Sit-Stand Benson (Transfers) modified independence  -     Assistive Device (Sit-Stand Transfers) walker, front-wheeled  -ST     Row Name 05/18/23 1155          Gait/Stairs (Locomotion)    Benson Level (Gait) standby assist  -     Assistive Device (Gait) walker, front-wheeled  -     Distance in Feet (Gait) 20', 30'  -ST     Deviations/Abnormal Patterns (Gait) chandana decreased;gait speed decreased;stride length decreased  -ST     Comment, (Gait/Stairs) SBA for lines only, no LOB. O2 sats 87% and above  -ST           User Key  (r) = Recorded By, (t) = Taken By, (c) = Cosigned By    Initials Name Provider Type    ST Emmy Gandara, PT Physical Therapist               Obj/Interventions     [facility] Name 05/18/23 1156          Range of Motion Comprehensive    General Range of Motion no range of motion deficits identified  -[facility] Name 05/18/23 1156          Strength Comprehensive (MMT)    General Manual Muscle Testing (MMT) Assessment no strength deficits identified  -     Row Name 05/18/23 1156          Balance    Comment, Balance SBA for dynamic mobility throughout, no LOB  -ST           User Key  (r) = Recorded By, (t) = Taken By, (c) = Cosigned By    Initials Name Provider Type    ST Cruzito Gandaraa, PT Physical Therapist               Goals/Plan    No documentation.                Clinical Impression     [facility] Name 05/18/23 1156          Pain     Pretreatment Pain Rating 0/10 - no pain  -ST     Posttreatment Pain Rating 0/10 - no pain  -ST     Row Name 05/18/23 1156          Plan of Care Review    Plan of Care Reviewed With patient;family  -ST     Row Name 05/18/23 1156          Therapy Assessment/Plan (PT)    Patient/Family Therapy Goals Statement (PT) Pt is 86 y/o F admitted to [facility] on 5/17/23 with SOB. At baseline pt is indep with mobility, uses rollater. Her grandson lives with her but she is home alone during the day, she has a ramp for entrance. Reports she wears 3 L O2 at home, currently on 5L in room. Pt currently demos mod I with transfers, SBA with ambulation using walker x 25' - pt reports this is about how far in the house she walks at a time. O2 sats noted to be 87% and above with activity, recovers within 20 sec. Encouraged pt to ambulate with [facility] staff for improved endurance, but pt otherwise demos baseline mobility, strength, balance - no acute PT goals identified. Anticipate d/t home with home health.  -ST     Criteria for Skilled Interventions Met (PT) no;no problems identified which require skilled intervention  -ST     Therapy Frequency (PT) evaluation only  -ST     Row Name 05/18/23 1156          Positioning and Restraints    Pre-Treatment Position in bed  -ST     Post Treatment Position bed  -ST     In Bed notified nsg;sitting EOB;encouraged to call for assist;call light within reach;with family/caregiver  -ST           User Key  (r) = Recorded By, (t) = Taken By, (c) = Cosigned By    Initials Name Provider Type    Emmy Cantu, PT Physical Therapist               Outcome Measures     Row Name 05/18/23 1157 05/18/23 1033       How much help from another person do you currently need...    Turning from your back to your side while in flat bed without using bedrails? 4  -ST 3  -FABBY    Moving from lying on back to sitting on the side of a flat bed without bedrails? 4  -ST 4  -FABBY    Moving to and from a bed to a chair (including a  wheelchair)? 3  -ST 3  -FABBY    Standing up from a chair using your arms (e.g., wheelchair, bedside chair)? 4  -ST 3  -FABBY    Climbing 3-5 steps with a railing? 3  -ST 4  -FABBY    To walk in hospital room? 3  -ST 3  -FABBY    AM-PAC 6 Clicks Score (PT) 21  -ST 20  -FABBY    Highest level of mobility 6 --> Walked 10 steps or more  -ST 6 --> Walked 10 steps or more  -FABBY    Row Name 05/18/23 1157          Functional Assessment    Outcome Measure Options AM-PAC 6 Clicks Basic Mobility (PT)  -ST           User Key  (r) = Recorded By, (t) = Taken By, (c) = Cosigned By    Initials Name Provider Type    Radha Jackson RN Registered Nurse    Emmy Cantu, PT Physical Therapist                               PT Recommendation and Plan     Plan of Care Reviewed With: patient, family     Time Calculation:    PT Charges     Row Name 05/18/23 1200             Time Calculation    Start Time 1105  -ST      Stop Time 1128  -ST      Time Calculation (min) 23 min  -ST      PT Received On 05/18/23  -ST         Time Calculation- PT    Total Timed Code Minutes- PT 13 minute(s)  -ST         Timed Charges    60005 - PT Therapeutic Activity Minutes 13  -ST         Total Minutes    Timed Charges Total Minutes 13  -ST       Total Minutes 13  -ST            User Key  (r) = Recorded By, (t) = Taken By, (c) = Cosigned By    Initials Name Provider Type    ST Emmy Gandara, PT Physical Therapist              Therapy Charges for Today     Code Description Service Date Service Provider Modifiers Qty    94737706186 HC PT THERAPEUTIC ACT EA 15 MIN 5/18/2023 Emmy Gandara, PT GP 1    75959615155 HC PT EVAL MOD COMPLEXITY 1 5/18/2023 Emmy Gandara, PT GP 1          PT G-Codes  Outcome Measure Options: AM-PAC 6 Clicks Basic Mobility (PT)  AM-PAC 6 Clicks Score (PT): 21  PT Discharge Summary  Anticipated Discharge Disposition (PT): home with home health    Emmy Gandara, PT  5/18/2023

## 2023-05-18 NOTE — PLAN OF CARE
Goal Outcome Evaluation:  Plan of Care Reviewed With: patient, family  Pt is 86 y/o F admitted to [facility] on 5/17/23 with SOB. At baseline pt is indep with mobility, uses rollater. Her grandson lives with her but she is home alone during the day, she has a ramp for entrance. Reports she wears 3 L O2 at home, currently on 5L in room. Pt currently demos mod I with transfers, SBA with ambulation using walker x 25' - pt reports this is about how far in the house she walks at a time. O2 sats noted to be 87% and above with activity, recovers within 20 sec. Encouraged pt to ambulate with [facility] staff for improved endurance, but pt otherwise demos baseline mobility, strength, balance - no acute PT goals identified. Anticipate d/t home with home health.

## 2023-05-18 NOTE — PROGRESS NOTES
Re: chf, pt of Dr Martin    S: soa much better    O:  Vitals:    05/18/23 0636 05/18/23 0703 05/18/23 0758 05/18/23 0929   BP: 135/71  131/73    BP Location:   Right arm    Patient Position:   Sitting    Pulse: 76 71 70    Resp:  20 18    Temp:   97.6 °F (36.4 °C)    TempSrc:   Oral    SpO2:  94% 94% 91%   Weight:       Height:           GEN awake nad  RESP cta  CVS regular  GI soft nt  NEURO aao times 3 normal speech      Intake/Output Summary (Last 24 hours) at 5/18/2023 1009  Last data filed at 5/18/2023 0638  Gross per 24 hour   Intake 340 ml   Output 2200 ml   Net -1860 ml       Tele paced overnight        Assessment/ Plan  1.  Acute on chronic hypoxic respiratory failure/COPD and CHF  ---chronic home oxgyen,  Hs trop 32 <- 33  cxr  Possible vascular congestion. Nonspecific bibasilar consolidation. Pneumonia is not excluded.     2.  Hypertensive urgency     3.  CAD, s/p CABG (2009) & PCI (2007),     4.  CKD stage 3     5. DM    6. 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 Suarez--[facility]    7. severe vascular disease (carotid disease and peripheral)--s/p aorto-iliac stent, renal stent and right CEA 2018    8. 2nd degree avb-- PPM aidan XT surescan DR BLANCA 7/22/2022    Pt seen examined by me  Appears much better, oxygen down, diuresed well,   Renal function showed Cr up, so hold any more IV today  Paced on tele  Change to oral diuretic , next dose tomr am, if cr stable tomr ok for dc home

## 2023-05-18 NOTE — PLAN OF CARE
Goal Outcome Evaluation:  Plan of Care Reviewed With: patient        Progress: improving  Outcome Evaluation: Vitals stable. Started tonight on 13 liters O2 now down to 5 liters. IV lasix continued. BP improved. Urine output good. Family visited. Sleeping at this time.

## 2023-05-18 NOTE — PLAN OF CARE
Problem: Adult Inpatient Plan of Care  Goal: Plan of Care Review  Outcome: Ongoing, Progressing  Flowsheets (Taken 5/18/2023 1617)  Progress: improving  Plan of Care Reviewed With: patient  Outcome Evaluation: Vitals stable. pt on 6-4L O2 throughout the day. Encouraged to us IS. Worked with PT. Up to chair. Dopplers ordered. Will continue to monitor.

## 2023-05-18 NOTE — PROGRESS NOTES
Ronald Velez MD                          [phone]            Patient ID:    Name:  Salma Hatfield    MRN:  [id number]    1935   87 y.o.  female            Patient Care Team:  Stephen Long APRN as PCP - General (Nurse Practitioner)    CC/ Reason for visit: Acute on chronic respiratory failure, concern for congestive heart failure    Subjective: Pt seen and examined this AM. No acute overnight events noted. Doing better.  Improvement in oxygen needs noted.  CT angiogram done yesterday reviewed no pulm embolism but chronic changes of COPD/emphysema.  D-dimer is elevated    ROS: Denies any subjective fevers, syncope or presyncopal events, new neurological deficits, nausea or vomiting currently    Objective     Vital Signs past 24hrs    BP range: BP: (103-149)/(60-98) 103/62  Pulse range: Heart Rate:  [69-87] 81  Resp rate range: Resp:  [18-20] 18  Temp range: Temp (24hrs), Av.9 °F (36.6 °C), Min:97.6 °F (36.4 °C), Max:98.2 °F (36.8 °C)      Ventilator/Non-Invasive Ventilation Settings (From admission, onward)    None          Vent Settings                                         Device (Oxygen Therapy): nasal cannula;humidified       68 kg (150 lb); Body mass index is 28.34 kg/m².      Intake/Output Summary (Last 24 hours) at 2023 1748  Last data filed at 2023 1519  Gross per 24 hour   Intake 1000 ml   Output 1300 ml   Net -300 ml       PHYSICAL EXAM   Constitutional: Middle-aged pt in bed,++ accessory muscle use/resp distress   Head: - NCAT  Eyes: No pallor, anicteric conjunctivae  ENMT:  Mallampati 3, no oral thrush. Moist MM.   NECK: Trachea midline, No thyromegaly, no palpable cervical lymphadenopathy  Heart: RRR, no murmur.  Trace pedal edema   Lungs: MORELIA +, distant breath sounds no wheezes/ crackles heard    Abdomen: Soft. No tenderness, guarding or rigidity. No palpable masses  Extremities: Extremities warm and well perfused. No cyanosis/  clubbing  Neuro: Conscious, answers appropriately, no gross focal neuro deficits  Psych: Mood and affect appropriate    PPE recommended per [facility] infectious disease Isolation protocol for the current clinical scenario (as mentioned below) was followed.     Scheduled meds:  allopurinol, 100 mg, Oral, BID  amLODIPine, 10 mg, Oral, Daily  aspirin, 81 mg, Oral, Daily  docusate sodium, 100 mg, Oral, BID  dorzolamide-timolol, , Left Eye, BID  enoxaparin, 30 mg, Subcutaneous, Nightly  famotidine, 20 mg, Oral, BID AC  fluticasone, 2 spray, Nasal, Daily  gabapentin, 300 mg, Oral, QAM  gabapentin, 300 mg, Oral, Nightly  glipizide, 2.5 mg, Oral, BID AC  hydrALAZINE, 50 mg, Oral, Q8H  insulin lispro, 2-7 Units, Subcutaneous, TID With Meals  ipratropium-albuterol, 3 mL, Nebulization, Q6H While Awake - RT  isosorbide mononitrate, 30 mg, Oral, Daily  latanoprost, 1 drop, Left Eye, Nightly  levothyroxine, 50 mcg, Oral, Q AM  linagliptin, 5 mg, Oral, Daily  losartan, 100 mg, Oral, Daily  metoprolol tartrate, 50 mg, Oral, BID  montelukast, 10 mg, Oral, Nightly  multivitamin with minerals, 1 tablet, Oral, Daily  rosuvastatin, 40 mg, Oral, Daily  sodium chloride, 10 mL, Intravenous, Q12H        IV meds:                           Data Review:      Results from last 7 days   Lab Units 05/18/23  0511 05/17/23  0532 05/17/23  0100   SODIUM mmol/L 142 140 140   POTASSIUM mmol/L 4.1 3.8 4.6   CHLORIDE mmol/L 99 100 103   CO2 mmol/L 32.0* 22.8 26.9   BUN mg/dL 35* 20 21   CREATININE mg/dL 1.53* 0.94 1.02*   CALCIUM mg/dL 10.5 10.0 10.2   BILIRUBIN mg/dL  --   --  0.8   ALK PHOS U/L  --   --  73   ALT (SGPT) U/L  --   --  42*   AST (SGOT) U/L  --   --  46*   GLUCOSE mg/dL 266* 313* 287*   WBC 10*3/mm3 14.14* 8.68 10.48   HEMOGLOBIN g/dL 13.6 15.0 14.8   PLATELETS 10*3/mm3 136* 123* 138*   PROBNP pg/mL  --   --  5,785.0*   PROCALCITONIN ng/mL  --   --  0.07       Lab Results   Component Value Date    CALCIUM 10.5 05/18/2023              Results from last 7 days   Lab Units 05/17/23  0114   PH, ARTERIAL pH units 7.401   PO2 ART mm Hg 71.0*   PCO2, ARTERIAL mm Hg 40.8   HCO3 ART mmol/L 25.4        I have personally reviewed the results from the time of this admission to 5/18/2023 17:48 EDT and agree with these findings:  [x]  Laboratory  [x]  Microbiology  [x]  Radiology  []  EKG/Telemetry   [x]  Cardiology/Vascular   []  Pathology  []  Old records    ASSESSMENT   Acute on chronic hypoxic respiratory failure (3 L nasal cannula)  Acute congestive heart failure  COPD not in exacerbation  CAD s/p CABG  Severe PAD s/p aortoiliac, renal stent  Carotid disease s/p CEA  Heart block s/p PPM  Remote lymphoma -in remission  CKD  Diabetes     RECOMMENDATIONS:  Patient with improvement in oxygen needs and getting close to baseline and continue with gentle diuretics as tolerated  Continue with bronchodilators for COPD  Pulm embolism has been ruled out but will get lower extremity venous duplex given elevated D-dimer to rule out DVT  Recommended to be out of bed as tolerated  Cardiology managing diuresis  DVT prophylaxis    Should be okay to be considered for discharge as she is getting closer to her baseline oxygen needs.    Ronald Velez MD  5/18/2023

## 2023-05-19 ENCOUNTER — APPOINTMENT (OUTPATIENT)
Dept: CARDIOLOGY | Facility: HOSPITAL | Age: 88
End: 2023-05-19
Payer: MEDICARE

## 2023-05-19 ENCOUNTER — APPOINTMENT (OUTPATIENT)
Dept: GENERAL RADIOLOGY | Facility: HOSPITAL | Age: 88
End: 2023-05-19
Payer: MEDICARE

## 2023-05-19 LAB
ANION GAP SERPL CALCULATED.3IONS-SCNC: 12 MMOL/L (ref 5–15)
AORTIC DIMENSIONLESS INDEX: 0.7 (DI)
BH CV ECHO MEAS - AO MAX PG: 9.9 MMHG
BH CV ECHO MEAS - AO MEAN PG: 5 MMHG
BH CV ECHO MEAS - AO ROOT DIAM: 3.5 CM
BH CV ECHO MEAS - AO V2 MAX: 157.5 CM/SEC
BH CV ECHO MEAS - AO V2 VTI: 34.7 CM
BH CV ECHO MEAS - AVA(I,D): 2.1 CM2
BH CV ECHO MEAS - EDV(CUBED): 66.4 ML
BH CV ECHO MEAS - EDV(MOD-SP2): 64 ML
BH CV ECHO MEAS - EDV(MOD-SP4): 70 ML
BH CV ECHO MEAS - EF(MOD-BP): 54.4 %
BH CV ECHO MEAS - EF(MOD-SP2): 56.3 %
BH CV ECHO MEAS - EF(MOD-SP4): 51.4 %
BH CV ECHO MEAS - ESV(CUBED): 13.3 ML
BH CV ECHO MEAS - ESV(MOD-SP2): 28 ML
BH CV ECHO MEAS - ESV(MOD-SP4): 34 ML
BH CV ECHO MEAS - FS: 41.5 %
BH CV ECHO MEAS - IVS/LVPW: 1.48 CM
BH CV ECHO MEAS - IVSD: 1.48 CM
BH CV ECHO MEAS - LAT PEAK E' VEL: 7.6 CM/SEC
BH CV ECHO MEAS - LV DIASTOLIC VOL/BSA (35-75): 42.1 CM2
BH CV ECHO MEAS - LV MASS(C)D: 176.7 GRAMS
BH CV ECHO MEAS - LV MAX PG: 3.5 MMHG
BH CV ECHO MEAS - LV MEAN PG: 1.97 MMHG
BH CV ECHO MEAS - LV SYSTOLIC VOL/BSA (12-30): 20.4 CM2
BH CV ECHO MEAS - LV V1 MAX: 94 CM/SEC
BH CV ECHO MEAS - LV V1 VTI: 23.4 CM
BH CV ECHO MEAS - LVIDD: 4 CM
BH CV ECHO MEAS - LVIDS: 2.37 CM
BH CV ECHO MEAS - LVOT AREA: 3.1 CM2
BH CV ECHO MEAS - LVOT DIAM: 1.99 CM
BH CV ECHO MEAS - LVPWD: 1 CM
BH CV ECHO MEAS - MED PEAK E' VEL: 3.8 CM/SEC
BH CV ECHO MEAS - MR MAX PG: 100.4 MMHG
BH CV ECHO MEAS - MR MAX VEL: 501 CM/SEC
BH CV ECHO MEAS - MV A DUR: 0.11 SEC
BH CV ECHO MEAS - MV A MAX VEL: 138.4 CM/SEC
BH CV ECHO MEAS - MV DEC SLOPE: 513.3 CM/SEC2
BH CV ECHO MEAS - MV DEC TIME: 187 MSEC
BH CV ECHO MEAS - MV E MAX VEL: 90.3 CM/SEC
BH CV ECHO MEAS - MV E/A: 0.65
BH CV ECHO MEAS - MV MAX PG: 6.2 MMHG
BH CV ECHO MEAS - MV MEAN PG: 2.6 MMHG
BH CV ECHO MEAS - MV P1/2T: 56.4 MSEC
BH CV ECHO MEAS - MV V2 VTI: 31.8 CM
BH CV ECHO MEAS - MVA(P1/2T): 3.9 CM2
BH CV ECHO MEAS - MVA(VTI): 2.29 CM2
BH CV ECHO MEAS - PA ACC TIME: 0.1 SEC
BH CV ECHO MEAS - PA PR(ACCEL): 36.2 MMHG
BH CV ECHO MEAS - PA V2 MAX: 104.5 CM/SEC
BH CV ECHO MEAS - PULM A REVS DUR: 0.1 SEC
BH CV ECHO MEAS - PULM A REVS VEL: 23 CM/SEC
BH CV ECHO MEAS - PULM DIAS VEL: 36.4 CM/SEC
BH CV ECHO MEAS - PULM S/D: 1.38
BH CV ECHO MEAS - PULM SYS VEL: 50.3 CM/SEC
BH CV ECHO MEAS - RAP SYSTOLE: 3 MMHG
BH CV ECHO MEAS - RV MAX PG: 1.48 MMHG
BH CV ECHO MEAS - RV V1 MAX: 60.8 CM/SEC
BH CV ECHO MEAS - RV V1 VTI: 13.5 CM
BH CV ECHO MEAS - RVSP: 31.3 MMHG
BH CV ECHO MEAS - SI(MOD-SP2): 21.6 ML/M2
BH CV ECHO MEAS - SI(MOD-SP4): 21.6 ML/M2
BH CV ECHO MEAS - SV(LVOT): 72.8 ML
BH CV ECHO MEAS - SV(MOD-SP2): 36 ML
BH CV ECHO MEAS - SV(MOD-SP4): 36 ML
BH CV ECHO MEAS - TAPSE (>1.6): 1.5 CM
BH CV ECHO MEAS - TR MAX PG: 28.3 MMHG
BH CV ECHO MEAS - TR MAX VEL: 266 CM/SEC
BH CV ECHO MEASUREMENTS AVERAGE E/E' RATIO: 15.84
BH CV LOWER VASCULAR LEFT COMMON FEMORAL AUGMENT: NORMAL
BH CV LOWER VASCULAR LEFT COMMON FEMORAL COMPETENT: NORMAL
BH CV LOWER VASCULAR LEFT COMMON FEMORAL COMPRESS: NORMAL
BH CV LOWER VASCULAR LEFT COMMON FEMORAL PHASIC: NORMAL
BH CV LOWER VASCULAR LEFT COMMON FEMORAL SPONT: NORMAL
BH CV LOWER VASCULAR LEFT DISTAL FEMORAL COMPRESS: NORMAL
BH CV LOWER VASCULAR LEFT GASTRONEMIUS COMPRESS: NORMAL
BH CV LOWER VASCULAR LEFT GREATER SAPH AK COMPRESS: NORMAL
BH CV LOWER VASCULAR LEFT GREATER SAPH BK COMPRESS: NORMAL
BH CV LOWER VASCULAR LEFT LESSER SAPH COMPRESS: NORMAL
BH CV LOWER VASCULAR LEFT MID FEMORAL AUGMENT: NORMAL
BH CV LOWER VASCULAR LEFT MID FEMORAL COMPETENT: NORMAL
BH CV LOWER VASCULAR LEFT MID FEMORAL COMPRESS: NORMAL
BH CV LOWER VASCULAR LEFT MID FEMORAL PHASIC: NORMAL
BH CV LOWER VASCULAR LEFT MID FEMORAL SPONT: NORMAL
BH CV LOWER VASCULAR LEFT PERONEAL COMPRESS: NORMAL
BH CV LOWER VASCULAR LEFT POPLITEAL AUGMENT: NORMAL
BH CV LOWER VASCULAR LEFT POPLITEAL COMPETENT: NORMAL
BH CV LOWER VASCULAR LEFT POPLITEAL COMPRESS: NORMAL
BH CV LOWER VASCULAR LEFT POPLITEAL PHASIC: NORMAL
BH CV LOWER VASCULAR LEFT POPLITEAL SPONT: NORMAL
BH CV LOWER VASCULAR LEFT POSTERIOR TIBIAL COMPRESS: NORMAL
BH CV LOWER VASCULAR LEFT PROFUNDA FEMORAL COMPRESS: NORMAL
BH CV LOWER VASCULAR LEFT PROXIMAL FEMORAL COMPRESS: NORMAL
BH CV LOWER VASCULAR LEFT SAPHENOFEMORAL JUNCTION COMPRESS: NORMAL
BH CV LOWER VASCULAR RIGHT COMMON FEMORAL AUGMENT: NORMAL
BH CV LOWER VASCULAR RIGHT COMMON FEMORAL COMPETENT: NORMAL
BH CV LOWER VASCULAR RIGHT COMMON FEMORAL COMPRESS: NORMAL
BH CV LOWER VASCULAR RIGHT COMMON FEMORAL PHASIC: NORMAL
BH CV LOWER VASCULAR RIGHT COMMON FEMORAL SPONT: NORMAL
BH CV LOWER VASCULAR RIGHT DISTAL FEMORAL COMPRESS: NORMAL
BH CV LOWER VASCULAR RIGHT GASTRONEMIUS COMPRESS: NORMAL
BH CV LOWER VASCULAR RIGHT GREATER SAPH AK COMPRESS: NORMAL
BH CV LOWER VASCULAR RIGHT GREATER SAPH BK COMPRESS: NORMAL
BH CV LOWER VASCULAR RIGHT LESSER SAPH COMPRESS: NORMAL
BH CV LOWER VASCULAR RIGHT MID FEMORAL AUGMENT: NORMAL
BH CV LOWER VASCULAR RIGHT MID FEMORAL COMPETENT: NORMAL
BH CV LOWER VASCULAR RIGHT MID FEMORAL COMPRESS: NORMAL
BH CV LOWER VASCULAR RIGHT MID FEMORAL PHASIC: NORMAL
BH CV LOWER VASCULAR RIGHT MID FEMORAL SPONT: NORMAL
BH CV LOWER VASCULAR RIGHT PERONEAL COMPRESS: NORMAL
BH CV LOWER VASCULAR RIGHT POPLITEAL AUGMENT: NORMAL
BH CV LOWER VASCULAR RIGHT POPLITEAL COMPETENT: NORMAL
BH CV LOWER VASCULAR RIGHT POPLITEAL COMPRESS: NORMAL
BH CV LOWER VASCULAR RIGHT POPLITEAL PHASIC: NORMAL
BH CV LOWER VASCULAR RIGHT POPLITEAL SPONT: NORMAL
BH CV LOWER VASCULAR RIGHT POSTERIOR TIBIAL COMPRESS: NORMAL
BH CV LOWER VASCULAR RIGHT PROFUNDA FEMORAL COMPRESS: NORMAL
BH CV LOWER VASCULAR RIGHT PROXIMAL FEMORAL COMPRESS: NORMAL
BH CV LOWER VASCULAR RIGHT SAPHENOFEMORAL JUNCTION COMPRESS: NORMAL
BH CV XLRA - RV BASE: 3.5 CM
BH CV XLRA - TDI S': 8.6 CM/SEC
BUN SERPL-MCNC: 51 MG/DL (ref 8–23)
BUN/CREAT SERPL: 30.9 (ref 7–25)
CALCIUM SPEC-SCNC: 9.9 MG/DL (ref 8.6–10.5)
CHLORIDE SERPL-SCNC: 94 MMOL/L (ref 98–107)
CO2 SERPL-SCNC: 32 MMOL/L (ref 22–29)
CREAT SERPL-MCNC: 1.65 MG/DL (ref 0.57–1)
EGFRCR SERPLBLD CKD-EPI 2021: 30 ML/MIN/1.73
GLUCOSE BLDC GLUCOMTR-MCNC: 174 MG/DL (ref 70–130)
GLUCOSE BLDC GLUCOMTR-MCNC: 289 MG/DL (ref 70–130)
GLUCOSE BLDC GLUCOMTR-MCNC: 315 MG/DL (ref 70–130)
GLUCOSE BLDC GLUCOMTR-MCNC: 395 MG/DL (ref 70–130)
GLUCOSE SERPL-MCNC: 214 MG/DL (ref 65–99)
LEFT ATRIUM VOLUME INDEX: 23.6 ML/M2
MAXIMAL PREDICTED HEART RATE: 133 BPM
MAXIMAL PREDICTED HEART RATE: 133 BPM
POTASSIUM SERPL-SCNC: 4 MMOL/L (ref 3.5–5.2)
SODIUM SERPL-SCNC: 138 MMOL/L (ref 136–145)
STRESS TARGET HR: 113 BPM
STRESS TARGET HR: 113 BPM

## 2023-05-19 PROCEDURE — 94799 UNLISTED PULMONARY SVC/PX: CPT

## 2023-05-19 PROCEDURE — 93970 EXTREMITY STUDY: CPT

## 2023-05-19 PROCEDURE — 25010000002 ENOXAPARIN PER 10 MG: Performed by: HOSPITALIST

## 2023-05-19 PROCEDURE — 94760 N-INVAS EAR/PLS OXIMETRY 1: CPT

## 2023-05-19 PROCEDURE — 71046 X-RAY EXAM CHEST 2 VIEWS: CPT

## 2023-05-19 PROCEDURE — 94664 DEMO&/EVAL PT USE INHALER: CPT

## 2023-05-19 PROCEDURE — 63710000001 INSULIN LISPRO (HUMAN) PER 5 UNITS: Performed by: NURSE PRACTITIONER

## 2023-05-19 PROCEDURE — 82948 REAGENT STRIP/BLOOD GLUCOSE: CPT

## 2023-05-19 PROCEDURE — 80048 BASIC METABOLIC PNL TOTAL CA: CPT | Performed by: HOSPITALIST

## 2023-05-19 PROCEDURE — 94761 N-INVAS EAR/PLS OXIMETRY MLT: CPT

## 2023-05-19 RX ORDER — TORSEMIDE 20 MG/1
20 TABLET ORAL DAILY
Status: DISCONTINUED | OUTPATIENT
Start: 2023-05-20 | End: 2023-05-21 | Stop reason: HOSPADM

## 2023-05-19 RX ORDER — GLIPIZIDE 5 MG/1
5 TABLET ORAL
Status: DISCONTINUED | OUTPATIENT
Start: 2023-05-19 | End: 2023-05-21 | Stop reason: HOSPADM

## 2023-05-19 RX ORDER — HYDRALAZINE HYDROCHLORIDE 25 MG/1
25 TABLET, FILM COATED ORAL EVERY 8 HOURS SCHEDULED
Status: DISCONTINUED | OUTPATIENT
Start: 2023-05-19 | End: 2023-05-21 | Stop reason: HOSPADM

## 2023-05-19 RX ADMIN — TIMOLOL MALEATE: 5 SOLUTION OPHTHALMIC at 22:01

## 2023-05-19 RX ADMIN — GLIPIZIDE 5 MG: 5 TABLET ORAL at 17:31

## 2023-05-19 RX ADMIN — ALLOPURINOL 100 MG: 100 TABLET ORAL at 10:59

## 2023-05-19 RX ADMIN — IPRATROPIUM BROMIDE AND ALBUTEROL SULFATE 3 ML: 2.5; .5 SOLUTION RESPIRATORY (INHALATION) at 19:24

## 2023-05-19 RX ADMIN — GABAPENTIN 300 MG: 300 CAPSULE ORAL at 22:00

## 2023-05-19 RX ADMIN — FAMOTIDINE 20 MG: 20 TABLET ORAL at 06:41

## 2023-05-19 RX ADMIN — Medication 10 ML: at 22:02

## 2023-05-19 RX ADMIN — LEVOTHYROXINE SODIUM 50 MCG: 0.05 TABLET ORAL at 06:41

## 2023-05-19 RX ADMIN — DOCUSATE SODIUM 100 MG: 100 CAPSULE, LIQUID FILLED ORAL at 22:00

## 2023-05-19 RX ADMIN — LINAGLIPTIN 5 MG: 5 TABLET, FILM COATED ORAL at 10:59

## 2023-05-19 RX ADMIN — ASPIRIN 81 MG: 81 TABLET, COATED ORAL at 10:59

## 2023-05-19 RX ADMIN — GLIPIZIDE 2.5 MG: 5 TABLET ORAL at 06:42

## 2023-05-19 RX ADMIN — METOPROLOL TARTRATE 50 MG: 50 TABLET, FILM COATED ORAL at 10:59

## 2023-05-19 RX ADMIN — INSULIN LISPRO 4 UNITS: 100 INJECTION, SOLUTION INTRAVENOUS; SUBCUTANEOUS at 17:32

## 2023-05-19 RX ADMIN — ROSUVASTATIN CALCIUM 40 MG: 40 TABLET, FILM COATED ORAL at 10:59

## 2023-05-19 RX ADMIN — MULTIPLE VITAMINS W/ MINERALS TAB 1 TABLET: TAB at 10:59

## 2023-05-19 RX ADMIN — Medication 10 ML: at 10:58

## 2023-05-19 RX ADMIN — TIMOLOL MALEATE: 5 SOLUTION OPHTHALMIC at 11:00

## 2023-05-19 RX ADMIN — IPRATROPIUM BROMIDE AND ALBUTEROL SULFATE 3 ML: 2.5; .5 SOLUTION RESPIRATORY (INHALATION) at 06:48

## 2023-05-19 RX ADMIN — DOCUSATE SODIUM 100 MG: 100 CAPSULE, LIQUID FILLED ORAL at 10:59

## 2023-05-19 RX ADMIN — ALLOPURINOL 100 MG: 100 TABLET ORAL at 22:01

## 2023-05-19 RX ADMIN — AMLODIPINE BESYLATE 10 MG: 10 TABLET ORAL at 10:59

## 2023-05-19 RX ADMIN — ENOXAPARIN SODIUM 30 MG: 100 INJECTION SUBCUTANEOUS at 22:00

## 2023-05-19 RX ADMIN — FLUTICASONE PROPIONATE 2 SPRAY: 50 SPRAY, METERED NASAL at 11:00

## 2023-05-19 RX ADMIN — IPRATROPIUM BROMIDE AND ALBUTEROL SULFATE 3 ML: 2.5; .5 SOLUTION RESPIRATORY (INHALATION) at 14:02

## 2023-05-19 RX ADMIN — LATANOPROST 1 DROP: 50 SOLUTION OPHTHALMIC at 22:09

## 2023-05-19 RX ADMIN — HYDRALAZINE HYDROCHLORIDE 50 MG: 50 TABLET, FILM COATED ORAL at 06:41

## 2023-05-19 RX ADMIN — MONTELUKAST SODIUM 10 MG: 10 TABLET, FILM COATED ORAL at 22:01

## 2023-05-19 RX ADMIN — INSULIN LISPRO 6 UNITS: 100 INJECTION, SOLUTION INTRAVENOUS; SUBCUTANEOUS at 12:39

## 2023-05-19 RX ADMIN — ISOSORBIDE MONONITRATE 30 MG: 30 TABLET, EXTENDED RELEASE ORAL at 10:59

## 2023-05-19 RX ADMIN — GABAPENTIN 300 MG: 300 CAPSULE ORAL at 06:41

## 2023-05-19 RX ADMIN — HYDRALAZINE HYDROCHLORIDE 25 MG: 50 TABLET, FILM COATED ORAL at 22:00

## 2023-05-19 RX ADMIN — FAMOTIDINE 20 MG: 20 TABLET ORAL at 17:31

## 2023-05-19 NOTE — PROGRESS NOTES
Ronald Velez MD                          [phone]            Patient ID:    Name:  Salma Hatfield    MRN:  [id number]    1935   87 y.o.  female            Patient Care Team:  Stephen Long APRN as PCP - General (Nurse Practitioner)    CC/ Reason for visit: Acute on chronic respiratory failure, concern for congestive heart failure    Subjective: Pt seen and examined this AM. No acute overnight events noted. Doing better.  Improvement in oxygen needs noted.  Lower extremity venous duplex is negative.  Sitting up in the chair and states that she is feeling like she could go home    ROS: Denies any subjective fevers, syncope or presyncopal events, new neurological deficits, nausea or vomiting currently    Objective     Vital Signs past 24hrs    BP range: BP: ()/(50-93) 100/55  Pulse range: Heart Rate:  [69-83] 69  Resp rate range: Resp:  [16-18] 18  Temp range: Temp (24hrs), Av.6 °F (36.4 °C), Min:97.5 °F (36.4 °C), Max:97.7 °F (36.5 °C)      Ventilator/Non-Invasive Ventilation Settings (From admission, onward)    None          Vent Settings                                         Device (Oxygen Therapy): nasal cannula       64.2 kg (141 lb 8 oz); Body mass index is 26.74 kg/m².      Intake/Output Summary (Last 24 hours) at 2023 1502  Last data filed at 2023 1300  Gross per 24 hour   Intake 1020 ml   Output 400 ml   Net 620 ml       PHYSICAL EXAM   Constitutional: Middle-aged pt in bed,++ accessory muscle use/resp distress   Head: - NCAT  Eyes: No pallor, anicteric conjunctivae  ENMT:  Mallampati 3, no oral thrush. Moist MM.   NECK: Trachea midline, No thyromegaly, no palpable cervical lymphadenopathy  Heart: RRR, no murmur.  Trace pedal edema   Lungs: MORELIA +, distant breath sounds no wheezes/ crackles heard    Abdomen: Soft. No tenderness, guarding or rigidity. No palpable masses  Extremities: Extremities warm and well perfused. No cyanosis/  clubbing  Neuro: Conscious, answers appropriately, no gross focal neuro deficits  Psych: Mood and affect appropriate    PPE recommended per [facility] infectious disease Isolation protocol for the current clinical scenario (as mentioned below) was followed.     Scheduled meds:  allopurinol, 100 mg, Oral, BID  amLODIPine, 10 mg, Oral, Daily  aspirin, 81 mg, Oral, Daily  docusate sodium, 100 mg, Oral, BID  dorzolamide-timolol, , Left Eye, BID  enoxaparin, 30 mg, Subcutaneous, Nightly  famotidine, 20 mg, Oral, BID AC  fluticasone, 2 spray, Nasal, Daily  gabapentin, 300 mg, Oral, QAM  gabapentin, 300 mg, Oral, Nightly  glipizide, 5 mg, Oral, BID AC  hydrALAZINE, 50 mg, Oral, Q8H  insulin lispro, 2-7 Units, Subcutaneous, TID With Meals  ipratropium-albuterol, 3 mL, Nebulization, Q6H While Awake - RT  isosorbide mononitrate, 30 mg, Oral, Daily  latanoprost, 1 drop, Left Eye, Nightly  levothyroxine, 50 mcg, Oral, Q AM  linagliptin, 5 mg, Oral, Daily  metoprolol tartrate, 50 mg, Oral, BID  montelukast, 10 mg, Oral, Nightly  multivitamin with minerals, 1 tablet, Oral, Daily  rosuvastatin, 40 mg, Oral, Daily  sodium chloride, 10 mL, Intravenous, Q12H        IV meds:                           Data Review:      Results from last 7 days   Lab Units 05/19/23  1116 05/18/23  0511 05/17/23  0532 05/17/23  0100   SODIUM mmol/L 138 142 140 140   POTASSIUM mmol/L 4.0 4.1 3.8 4.6   CHLORIDE mmol/L 94* 99 100 103   CO2 mmol/L 32.0* 32.0* 22.8 26.9   BUN mg/dL 51* 35* 20 21   CREATININE mg/dL 1.65* 1.53* 0.94 1.02*   CALCIUM mg/dL 9.9 10.5 10.0 10.2   BILIRUBIN mg/dL  --   --   --  0.8   ALK PHOS U/L  --   --   --  73   ALT (SGPT) U/L  --   --   --  42*   AST (SGOT) U/L  --   --   --  46*   GLUCOSE mg/dL 214* 266* 313* 287*   WBC 10*3/mm3  --  14.14* 8.68 10.48   HEMOGLOBIN g/dL  --  13.6 15.0 14.8   PLATELETS 10*3/mm3  --  136* 123* 138*   PROBNP pg/mL  --   --   --  5,785.0*   PROCALCITONIN ng/mL  --   --   --  0.07       Lab  Results   Component Value Date    CALCIUM 9.9 05/19/2023             Results from last 7 days   Lab Units 05/17/23  0114   PH, ARTERIAL pH units 7.401   PO2 ART mm Hg 71.0*   PCO2, ARTERIAL mm Hg 40.8   HCO3 ART mmol/L 25.4        I have personally reviewed the results from the time of this admission to 5/19/2023 15:02 EDT and agree with these findings:  [x]  Laboratory  [x]  Microbiology  [x]  Radiology  []  EKG/Telemetry   [x]  Cardiology/Vascular   []  Pathology  []  Old records    ASSESSMENT   Acute on chronic hypoxic respiratory failure (3 L nasal cannula)  Acute congestive heart failure  COPD not in exacerbation  CAD s/p CABG  Severe PAD s/p aortoiliac, renal stent  Carotid disease s/p CEA  Heart block s/p PPM  Remote lymphoma -in remission  CKD  Diabetes     RECOMMENDATIONS:  Patient with improvement in oxygen needs and getting close to baseline and continue with gentle diuretics as tolerated  Continue with bronchodilators for COPD  VTE has been ruled out in spite of elevated D-dimer  Recommended to be out of bed as tolerated  Cardiology managing diuresis  DVT prophylaxis    Should be okay for discharge as she is getting closer to her baseline oxygen needs.  Follow-up with outpatient pulmonologist on [facility].    Ronald Velez MD  5/19/2023

## 2023-05-19 NOTE — CASE MANAGEMENT/SOCIAL WORK
Continued Stay Note  [facility]     Patient Name: Salma Hatfield  MRN: [id number]  Today's Date: 5/19/2023    Admit Date: 5/17/2023    Plan: Home with [facility] and [facility].  Family to transport. Uses O2@3LNC at home through [employer]. If nebs ordered at D/C will need nebulizer machine.   Discharge Plan     Row Name 05/19/23 1629       Plan    Plan Home with Grandson and [facility].  Family to transport. Uses O2@3LNC at home through [employer]. If nebs ordered at D/C will need nebulizer machine.    Patient/Family in Agreement with Plan yes    Plan Comments Duplex scan negative. Per PT, SBA with ambulation using walker x 25' - pt reports this is about how far in the house she walks at a time. PT rec home with HH. [facility] to see at D/C. If nebs ordered at D/C will need nebulizer machine. Ori Singletary RN-BC               Discharge Codes    No documentation.               Expected Discharge Date and Time     Expected Discharge Date Expected Discharge Time    May 20, 2023             Ori Singletary RN

## 2023-05-19 NOTE — CONSULTS
[facility] Consult Note      Patient Name: Salma Hatfield  : 1935  MRN: [id number]  Primary Care Physician:  Stepehn Long APRN  Referring Physician: Chacho Jacob MD  Date of admission: 2023    Subjective     Reason for Consult:  MARTIN on CKD3b    HPI:   Salma Hatfield is a 87 y.o. female with CKD3b (baseline SCR appears to be 1.4) previously followed by Dr. Joey Browning of our group, admitted  for further evaluation of shortness of breath.  Her oxygen concentrator had stopped working; EMS arrived before the technician who could repair the concentrator arrived.  PMH notable for COPD with chronic hypoxic respiratory failure on 3 L/min continuously, DM2, PVD with bilateral MELCHOR s/p bilateral stents, hypertension, and hypertension.  Found to have exacerbation of both COPD and CHF.  SCR on arrival 1.5, with value 1.7 today.  IV Lasix given; aggressive pulmonary toilet in place.    · Chronic orthopnea that has not changed  · Weight stable for the preceding few months; no significant leg swelling and no abdominal distention  · Has had progressive dyspnea with exertion in the days preceding admission; no chest pain  · No fever or chills  · Appetite fair; no N/B        Review of Systems:   14 point review of systems is otherwise negative except for mentioned above on HPI    Personal History     Past Medical History:   Diagnosis Date   • AAA (abdominal aortic aneurysm)     stated in 2022 Dr. Lokesh Santoro office note   • Atherosclerotic heart disease     stated in 2022 Dr. Lokesh Santoro office note   • Bradycardia    • Carotid artery stenosis     stated in 2022 Dr. Lokesh Santoro office note   • Cataract Removal 10/27/2008    Right (10/27/08) and Left (08)   • Chronic kidney disease, stage 3a    • Chronic respiratory failure with hypoxia    • Coronary artery disease    • Coronary atherosclerosis     stated in 2022 Dr. Campa  Giovanny Santoro office note   • Diabetes mellitus    • Diffuse large B cell lymphoma 06/21/2016   • Elevated cholesterol    • H/O angioplasty 12/31/2007   • Hx of CABG 09/21/2009    Triple bypass   • Hyperlipidemia    • Hyperparathyroidism 04/17/2017   • Hypertension    • Hypertensive disorder     stated in 2- Dr. Lokesh Santoro office note   • Hypothyroidism (acquired)    • Ischemic heart disease 12/15/2016   • Pulmonary emphysema    • Retinal tear 02/06/2009    Right eye, repaired 06/2009   • S/P arterial stent 08/26/2009    Left leg and aorta   • S/P renal artery angioplasty 01/21/2008    Left   • Shingles 03/26/2014   • Status post carotid surgery 06/13/2018   • Stenosis of iliac artery     stated in 2- Dr. Lokesh Santoro office note       Past Surgical History:   Procedure Laterality Date   • CARDIAC CATHETERIZATION     • CARDIAC ELECTROPHYSIOLOGY PROCEDURE N/A 07/22/2022    Procedure: Pacemaker SC new [employer];  Surgeon: Kevin Eisenberg MD;  Location: [facility] CATH INVASIVE LOC[facility];  Service: Cardiology;  Laterality: N/A;   • CAROTID ENDARTERECTOMY  2018   • CORONARY ARTERY BYPASS GRAFT  2008    Triple bypass   • CORONARY STENT PLACEMENT  10/2011   • ILIAC ARTERY STENT      Aorta-iliac stent 2009   • INSERT / REPLACE / REMOVE PACEMAKER  07/2022   • RENAL ARTERY STENT Left 2008       Family History: family history is not on file.    Social History:  reports that she has never smoked. She has never used smokeless tobacco. She reports that she does not drink alcohol and does not use drugs.    Home Medications:  Prior to Admission medications    Medication Sig Start Date End Date Taking? Authorizing Provider   aspirin 81 MG EC tablet Take 1 tablet by mouth Daily.   Yes ProviderLuis MD   hydrALAZINE (APRESOLINE) 100 MG tablet Take 1 tablet by mouth 3 (Three) Times a Day.   Yes Luis Hollis MD   latanoprost (XALATAN) 0.005 % ophthalmic solution Administer 1 drop into the  left eye Every Night.   Yes Luis Hollis MD   metoprolol tartrate (LOPRESSOR) 50 MG tablet Take 1 tablet by mouth 2 (Two) Times a Day.   Yes Luis Hollis MD   montelukast (SINGULAIR) 10 MG tablet Take 1 tablet by mouth Every Night.   Yes Luis Hollis MD   allopurinol (ZYLOPRIM) 100 MG tablet Take 100 mg by mouth 2 (Two) Times a Day.    Luis Hollis MD   amLODIPine (NORVASC) 5 MG tablet Take 1 tablet by mouth Daily. 9/23/22   Salazar Perez MD   Calcium Carbonate-Vitamin D 600-200 MG-UNIT tablet Take 1 tablet by mouth Daily.    Luis Hollis MD   docusate sodium (COLACE) 100 MG capsule Take 100 mg by mouth 2 (Two) Times a Day.    Luis Hollis MD   dorzolamide-timolol (COSOPT) 22.3-6.8 MG/ML ophthalmic solution Administer 1 drop into the left eye 2 (Two) Times a Day.    Luis Hollis MD   ezetimibe (ZETIA) 10 MG tablet Take 10 mg by mouth Daily.    Luis Hollis MD   famotidine (PEPCID) 20 MG tablet Take 20 mg by mouth 2 (Two) Times a Day.    Luis Hollis MD   fluticasone (FLONASE) 50 MCG/ACT nasal spray 2 sprays into the nostril(s) as directed by provider Daily.    Luis Hollis MD   gabapentin (NEURONTIN) 300 MG capsule Take 300 mg by mouth Every Morning.    Luis Hollis MD   gabapentin (NEURONTIN) 300 MG capsule Take 600 mg by mouth Every Night.    Luis Hollis MD   glipizide (GLUCOTROL XL) 5 MG ER tablet Take 5 mg by mouth 2 (Two) Times a Day.    Luis Hollis MD   HYDROcodone-acetaminophen (NORCO) 5-325 MG per tablet Take 1 tablet by mouth Every 8 (Eight) Hours As Needed.    Luis Hollis MD   isosorbide mononitrate (IMDUR) 30 MG 24 hr tablet Take 1 tablet by mouth Daily. 9/24/22   Salazar Perez MD   levothyroxine (SYNTHROID, LEVOTHROID) 50 MCG tablet Take 50 mcg by mouth Daily.    Luis Hollis MD   losartan (Cozaar) 100 MG tablet Take 1 tablet by mouth Daily.  9/23/22   Salazar Perez MD   multivitamin with minerals tablet tablet Take 1 tablet by mouth Daily.    Provider, MD Luis   rosuvastatin (CRESTOR) 20 MG tablet Take 40 mg by mouth Daily.    Provider, MD Luis   SITagliptin (JANUVIA) 50 MG tablet Take 50 mg by mouth Daily.    Provider, MD Luis       Allergies:  No Known Allergies    Objective     Vitals:   Temp:  [97.4 °F (36.3 °C)-97.7 °F (36.5 °C)] 97.4 °F (36.3 °C)  Heart Rate:  [69-83] 71  Resp:  [16-20] 18  BP: ()/(50-93) 96/63  Flow (L/min):  [3-6] 3    Intake/Output Summary (Last 24 hours) at 5/19/2023 1940  Last data filed at 5/19/2023 1935  Gross per 24 hour   Intake 360 ml   Output 400 ml   Net -40 ml       Physical Exam:   Constitutional: Awake, alert, chronically ill, sitting in a chair  HEENT: Sclera anicteric, no conjunctival injection  Neck: Supple, +carotid bruits, trachea at midline, no JVD  Respiratory: Very poor air movement in bases, crackles, not labored  Cardiovascular: RRR, no rub  Gastrointestinal: BS +, soft, nontender and nondistended  : No palpable bladder  Musculoskeletal: No edema; +clubbing  Psychiatric: Appropriate affect, cooperative, oriented  Neurologic: No asterixis, moving all extremities, normal speech  Skin: Warm and dry       Scheduled Meds:     allopurinol, 100 mg, Oral, BID  amLODIPine, 10 mg, Oral, Daily  aspirin, 81 mg, Oral, Daily  docusate sodium, 100 mg, Oral, BID  dorzolamide-timolol, , Left Eye, BID  enoxaparin, 30 mg, Subcutaneous, Nightly  famotidine, 20 mg, Oral, BID AC  fluticasone, 2 spray, Nasal, Daily  gabapentin, 300 mg, Oral, QAM  gabapentin, 300 mg, Oral, Nightly  glipizide, 5 mg, Oral, BID AC  hydrALAZINE, 50 mg, Oral, Q8H  insulin lispro, 2-7 Units, Subcutaneous, TID With Meals  ipratropium-albuterol, 3 mL, Nebulization, Q6H While Awake - RT  isosorbide mononitrate, 30 mg, Oral, Daily  latanoprost, 1 drop, Left Eye, Nightly  levothyroxine, 50 mcg, Oral, Q  AM  linagliptin, 5 mg, Oral, Daily  metoprolol tartrate, 50 mg, Oral, BID  montelukast, 10 mg, Oral, Nightly  multivitamin with minerals, 1 tablet, Oral, Daily  rosuvastatin, 40 mg, Oral, Daily  sodium chloride, 10 mL, Intravenous, Q12H      IV Meds:        Results Reviewed:   I have personally reviewed the results from the time of this admission to 5/19/2023 19:40 EDT     Lab Results   Component Value Date    GLUCOSE 214 (H) 05/19/2023    CALCIUM 9.9 05/19/2023     05/19/2023    K 4.0 05/19/2023    CO2 32.0 (H) 05/19/2023    CL 94 (L) 05/19/2023    BUN 51 (H) 05/19/2023    CREATININE 1.65 (H) 05/19/2023    BCR 30.9 (H) 05/19/2023    ANIONGAP 12.0 05/19/2023      Lab Results   Component Value Date    MG 2.4 09/22/2022    ALBUMIN 4.8 05/17/2023           Assessment / Plan       Acute on chronic respiratory failure with hypoxia    Pulmonary emphysema    S/P CABG (coronary artery bypass graft)    Type 2 diabetes mellitus with hyperglycemia, without long-term current use of insulin    Hypothyroidism (acquired)    Stage 3b chronic kidney disease    PVD (peripheral vascular disease)    Diastolic CHF, acute on chronic      ASSESSMENT:  1.  MARTIN on CKD3b, nonoliguric, multifactorial:  hypoxia, hypotension, and decompensated CHF.  Volume excess by exam and imaging; normal potassium and normal anion gap.  Urinalysis with no blood and 100 mg/dL protein  2.  Acute on chronic CHF; was not on a diuretic at home  3.  Chronic respiratory failure due to COPD  4.  Hypertension, overcontrolled  5.  DM2  6.  Extensive PVD; has bilateral renal artery stenosis s/p bilateral stents  7.  Diffuse large B-cell lymphoma    PLAN:  1.  Discontinue amlodipine and relax hydralazine dose given hypotension  2.  Tomorrow begin oral torsemide 20 mg daily  3.  Hopefully home soon    Thank you for involving us in the care of Salma Hatfield.  Please feel free to call with any questions.    Timmy Chaney MD  05/19/23  19:40  DRU    [facility]  [phone]      Please note that portions of this note were completed with a voice recognition program.

## 2023-05-19 NOTE — PROGRESS NOTES
1.  Acute on chronic hypoxic respiratory failure/COPD and CHF  ---chronic home oxgyen,  Hs trop 32 <- 33  cxr  Possible vascular congestion. Nonspecific bibasilar consolidation. Pneumonia is not excluded.     2.  Hypertensive urgency     3.  CAD, s/p CABG (2009) & PCI (2007),      4.  CKD stage 3     5. DM     6. 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 Suarez--[facility]     7. severe vascular disease (carotid disease and peripheral)--s/p aorto-iliac stent, renal stent and right CEA 2018     8. 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

## 2023-05-19 NOTE — PROGRESS NOTES
Name: Salma Hatfield ADMIT: 2023   : 1935  PCP: Stephen Long APRN    MRN: [id number] LOS: 2 days   AGE/SEX: 87 y.o. female  ROOM: [facility]     Subjective   Subjective   Feels much about the same.  Still not able to wean past 4 L Consistently    Review of Systems     Objective   Objective   Vital Signs  Temp:  [97.5 °F (36.4 °C)-97.7 °F (36.5 °C)] 97.7 °F (36.5 °C)  Heart Rate:  [69-83] 73  Resp:  [16-18] 18  BP: ()/(50-93) 102/57  SpO2:  [87 %-95 %] 94 %  on  Flow (L/min):  [3-6] 3;   Device (Oxygen Therapy): humidified;nasal cannula  Body mass index is 26.74 kg/m².  Physical Exam  Vitals and nursing note reviewed.   Constitutional:       General: She is not in acute distress.     Appearance: She is obese. She is not ill-appearing.   Cardiovascular:      Rate and Rhythm: Normal rate and regular rhythm.   Pulmonary:      Effort: Pulmonary effort is normal.      Comments: Diminished breath sound  Abdominal:      General: Bowel sounds are normal. There is no distension.      Palpations: Abdomen is soft.      Tenderness: There is no abdominal tenderness.   Musculoskeletal:      Right lower leg: No edema.      Left lower leg: No edema.   Skin:     General: Skin is warm and dry.      Findings: No rash.   Neurological:      General: No focal deficit present.      Mental Status: She is alert and oriented to person, place, and time.       Results Review     I reviewed the patient's new clinical results.  Results from last 7 days   Lab Units 23  0511 23  0532 23  0100   WBC 10*3/mm3 14.14* 8.68 10.48   HEMOGLOBIN g/dL 13.6 15.0 14.8   PLATELETS 10*3/mm3 136* 123* 138*     Results from last 7 days   Lab Units 23  1116 23  0511 23  0532 23  0100   SODIUM mmol/L 138 142 140 140   POTASSIUM mmol/L 4.0 4.1 3.8 4.6   CHLORIDE mmol/L 94* 99 100 103   CO2 mmol/L 32.0* 32.0* 22.8 26.9   BUN mg/dL 51* 35* 20 21   CREATININE mg/dL 1.65* 1.53* 0.94 1.02*   GLUCOSE mg/dL  214* 266* 313* 287*   EGFR mL/min/1.73 30.0* 32.8* 58.8* 53.4*     Results from last 7 days   Lab Units 05/17/23  0100   ALBUMIN g/dL 4.8   BILIRUBIN mg/dL 0.8   ALK PHOS U/L 73   AST (SGOT) U/L 46*   ALT (SGPT) U/L 42*     Results from last 7 days   Lab Units 05/19/23  1116 05/18/23  0511 05/17/23  0532 05/17/23  0100   CALCIUM mg/dL 9.9 10.5 10.0 10.2   ALBUMIN g/dL  --   --   --  4.8     Results from last 7 days   Lab Units 05/17/23  0100   PROCALCITONIN ng/mL 0.07     Hemoglobin A1C   Date/Time Value Ref Range Status   05/18/2023 0511 7.00 (H) 4.80 - 5.60 % Final     Glucose   Date/Time Value Ref Range Status   05/19/2023 1235 395 (H) 70 - 130 mg/dL Final     Comment:     Meter: [id number] : [id number] Dianelys Sandy    05/19/2023 0838 174 (H) 70 - 130 mg/dL Final     Comment:     Meter: [id number] : [id number] Dianelys Sandy    05/18/2023 1627 252 (H) 70 - 130 mg/dL Final     Comment:     Meter: [id number] : [id number] Luis May NA   05/18/2023 1220 250 (H) 70 - 130 mg/dL Final     Comment:     Meter: [id number] : [id number] Smithrommel May BRODIE   05/18/2023 0811 160 (H) 70 - 130 mg/dL Final     Comment:     Meter: [id number] : [id number] Luis May NA   05/17/2023 1709 493 (C) 70 - 130 mg/dL Final     Comment:     RN Notified R and V Meter: [id number] : [id number] Ari CALLOWAY   05/17/2023 1224 371 (H) 70 - 130 mg/dL Final     Comment:     Meter: [id number] : [id number] Ari CALLOWAY       CT Angiogram Chest    Result Date: 5/17/2023   1. No pulmonary embolism. Emphysematous changes of the lungs. 2. Cystic lesion of the pancreas.  This report was finalized on 5/17/2023 6:08 PM by Dr. Kenton Vallejo M.D.      I have personally reviewed all medications:  Scheduled Medications  allopurinol, 100 mg, Oral, BID  amLODIPine, 10 mg, Oral, Daily  aspirin, 81 mg, Oral, Daily  docusate sodium, 100 mg, Oral, BID  dorzolamide-timolol, , Left Eye, BID  enoxaparin, 30 mg, Subcutaneous,  Nightly  famotidine, 20 mg, Oral, BID AC  fluticasone, 2 spray, Nasal, Daily  gabapentin, 300 mg, Oral, QAM  gabapentin, 300 mg, Oral, Nightly  glipizide, 5 mg, Oral, BID AC  hydrALAZINE, 50 mg, Oral, Q8H  insulin lispro, 2-7 Units, Subcutaneous, TID With Meals  ipratropium-albuterol, 3 mL, Nebulization, Q6H While Awake - RT  isosorbide mononitrate, 30 mg, Oral, Daily  latanoprost, 1 drop, Left Eye, Nightly  levothyroxine, 50 mcg, Oral, Q AM  linagliptin, 5 mg, Oral, Daily  metoprolol tartrate, 50 mg, Oral, BID  montelukast, 10 mg, Oral, Nightly  multivitamin with minerals, 1 tablet, Oral, Daily  rosuvastatin, 40 mg, Oral, Daily  sodium chloride, 10 mL, Intravenous, Q12H    Infusions   Diet  Diet: Regular/House Diet, Cardiac Diets, Diabetic Diets; Healthy Heart (2-3 Na+); Consistent Carbohydrate; Texture: Regular Texture (IDDSI 7); Fluid Consistency: Thin (IDDSI 0)    I have personally reviewed:  [x]  Laboratory   [x]  Microbiology   [x]  Radiology   [x]  EKG/Telemetry  [x]  Cardiology/Vascular   []  Pathology    []  Records       Assessment/Plan     Active Hospital Problems    Diagnosis  POA   • **Acute on chronic respiratory failure with hypoxia [J96.21]  Yes   • PVD (peripheral vascular disease) [I73.9]  Yes   • Diastolic CHF, acute on chronic [I50.33]  Yes   • Type 2 diabetes mellitus with hyperglycemia, without long-term current use of insulin [E11.65]  Yes   • Stage 3b chronic kidney disease [N18.32]  Yes   • Pulmonary emphysema [J43.9]  Yes   • Hypothyroidism (acquired) [E03.9]  Yes   • S/P CABG (coronary artery bypass graft) [Z95.1]  Not Applicable      Resolved Hospital Problems   No resolved problems to display.       87 y.o. female admitted with Acute on chronic respiratory failure with hypoxia.    Respiratory failure improving, likely acute on chronic diastolic failure.  - Echo reviewed.  EF 54%, moderate to severe septal hypertrophy, LV diastolic function, moderate reduction in RV function  - Cardiology  note reviewed.  I think her renal function is just over her baseline, probably could be continued on her diuretic and losartan  - Continue to wean O2 as tolerated.  Baseline level 3 L    CKD 3B-reviewed prior creatinines and looks like she runs between 1.3-1.5 so this is not far from baseline.    -We will asked nephrology to see and comment regarding diuretic dosing and medications.    DM2 with occasional hyperglycemia even without steroids and.  Will increase glipizide.    COPD appears relatively stable.  Still not wheezing.  Continue scheduled nebs    Hypertension is much better.  Continue home meds    · Lovenox 30 mg SC daily for DVT prophylaxis.  · Disposition: Anticipate home tomorrow pending renal function      Expected Discharge  Expected Discharge Date: 5/20/2023; Expected Discharge Time:         Salazar Perez MD  [facility]  05/19/23  16:45 EDT

## 2023-05-19 NOTE — PLAN OF CARE
Problem: Adult Inpatient Plan of Care  Goal: Plan of Care Review  Outcome: Ongoing, Progressing  Flowsheets (Taken 5/19/2023 1630)  Progress: improving  Plan of Care Reviewed With: patient  Outcome Evaluation: Vital stable. 3-4L O2 required today (3L Baseline). IS at bedside, encouraged to use. Chest Xray and dopplers done. Renal Consulted, creatinine increased to 1.65. +ambulation and up to chair in room with staff. Pt hopeful to discharge home tomorrow. Will continue to monitor.

## 2023-05-19 NOTE — PLAN OF CARE
Goal Outcome Evaluation:  Plan of Care Reviewed With: patient        Progress: no change  Outcome Evaluation: Vitals stable. O2 currently on 4 liters. IS use encouraged. Doppler of bilateral legs has not yet been done. Slept intermittently tonight.

## 2023-05-20 LAB
ALBUMIN SERPL-MCNC: 3.8 G/DL (ref 3.5–5.2)
ANION GAP SERPL CALCULATED.3IONS-SCNC: 14.8 MMOL/L (ref 5–15)
BUN SERPL-MCNC: 58 MG/DL (ref 8–23)
BUN/CREAT SERPL: 37.7 (ref 7–25)
CALCIUM SPEC-SCNC: 9.3 MG/DL (ref 8.6–10.5)
CHLORIDE SERPL-SCNC: 97 MMOL/L (ref 98–107)
CO2 SERPL-SCNC: 26.2 MMOL/L (ref 22–29)
CREAT SERPL-MCNC: 1.54 MG/DL (ref 0.57–1)
EGFRCR SERPLBLD CKD-EPI 2021: 32.5 ML/MIN/1.73
GLUCOSE BLDC GLUCOMTR-MCNC: 224 MG/DL (ref 70–130)
GLUCOSE BLDC GLUCOMTR-MCNC: 230 MG/DL (ref 70–130)
GLUCOSE BLDC GLUCOMTR-MCNC: 339 MG/DL (ref 70–130)
GLUCOSE SERPL-MCNC: 251 MG/DL (ref 65–99)
PHOSPHATE SERPL-MCNC: 3.1 MG/DL (ref 2.5–4.5)
POTASSIUM SERPL-SCNC: 4.1 MMOL/L (ref 3.5–5.2)
SODIUM SERPL-SCNC: 138 MMOL/L (ref 136–145)

## 2023-05-20 PROCEDURE — 82948 REAGENT STRIP/BLOOD GLUCOSE: CPT

## 2023-05-20 PROCEDURE — 94761 N-INVAS EAR/PLS OXIMETRY MLT: CPT

## 2023-05-20 PROCEDURE — 94664 DEMO&/EVAL PT USE INHALER: CPT

## 2023-05-20 PROCEDURE — 63710000001 INSULIN LISPRO (HUMAN) PER 5 UNITS: Performed by: NURSE PRACTITIONER

## 2023-05-20 PROCEDURE — 94799 UNLISTED PULMONARY SVC/PX: CPT

## 2023-05-20 PROCEDURE — 80069 RENAL FUNCTION PANEL: CPT | Performed by: INTERNAL MEDICINE

## 2023-05-20 PROCEDURE — 94760 N-INVAS EAR/PLS OXIMETRY 1: CPT

## 2023-05-20 PROCEDURE — 25010000002 ENOXAPARIN PER 10 MG: Performed by: HOSPITALIST

## 2023-05-20 RX ADMIN — IPRATROPIUM BROMIDE AND ALBUTEROL SULFATE 3 ML: 2.5; .5 SOLUTION RESPIRATORY (INHALATION) at 19:30

## 2023-05-20 RX ADMIN — HYDRALAZINE HYDROCHLORIDE 25 MG: 50 TABLET, FILM COATED ORAL at 21:34

## 2023-05-20 RX ADMIN — IPRATROPIUM BROMIDE AND ALBUTEROL SULFATE 3 ML: 2.5; .5 SOLUTION RESPIRATORY (INHALATION) at 11:39

## 2023-05-20 RX ADMIN — FAMOTIDINE 20 MG: 20 TABLET ORAL at 17:55

## 2023-05-20 RX ADMIN — Medication 10 ML: at 09:13

## 2023-05-20 RX ADMIN — INSULIN LISPRO 5 UNITS: 100 INJECTION, SOLUTION INTRAVENOUS; SUBCUTANEOUS at 12:23

## 2023-05-20 RX ADMIN — TIMOLOL MALEATE: 5 SOLUTION OPHTHALMIC at 21:33

## 2023-05-20 RX ADMIN — GLIPIZIDE 5 MG: 5 TABLET ORAL at 06:46

## 2023-05-20 RX ADMIN — DOCUSATE SODIUM 100 MG: 100 CAPSULE, LIQUID FILLED ORAL at 21:35

## 2023-05-20 RX ADMIN — ASPIRIN 81 MG: 81 TABLET, COATED ORAL at 09:13

## 2023-05-20 RX ADMIN — MULTIPLE VITAMINS W/ MINERALS TAB 1 TABLET: TAB at 09:13

## 2023-05-20 RX ADMIN — TIMOLOL MALEATE: 5 SOLUTION OPHTHALMIC at 09:18

## 2023-05-20 RX ADMIN — INSULIN LISPRO 3 UNITS: 100 INJECTION, SOLUTION INTRAVENOUS; SUBCUTANEOUS at 09:12

## 2023-05-20 RX ADMIN — TORSEMIDE 20 MG: 20 TABLET ORAL at 09:13

## 2023-05-20 RX ADMIN — DOCUSATE SODIUM 100 MG: 100 CAPSULE, LIQUID FILLED ORAL at 09:13

## 2023-05-20 RX ADMIN — FAMOTIDINE 20 MG: 20 TABLET ORAL at 06:46

## 2023-05-20 RX ADMIN — IPRATROPIUM BROMIDE AND ALBUTEROL SULFATE 3 ML: 2.5; .5 SOLUTION RESPIRATORY (INHALATION) at 07:23

## 2023-05-20 RX ADMIN — ROSUVASTATIN CALCIUM 40 MG: 40 TABLET, FILM COATED ORAL at 09:13

## 2023-05-20 RX ADMIN — LINAGLIPTIN 5 MG: 5 TABLET, FILM COATED ORAL at 09:13

## 2023-05-20 RX ADMIN — GABAPENTIN 300 MG: 300 CAPSULE ORAL at 06:46

## 2023-05-20 RX ADMIN — MONTELUKAST SODIUM 10 MG: 10 TABLET, FILM COATED ORAL at 21:35

## 2023-05-20 RX ADMIN — HYDRALAZINE HYDROCHLORIDE 25 MG: 50 TABLET, FILM COATED ORAL at 13:50

## 2023-05-20 RX ADMIN — ALLOPURINOL 100 MG: 100 TABLET ORAL at 21:34

## 2023-05-20 RX ADMIN — ALLOPURINOL 100 MG: 100 TABLET ORAL at 09:13

## 2023-05-20 RX ADMIN — METOPROLOL TARTRATE 50 MG: 50 TABLET, FILM COATED ORAL at 09:13

## 2023-05-20 RX ADMIN — ISOSORBIDE MONONITRATE 30 MG: 30 TABLET, EXTENDED RELEASE ORAL at 09:13

## 2023-05-20 RX ADMIN — LEVOTHYROXINE SODIUM 50 MCG: 0.05 TABLET ORAL at 06:46

## 2023-05-20 RX ADMIN — ENOXAPARIN SODIUM 30 MG: 100 INJECTION SUBCUTANEOUS at 21:34

## 2023-05-20 RX ADMIN — INSULIN LISPRO 3 UNITS: 100 INJECTION, SOLUTION INTRAVENOUS; SUBCUTANEOUS at 17:55

## 2023-05-20 RX ADMIN — FLUTICASONE PROPIONATE 2 SPRAY: 50 SPRAY, METERED NASAL at 09:18

## 2023-05-20 RX ADMIN — INSULIN GLARGINE-YFGN 3 UNITS: 100 INJECTION, SOLUTION SUBCUTANEOUS at 09:26

## 2023-05-20 RX ADMIN — GLIPIZIDE 5 MG: 5 TABLET ORAL at 17:55

## 2023-05-20 RX ADMIN — LATANOPROST 1 DROP: 50 SOLUTION OPHTHALMIC at 21:33

## 2023-05-20 RX ADMIN — METOPROLOL TARTRATE 50 MG: 50 TABLET, FILM COATED ORAL at 21:34

## 2023-05-20 RX ADMIN — HYDRALAZINE HYDROCHLORIDE 25 MG: 50 TABLET, FILM COATED ORAL at 06:46

## 2023-05-20 RX ADMIN — Medication 10 ML: at 21:35

## 2023-05-20 RX ADMIN — GABAPENTIN 300 MG: 300 CAPSULE ORAL at 21:35

## 2023-05-20 NOTE — PROGRESS NOTES
[facility] Medicine Services  PROGRESS NOTE    Patient Name: Salma Hatfield  : 1935  MRN: [id number]    Date of Admission: 2023  Primary Care Physician: Stephen Long APRN    Subjective   Subjective     CC:  Follow-up shortness of breath    Subjective: Breathing relatively stable on 3 L nasal cannula this morning.  Patient says she is feeling a little better.  States she is eating decently    Review of Systems  No current fevers or chills  No current nausea, vomiting, or diarrhea  No current chest pain or palpitations      Objective   Objective     Vital Signs:   Temp:  [97.3 °F (36.3 °C)-97.7 °F (36.5 °C)] 97.3 °F (36.3 °C)  Heart Rate:  [69-73] 69  Resp:  [16-20] 16  BP: ()/(50-83) 141/67        Physical Exam:  Constitutional:Awake, alert  HENT: NCAT, mucous membranes moist, neck supple  Respiratory: No cough or wheezing, moderate inspiration, nonlabored breathing  Cardiovascular: Pulse rate is normal, palpable radial pulses  Gastrointestinal: Positive bowel sounds, soft, nontender, nondistended  Musculoskeletal: Elderly frail and debilitated in appearance, mild lower extremity edema, BMI 26  Psychiatric: Appropriate affect, cooperative, conversational  Neurologic: No slurred speech or facial droop, follows commands  Skin: No rashes or jaundice, warm      Results Reviewed:  Results from last 7 days   Lab Units 23  0511 23  0532 23  0100   WBC 10*3/mm3 14.14* 8.68 10.48   HEMOGLOBIN g/dL 13.6 15.0 14.8   HEMATOCRIT % 40.6 47.0* 47.8*   PLATELETS 10*3/mm3 136* 123* 138*   PROCALCITONIN ng/mL  --   --  0.07     Results from last 7 days   Lab Units 23  0512 23  1116 23  0511 23  0532 23  0247 23  0100   SODIUM mmol/L 138 138 142 140  --  140   POTASSIUM mmol/L 4.1 4.0 4.1 3.8  --  4.6   CHLORIDE mmol/L 97* 94* 99 100  --  103   CO2 mmol/L 26.2 32.0* 32.0* 22.8  --  26.9   BUN mg/dL 58* 51* 35* 20  --  21   CREATININE mg/dL 1.54* 1.65*  1.53* 0.94  --  1.02*   GLUCOSE mg/dL 251* 214* 266* 313*  --  287*   CALCIUM mg/dL 9.3 9.9 10.5 10.0  --  10.2   ALT (SGPT) U/L  --   --   --   --   --  42*   AST (SGOT) U/L  --   --   --   --   --  46*   HSTROP T ng/L  --   --   --  32* 33* 32*   PROBNP pg/mL  --   --   --   --   --  5,785.0*     Estimated Creatinine Clearance: 22.1 mL/min (A) (by C-G formula based on SCr of 1.54 mg/dL (H)).    Microbiology Results Abnormal     Procedure Component Value - Date/Time    Respiratory Panel PCR w/COVID-19(SARS-CoV-2) DAVID/ANANT/APOLINAR/PAD/COR/MAD/CRISTIAN In-House, NP Swab in UTM/VTM, 3-4 HR TAT - Swab, Nasopharynx [[id number]]  (Normal) Collected: 05/17/23 0102    Lab Status: Final result Specimen: Swab from Nasopharynx Updated: 05/17/23 0155     ADENOVIRUS, PCR Not Detected     Coronavirus 229E Not Detected     Coronavirus HKU1 Not Detected     Coronavirus NL63 Not Detected     Coronavirus OC43 Not Detected     COVID19 Not Detected     Human Metapneumovirus Not Detected     Human Rhinovirus/Enterovirus Not Detected     Influenza A PCR Not Detected     Influenza B PCR Not Detected     Parainfluenza Virus 1 Not Detected     Parainfluenza Virus 2 Not Detected     Parainfluenza Virus 3 Not Detected     Parainfluenza Virus 4 Not Detected     RSV, PCR Not Detected     Bordetella pertussis pcr Not Detected     Bordetella parapertussis PCR Not Detected     Chlamydophila pneumoniae PCR Not Detected     Mycoplasma pneumo by PCR Not Detected    Narrative:      In the setting of a positive respiratory panel with a viral infection PLUS a negative procalcitonin without other underlying concern for bacterial infection, consider observing off antibiotics or discontinuation of antibiotics and continue supportive care. If the respiratory panel is positive for atypical bacterial infection (Bordetella pertussis, Chlamydophila pneumoniae, or Mycoplasma pneumoniae), consider antibiotic de-escalation to target atypical bacterial infection.           Imaging Results (Last 24 Hours)     Procedure Component Value Units Date/Time    XR Chest PA & Lateral [[id number]] Collected: 05/19/23 1642     Updated: 05/19/23 1647    Narrative:      XR CHEST PA AND LATERAL-     HISTORY:  Hypoxia, CHF.     COMPARISON:  Chest radiograph 05/17/2023     FINDINGS:    4 views of the chest were obtained. There is a left chest cardiac  pacemaker, not significantly changed. The cardiac silhouette is normal  size. There are postsurgical changes from CABG. There is calcific aortic  atherosclerosis. There is mild bibasilar airspace opacity, significantly  improved from 05/17/2023. There are trace bilateral pleural effusions.  Sternotomy wires are present. There is multilevel degenerative disc  disease. There is calcific atherosclerosis in the visualized upper  abdomen.     This report was finalized on 5/19/2023 4:43 PM by Dr. Jael Espinosa M.D.             Results for orders placed during the hospital encounter of 05/17/23    Adult Transthoracic Echo Complete w/ Color, Spectral and Contrast if Necessary Per Protocol    Interpretation Summary  •  Left ventricular systolic function is normal. Calculated left ventricular EF = 54.4%  •  The left ventricular cavity is small in size.  •  Left ventricular wall thickness is consistent with moderate to severe septal asymmetric hypertrophy.  •  Left ventricular diastolic function is consistent with (grade Ia w/high LAP) impaired relaxation.  •  Moderately reduced right ventricular systolic function noted.  •  Estimated right ventricular systolic pressure from tricuspid regurgitation is normal (<35 mmHg).      I have reviewed the medications:  Scheduled Meds:allopurinol, 100 mg, Oral, BID  aspirin, 81 mg, Oral, Daily  docusate sodium, 100 mg, Oral, BID  dorzolamide-timolol, , Left Eye, BID  enoxaparin, 30 mg, Subcutaneous, Nightly  famotidine, 20 mg, Oral, BID AC  fluticasone, 2 spray, Nasal, Daily  gabapentin, 300 mg, Oral, QAM  gabapentin, 300  mg, Oral, Nightly  glipizide, 5 mg, Oral, BID AC  hydrALAZINE, 25 mg, Oral, Q8H  insulin lispro, 2-7 Units, Subcutaneous, TID With Meals  ipratropium-albuterol, 3 mL, Nebulization, Q6H While Awake - RT  isosorbide mononitrate, 30 mg, Oral, Daily  latanoprost, 1 drop, Left Eye, Nightly  levothyroxine, 50 mcg, Oral, Q AM  linagliptin, 5 mg, Oral, Daily  metoprolol tartrate, 50 mg, Oral, BID  montelukast, 10 mg, Oral, Nightly  multivitamin with minerals, 1 tablet, Oral, Daily  rosuvastatin, 40 mg, Oral, Daily  sodium chloride, 10 mL, Intravenous, Q12H  torsemide, 20 mg, Oral, Daily      Continuous Infusions:   PRN Meds:.•  acetaminophen **OR** acetaminophen **OR** acetaminophen  •  calcium carbonate  •  dextrose  •  dextrose  •  glucagon (human recombinant)  •  HYDROcodone-acetaminophen  •  ipratropium-albuterol  •  nitroglycerin  •  ondansetron **OR** ondansetron  •  [COMPLETED] Insert Peripheral IV **AND** sodium chloride  •  sodium chloride  •  sodium chloride    Assessment & Plan   Assessment & Plan     Active Hospital Problems    Diagnosis  POA   • **Acute on chronic respiratory failure with hypoxia [J96.21]  Yes   • PVD (peripheral vascular disease) [I73.9]  Yes   • Diastolic CHF, acute on chronic [I50.33]  Yes   • Type 2 diabetes mellitus with hyperglycemia, without long-term current use of insulin [E11.65]  Yes   • Stage 3b chronic kidney disease [N18.32]  Yes   • Pulmonary emphysema [J43.9]  Yes   • Hypothyroidism (acquired) [E03.9]  Yes   • S/P CABG (coronary artery bypass graft) [Z95.1]  Not Applicable      Resolved Hospital Problems   No resolved problems to display.        Brief Hospital Course to date:  Salma Hatfield is a 87 y.o. female with respiratory failure and CHF.    Hypoxic respiratory failure  Acute on chronic CHF, diastolic, impaired relaxation  Chronic respiratory failure  COPD  Essential hypertension  Type 2 diabetes mellitus  Peripheral vascular disease  Bilateral renal artery stenosis  status post bilateral stents  Diffuse large B-cell lymphoma history    Discussion/plan:  Appreciate nephrology and cardiology recommendations.  Patient seems stable and it does appear she will need some degree of low-dose oral diuretic long-term.  Blood pressure acceptable.  Plan to monitor response to torsemide today and possibly home tomorrow.    Continue to hold losartan.  Other blood pressure medications have been adjusted.  Appreciate nephrology assistance.    Glucose reviewed.  Still elevated.  Add very low-dose basal this morning and monitor response.    Remainder of problems seem reasonably stable.  Potential discharge home tomorrow pending clinical course.    DVT Prophylaxis: Lovenox    Disposition: Probably home 1 days with grandson    CODE STATUS:   Code Status and Medical Interventions:   Ordered at: 05/17/23 0401     Code Status (Patient has no pulse and is not breathing):    CPR (Attempt to Resuscitate)     Medical Interventions (Patient has pulse or is breathing):    Full Support       Vince Okeefe MD  05/20/23

## 2023-05-20 NOTE — PROGRESS NOTES
[facility] Progress Note      Patient Name: Salma Hatfield  : 1935  MRN: [id number]  Primary Care Physician:  Stephen Long APRN  Date of admission: 2023    Subjective     Interval History:   Breathing is more comfortable, though remains on 3 L/min  Orthopnea is chronic; was sleeping in chair when I entered room  Appetite is good; no N/V  UOP 1.4 L yesterday; started today oral torsemide 20 mg QD      Review of Systems:   As noted above    Objective     Vitals:   Temp:  [97.3 °F (36.3 °C)-97.6 °F (36.4 °C)] 97.3 °F (36.3 °C)  Heart Rate:  [69-73] 73  Resp:  [14-20] 18  BP: ()/(59-69) 124/66  Flow (L/min):  [3] 3    Intake/Output Summary (Last 24 hours) at 2023 1704  Last data filed at 2023 1153  Gross per 24 hour   Intake 0 ml   Output --   Net 0 ml       Physical Exam:    Constitutional: Awake, alert, chronically ill, sitting in a chair, very pleasant  HEENT: Sclera anicteric, no conjunctival injection  Neck: Supple, +carotid bruits, trachea at midline, no JVD  Respiratory: Very poor air movement in bases, +crackles, not labored  Cardiovascular: RRR, no rub  Gastrointestinal: BS +, soft, nontender and nondistended  : No palpable bladder  Musculoskeletal: No edema; +clubbing  Psychiatric: Appropriate affect, cooperative, oriented  Neurologic: No asterixis, moving all extremities, normal speech  Skin: Warm and dry        Scheduled Meds:     allopurinol, 100 mg, Oral, BID  aspirin, 81 mg, Oral, Daily  docusate sodium, 100 mg, Oral, BID  dorzolamide-timolol, , Left Eye, BID  enoxaparin, 30 mg, Subcutaneous, Nightly  famotidine, 20 mg, Oral, BID AC  fluticasone, 2 spray, Nasal, Daily  gabapentin, 300 mg, Oral, QAM  gabapentin, 300 mg, Oral, Nightly  glipizide, 5 mg, Oral, BID AC  hydrALAZINE, 25 mg, Oral, Q8H  insulin glargine, 3 Units, Subcutaneous, Daily  insulin lispro, 2-7 Units, Subcutaneous, TID With Meals  ipratropium-albuterol, 3 mL, Nebulization,  Q6H While Awake - RT  isosorbide mononitrate, 30 mg, Oral, Daily  latanoprost, 1 drop, Left Eye, Nightly  levothyroxine, 50 mcg, Oral, Q AM  linagliptin, 5 mg, Oral, Daily  metoprolol tartrate, 50 mg, Oral, BID  montelukast, 10 mg, Oral, Nightly  multivitamin with minerals, 1 tablet, Oral, Daily  rosuvastatin, 40 mg, Oral, Daily  sodium chloride, 10 mL, Intravenous, Q12H  torsemide, 20 mg, Oral, Daily      IV Meds:        Results Reviewed:   I have personally reviewed the results from the time of this admission to 5/20/2023 17:04 EDT     Results from last 7 days   Lab Units 05/20/23  0512 05/19/23  1116 05/18/23  0511 05/17/23  0532 05/17/23  0100   SODIUM mmol/L 138 138 142   < > 140   POTASSIUM mmol/L 4.1 4.0 4.1   < > 4.6   CHLORIDE mmol/L 97* 94* 99   < > 103   CO2 mmol/L 26.2 32.0* 32.0*   < > 26.9   BUN mg/dL 58* 51* 35*   < > 21   CREATININE mg/dL 1.54* 1.65* 1.53*   < > 1.02*   CALCIUM mg/dL 9.3 9.9 10.5   < > 10.2   BILIRUBIN mg/dL  --   --   --   --  0.8   ALK PHOS U/L  --   --   --   --  73   ALT (SGPT) U/L  --   --   --   --  42*   AST (SGOT) U/L  --   --   --   --  46*   GLUCOSE mg/dL 251* 214* 266*   < > 287*    < > = values in this interval not displayed.       Estimated Creatinine Clearance: 22.1 mL/min (A) (by C-G formula based on SCr of 1.54 mg/dL (H)).    Results from last 7 days   Lab Units 05/20/23  0512   PHOSPHORUS mg/dL 3.1             Results from last 7 days   Lab Units 05/18/23  0511 05/17/23  0532 05/17/23  0100   WBC 10*3/mm3 14.14* 8.68 10.48   HEMOGLOBIN g/dL 13.6 15.0 14.8   PLATELETS 10*3/mm3 136* 123* 138*             Assessment / Plan     ASSESSMENT:  1.  MARTIN on CKD3b, nonoliguric, stable and multifactorial:  hypoxia, hypotension, and decompensated CHF.  Volume excess by exam and imaging; normal potassium and normal anion gap.  Urinalysis with no blood and 100 mg/dL protein  2.  Acute on chronic CHF; was not on a diuretic at home  3.  Chronic respiratory failure due to COPD  4.   Hypertension, controlled  5.  DM2  6.  Extensive PVD; has bilateral renal artery stenosis s/p bilateral stents  7.  Diffuse large B-cell lymphoma    PLAN:  1.  Oral torsemide 20 mg once daily  2.  Barring any surprises in labs tomorrow, no objection to discharge home  3.  Discussed with Dr. Okeefe earlier today    Thank you for involving us in the care of Salma Hatfield.  Please feel free to call with any questions.    Timmy Chnaey MD  05/20/23  17:04 EDT    [facility]  [phone]    Please note that portions of this note were completed with a voice recognition program.

## 2023-05-20 NOTE — PLAN OF CARE
Goal Outcome Evaluation:   Up in chair much of the day.  Family visited. SPO2 stable on 3L NC. Likely d/c soon.

## 2023-05-20 NOTE — PROGRESS NOTES
Re:  chf    S: soa improved    O:  Vitals:    05/20/23 0646 05/20/23 0700 05/20/23 0723 05/20/23 1139   BP: 135/69 141/67     BP Location:  Right arm     Patient Position:  Lying     Pulse: 69 69     Resp:  20 16 14   Temp:  97.3 °F (36.3 °C)     TempSrc:  Oral     SpO2:  94%     Weight:       Height:           GEN awake nad  RESP cta  CVS regular no murmurs  GI soft nt  NEURO aao times 3 normal speech      Intake/Output Summary (Last 24 hours) at 5/20/2023 1301  Last data filed at 5/20/2023 1153  Gross per 24 hour   Intake 0 ml   Output --   Net 0 ml       Tele      Results for orders placed during the hospital encounter of 05/17/23    Adult Transthoracic Echo Complete w/ Color, Spectral and Contrast if Necessary Per Protocol    Interpretation Summary  •  Left ventricular systolic function is normal. Calculated left ventricular EF = 54.4%  •  The left ventricular cavity is small in size.  •  Left ventricular wall thickness is consistent with moderate to severe septal asymmetric hypertrophy.  •  Left ventricular diastolic function is consistent with (grade Ia w/high LAP) impaired relaxation.  •  Moderately reduced right ventricular systolic function noted.  •  Estimated right ventricular systolic pressure from tricuspid regurgitation is normal (<35 mmHg).      Assessment and Plan:  1.  Acute on chronic hypoxic respiratory failure/COPD (was out of oxygen at home) and CHF (mild volume overload)  ---chronic home oxgyen  Hs trop flay at 30s  cxr  Possible vascular congestion. Nonspecific bibasilar consolidation. Pneumonia is not excluded.     2.  Hypertensive urgency, bp improved now     3.  CAD, s/p CABG (2009) & PCI (2007), stable, no angina     4.  CKD stage 3     5. DM     6. 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 Suarez--[facility]     7. severe vascular disease (carotid disease and peripheral)--s/p aorto-iliac stent, renal stent and right  CEA 2018     8. 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

## 2023-05-20 NOTE — PLAN OF CARE
Goal Outcome Evaluation:         VSS,Pt continues on 3L 02,sats stable at 90%,A&O*4, denies any pain/discomfort or SOB.WCTM.

## 2023-05-21 ENCOUNTER — READMISSION MANAGEMENT (OUTPATIENT)
Dept: CALL CENTER | Facility: HOSPITAL | Age: 88
End: 2023-05-21
Payer: MEDICARE

## 2023-05-21 ENCOUNTER — TRANSCRIBE ORDERS (OUTPATIENT)
Dept: HOME HEALTH SERVICES | Facility: HOME HEALTHCARE | Age: 88
End: 2023-05-21
Payer: MEDICARE

## 2023-05-21 VITALS
RESPIRATION RATE: 16 BRPM | BODY MASS INDEX: 26.49 KG/M2 | SYSTOLIC BLOOD PRESSURE: 116 MMHG | HEIGHT: 61 IN | WEIGHT: 140.3 LBS | DIASTOLIC BLOOD PRESSURE: 62 MMHG | TEMPERATURE: 97.7 F | HEART RATE: 74 BPM | OXYGEN SATURATION: 98 %

## 2023-05-21 DIAGNOSIS — J44.9 CHRONIC OBSTRUCTIVE PULMONARY DISEASE, UNSPECIFIED COPD TYPE: ICD-10-CM

## 2023-05-21 DIAGNOSIS — I50.33 ACUTE ON CHRONIC DIASTOLIC CONGESTIVE HEART FAILURE: ICD-10-CM

## 2023-05-21 DIAGNOSIS — J96.01 ACUTE RESPIRATORY FAILURE WITH HYPOXIA: Primary | ICD-10-CM

## 2023-05-21 DIAGNOSIS — I10 HYPERTENSION, UNSPECIFIED TYPE: ICD-10-CM

## 2023-05-21 LAB
ALBUMIN SERPL-MCNC: 4.1 G/DL (ref 3.5–5.2)
ANION GAP SERPL CALCULATED.3IONS-SCNC: 9.9 MMOL/L (ref 5–15)
BUN SERPL-MCNC: 56 MG/DL (ref 8–23)
BUN/CREAT SERPL: 38.4 (ref 7–25)
CALCIUM SPEC-SCNC: 9.9 MG/DL (ref 8.6–10.5)
CHLORIDE SERPL-SCNC: 98 MMOL/L (ref 98–107)
CO2 SERPL-SCNC: 31.1 MMOL/L (ref 22–29)
CREAT SERPL-MCNC: 1.46 MG/DL (ref 0.57–1)
EGFRCR SERPLBLD CKD-EPI 2021: 34.7 ML/MIN/1.73
GLUCOSE BLDC GLUCOMTR-MCNC: 242 MG/DL (ref 70–130)
GLUCOSE BLDC GLUCOMTR-MCNC: 336 MG/DL (ref 70–130)
GLUCOSE SERPL-MCNC: 305 MG/DL (ref 65–99)
MAGNESIUM SERPL-MCNC: 2.6 MG/DL (ref 1.6–2.4)
PHOSPHATE SERPL-MCNC: 3.6 MG/DL (ref 2.5–4.5)
POTASSIUM SERPL-SCNC: 4.3 MMOL/L (ref 3.5–5.2)
SODIUM SERPL-SCNC: 139 MMOL/L (ref 136–145)

## 2023-05-21 PROCEDURE — 82948 REAGENT STRIP/BLOOD GLUCOSE: CPT

## 2023-05-21 PROCEDURE — 94799 UNLISTED PULMONARY SVC/PX: CPT

## 2023-05-21 PROCEDURE — 80069 RENAL FUNCTION PANEL: CPT | Performed by: INTERNAL MEDICINE

## 2023-05-21 PROCEDURE — 94664 DEMO&/EVAL PT USE INHALER: CPT

## 2023-05-21 PROCEDURE — 83735 ASSAY OF MAGNESIUM: CPT | Performed by: INTERNAL MEDICINE

## 2023-05-21 PROCEDURE — 63710000001 INSULIN LISPRO (HUMAN) PER 5 UNITS: Performed by: NURSE PRACTITIONER

## 2023-05-21 RX ORDER — FAMOTIDINE 20 MG/1
10 TABLET, FILM COATED ORAL 2 TIMES DAILY
Start: 2023-05-21

## 2023-05-21 RX ORDER — TORSEMIDE 20 MG/1
20 TABLET ORAL DAILY
Qty: 30 TABLET | Refills: 1 | Status: SHIPPED | OUTPATIENT
Start: 2023-05-22

## 2023-05-21 RX ORDER — HYDRALAZINE HYDROCHLORIDE 25 MG/1
25 TABLET, FILM COATED ORAL EVERY 8 HOURS SCHEDULED
Qty: 90 TABLET | Refills: 0 | Status: SHIPPED | OUTPATIENT
Start: 2023-05-21

## 2023-05-21 RX ORDER — CALCIUM CARBONATE 500 MG/1
2 TABLET, CHEWABLE ORAL 2 TIMES DAILY PRN
Start: 2023-05-21

## 2023-05-21 RX ORDER — GABAPENTIN 300 MG/1
300 CAPSULE ORAL NIGHTLY
Start: 2023-05-21

## 2023-05-21 RX ADMIN — TORSEMIDE 20 MG: 20 TABLET ORAL at 09:10

## 2023-05-21 RX ADMIN — ISOSORBIDE MONONITRATE 30 MG: 30 TABLET, EXTENDED RELEASE ORAL at 09:10

## 2023-05-21 RX ADMIN — ALLOPURINOL 100 MG: 100 TABLET ORAL at 09:11

## 2023-05-21 RX ADMIN — INSULIN LISPRO 5 UNITS: 100 INJECTION, SOLUTION INTRAVENOUS; SUBCUTANEOUS at 12:56

## 2023-05-21 RX ADMIN — INSULIN GLARGINE-YFGN 3 UNITS: 100 INJECTION, SOLUTION SUBCUTANEOUS at 09:11

## 2023-05-21 RX ADMIN — FLUTICASONE PROPIONATE 2 SPRAY: 50 SPRAY, METERED NASAL at 09:14

## 2023-05-21 RX ADMIN — LINAGLIPTIN 5 MG: 5 TABLET, FILM COATED ORAL at 09:11

## 2023-05-21 RX ADMIN — GLIPIZIDE 5 MG: 5 TABLET ORAL at 07:01

## 2023-05-21 RX ADMIN — DOCUSATE SODIUM 100 MG: 100 CAPSULE, LIQUID FILLED ORAL at 09:11

## 2023-05-21 RX ADMIN — LEVOTHYROXINE SODIUM 50 MCG: 0.05 TABLET ORAL at 07:01

## 2023-05-21 RX ADMIN — GABAPENTIN 300 MG: 300 CAPSULE ORAL at 07:01

## 2023-05-21 RX ADMIN — HYDRALAZINE HYDROCHLORIDE 25 MG: 50 TABLET, FILM COATED ORAL at 14:02

## 2023-05-21 RX ADMIN — IPRATROPIUM BROMIDE AND ALBUTEROL SULFATE 3 ML: 2.5; .5 SOLUTION RESPIRATORY (INHALATION) at 07:58

## 2023-05-21 RX ADMIN — Medication 10 ML: at 09:13

## 2023-05-21 RX ADMIN — METOPROLOL TARTRATE 50 MG: 50 TABLET, FILM COATED ORAL at 09:10

## 2023-05-21 RX ADMIN — MULTIPLE VITAMINS W/ MINERALS TAB 1 TABLET: TAB at 09:11

## 2023-05-21 RX ADMIN — ASPIRIN 81 MG: 81 TABLET, COATED ORAL at 09:10

## 2023-05-21 RX ADMIN — ROSUVASTATIN CALCIUM 40 MG: 40 TABLET, FILM COATED ORAL at 09:10

## 2023-05-21 RX ADMIN — FAMOTIDINE 20 MG: 20 TABLET ORAL at 09:11

## 2023-05-21 RX ADMIN — TIMOLOL MALEATE: 5 SOLUTION OPHTHALMIC at 09:14

## 2023-05-21 RX ADMIN — INSULIN LISPRO 3 UNITS: 100 INJECTION, SOLUTION INTRAVENOUS; SUBCUTANEOUS at 09:12

## 2023-05-21 RX ADMIN — HYDRALAZINE HYDROCHLORIDE 25 MG: 50 TABLET, FILM COATED ORAL at 07:02

## 2023-05-21 NOTE — PLAN OF CARE
Goal Outcome Evaluation:            BS still elevated,other Vitals WNL.Continues on PO diuretic,adequate urine output this shift. Continues on 2L 02 via N/C,sats stable,WCTM.

## 2023-05-21 NOTE — OUTREACH NOTE
Prep Survey    Flowsheet Row Responses   [facility] patient discharged from? [place]   Is LACE score < 7 ? No   Eligibility Readm Mgmt   Discharge diagnosis Acute on chronic heart failure   Does the patient have one of the following disease processes/diagnoses(primary or secondary)? CHF   Does the patient have Home health ordered? Yes   What is the Home health agency?  [facility].   Is there a DME ordered? Yes   What DME was ordered? O2at home through [employer].  needs nebulizer machine   Prep survey completed? Yes          SHERRIE MARCH - Registered Nurse

## 2023-05-21 NOTE — DISCHARGE SUMMARY
[facility] Medicine Services  DISCHARGE SUMMARY    Patient Name: Salma Hatfield  : 1935  MRN: [id number]    Date of Admission: 2023 12:20 AM  Date of Discharge: 2023  Primary Care Physician: Stephen Long APRN    Consults     Date and Time Order Name Status Description    2023  1:41 PM Inpatient Nephrology Consult      2023  4:03 AM Inpatient Cardiology Consult Completed     2023  4:03 AM Inpatient Pulmonology Consult Completed     2023  2:56 AM [facility] (on-call MD unless specified) Details            Hospital Course       Active Hospital Problems    Diagnosis  POA   • **Acute on chronic respiratory failure with hypoxia [J96.21]  Yes   • Acute on chronic heart failure with preserved ejection fraction (HFpEF) [I50.33]  Yes   • PVD (peripheral vascular disease) [I73.9]  Yes   • Diastolic CHF, acute on chronic [I50.33]  Yes   • Type 2 diabetes mellitus with hyperglycemia, without long-term current use of insulin [E11.65]  Yes   • Stage 3b chronic kidney disease [N18.32]  Yes   • Pulmonary emphysema [J43.9]  Yes   • Hypothyroidism (acquired) [E03.9]  Yes   • S/P CABG (coronary artery bypass graft) [Z95.1]  Not Applicable      Resolved Hospital Problems   No resolved problems to display.          Hospital Course:  Salma Hatfield is a 87 y.o. female presents the hospital with respiratory failure and acute on chronic CHF.    Hypoxic respiratory failure  Acute on chronic CHF, diastolic, impaired relaxation  Chronic respiratory failure  COPD  Essential hypertension  Type 2 diabetes mellitus  Peripheral vascular disease  Bilateral renal artery stenosis status post bilateral stents    Patient received medical treatment for CHF and diuresis with the assistance of cardiology consult team and nephrology consult team.  Her blood pressure medications were adjusted.  Nephrology feels that she needs less aggressive blood pressure control and at her advanced age may need to allow for  slightly more elevated blood pressure.  Her losartan has been discontinued and her amlodipine has been discontinued.  Diuretic has been added.  Patient is felt to be as medically maximized as possible per my conversation with cardiology and nephrology and has been cleared by other services to discharge home today.  She will need to follow-up closely with primary care provider, nephrology, and cardiology.    Also of note patient's diabetes required some adjustment in her medication.  I am going to discharge her on very low-dose metformin 500 mg once daily particularly with her CKD with creatinine which is currently 1.4.  Patient is going to work on improving diet and exercise is much as possible to improve her diabetes control and follow-up with primary care provider for further titration with a list of her most recent blood glucoses.    Patient is eager to discharge home today and appears reasonably hemodynamically stable and medically maximized to do so.  At the time of discharge patient was told to take all medications as prescribed, keep all follow-up appointments, and call their doctor or return to the hospital with any worsening or concerning symptoms.    Also of note there is an incidental cystic pancreatic lesion below.  This was discussed with the patient as well as radiologist recommendation for repeat imaging in the future.  Based on patient's advanced age and comorbid conditions she is uncertain how aggressively she wishes to watch this lesion but wishes to speak with primary care provider at follow-up and will make the decision with her primary care provider regarding whether she wishes to pursue further imaging.  Please see report below for further details    Please note that this note was made using Dragon voice recognition software            Day of Discharge     Subjective: Patient wants to discharge home soon as possible.  She feels much better.  She is breathing much better and feels that the  medications have helped.  She denies any other new complaints.  She agrees to follow closely with her nephrologist, primary care, and cardiologist.  She agrees to continue to work on her diabetes control.  She really wants to go back on low-dose metformin as she feels it has been very beneficial for her in the past    ROS:  No current fevers or chills  No current nausea, vomiting, or diarrhea  No current chest pain or palpitations      Vital Signs:   Temp:  [97.3 °F (36.3 °C)-98.4 °F (36.9 °C)] 98.4 °F (36.9 °C)  Heart Rate:  [69-73] 69  Resp:  [16-18] 16  BP: (112-156)/(54-84) 152/69     Physical Exam:  Constitutional:Awake, alert  HENT: NCAT, mucous membranes moist, neck supple  Respiratory: No cough or wheezing, moderate inspiration, nonlabored breathing  Cardiovascular: Pulse rate is normal, palpable radial pulses  Gastrointestinal: Positive bowel sounds, soft, nontender, nondistended  Musculoskeletal: Elderly frail and debilitated in appearance, mild lower extremity edema, BMI 26  Psychiatric: Appropriate affect, cooperative, conversational  Neurologic: No slurred speech or facial droop, follows commands  Skin: No rashes or jaundice, warm    Pertinent  and/or Most Recent Results     Results from last 7 days   Lab Units 05/21/23  0405 05/20/23  0512 05/19/23  1116 05/18/23  0511 05/17/23  0532 05/17/23  0100   WBC 10*3/mm3  --   --   --  14.14* 8.68 10.48   HEMOGLOBIN g/dL  --   --   --  13.6 15.0 14.8   HEMATOCRIT %  --   --   --  40.6 47.0* 47.8*   PLATELETS 10*3/mm3  --   --   --  136* 123* 138*   SODIUM mmol/L 139 138 138 142 140 140   POTASSIUM mmol/L 4.3 4.1 4.0 4.1 3.8 4.6   CHLORIDE mmol/L 98 97* 94* 99 100 103   CO2 mmol/L 31.1* 26.2 32.0* 32.0* 22.8 26.9   BUN mg/dL 56* 58* 51* 35* 20 21   CREATININE mg/dL 1.46* 1.54* 1.65* 1.53* 0.94 1.02*   GLUCOSE mg/dL 305* 251* 214* 266* 313* 287*   CALCIUM mg/dL 9.9 9.3 9.9 10.5 10.0 10.2     Results from last 7 days   Lab Units 05/17/23  0100   BILIRUBIN mg/dL  0.8   ALK PHOS U/L 73   ALT (SGPT) U/L 42*   AST (SGOT) U/L 46*           Invalid input(s): TG, LDLCALC, LDLREALC  Results from last 7 days   Lab Units 05/18/23  0511 05/17/23  0532 05/17/23  0247 05/17/23  0100   HEMOGLOBIN A1C % 7.00*  --   --   --    PROBNP pg/mL  --   --   --  5,785.0*   HSTROP T ng/L  --  32* 33* 32*   PROCALCITONIN ng/mL  --   --   --  0.07       Brief Urine Lab Results  (Last result in the past 365 days)      Color   Clarity   Blood   Leuk Est   Nitrite   Protein   CREAT   Urine HCG        05/17/23 0245 Yellow   Cloudy   Negative   Trace   Negative   100 mg/dL (2+)                 Microbiology Results Abnormal     Procedure Component Value - Date/Time    Respiratory Panel PCR w/COVID-19(SARS-CoV-2) [facility]/ANANT/APOLINAR/PAD/COR/MAD/CRISTIAN In-House, NP Swab in UTM/VTM, 3-4 HR TAT - Swab, Nasopharynx [[id number]]  (Normal) Collected: 05/17/23 0102    Lab Status: Final result Specimen: Swab from Nasopharynx Updated: 05/17/23 0155     ADENOVIRUS, PCR Not Detected     Coronavirus 229E Not Detected     Coronavirus HKU1 Not Detected     Coronavirus NL63 Not Detected     Coronavirus OC43 Not Detected     COVID19 Not Detected     Human Metapneumovirus Not Detected     Human Rhinovirus/Enterovirus Not Detected     Influenza A PCR Not Detected     Influenza B PCR Not Detected     Parainfluenza Virus 1 Not Detected     Parainfluenza Virus 2 Not Detected     Parainfluenza Virus 3 Not Detected     Parainfluenza Virus 4 Not Detected     RSV, PCR Not Detected     Bordetella pertussis pcr Not Detected     Bordetella parapertussis PCR Not Detected     Chlamydophila pneumoniae PCR Not Detected     Mycoplasma pneumo by PCR Not Detected    Narrative:      In the setting of a positive respiratory panel with a viral infection PLUS a negative procalcitonin without other underlying concern for bacterial infection, consider observing off antibiotics or discontinuation of antibiotics and continue supportive care. If the respiratory  panel is positive for atypical bacterial infection (Bordetella pertussis, Chlamydophila pneumoniae, or Mycoplasma pneumoniae), consider antibiotic de-escalation to target atypical bacterial infection.          Imaging Results (All)     Procedure Component Value Units Date/Time    XR Chest PA & Lateral [[id number]] Collected: 05/19/23 1642     Updated: 05/19/23 1647    Narrative:      XR CHEST PA AND LATERAL-     HISTORY:  Hypoxia, CHF.     COMPARISON:  Chest radiograph 05/17/2023     FINDINGS:    4 views of the chest were obtained. There is a left chest cardiac  pacemaker, not significantly changed. The cardiac silhouette is normal  size. There are postsurgical changes from CABG. There is calcific aortic  atherosclerosis. There is mild bibasilar airspace opacity, significantly  improved from 05/17/2023. There are trace bilateral pleural effusions.  Sternotomy wires are present. There is multilevel degenerative disc  disease. There is calcific atherosclerosis in the visualized upper  abdomen.     This report was finalized on 5/19/2023 4:43 PM by Dr. Jael Espinosa M.D.       CT Angiogram Chest [[id number]] Collected: 05/17/23 1802     Updated: 05/17/23 1811    Narrative:      CT ANGIOGRAM CHEST-     INDICATIONS: Pulmonary embolism suspected.     TECHNIQUE: Radiation dose reduction techniques were utilized, including  automated exposure control and exposure modulation based on body size.  CT angiography chest. Three-dimensional reconstructions.     COMPARISON: None available      FINDINGS:     No pulmonary embolism. Accounting of the ascending aorta is measured at  3.5 cm. Calcified mural plaque versus chronic dissection change is  apparent in the lower most images of the included abdominal aorta.     The heart size is normal without pericardial effusion. Left-sided  pacemaker and cardiac leads are noted. A few small subcentimeter short  axis mediastinal lymph nodes are seen that are not significant by size  criteria.      The airways appear clear.     No pleural effusion or pneumothorax.     The lungs show extensive emphysematous changes. Atelectasis is seen  bilaterally, predominantly dependently. No focal pulmonary consolidation  or mass.     Upper abdominal structures show no acute findings. The 2.2 cm cystic  lesion of the pancreatic body is noted, possibility of cystic neoplasm  not excluded, follow-up evaluation advised. Small hiatal hernia is seen.  Asymmetric atrophy of the left kidney is present.     Degenerative changes are seen in the spine. No acute fracture is  identified.       Impression:         1. No pulmonary embolism. Emphysematous changes of the lungs.  2. Cystic lesion of the pancreas.     This report was finalized on 5/17/2023 6:08 PM by Dr. Kenton Vallejo M.D.       XR Chest 1 View [[id number]] Collected: 05/17/23 0058     Updated: 05/17/23 0102    Narrative:      SINGLE VIEW OF THE CHEST     HISTORY: Shortness of air     COMPARISON: 09/21/2022     FINDINGS:  There is cardiomegaly. There is calcification of the aorta. Left-sided  pacemaker is noted. No pneumothorax is seen. Patient has background  changes of COPD. There may be some vascular congestion. Patient does  have focal airspace consolidation at the lung bases bilaterally,  pneumonia is not excluded. There do appear to be pleural effusions.       Impression:         1. Possible vascular congestion.  2. Nonspecific bibasilar consolidation. Pneumonia is not excluded.     This report was finalized on 5/17/2023 12:59 AM by Dr. Silvana Hernandez M.D.             Results for orders placed during the hospital encounter of 05/17/23    Duplex Venous Lower Extremity - Bilateral CAR    Interpretation Summary  •  Normal bilateral lower extremity venous duplex scan.      Results for orders placed during the hospital encounter of 05/17/23    Duplex Venous Lower Extremity - Bilateral CAR    Interpretation Summary  •  Normal bilateral lower extremity venous  duplex scan.      Results for orders placed during the hospital encounter of 05/17/23    Adult Transthoracic Echo Complete w/ Color, Spectral and Contrast if Necessary Per Protocol    Interpretation Summary  •  Left ventricular systolic function is normal. Calculated left ventricular EF = 54.4%  •  The left ventricular cavity is small in size.  •  Left ventricular wall thickness is consistent with moderate to severe septal asymmetric hypertrophy.  •  Left ventricular diastolic function is consistent with (grade Ia w/high LAP) impaired relaxation.  •  Moderately reduced right ventricular systolic function noted.  •  Estimated right ventricular systolic pressure from tricuspid regurgitation is normal (<35 mmHg).        Discharge Details        Discharge Medications      New Medications      Instructions Start Date   calcium carbonate 500 MG chewable tablet  Commonly known as: TUMS   2 tablets, Oral, 2 Times Daily PRN      metFORMIN 500 MG tablet  Commonly known as: GLUCOPHAGE   500 mg, Oral, Daily With Breakfast      torsemide 20 MG tablet  Commonly known as: DEMADEX   20 mg, Oral, Daily   Start Date: May 22, 2023        Changes to Medications      Instructions Start Date   famotidine 20 MG tablet  Commonly known as: PEPCID  What changed: how much to take   10 mg, Oral, 2 Times Daily      gabapentin 300 MG capsule  Commonly known as: NEURONTIN  What changed: Another medication with the same name was changed. Make sure you understand how and when to take each.   300 mg, Oral, Every Morning      gabapentin 300 MG capsule  Commonly known as: NEURONTIN  What changed: how much to take   300 mg, Oral, Nightly      hydrALAZINE 25 MG tablet  Commonly known as: APRESOLINE  What changed:   · medication strength  · how much to take  · when to take this   25 mg, Oral, Every 8 Hours Scheduled         Continue These Medications      Instructions Start Date   allopurinol 100 MG tablet  Commonly known as: ZYLOPRIM   100 mg, Oral, 2  Times Daily      aspirin 81 MG EC tablet   81 mg, Oral, Daily      Calcium Carbonate-Vitamin D 600-200 MG-UNIT tablet   1 tablet, Oral, Daily      docusate sodium 100 MG capsule  Commonly known as: COLACE   100 mg, Oral, 2 Times Daily      dorzolamide-timolol 22.3-6.8 MG/ML ophthalmic solution  Commonly known as: COSOPT   1 drop, Left Eye, 2 Times Daily      ezetimibe 10 MG tablet  Commonly known as: ZETIA   10 mg, Oral, Daily      fluticasone 50 MCG/ACT nasal spray  Commonly known as: FLONASE   2 sprays, Nasal, Daily      glipizide 5 MG ER tablet  Commonly known as: GLUCOTROL XL   5 mg, Oral, 2 Times Daily      HYDROcodone-acetaminophen 5-325 MG per tablet  Commonly known as: NORCO   1 tablet, Oral, Every 8 Hours PRN      isosorbide mononitrate 30 MG 24 hr tablet  Commonly known as: IMDUR   30 mg, Oral, Daily      latanoprost 0.005 % ophthalmic solution  Commonly known as: XALATAN   1 drop, Left Eye, Nightly      levothyroxine 50 MCG tablet  Commonly known as: SYNTHROID, LEVOTHROID   50 mcg, Oral, Daily      metoprolol tartrate 50 MG tablet  Commonly known as: LOPRESSOR   50 mg, Oral, 2 Times Daily      montelukast 10 MG tablet  Commonly known as: SINGULAIR   10 mg, Oral, Nightly      multivitamin with minerals tablet tablet   1 tablet, Oral, Daily      rosuvastatin 20 MG tablet  Commonly known as: CRESTOR   40 mg, Oral, Daily      SITagliptin 50 MG tablet  Commonly known as: JANUVIA   50 mg, Oral, Daily         Stop These Medications    amLODIPine 5 MG tablet  Commonly known as: NORVASC     losartan 100 MG tablet  Commonly known as: Cozaar            No Known Allergies      Discharge Disposition:  Home or Self Care    Diet:  Hospital:  Diet Order   Procedures   • Diet: Regular/House Diet, Cardiac Diets, Diabetic Diets; Healthy Heart (2-3 Na+); Consistent Carbohydrate; Texture: Regular Texture (IDDSI 7); Fluid Consistency: Thin (IDDSI 0)       Activity:  Activity Instructions     Activity as Tolerated                  CODE STATUS:    Code Status and Medical Interventions:   Ordered at: 05/17/23 0401     Code Status (Patient has no pulse and is not breathing):    CPR (Attempt to Resuscitate)     Medical Interventions (Patient has pulse or is breathing):    Full Support       Future Appointments   Date Time Provider Department Center   [facility]2023 11[facility]30 [facility]   8/14/2023 12:20 PM Kevin Eisenberg MD [facility]         Additional Instructions for the Follow-ups that You Need to Schedule     Discharge Follow-up with PCP   As directed       Currently Documented PCP:    Stephen Long APRN    PCP Phone Number:    [phone]     Follow Up Details: 1 week.  Call Monday to schedule for general hospital follow-up         Discharge Follow-up with Specialty: [facility].  Please call the clinic to arrange appointment or with any questions.; 1 Week   As directed      Specialty: [facility].  Please call the clinic to arrange appointment or with any questions.    Follow Up: 1 Week            Contact information for follow-up providers     Stephen Long APRN .    Specialty: Nurse Practitioner  Contact information:  [address] [address]  [phone]             Choco Browning MD Follow up in 2 week(s).    Specialty: Nephrology  Why: Nephrology clinic follow-up for diuretics and blood pressure medications and monitoring of kidney function.  Please call Monday to schedule appointment or with any questions  Contact information:  [address]  [address]  [place] [address]  [phone]             Stephen Long APRN .    Specialty: Nurse Practitioner  Why: 1 week.  Call Monday to schedule for general hospital follow-up  Contact information:  [address] [id number] [phone]                   Contact information for after-discharge care     Home Medical Care     [facility]  HOME CARE .    Service: Home Health Services  Contact information:  [address]  [facility] [address] [phone]                                 Vince Okeefe MD  05/21/23      Time Spent on Discharge:  I spent greater than 35 minutes on this discharge activity which included: face-to-face encounter with the patient, reviewing the data in the system, coordination of the care with the nursing staff as well as consultants, documentation, and entering orders.

## 2023-05-21 NOTE — PROGRESS NOTES
[facility] to provide Home Care services. Patient agreeable and denies other HH at this time.PCP is SORIN Baxter, [phone] or [phone]. Contact information confirmed.

## 2023-05-22 ENCOUNTER — HOME CARE VISIT (OUTPATIENT)
Dept: HOME HEALTH SERVICES | Facility: HOME HEALTHCARE | Age: 88
End: 2023-05-22
Payer: MEDICARE

## 2023-05-22 PROCEDURE — G0299 HHS/HOSPICE OF RN EA 15 MIN: HCPCS

## 2023-05-22 NOTE — CASE MANAGEMENT/SOCIAL WORK
Case Management Discharge Note      Final Note: Patient discharged home with [facility].    Provided Post Acute Provider List?: Refused  Refused Provider List Comment: Has used [facility] in the past and will use again    Selected Continued Care - Discharged on 5/21/2023 Admission date: 5/17/2023 - Discharge disposition: Home or Self Care    Destination    No services have been selected for the patient.              Durable Medical Equipment    No services have been selected for the patient.              Dialysis/Infusion    No services have been selected for the patient.              Home Medical Care Coordination complete.    Service Provider Selected Services Address Phone Fax Patient Preferred    [facility] [address] [address] [phone] --          Therapy    No services have been selected for the patient.              Community Resources    No services have been selected for the patient.              Community & DME    No services have been selected for the patient.                       Final Discharge Disposition Code: 06 - home with home health care

## 2023-05-23 ENCOUNTER — HOME CARE VISIT (OUTPATIENT)
Dept: HOME HEALTH SERVICES | Facility: HOME HEALTHCARE | Age: 88
End: 2023-05-23
Payer: MEDICARE

## 2023-05-23 ENCOUNTER — READMISSION MANAGEMENT (OUTPATIENT)
Dept: CALL CENTER | Facility: HOSPITAL | Age: 88
End: 2023-05-23
Payer: MEDICARE

## 2023-05-23 VITALS
TEMPERATURE: 97.2 F | RESPIRATION RATE: 18 BRPM | OXYGEN SATURATION: 94 % | DIASTOLIC BLOOD PRESSURE: 62 MMHG | SYSTOLIC BLOOD PRESSURE: 110 MMHG

## 2023-05-23 VITALS
RESPIRATION RATE: 18 BRPM | HEART RATE: 79 BPM | OXYGEN SATURATION: 90 % | SYSTOLIC BLOOD PRESSURE: 110 MMHG | DIASTOLIC BLOOD PRESSURE: 60 MMHG

## 2023-05-23 PROCEDURE — G0151 HHCP-SERV OF PT,EA 15 MIN: HCPCS

## 2023-05-23 NOTE — OUTREACH NOTE
CHF Week 1 Survey    Flowsheet Row Responses   [facility] facility patient discharged from? [place]   Does the patient have one of the following disease processes/diagnoses(primary or secondary)? CHF   CHF Week 1 attempt successful? No   Unsuccessful attempts Attempt 1          Yessi Predomo Registered Nurse

## 2023-05-23 NOTE — HOME HEALTH
Pt is an 86yo F with history of acute on chronic CHF, chronic respiratory failure, COPD, Essential hypertension, Type 2 diabetes mellitus, Peripheral vascular disease, Bilateral renal artery stenosis status post bilateral stents. Recent hospitalization due to CHF exacerbation. Pt presented walking with her rollator and oxygen on.    She reports Hx of 2 falls (one due to oxygen cord, and one losing balance in the closet) last year; Occassional R glut/sciatic pain 3/10 today. She reports full sensation in her feet.    She does not drive due to vision. Her 26 year old grandson lives with her.  Pt would like to work on her balance.      Overall she is doing really well but will benefit from a standing HEP and some exercises to address R glut pain that may have flared up during hospital stay.  She also exhibits decreased functinonal stamina, and instructed pt in PLB with exertion.    Plan for 2wk1.   Plan for next visit: review HEP and pt tolerance to exercises, review PLB with exertion, PT DC.

## 2023-05-23 NOTE — HOME HEALTH
87F  with history of acute on chronic CHF, chronic respiratory failure, COPD, Essential hypertension, Type 2 diabetes mellitus, Peripheral vascular disease, Bilateral renal artery stenosis status post bilateral stents.  Recent hospitalization due to CHF exacerbation. Her blood pressure medications were adjusted. Nephrology feels that she needs less aggressive blood pressure control and at her advanced age may need to allow for slightly more elevated blood pressure. Her losartan has been discontinued and her amlodipine has been discontinued. Diuretic has been added. Medications reconciled, and are all found in home.  VSS.  O2 at 3L.  No edema.  Uses rollator.  Lives at home with grandson.  SN FOC CHF.  SN to teach and instuct CHF and medication regime.

## 2023-05-24 ENCOUNTER — HOME CARE VISIT (OUTPATIENT)
Dept: HOME HEALTH SERVICES | Facility: HOME HEALTHCARE | Age: 88
End: 2023-05-24
Payer: MEDICARE

## 2023-05-26 ENCOUNTER — HOME CARE VISIT (OUTPATIENT)
Dept: HOME HEALTH SERVICES | Facility: HOME HEALTHCARE | Age: 88
End: 2023-05-26
Payer: MEDICARE

## 2023-05-26 VITALS
OXYGEN SATURATION: 98 % | RESPIRATION RATE: 18 BRPM | HEART RATE: 76 BPM | SYSTOLIC BLOOD PRESSURE: 120 MMHG | DIASTOLIC BLOOD PRESSURE: 70 MMHG

## 2023-05-26 VITALS
DIASTOLIC BLOOD PRESSURE: 70 MMHG | OXYGEN SATURATION: 97 % | TEMPERATURE: 97.6 F | SYSTOLIC BLOOD PRESSURE: 120 MMHG | RESPIRATION RATE: 18 BRPM | HEART RATE: 71 BPM

## 2023-05-26 PROCEDURE — G0151 HHCP-SERV OF PT,EA 15 MIN: HCPCS

## 2023-05-26 PROCEDURE — G0299 HHS/HOSPICE OF RN EA 15 MIN: HCPCS

## 2023-05-26 NOTE — HOME HEALTH
Pt reports she has been doing the exercises.  No pain today.  Has had pain if she does too much in low back.  She is MOD I with rollator for home environment and now I with HEP.  She reported relief in low back after exercises today.  Agreeable to PT DC and all PT needs met. 03-Aug-2021 07:53

## 2023-05-27 NOTE — HOME HEALTH
VSS.  SATS WNL.  Taught norco.  Weighing daily and recording.  Patient tolerated well and verbalized understanding.

## 2023-05-31 ENCOUNTER — HOME CARE VISIT (OUTPATIENT)
Dept: HOME HEALTH SERVICES | Facility: HOME HEALTHCARE | Age: 88
End: 2023-05-31
Payer: MEDICARE

## 2023-05-31 VITALS
SYSTOLIC BLOOD PRESSURE: 138 MMHG | RESPIRATION RATE: 18 BRPM | TEMPERATURE: 94.6 F | HEART RATE: 81 BPM | OXYGEN SATURATION: 91 % | DIASTOLIC BLOOD PRESSURE: 68 MMHG

## 2023-05-31 PROCEDURE — G0495 RN CARE TRAIN/EDU IN HH: HCPCS

## 2023-06-02 ENCOUNTER — HOME CARE VISIT (OUTPATIENT)
Dept: HOME HEALTH SERVICES | Facility: HOME HEALTHCARE | Age: 88
End: 2023-06-02
Payer: MEDICARE

## 2023-06-02 ENCOUNTER — READMISSION MANAGEMENT (OUTPATIENT)
Dept: CALL CENTER | Facility: HOSPITAL | Age: 88
End: 2023-06-02

## 2023-06-02 VITALS
OXYGEN SATURATION: 92 % | SYSTOLIC BLOOD PRESSURE: 130 MMHG | HEART RATE: 72 BPM | TEMPERATURE: 94.9 F | DIASTOLIC BLOOD PRESSURE: 64 MMHG | RESPIRATION RATE: 16 BRPM

## 2023-06-02 PROCEDURE — G0495 RN CARE TRAIN/EDU IN HH: HCPCS

## 2023-06-02 NOTE — HOME HEALTH
VSS.  SATS WNL.  No edema.  Weight down 4 lbs to 139.  Taking medications as prescribed.  Agreeable to agency d/c next week.

## 2023-06-02 NOTE — OUTREACH NOTE
CHF Week 2 Survey    Flowsheet Row Responses   [facility] patient discharged from? [place]   Does the patient have one of the following disease processes/diagnoses(primary or secondary)? CHF   Week 2 attempt successful? Yes   Call start time 1654   Call end time 1656   Discharge diagnosis Acute on chronic heart failure   Is the patient taking all medications as directed (includes completed medication regime)? Yes   Comments regarding appointments will see nephrologist on 6/6   Does the patient have a primary care provider?  Yes   Does the patient have an appointment with their PCP within 7 days of discharge? Yes   Comments regarding PCP has seen PCP   Has the patient kept scheduled appointments due by today? Yes   What is the Home health agency?  [facility].   Has home health visited the patient within 72 hours of discharge? Yes   Home health comments HH is still coming   Psychosocial issues? No   Did the patient receive a copy of their discharge instructions? Yes   Nursing interventions Reviewed instructions with patient   What is the patient's perception of their health status since discharge? Improving   Nursing interventions Nurse provided patient education   Is the patient able to teach back signs and symptoms of worsening condition? (i.e. weight gain, shortness of air, etc.) Yes   Is the patient/caregiver able to teach back the hierarchy of who to call/visit for symptoms/problems? PCP, Specialist, Home health nurse, Urgent Care, ED, 911 Yes   CHF Zone this Call Green Zone   Green Zone Patient reports doing well, No new or worsening shortness of breath, Physical activity level is normal for you, No new swelling -  feet, ankles and legs look normal for you, Weight check stable, No chest pain   Green Zone Interventions Daily weight check, Meds as directed, Follow up visits planned, Low sodium diet   CHF Week 2 call completed? Yes   Is the patient interested in additional calls from an ambulatory case  manager?  NOTE:  applies to high risk patients requiring additional follow-up. No   Would this patient benefit from a Referral to [facility] Social Work? No   Wrap up additional comments Doing well but having some urination issues, will see nephrologist next week,   Call end time [phone]          Jennifer YEH - Registered Nurse

## 2023-06-07 ENCOUNTER — HOME CARE VISIT (OUTPATIENT)
Dept: HOME HEALTH SERVICES | Facility: HOME HEALTHCARE | Age: 88
End: 2023-06-07
Payer: MEDICARE

## 2023-06-07 VITALS
SYSTOLIC BLOOD PRESSURE: 132 MMHG | RESPIRATION RATE: 16 BRPM | TEMPERATURE: 95.8 F | HEART RATE: 71 BPM | OXYGEN SATURATION: 94 % | DIASTOLIC BLOOD PRESSURE: 72 MMHG

## 2023-06-07 PROCEDURE — G0493 RN CARE EA 15 MIN HH/HOSPICE: HCPCS

## 2023-06-13 ENCOUNTER — READMISSION MANAGEMENT (OUTPATIENT)
Dept: CALL CENTER | Facility: HOSPITAL | Age: 88
End: 2023-06-13
Payer: MEDICARE

## 2023-06-13 NOTE — OUTREACH NOTE
CHF Week 3 Survey      Flowsheet Row Responses   [facility] patient discharged from? [place]   Does the patient have one of the following disease processes/diagnoses(primary or secondary)? CHF   Week 3 attempt successful? No   Unsuccessful attempts Attempt 1  [Reached patient, but she was getting ready to go to PCP appt today. ]            SHERRIE ZEPEDA - Registered Nurse

## 2025-01-02 ENCOUNTER — APPOINTMENT (OUTPATIENT)
Dept: GENERAL RADIOLOGY | Facility: HOSPITAL | Age: OVER 89
End: 2025-01-02
Payer: MEDICARE

## 2025-01-02 ENCOUNTER — HOSPITAL ENCOUNTER (INPATIENT)
Facility: HOSPITAL | Age: OVER 89
LOS: 6 days | Discharge: HOME | End: 2025-01-09
Attending: EMERGENCY MEDICINE | Admitting: HOSPITALIST
Payer: MEDICARE

## 2025-01-02 DIAGNOSIS — J81.0 ACUTE PULMONARY EDEMA: ICD-10-CM

## 2025-01-02 DIAGNOSIS — M25.561 ACUTE PAIN OF RIGHT KNEE: ICD-10-CM

## 2025-01-02 DIAGNOSIS — J96.21 ACUTE ON CHRONIC RESPIRATORY FAILURE WITH HYPOXIA: Primary | ICD-10-CM

## 2025-01-02 DIAGNOSIS — J18.9 COMMUNITY ACQUIRED PNEUMONIA, UNSPECIFIED LATERALITY: ICD-10-CM

## 2025-01-02 LAB
ALBUMIN SERPL-MCNC: 3.7 G/DL (ref 3.5–5.2)
ALBUMIN/GLOB SERPL: 1.2 G/DL
ALP SERPL-CCNC: 142 U/L (ref 39–117)
ALT SERPL W P-5'-P-CCNC: 174 U/L (ref 1–33)
ANION GAP SERPL CALCULATED.3IONS-SCNC: 9.8 MMOL/L (ref 5–15)
AST SERPL-CCNC: 242 U/L (ref 1–32)
B PARAPERT DNA SPEC QL NAA+PROBE: NOT DETECTED
B PERT DNA SPEC QL NAA+PROBE: NOT DETECTED
BASOPHILS # BLD AUTO: 0.01 10*3/MM3 (ref 0–0.2)
BASOPHILS NFR BLD AUTO: 0.1 % (ref 0–1.5)
BILIRUB SERPL-MCNC: 0.7 MG/DL (ref 0–1.2)
BUN SERPL-MCNC: 60 MG/DL (ref 8–23)
BUN/CREAT SERPL: 24.2 (ref 7–25)
C PNEUM DNA NPH QL NAA+NON-PROBE: NOT DETECTED
CALCIUM SPEC-SCNC: 9.5 MG/DL (ref 8.6–10.5)
CHLORIDE SERPL-SCNC: 97 MMOL/L (ref 98–107)
CO2 SERPL-SCNC: 26.2 MMOL/L (ref 22–29)
CREAT SERPL-MCNC: 2.48 MG/DL (ref 0.57–1)
D-LACTATE SERPL-SCNC: 1.5 MMOL/L (ref 0.5–2)
DEPRECATED RDW RBC AUTO: 45.5 FL (ref 37–54)
EGFRCR SERPLBLD CKD-EPI 2021: 18.1 ML/MIN/1.73
EOSINOPHIL # BLD AUTO: 0.01 10*3/MM3 (ref 0–0.4)
EOSINOPHIL NFR BLD AUTO: 0.1 % (ref 0.3–6.2)
ERYTHROCYTE [DISTWIDTH] IN BLOOD BY AUTOMATED COUNT: 13.2 % (ref 12.3–15.4)
FLUAV SUBTYP SPEC NAA+PROBE: NOT DETECTED
FLUBV RNA ISLT QL NAA+PROBE: NOT DETECTED
GLOBULIN UR ELPH-MCNC: 3 GM/DL
GLUCOSE SERPL-MCNC: 536 MG/DL (ref 65–99)
HADV DNA SPEC NAA+PROBE: NOT DETECTED
HCOV 229E RNA SPEC QL NAA+PROBE: NOT DETECTED
HCOV HKU1 RNA SPEC QL NAA+PROBE: NOT DETECTED
HCOV NL63 RNA SPEC QL NAA+PROBE: NOT DETECTED
HCOV OC43 RNA SPEC QL NAA+PROBE: NOT DETECTED
HCT VFR BLD AUTO: 49.9 % (ref 34–46.6)
HGB BLD-MCNC: 16.6 G/DL (ref 12–15.9)
HMPV RNA NPH QL NAA+NON-PROBE: NOT DETECTED
HOLD SPECIMEN: NORMAL
HPIV1 RNA ISLT QL NAA+PROBE: NOT DETECTED
HPIV2 RNA SPEC QL NAA+PROBE: NOT DETECTED
HPIV3 RNA NPH QL NAA+PROBE: NOT DETECTED
HPIV4 P GENE NPH QL NAA+PROBE: NOT DETECTED
IMM GRANULOCYTES # BLD AUTO: 0.07 10*3/MM3 (ref 0–0.05)
IMM GRANULOCYTES NFR BLD AUTO: 0.5 % (ref 0–0.5)
LYMPHOCYTES # BLD AUTO: 0.9 10*3/MM3 (ref 0.7–3.1)
LYMPHOCYTES NFR BLD AUTO: 7.1 % (ref 19.6–45.3)
M PNEUMO IGG SER IA-ACNC: NOT DETECTED
MCH RBC QN AUTO: 31.3 PG (ref 26.6–33)
MCHC RBC AUTO-ENTMCNC: 33.3 G/DL (ref 31.5–35.7)
MCV RBC AUTO: 94 FL (ref 79–97)
MONOCYTES # BLD AUTO: 0.84 10*3/MM3 (ref 0.1–0.9)
MONOCYTES NFR BLD AUTO: 6.6 % (ref 5–12)
NEUTROPHILS NFR BLD AUTO: 10.93 10*3/MM3 (ref 1.7–7)
NEUTROPHILS NFR BLD AUTO: 85.6 % (ref 42.7–76)
NRBC BLD AUTO-RTO: 0 /100 WBC (ref 0–0.2)
NT-PROBNP SERPL-MCNC: 7970 PG/ML (ref 0–1800)
PLATELET # BLD AUTO: 118 10*3/MM3 (ref 140–450)
PMV BLD AUTO: 10.3 FL (ref 6–12)
POTASSIUM SERPL-SCNC: 3.9 MMOL/L (ref 3.5–5.2)
PROT SERPL-MCNC: 6.7 G/DL (ref 6–8.5)
RBC # BLD AUTO: 5.31 10*6/MM3 (ref 3.77–5.28)
RHINOVIRUS RNA SPEC NAA+PROBE: NOT DETECTED
RSV RNA NPH QL NAA+NON-PROBE: NOT DETECTED
SARS-COV-2 RNA NPH QL NAA+NON-PROBE: NOT DETECTED
SODIUM SERPL-SCNC: 133 MMOL/L (ref 136–145)
TROPONIN T SERPL HS-MCNC: 125 NG/L
WBC NRBC COR # BLD AUTO: 12.76 10*3/MM3 (ref 3.4–10.8)
WHOLE BLOOD HOLD COAG: NORMAL
WHOLE BLOOD HOLD SPECIMEN: NORMAL

## 2025-01-02 PROCEDURE — 0202U NFCT DS 22 TRGT SARS-COV-2: CPT | Performed by: EMERGENCY MEDICINE

## 2025-01-02 PROCEDURE — 94640 AIRWAY INHALATION TREATMENT: CPT

## 2025-01-02 PROCEDURE — 99285 EMERGENCY DEPT VISIT HI MDM: CPT

## 2025-01-02 PROCEDURE — 93010 ELECTROCARDIOGRAM REPORT: CPT | Performed by: INTERNAL MEDICINE

## 2025-01-02 PROCEDURE — 94760 N-INVAS EAR/PLS OXIMETRY 1: CPT

## 2025-01-02 PROCEDURE — 83880 ASSAY OF NATRIURETIC PEPTIDE: CPT | Performed by: EMERGENCY MEDICINE

## 2025-01-02 PROCEDURE — 94799 UNLISTED PULMONARY SVC/PX: CPT

## 2025-01-02 PROCEDURE — 83605 ASSAY OF LACTIC ACID: CPT | Performed by: EMERGENCY MEDICINE

## 2025-01-02 PROCEDURE — 80053 COMPREHEN METABOLIC PANEL: CPT | Performed by: EMERGENCY MEDICINE

## 2025-01-02 PROCEDURE — 85025 COMPLETE CBC W/AUTO DIFF WBC: CPT | Performed by: EMERGENCY MEDICINE

## 2025-01-02 PROCEDURE — 93005 ELECTROCARDIOGRAM TRACING: CPT | Performed by: EMERGENCY MEDICINE

## 2025-01-02 PROCEDURE — 83036 HEMOGLOBIN GLYCOSYLATED A1C: CPT | Performed by: NURSE PRACTITIONER

## 2025-01-02 PROCEDURE — 71045 X-RAY EXAM CHEST 1 VIEW: CPT

## 2025-01-02 PROCEDURE — 84484 ASSAY OF TROPONIN QUANT: CPT | Performed by: EMERGENCY MEDICINE

## 2025-01-02 PROCEDURE — 94761 N-INVAS EAR/PLS OXIMETRY MLT: CPT

## 2025-01-02 RX ORDER — SODIUM CHLORIDE 0.9 % (FLUSH) 0.9 %
10 SYRINGE (ML) INJECTION AS NEEDED
Status: DISCONTINUED | OUTPATIENT
Start: 2025-01-02 | End: 2025-01-04

## 2025-01-02 RX ORDER — IPRATROPIUM BROMIDE AND ALBUTEROL SULFATE 2.5; .5 MG/3ML; MG/3ML
9 SOLUTION RESPIRATORY (INHALATION) ONCE
Status: COMPLETED | OUTPATIENT
Start: 2025-01-02 | End: 2025-01-02

## 2025-01-02 RX ORDER — SODIUM CHLORIDE 0.9 % (FLUSH) 0.9 %
10 SYRINGE (ML) INJECTION AS NEEDED
Status: DISCONTINUED | OUTPATIENT
Start: 2025-01-02 | End: 2025-01-09 | Stop reason: HOSPADM

## 2025-01-02 RX ORDER — ASPIRIN 81 MG/1
324 TABLET, CHEWABLE ORAL ONCE
Status: COMPLETED | OUTPATIENT
Start: 2025-01-02 | End: 2025-01-02

## 2025-01-02 RX ADMIN — ASPIRIN 324 MG: 81 TABLET, CHEWABLE ORAL at 23:58

## 2025-01-02 RX ADMIN — IPRATROPIUM BROMIDE AND ALBUTEROL SULFATE 9 ML: 2.5; .5 SOLUTION RESPIRATORY (INHALATION) at 23:07

## 2025-01-03 ENCOUNTER — APPOINTMENT (OUTPATIENT)
Dept: GENERAL RADIOLOGY | Facility: HOSPITAL | Age: OVER 89
End: 2025-01-03
Payer: MEDICARE

## 2025-01-03 ENCOUNTER — APPOINTMENT (OUTPATIENT)
Dept: ULTRASOUND IMAGING | Facility: HOSPITAL | Age: OVER 89
End: 2025-01-03
Payer: MEDICARE

## 2025-01-03 PROBLEM — J96.90 RESPIRATORY FAILURE: Status: ACTIVE | Noted: 2025-01-03

## 2025-01-03 LAB
ANION GAP SERPL CALCULATED.3IONS-SCNC: 13.3 MMOL/L (ref 5–15)
BACTERIA UR QL AUTO: ABNORMAL /HPF
BILIRUB UR QL STRIP: NEGATIVE
BUN SERPL-MCNC: 56 MG/DL (ref 8–23)
BUN/CREAT SERPL: 24.9 (ref 7–25)
CALCIUM SPEC-SCNC: 9.5 MG/DL (ref 8.6–10.5)
CHLORIDE SERPL-SCNC: 100 MMOL/L (ref 98–107)
CLARITY UR: CLEAR
CO2 SERPL-SCNC: 22.7 MMOL/L (ref 22–29)
COLOR UR: YELLOW
CREAT SERPL-MCNC: 2.25 MG/DL (ref 0.57–1)
DEPRECATED RDW RBC AUTO: 47.2 FL (ref 37–54)
EGFRCR SERPLBLD CKD-EPI 2021: 20.4 ML/MIN/1.73
ERYTHROCYTE [DISTWIDTH] IN BLOOD BY AUTOMATED COUNT: 13.4 % (ref 12.3–15.4)
GEN 5 1HR TROPONIN T REFLEX: 128 NG/L
GLUCOSE BLDC GLUCOMTR-MCNC: 366 MG/DL (ref 70–130)
GLUCOSE BLDC GLUCOMTR-MCNC: 382 MG/DL (ref 70–130)
GLUCOSE BLDC GLUCOMTR-MCNC: 434 MG/DL (ref 70–130)
GLUCOSE BLDC GLUCOMTR-MCNC: 452 MG/DL (ref 70–130)
GLUCOSE BLDC GLUCOMTR-MCNC: 460 MG/DL (ref 70–130)
GLUCOSE BLDC GLUCOMTR-MCNC: 491 MG/DL (ref 70–130)
GLUCOSE BLDC GLUCOMTR-MCNC: 504 MG/DL (ref 70–130)
GLUCOSE SERPL-MCNC: 295 MG/DL (ref 65–99)
GLUCOSE UR STRIP-MCNC: ABNORMAL MG/DL
HAV IGM SERPL QL IA: NORMAL
HBA1C MFR BLD: 10.7 % (ref 4.8–5.6)
HBV CORE IGM SERPL QL IA: NORMAL
HBV SURFACE AG SERPL QL IA: NORMAL
HCT VFR BLD AUTO: 49.9 % (ref 34–46.6)
HCV AB SER QL: NORMAL
HGB BLD-MCNC: 15.5 G/DL (ref 12–15.9)
HGB UR QL STRIP.AUTO: ABNORMAL
HYALINE CASTS UR QL AUTO: ABNORMAL /LPF
KETONES UR QL STRIP: NEGATIVE
LEUKOCYTE ESTERASE UR QL STRIP.AUTO: NEGATIVE
MCH RBC QN AUTO: 29.4 PG (ref 26.6–33)
MCHC RBC AUTO-ENTMCNC: 31.1 G/DL (ref 31.5–35.7)
MCV RBC AUTO: 94.5 FL (ref 79–97)
NITRITE UR QL STRIP: NEGATIVE
PH UR STRIP.AUTO: 5.5 [PH] (ref 5–8)
PLATELET # BLD AUTO: 102 10*3/MM3 (ref 140–450)
PMV BLD AUTO: 10.1 FL (ref 6–12)
POTASSIUM SERPL-SCNC: 3.8 MMOL/L (ref 3.5–5.2)
PROT UR QL STRIP: NEGATIVE
QT INTERVAL: 391 MS
QTC INTERVAL: 422 MS
RBC # BLD AUTO: 5.28 10*6/MM3 (ref 3.77–5.28)
RBC # UR STRIP: ABNORMAL /HPF
REF LAB TEST METHOD: ABNORMAL
SODIUM SERPL-SCNC: 136 MMOL/L (ref 136–145)
SP GR UR STRIP: 1.01 (ref 1–1.03)
SQUAMOUS #/AREA URNS HPF: ABNORMAL /HPF
TROPONIN T % DELTA: 2 %
TROPONIN T NUMERIC DELTA: 3 NG/L
TROPONIN T SERPL HS-MCNC: 137 NG/L
UROBILINOGEN UR QL STRIP: ABNORMAL
WBC # UR STRIP: ABNORMAL /HPF
WBC NRBC COR # BLD AUTO: 10.27 10*3/MM3 (ref 3.4–10.8)

## 2025-01-03 PROCEDURE — 85027 COMPLETE CBC AUTOMATED: CPT | Performed by: NURSE PRACTITIONER

## 2025-01-03 PROCEDURE — 82948 REAGENT STRIP/BLOOD GLUCOSE: CPT

## 2025-01-03 PROCEDURE — 84484 ASSAY OF TROPONIN QUANT: CPT | Performed by: EMERGENCY MEDICINE

## 2025-01-03 PROCEDURE — 36415 COLL VENOUS BLD VENIPUNCTURE: CPT

## 2025-01-03 PROCEDURE — 81001 URINALYSIS AUTO W/SCOPE: CPT | Performed by: EMERGENCY MEDICINE

## 2025-01-03 PROCEDURE — 76775 US EXAM ABDO BACK WALL LIM: CPT

## 2025-01-03 PROCEDURE — 63710000001 INSULIN LISPRO (HUMAN) PER 5 UNITS: Performed by: NURSE PRACTITIONER

## 2025-01-03 PROCEDURE — 99232 SBSQ HOSP IP/OBS MODERATE 35: CPT | Performed by: INTERNAL MEDICINE

## 2025-01-03 PROCEDURE — 25010000002 AZITHROMYCIN PER 500 MG: Performed by: EMERGENCY MEDICINE

## 2025-01-03 PROCEDURE — 25010000002 FUROSEMIDE PER 20 MG: Performed by: INTERNAL MEDICINE

## 2025-01-03 PROCEDURE — 25010000002 FUROSEMIDE PER 20 MG: Performed by: EMERGENCY MEDICINE

## 2025-01-03 PROCEDURE — 25010000002 CEFTRIAXONE PER 250 MG: Performed by: EMERGENCY MEDICINE

## 2025-01-03 PROCEDURE — 25810000003 SODIUM CHLORIDE 0.9 % SOLUTION 250 ML FLEX CONT: Performed by: EMERGENCY MEDICINE

## 2025-01-03 PROCEDURE — 80074 ACUTE HEPATITIS PANEL: CPT | Performed by: NURSE PRACTITIONER

## 2025-01-03 PROCEDURE — 63710000001 INSULIN REGULAR HUMAN PER 5 UNITS: Performed by: EMERGENCY MEDICINE

## 2025-01-03 PROCEDURE — 73562 X-RAY EXAM OF KNEE 3: CPT

## 2025-01-03 PROCEDURE — 84484 ASSAY OF TROPONIN QUANT: CPT | Performed by: NURSE PRACTITIONER

## 2025-01-03 PROCEDURE — 25810000003 LACTATED RINGERS SOLUTION: Performed by: EMERGENCY MEDICINE

## 2025-01-03 PROCEDURE — 87040 BLOOD CULTURE FOR BACTERIA: CPT | Performed by: EMERGENCY MEDICINE

## 2025-01-03 PROCEDURE — 80048 BASIC METABOLIC PNL TOTAL CA: CPT | Performed by: NURSE PRACTITIONER

## 2025-01-03 RX ORDER — CETIRIZINE HYDROCHLORIDE 10 MG/1
10 TABLET ORAL DAILY
COMMUNITY

## 2025-01-03 RX ORDER — BUMETANIDE 0.25 MG/ML
2 INJECTION, SOLUTION INTRAMUSCULAR; INTRAVENOUS EVERY 12 HOURS
Status: DISCONTINUED | OUTPATIENT
Start: 2025-01-03 | End: 2025-01-03

## 2025-01-03 RX ORDER — ACETAMINOPHEN 160 MG/5ML
650 SOLUTION ORAL EVERY 4 HOURS PRN
Status: DISCONTINUED | OUTPATIENT
Start: 2025-01-03 | End: 2025-01-03

## 2025-01-03 RX ORDER — BISACODYL 5 MG/1
5 TABLET, DELAYED RELEASE ORAL DAILY PRN
Status: DISCONTINUED | OUTPATIENT
Start: 2025-01-03 | End: 2025-01-09 | Stop reason: HOSPADM

## 2025-01-03 RX ORDER — INSULIN LISPRO 100 [IU]/ML
5 INJECTION, SOLUTION INTRAVENOUS; SUBCUTANEOUS ONCE
Status: COMPLETED | OUTPATIENT
Start: 2025-01-03 | End: 2025-01-03

## 2025-01-03 RX ORDER — SODIUM CHLORIDE 9 MG/ML
40 INJECTION, SOLUTION INTRAVENOUS AS NEEDED
Status: DISCONTINUED | OUTPATIENT
Start: 2025-01-03 | End: 2025-01-04

## 2025-01-03 RX ORDER — AMOXICILLIN 250 MG
2 CAPSULE ORAL 2 TIMES DAILY PRN
Status: DISCONTINUED | OUTPATIENT
Start: 2025-01-03 | End: 2025-01-09 | Stop reason: HOSPADM

## 2025-01-03 RX ORDER — BISACODYL 10 MG
10 SUPPOSITORY, RECTAL RECTAL DAILY PRN
Status: DISCONTINUED | OUTPATIENT
Start: 2025-01-03 | End: 2025-01-09 | Stop reason: HOSPADM

## 2025-01-03 RX ORDER — ACETAMINOPHEN 650 MG/1
650 SUPPOSITORY RECTAL EVERY 4 HOURS PRN
Status: DISCONTINUED | OUTPATIENT
Start: 2025-01-03 | End: 2025-01-03

## 2025-01-03 RX ORDER — CALCIUM CARBONATE 500 MG/1
2 TABLET, CHEWABLE ORAL 2 TIMES DAILY PRN
Status: DISCONTINUED | OUTPATIENT
Start: 2025-01-03 | End: 2025-01-04

## 2025-01-03 RX ORDER — FLUTICASONE FUROATE, UMECLIDINIUM BROMIDE AND VILANTEROL TRIFENATATE 100; 62.5; 25 UG/1; UG/1; UG/1
1 POWDER RESPIRATORY (INHALATION)
COMMUNITY

## 2025-01-03 RX ORDER — MICONAZOLE NITRATE 20 MG/G
1 POWDER TOPICAL AS NEEDED
COMMUNITY

## 2025-01-03 RX ORDER — POLYETHYLENE GLYCOL 3350 17 G/17G
17 POWDER, FOR SOLUTION ORAL DAILY PRN
Status: DISCONTINUED | OUTPATIENT
Start: 2025-01-03 | End: 2025-01-09 | Stop reason: HOSPADM

## 2025-01-03 RX ORDER — NITROGLYCERIN 0.4 MG/1
0.4 TABLET SUBLINGUAL
Status: DISCONTINUED | OUTPATIENT
Start: 2025-01-03 | End: 2025-01-04

## 2025-01-03 RX ORDER — NICOTINE POLACRILEX 4 MG
15 LOZENGE BUCCAL
Status: DISCONTINUED | OUTPATIENT
Start: 2025-01-03 | End: 2025-01-09 | Stop reason: HOSPADM

## 2025-01-03 RX ORDER — DEXTROSE MONOHYDRATE 25 G/50ML
25 INJECTION, SOLUTION INTRAVENOUS
Status: DISCONTINUED | OUTPATIENT
Start: 2025-01-03 | End: 2025-01-09 | Stop reason: HOSPADM

## 2025-01-03 RX ORDER — SODIUM CHLORIDE 0.9 % (FLUSH) 0.9 %
10 SYRINGE (ML) INJECTION AS NEEDED
Status: DISCONTINUED | OUTPATIENT
Start: 2025-01-03 | End: 2025-01-04

## 2025-01-03 RX ORDER — IBUPROFEN 600 MG/1
1 TABLET ORAL
Status: DISCONTINUED | OUTPATIENT
Start: 2025-01-03 | End: 2025-01-09 | Stop reason: HOSPADM

## 2025-01-03 RX ORDER — TIMOLOL MALEATE 5 MG/ML
1 SOLUTION/ DROPS OPHTHALMIC 2 TIMES DAILY
COMMUNITY

## 2025-01-03 RX ORDER — INSULIN LISPRO 100 [IU]/ML
2-7 INJECTION, SOLUTION INTRAVENOUS; SUBCUTANEOUS
Status: DISCONTINUED | OUTPATIENT
Start: 2025-01-03 | End: 2025-01-04

## 2025-01-03 RX ORDER — METOPROLOL SUCCINATE 100 MG/1
100 TABLET, EXTENDED RELEASE ORAL DAILY
COMMUNITY

## 2025-01-03 RX ORDER — ESTRADIOL 0.1 MG/G
2 CREAM VAGINAL 2 TIMES WEEKLY
COMMUNITY

## 2025-01-03 RX ORDER — ACETAMINOPHEN 325 MG/1
650 TABLET ORAL EVERY 4 HOURS PRN
Status: DISCONTINUED | OUTPATIENT
Start: 2025-01-03 | End: 2025-01-03

## 2025-01-03 RX ORDER — SODIUM CHLORIDE 0.9 % (FLUSH) 0.9 %
10 SYRINGE (ML) INJECTION EVERY 12 HOURS SCHEDULED
Status: DISCONTINUED | OUTPATIENT
Start: 2025-01-03 | End: 2025-01-04

## 2025-01-03 RX ORDER — FUROSEMIDE 10 MG/ML
80 INJECTION INTRAMUSCULAR; INTRAVENOUS ONCE
Status: COMPLETED | OUTPATIENT
Start: 2025-01-03 | End: 2025-01-03

## 2025-01-03 RX ORDER — VIBEGRON 75 MG/1
75 TABLET, FILM COATED ORAL DAILY
COMMUNITY

## 2025-01-03 RX ORDER — MIDODRINE HYDROCHLORIDE 5 MG/1
5 TABLET ORAL
Status: DISCONTINUED | OUTPATIENT
Start: 2025-01-03 | End: 2025-01-03

## 2025-01-03 RX ADMIN — INSULIN LISPRO 7 UNITS: 100 INJECTION, SOLUTION INTRAVENOUS; SUBCUTANEOUS at 21:00

## 2025-01-03 RX ADMIN — CEFTRIAXONE SODIUM 1000 MG: 1 INJECTION, POWDER, FOR SOLUTION INTRAMUSCULAR; INTRAVENOUS at 01:30

## 2025-01-03 RX ADMIN — INSULIN HUMAN 6 UNITS: 100 INJECTION, SOLUTION PARENTERAL at 00:00

## 2025-01-03 RX ADMIN — Medication 10 ML: at 12:38

## 2025-01-03 RX ADMIN — INSULIN LISPRO 4 UNITS: 100 INJECTION, SOLUTION INTRAVENOUS; SUBCUTANEOUS at 10:16

## 2025-01-03 RX ADMIN — AZITHROMYCIN MONOHYDRATE 500 MG: 500 INJECTION, POWDER, LYOPHILIZED, FOR SOLUTION INTRAVENOUS at 02:18

## 2025-01-03 RX ADMIN — FUROSEMIDE 80 MG: 10 INJECTION, SOLUTION INTRAMUSCULAR; INTRAVENOUS at 01:26

## 2025-01-03 RX ADMIN — FUROSEMIDE 80 MG: 10 INJECTION, SOLUTION INTRAMUSCULAR; INTRAVENOUS at 12:36

## 2025-01-03 RX ADMIN — SODIUM CHLORIDE, POTASSIUM CHLORIDE, SODIUM LACTATE AND CALCIUM CHLORIDE 500 ML: 600; 310; 30; 20 INJECTION, SOLUTION INTRAVENOUS at 00:02

## 2025-01-03 RX ADMIN — Medication 10 ML: at 04:43

## 2025-01-03 RX ADMIN — INSULIN LISPRO 5 UNITS: 100 INJECTION, SOLUTION INTRAVENOUS; SUBCUTANEOUS at 21:01

## 2025-01-03 RX ADMIN — INSULIN LISPRO 7 UNITS: 100 INJECTION, SOLUTION INTRAVENOUS; SUBCUTANEOUS at 17:22

## 2025-01-03 RX ADMIN — Medication 10 ML: at 21:03

## 2025-01-03 NOTE — PROGRESS NOTES
"    Patient Name: Salma Hatfield  :1935  89 y.o.      Patient Care Team:  Stephen Long APRN as PCP - General (Nurse Practitioner)    Chief Complaint: CHF    Interval History:   This is a pleasant 89-year-old woman who usually follows with Dr. Giovanny Santoro.  Send history of CAD status post remote bypass, ischemic cardiomyopathy last EF about 55%, hypertension, hyperlipidemia, renal artery stenosis with bilateral renal artery stents in the past.  She has a AAA monitored by vascular surgery follows right carotid stenosis status post endarterectomy.  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  He was last evaluated 2024.  She has no known atrial fibrillation.  She also has a history of lung cancer but is not being treated for this currently.  She has COPD is a chronic oxygen 3 L.  CKD    Presented to [facility] emergency room with complaints of cough, shortness of breath worsened over the last several days to week.  Cough is productive of brown sputum.  She is currently on 6 L nasal cannula oxygen.  She has urinated greater than a liter with IV diuretics.  Chest x-ray reveals mild vascular congestion. BNP >7000/ Cr is 2.25 BUN 55 and troponin flat at 125, 128, 137.       Objective   Vital Signs  Temp:  [97.1 °F (36.2 °C)-98 °F (36.7 °C)] 97.7 °F (36.5 °C)  Heart Rate:  [64-87] 70  Resp:  [18-24] 18  BP: ()/(61-98) 131/89    Intake/Output Summary (Last 24 hours) at 1/3/2025 1251  Last data filed at 1/3/2025 1234  Gross per 24 hour   Intake 850 ml   Output 3300 ml   Net -2450 ml     Flowsheet Rows      Flowsheet Row First Filed Value   Admission Height 154.9 cm (60.98\") Documented at 2025 0516   Admission Weight 63.6 kg (140 lb 3.4 oz) Documented at 2025 0516            Physical Exam:   General Appearance:    Alert, cooperative, in no acute distress   Lungs:    Rales b/l.      Heart:    Regular rhythm and normal rate, normal S1 and S2, no murmurs, " gallops or rubs.     Chest Wall:    No abnormalities observed   Abdomen:     Soft, nontender, positive bowel sounds.     Extremities:   no cyanosis, clubbing or edema.  No marked joint deformities.  Adequate musculoskeletal strength.       Results Review:    Results from last 7 days   Lab Units 01/03/25  0547   SODIUM mmol/L 136   POTASSIUM mmol/L 3.8   CHLORIDE mmol/L 100   CO2 mmol/L 22.7   BUN mg/dL 56*   CREATININE mg/dL 2.25*   GLUCOSE mg/dL 295*   CALCIUM mg/dL 9.5     Results from last 7 days   Lab Units 01/03/25  0547 01/03/25  0054 01/02/25  2235   HSTROP T ng/L 137* 128* 125*     Results from last 7 days   Lab Units 01/03/25  0547   WBC 10*3/mm3 10.27   HEMOGLOBIN g/dL 15.5   HEMATOCRIT % 49.9*   PLATELETS 10*3/mm3 102*                           Medication Review:   insulin lispro, 2-7 Units, Subcutaneous, 4x Daily AC & at Bedtime  sodium chloride, 10 mL, Intravenous, Q12H              Assessment & Plan   CHF: presumably diastolic CHF exacerbation. She has 700cc frothy clear urine in her canister. Will defer diuretic dosing to the nephrology service.   CAD ho CABG. EKG shows AV paced  rhythm. Troponin elevated yet flat in the setting of CKD, this does not represent ACS as she has no angina; likely leak from poor clearance  CKD  COPD  Acute on chronic hypoxic respiratory failure, usually on 3L NC now on 6.     Hoda Cooley MD  [facility]  01/03/25  12:51 EST

## 2025-01-03 NOTE — ED NOTES
15:00 RN paged jennifer carroll for bg [phone], awaiting response    15:25 RN attempted to call report, 4e rn unavailable    Nursing report ED to floor  Salma Hatfield  89 y.o.  female    HPI :  HPI  Stated Reason for Visit: cough, soa x1 week  History Obtained From: patient    Chief Complaint  Chief Complaint   Patient presents with    Shortness of Breath    Cough       Admitting doctor:   Glenn Vega MD    Admitting diagnosis:   The primary encounter diagnosis was Acute on chronic respiratory failure with hypoxia. Diagnoses of Acute pulmonary edema, Community acquired pneumonia, unspecified laterality, and Acute pain of right knee were also pertinent to this visit.    Code status:   Current Code Status       Date Active Code Status Order ID Comments User Context       1/3/2025 0212 CPR (Attempt to Resuscitate) [id number]  Steffi Nunez APRN ED        Question Answer    Code Status (Patient has no pulse and is not breathing) CPR (Attempt to Resuscitate)    Medical Interventions (Patient has pulse or is breathing) Full Support                    Allergies:   Patient has no known allergies.    Isolation:   No active isolations    Intake and Output    Intake/Output Summary (Last 24 hours) at 1/3/2025 1712  Last data filed at 1/3/2025 1659  Gross per 24 hour   Intake 1072 ml   Output 4700 ml   Net -3628 ml       Weight:       01/03/25  0516   Weight: 63.6 kg (140 lb 3.4 oz)       Most recent vitals:   Vitals:    01/03/25 0831 01/03/25 1010 01/03/25 1701 01/03/25 1703   BP: 132/78 131/89 152/88    BP Location:  Right arm     Patient Position:  Lying     Pulse: 69 70 69 70   Resp:  18     Temp:  97.7 °F (36.5 °C)     TempSrc:       SpO2: 99% 93% 90% 92%   Weight:       Height:           Active LDAs/IV Access:   Lines, Drains & Airways       Active LDAs       Name Placement date Placement time Site Days    Peripheral IV 01/02/25 2238 Left Antecubital 01/02/25 2238  Antecubital  less than 1    External Urinary  Catheter 01/03/25  0233  --  less than 1                    Labs (abnormal labs have a star):   Labs Reviewed   COMPREHENSIVE METABOLIC PANEL - Abnormal; Notable for the following components:       Result Value    Glucose 536 (*)     BUN 60 (*)     Creatinine 2.48 (*)     Sodium 133 (*)     Chloride 97 (*)     ALT (SGPT) 174 (*)     AST (SGOT) 242 (*)     Alkaline Phosphatase 142 (*)     eGFR 18.1 (*)     All other components within normal limits    Narrative:     GFR Categories in Chronic Kidney Disease (CKD)      GFR Category          GFR (mL/min/1.73)    Interpretation  G1                     90 or greater         Normal or high (1)  G2                      60-89                Mild decrease (1)  G3a                   45-59                Mild to moderate decrease  G3b                   30-44                Moderate to severe decrease  G4                    15-29                Severe decrease  G5                    14 or less           Kidney failure          (1)In the absence of evidence of kidney disease, neither GFR category G1 or G2 fulfill the criteria for CKD.    eGFR calculation 2021 CKD-EPI creatinine equation, which does not include race as a factor   BNP (IN-HOUSE) - Abnormal; Notable for the following components:    proBNP 7,970.0 (*)     All other components within normal limits    Narrative:     This assay is used as an aid in the diagnosis of individuals suspected of having heart failure. It can be used as an aid in the diagnosis of acute decompensated heart failure (ADHF) in patients presenting with signs and symptoms of ADHF to the emergency department (ED). In addition, NT-proBNP of <300 pg/mL indicates ADHF is not likely.    Age Range Result Interpretation  NT-proBNP Concentration (pg/mL:      <50             Positive            >450                   Gray                 300-450                    Negative             <300    50-75           Positive            >900                  Jacinto                 300-900                  Negative            <300      >75             Positive            >1800                  Gray                300-1800                  Negative            <300   TROPONIN - Abnormal; Notable for the following components:    HS Troponin T 125 (*)     All other components within normal limits    Narrative:     High Sensitive Troponin T Reference Range:  <14.0 ng/L- Negative Female for AMI  <22.0 ng/L- Negative Male for AMI  >=14 - Abnormal Female indicating possible myocardial injury.  >=22 - Abnormal Male indicating possible myocardial injury.   Clinicians would have to utilize clinical acumen, EKG, Troponin, and serial changes to determine if it is an Acute Myocardial Infarction or myocardial injury due to an underlying chronic condition.        CBC WITH AUTO DIFFERENTIAL - Abnormal; Notable for the following components:    WBC 12.76 (*)     RBC 5.31 (*)     Hemoglobin 16.6 (*)     Hematocrit 49.9 (*)     Platelets 118 (*)     Neutrophil % 85.6 (*)     Lymphocyte % 7.1 (*)     Eosinophil % 0.1 (*)     Neutrophils, Absolute 10.93 (*)     Immature Grans, Absolute 0.07 (*)     All other components within normal limits   HIGH SENSITIVITIY TROPONIN T 1HR - Abnormal; Notable for the following components:    HS Troponin T 128 (*)     All other components within normal limits    Narrative:     High Sensitive Troponin T Reference Range:  <14.0 ng/L- Negative Female for AMI  <22.0 ng/L- Negative Male for AMI  >=14 - Abnormal Female indicating possible myocardial injury.  >=22 - Abnormal Male indicating possible myocardial injury.   Clinicians would have to utilize clinical acumen, EKG, Troponin, and serial changes to determine if it is an Acute Myocardial Infarction or myocardial injury due to an underlying chronic condition.        URINALYSIS W/ MICROSCOPIC IF INDICATED (NO CULTURE) - Abnormal; Notable for the following components:    Glucose, UA >=1000 mg/dL (3+) (*)     Blood, UA Trace (*)      All other components within normal limits   BASIC METABOLIC PANEL - Abnormal; Notable for the following components:    Glucose 295 (*)     BUN 56 (*)     Creatinine 2.25 (*)     eGFR 20.4 (*)     All other components within normal limits    Narrative:     GFR Categories in Chronic Kidney Disease (CKD)      GFR Category          GFR (mL/min/1.73)    Interpretation  G1                     90 or greater         Normal or high (1)  G2                      60-89                Mild decrease (1)  G3a                   45-59                Mild to moderate decrease  G3b                   30-44                Moderate to severe decrease  G4                    15-29                Severe decrease  G5                    14 or less           Kidney failure          (1)In the absence of evidence of kidney disease, neither GFR category G1 or G2 fulfill the criteria for CKD.    eGFR calculation 2021 CKD-EPI creatinine equation, which does not include race as a factor   CBC (NO DIFF) - Abnormal; Notable for the following components:    Hematocrit 49.9 (*)     MCHC 31.1 (*)     Platelets 102 (*)     All other components within normal limits   TROPONIN - Abnormal; Notable for the following components:    HS Troponin T 137 (*)     All other components within normal limits    Narrative:     High Sensitive Troponin T Reference Range:  <14.0 ng/L- Negative Female for AMI  <22.0 ng/L- Negative Male for AMI  >=14 - Abnormal Female indicating possible myocardial injury.  >=22 - Abnormal Male indicating possible myocardial injury.   Clinicians would have to utilize clinical acumen, EKG, Troponin, and serial changes to determine if it is an Acute Myocardial Infarction or myocardial injury due to an underlying chronic condition.        HEMOGLOBIN A1C - Abnormal; Notable for the following components:    Hemoglobin A1C 10.70 (*)     All other components within normal limits    Narrative:     Hemoglobin A1C Ranges:    Increased Risk for  Diabetes  5.7% to 6.4%  Diabetes                     >= 6.5%  Diabetic Goal                < 7.0%   URINALYSIS, MICROSCOPIC ONLY - Abnormal; Notable for the following components:    RBC, UA 3-5 (*)     Squamous Epithelial Cells, UA 13-20 (*)     All other components within normal limits   POCT GLUCOSE FINGERSTICK - Abnormal; Notable for the following components:    Glucose 366 (*)     All other components within normal limits   POCT GLUCOSE FINGERSTICK - Abnormal; Notable for the following components:    Glucose 382 (*)     All other components within normal limits   POCT GLUCOSE FINGERSTICK - Abnormal; Notable for the following components:    Glucose 434 (*)     All other components within normal limits   POCT GLUCOSE FINGERSTICK - Abnormal; Notable for the following components:    Glucose 452 (*)     All other components within normal limits   RESPIRATORY PANEL PCR W/ COVID-19 (SARS-COV-2), NP SWAB IN UTM/VTP, 2 HR TAT - Normal    Narrative:     In the setting of a positive respiratory panel with a viral infection PLUS a negative procalcitonin without other underlying concern for bacterial infection, consider observing off antibiotics or discontinuation of antibiotics and continue supportive care. If the respiratory panel is positive for atypical bacterial infection (Bordetella pertussis, Chlamydophila pneumoniae, or Mycoplasma pneumoniae), consider antibiotic de-escalation to target atypical bacterial infection.   LACTIC ACID, PLASMA - Normal   HEPATITIS PANEL, ACUTE - Normal    Narrative:     Results may be falsely decreased if patient taking Biotin.    BLOOD CULTURE   BLOOD CULTURE   RAINBOW DRAW    Narrative:     The following orders were created for panel order [place] Draw.  Procedure                               Abnormality         Status                     ---------                               -----------         ------                     Green Top (Gel)[[id number]]                                   Final result               Lavender Top[[id number]]                                     Final result               Gold Top - SST[[id number]]                                   Final result               Light Blue Top[[id number]]                                   Final result                 Please view results for these tests on the individual orders.   RAINBOW DRAW    Narrative:     The following orders were created for panel order [place] Draw.  Procedure                               Abnormality         Status                     ---------                               -----------         ------                     Jacinto Top[[id number]]                                         Final result                 Please view results for these tests on the individual orders.   POCT GLUCOSE FINGERSTICK   POCT GLUCOSE FINGERSTICK   POCT GLUCOSE FINGERSTICK   POCT GLUCOSE FINGERSTICK   POCT GLUCOSE FINGERSTICK   POCT GLUCOSE FINGERSTICK   POCT GLUCOSE FINGERSTICK   POCT GLUCOSE FINGERSTICK   CBC AND DIFFERENTIAL    Narrative:     The following orders were created for panel order CBC & Differential.  Procedure                               Abnormality         Status                     ---------                               -----------         ------                     CBC Auto Differential[[id number]]        Abnormal            Final result                 Please view results for these tests on the individual orders.   GREEN TOP   LAVENDER TOP   GOLD TOP - SST   LIGHT BLUE TOP   GRAY TOP       EKG:   ECG 12 Lead Dyspnea   Preliminary Result   HEART RATE=70  bpm   RR Zpxcwhlo=036  ms   ID Interval=35  ms   P Horizontal Axis=-62  deg   P Front Axis=64  deg   QRSD Scnuxgii=704  ms   QT Excigrde=075  ms   YUcW=608  ms   QRS Axis=-59  deg   T Wave Axis=87  deg   - ABNORMAL ECG -   Atrial-ventricular dual-paced rhythm   No further analysis attempted due to paced rhythm   Date and Time of Study:2025-01-02 22:44:56          Meds given  in ED:   Medications   sodium chloride 0.9 % flush 10 mL (has no administration in time range)   sodium chloride 0.9 % flush 10 mL (has no administration in time range)   sodium chloride 0.9 % flush 10 mL (10 mL Intravenous Given 1/3/25 1238)   sodium chloride 0.9 % flush 10 mL (has no administration in time range)   sodium chloride 0.9 % infusion 40 mL (has no administration in time range)   nitroglycerin (NITROSTAT) SL tablet 0.4 mg (has no administration in time range)   sennosides-docusate (PERICOLACE) 8.6-50 MG per tablet 2 tablet (has no administration in time range)     And   polyethylene glycol (MIRALAX) packet 17 g (has no administration in time range)     And   bisacodyl (DULCOLAX) EC tablet 5 mg (has no administration in time range)     And   bisacodyl (DULCOLAX) suppository 10 mg (has no administration in time range)   calcium carbonate (TUMS) chewable tablet 500 mg (200 mg elemental) (has no administration in time range)   dextrose (GLUTOSE) oral gel 15 g (has no administration in time range)   dextrose (D50W) (25 g/50 mL) IV injection 25 g (has no administration in time range)   glucagon (GLUCAGEN) injection 1 mg (has no administration in time range)   insulin lispro (HUMALOG/ADMELOG) injection 2-7 Units ( Subcutaneous Not Given 1/3/25 1530)   ipratropium-albuterol (DUO-NEB) nebulizer solution 9 mL (9 mL Nebulization Given 1/2/25 2307)   aspirin chewable tablet 324 mg (324 mg Oral Given 1/2/25 2358)   insulin regular (humuLIN R,novoLIN R) injection 6 Units (6 Units Intravenous Given 1/3/25 0000)   lactated ringers bolus 500 mL (0 mL Intravenous Stopped 1/3/25 0057)   cefTRIAXone (ROCEPHIN) 1,000 mg in sodium chloride 0.9 % 100 mL MBP (0 mg Intravenous Stopped 1/3/25 0218)   azithromycin (ZITHROMAX) 500 mg in sodium chloride 0.9 % 250 mL IVPB-VTB (0 mg Intravenous Stopped 1/3/25 0334)   furosemide (LASIX) injection 80 mg (80 mg Intravenous Given 1/3/25 0126)   furosemide (LASIX) injection 80 mg (80 mg  Intravenous Given 1/3/25 1236)       Imaging results:  US Renal Bilateral    Result Date: 1/3/2025   1. Atrophic appearing left kidney. 2. Simple appearing left renal cyst. 3. No solid renal mass identified. No hydronephrosis. 4. Moderate to severe bladder distention     This report was finalized on 1/3/2025 10:06 AM by Dr. Dread Crisostomo M.D on Workstation: [id number]      XR Knee 3 View Right    Result Date: 1/3/2025   As described.    This report was finalized on 1/3/2025 7:10 AM by Dr. Kenton Vallejo M.D on Workstation: [id number]      XR Chest 1 View    Result Date: 1/3/2025  Electronically signed by Tip Lozano MD on 01-03-25 at 0101     Ambulatory status:   - assist    Social issues:   Social History     Socioeconomic History    Marital status:    Tobacco Use    Smoking status: Never    Smokeless tobacco: Never   Vaping Use    Vaping status: Never Used   Substance and Sexual Activity    Alcohol use: Never    Drug use: Never    Sexual activity: Defer       Peripheral Neurovascular  Peripheral Neurovascular (Adult)  Peripheral Neurovascular WDL: WDL    Neuro Cognitive  Neuro Cognitive (Adult)  Cognitive/Neuro/Behavioral WDL: WDL    Learning  Learning Assessment  Learning Readiness and Ability: no barriers identified  Education Provided  Person Taught: patient  Teaching Method: verbal instruction  Teaching Focus: symptom/problem overview  Education Outcome Evaluation: eager to learn    Respiratory  Respiratory WDL  Respiratory WDL: .WDL except, cough  Cough Frequency: frequent  Cough Type: fair  Breath Sounds  All Lung Fields Breath Sounds: All Fields  All Lung Fields Breath Sounds: Anterior:, wheezes, expiratory, crackles  Breath Sounds Post-Respiratory Treatment  Breath Sounds Posttreatment All Fields: All Fields  Breath Sounds Posttreatment All Fields: Anterior:, no change    Abdominal Pain       Pain Assessments  Pain (Adult)  (0-10) Pain Rating: Rest: 10  (0-10) Pain Rating: Activity: 10  Response  to Pain Interventions: functional ability unchanged    NIH Stroke Scale       Juan Manuel Ayala RN  01/03/25 17:12 EST

## 2025-01-03 NOTE — CONSULTS
[facility] Consult Note      Patient Name: Salma Hatfield  : 1935  MRN: [id number]  Primary Care Physician:  Stephen Long APRN  Referring Physician: Christie Yeh MD  Date of admission: 2025    Subjective     Reason for Consult:  MARTIN CKD3    HPI:   Salma Hatfield is a 89 y.o. female with CKD stage 3B (BL Cr 1.5 to 1.6, Dr Joey Browning follows), PVD & MELCHOR s/p bilat stents, diastolic CHF, CAD, Dm2, HTN, COPD on home O2 (3L).  Presents with cough & dyspnea.  CXR with mild vascular congestion.  Found to have MARTIN with BUN/Cr 60/2.5.  Hyperglycemic, .  Resp viral panel NEG.  UA normal.  BP elevated to 180/90 overnight (but 99/60 currently).  She was given  cc bolus and given lasix 80mg IV x1.  Azithromycin/rocephin also given.  Lactate normal.  proBNP elevated ~ 8K.  She has hx overactive bladder severe enough to require indwelling guerra in past.  Denies worsening OAB sx recently.  No n/v or diarrhea. On 5L O2 now.  Has pure wick, 600 cc urine on wall.  Cr down slightly 2.25 today (and BG to 295).  Home meds notable for low dose torsemide.       Review of Systems:   14 point review of systems is otherwise negative except for mentioned above on HPI    Personal History     Past Medical History:   Diagnosis Date    AAA (abdominal aortic aneurysm)     stated in 2022 Dr. Lokesh Santoro office note    Atherosclerotic heart disease     stated in 2022 Dr. Lokesh Santoro office note    Bradycardia     Carotid artery stenosis     stated in 2022 Dr. Lokesh Santoro office note    Cataract Removal 10/27/2008    Right (10/27/08) and Left (08)    Chronic kidney disease, stage 3a     Chronic respiratory failure with hypoxia     Coronary artery disease     Coronary atherosclerosis     stated in 2022 Dr. Lokesh Santoro office note    Diabetes mellitus     Diffuse large B cell lymphoma 2016    Elevated cholesterol     H/O  angioplasty 12/31/2007    Hx of CABG 09/21/2009    Triple bypass    Hyperlipidemia     Hyperparathyroidism 04/17/2017    Hypertension     Hypertensive disorder     stated in 2- Dr. Lokesh Santoro office note    Hypothyroidism (acquired)     Ischemic heart disease 12/15/2016    Pulmonary emphysema     Retinal tear 02/06/2009    Right eye, repaired 06/2009    S/P arterial stent 08/26/2009    Left leg and aorta    S/P renal artery angioplasty 01/21/2008    Left    Shingles 03/26/2014    Status post carotid surgery 06/13/2018    Stenosis of iliac artery     stated in 2- Dr. Lokesh Santoro office note       Past Surgical History:   Procedure Laterality Date    CARDIAC CATHETERIZATION      CARDIAC ELECTROPHYSIOLOGY PROCEDURE N/A 07/22/2022    Procedure: Pacemaker SC new Medtronic;  Surgeon: Kevin Eisenberg MD;  Location: [facility] CATH INVASIVE LOCATION;  Service: Cardiology;  Laterality: N/A;    CAROTID ENDARTERECTOMY  2018    CORONARY ARTERY BYPASS GRAFT  2008    Triple bypass    CORONARY STENT PLACEMENT  10/2011    ILIAC ARTERY STENT      Aorta-iliac stent 2009    INSERT / REPLACE / REMOVE PACEMAKER  07/2022    RENAL ARTERY STENT Left 2008       Family History: family history is not on file.    Social History:  reports that she has never smoked. She has never used smokeless tobacco. She reports that she does not drink alcohol and does not use drugs.    Home Medications:  Prior to Admission medications    Medication Sig Start Date End Date Taking? Authorizing Provider   allopurinol (ZYLOPRIM) 100 MG tablet Take 100 mg by mouth 2 (Two) Times a Day.    Provider, MD Luis   aspirin 81 MG EC tablet Take 1 tablet by mouth Daily. Indications: Disease involving Lipid Deposits in the Arteries    Provider, MD Luis   calcium carbonate (TUMS) 500 MG chewable tablet Chew 2 tablets 2 (Two) Times a Day As Needed for Heartburn. 5/21/23   Vince Okeefe MD   Calcium Carbonate-Vitamin D  600-200 MG-UNIT tablet Take 1 tablet by mouth Daily. Indications: Low Amount of Calcium in the Blood    ProviderLuis MD   docusate sodium (COLACE) 100 MG capsule Take 100 mg by mouth 2 (Two) Times a Day. Indications: Constipation    ProviderLuis MD   dorzolamide-timolol (COSOPT) 22.3-6.8 MG/ML ophthalmic solution Administer 1 drop into the left eye 2 (Two) Times a Day. Indications: Wide-Angle Glaucoma    ProviderLuis MD   ezetimibe (ZETIA) 10 MG tablet Take 10 mg by mouth Daily. Indications: Ischemic Heart Disease    ProviderLuis MD   famotidine (PEPCID) 20 MG tablet Take 0.5 tablets by mouth 2 (Two) Times a Day. 5/21/23   Vince Okeefe MD   fluticasone (FLONASE) 50 MCG/ACT nasal spray 2 sprays into the nostril(s) as directed by provider Daily. Indications: Stuffy Nose    ProviderLuis MD   gabapentin (NEURONTIN) 300 MG capsule Take 300 mg by mouth Every Morning. Indications: Neuropathic Pain    ProviderLuis MD   gabapentin (NEURONTIN) 300 MG capsule Take 1 capsule by mouth Every Night. 5/21/23   Vince Okeefe MD   glipizide (GLUCOTROL XL) 5 MG ER tablet Take 5 mg by mouth 2 (Two) Times a Day. Indications: Type 2 Diabetes    ProviderLuis MD   hydrALAZINE (APRESOLINE) 25 MG tablet Take 1 tablet by mouth Every 8 (Eight) Hours. 5/21/23   Vince Okeefe MD   HYDROcodone-acetaminophen (NORCO) 5-325 MG per tablet Take 1 tablet by mouth Every 8 (Eight) Hours As Needed. Indications: Pain    ProviderLuis MD   isosorbide mononitrate (IMDUR) 30 MG 24 hr tablet Take 1 tablet by mouth Daily. 9/24/22   Salazar Perez MD   latanoprost (XALATAN) 0.005 % ophthalmic solution Administer 1 drop into the left eye Every Night. Indications: Wide-Angle Glaucoma    ProviderLuis MD   levothyroxine (SYNTHROID, LEVOTHROID) 50 MCG tablet Take 50 mcg by mouth Daily. Indications: Underactive Thyroid    ProviderLuis  MD   metFORMIN (GLUCOPHAGE) 500 MG tablet Take 1 tablet by mouth Daily With Breakfast. 5/21/23   Vince Okeefe MD   metoprolol tartrate (LOPRESSOR) 50 MG tablet Take 1 tablet by mouth 2 (Two) Times a Day. Indications: High Blood Pressure Disorder    Luis Hollis MD   montelukast (SINGULAIR) 10 MG tablet Take 1 tablet by mouth Every Night. Indications: Hayfever    ProviderLuis MD   multivitamin with minerals tablet tablet Take 1 tablet by mouth Daily. Indications: Vitamin Deficiency    ProviderLuis MD   O2 (OXYGEN) Inhale 3 L/min Continuous. 5/22/23   Luis Hollis MD   rosuvastatin (CRESTOR) 20 MG tablet Take 40 mg by mouth Daily. Indications: High Amount of Fats in the Blood    Luis Hollis MD   SITagliptin (JANUVIA) 50 MG tablet Take 50 mg by mouth Daily. Indications: Type 2 Diabetes    ProviderLuis MD   torsemide (DEMADEX) 20 MG tablet Take 1 tablet by mouth Daily. 5/22/23   Vince Okeefe MD       Allergies:  No Known Allergies    Objective     Vitals:   Temp:  [97.1 °F (36.2 °C)-98 °F (36.7 °C)] 98 °F (36.7 °C)  Heart Rate:  [67-87] 67  Resp:  [18-24] 18  BP: (147-183)/(81-98) 159/87  Flow (L/min) (Oxygen Therapy):  [3-8] 6    Intake/Output Summary (Last 24 hours) at 1/3/2025 0804  Last data filed at 1/3/2025 0546  Gross per 24 hour   Intake 850 ml   Output 1800 ml   Net -950 ml       Physical Exam:    General Appearance: frail WF uncomfortable but alert on NC O2  Skin: warm and dry  HEENT: oral mucosa normal, nonicteric sclera  Neck: supple, no JVD  Lungs: Dec BS bilat no rales  Heart: RRR, normal S1 and S2  Abdomen: soft, nontender, nondistended  : pure wick 600 cc  Extremities: no edema, cyanosis or clubbing  Neuro: normal speech and mental status     Scheduled Meds:     bumetanide, 2 mg, Intravenous, Q12H  insulin lispro, 2-7 Units, Subcutaneous, 4x Daily AC & at Bedtime  sodium chloride, 10 mL, Intravenous, Q12H      IV Meds:         Results Reviewed:   I have personally reviewed the results from the time of this admission to 1/3/2025 08:04 EST     Lab Results   Component Value Date    GLUCOSE 295 (H) 01/03/2025    CALCIUM 9.5 01/03/2025     01/03/2025    K 3.8 01/03/2025    CO2 22.7 01/03/2025     01/03/2025    BUN 56 (H) 01/03/2025    CREATININE 2.25 (H) 01/03/2025    EGFRIFAFRI 41 (L) 07/20/2022    BCR 24.9 01/03/2025    ANIONGAP 13.3 01/03/2025      Lab Results   Component Value Date    MG 2.6 (H) 05/21/2023    PHOS 3.6 05/21/2023    ALBUMIN 3.7 01/02/2025           Assessment / Plan     ASSESSMENT:  Non olig MARTIN - possibly prerenal azotemia due to osmotic diuresis from hyperglycemia (BG 500s) in combination with diuretic use.  Does not appear to have sig decompensated CHF despite high proBNP - mild vascular alyx on CXR and no periph edema.  Cr improved 2.5 to 2.25 with both IVF (LR bolus) and IV lasix.  Hold diuretic this AM pending repeat /90 -> 90s/60s.  Assure no retention given hx OAB though UOP good via pure wick.  K/Hco3 normal.  UA NEG  CKD stage 3B, BL Cr 1.5 to 1.6, multifactorial: HTN, renovascular dz, DM2, CHF  Chronic diastolic CHF  Dm2 + hyperglycemia,  down to 295 now; hold metformin due to MARTIN  Cough + dyspnea - CXR shows no infiltrate and viral resp panel neg.  Given empiric abx.  WBC down 12 to 10K.  Afebrile   Acute on chronic hypoxic resp failure, on 3L O2 at home, 5L currently   HTN - BP labile thus far, assure low value this AM (97/61) is accurate (high all night).  Takes hydralazine & metop at home.      PLAN:  Check random bladder scan & renal US  Hold diuretic pending repeat BP assessment - if BP better don't object to single dose IV lasix this AM    Thank you for involving us in the care of Salma Hatfield.  Please feel free to call with any questions.    Dread Jason MD  01/03/25  08:04 [facility]    [facility]  [phone]

## 2025-01-03 NOTE — ED PROVIDER NOTES
EMERGENCY DEPARTMENT ENCOUNTER    History  Chief Complaint   Patient presents with    Shortness of Breath    Cough       History provided by: Patient and ED Nurse     HPI:  Chief Complaint: Cough and shortness of breath    Context: Pt is a 89 y.o. female who presents with complaint of cough and shortness of breath.  Symptoms been present for the past week or so, but acutely worse over the past day.  She notes productive cough of brown sputum.  She does have a history of COPD and is maintained on nasal cannula oxygen.  She was recently seen by PCP and was started on antibiotics, not sure which ones.  However, she has not seemed to improve improved.      Active Ambulatory Problems     Diagnosis Date Noted    Bradycardia 07/20/2022    Pulmonary emphysema 07/21/2022    Diffuse large B cell lymphoma 07/21/2022    S/P CABG (coronary artery bypass graft) 07/21/2022    Coronary artery disease involving native coronary artery without angina pectoris 07/21/2022    Chronic respiratory failure with hypoxia 07/21/2022    Type 2 diabetes mellitus with hyperglycemia, without long-term current use of insulin 07/21/2022    Hypothyroidism (acquired) 07/21/2022    Stage 3b chronic kidney disease 07/21/2022    Second degree AV block 07/20/2022    Presence of cardiac pacemaker 07/23/2022    Acute UTI (urinary tract infection) 09/21/2022    History of 2019 novel coronavirus disease (COVID-19) 09/21/2022    Acute on chronic respiratory failure with hypoxia 05/17/2023    PVD (peripheral vascular disease) 05/17/2023    Diastolic CHF, acute on chronic 05/17/2023    MARTIN (acute kidney injury) 05/18/2023    Acute on chronic heart failure with preserved ejection fraction (HFpEF) 05/21/2023     Resolved Ambulatory Problems     Diagnosis Date Noted    Chest pain with high risk for cardiac etiology 09/21/2022    Hypertensive urgency 09/21/2022     Past Medical History:   Diagnosis Date    AAA (abdominal aortic aneurysm)     Atherosclerotic heart  disease     Carotid artery stenosis     Cataract Removal 10/27/2008    Chronic kidney disease, stage 3a     Coronary artery disease     Coronary atherosclerosis     Diabetes mellitus     Elevated cholesterol     H/O angioplasty 12/31/2007    Hx of CABG 09/21/2009    Hyperlipidemia     Hyperparathyroidism 04/17/2017    Hypertension     Hypertensive disorder     Ischemic heart disease 12/15/2016    Retinal tear 02/06/2009    S/P arterial stent 08/26/2009    S/P renal artery angioplasty 01/21/2008    Shingles 03/26/2014    Status post carotid surgery 06/13/2018    Stenosis of iliac artery        Past Surgical History:   Procedure Laterality Date    CARDIAC CATHETERIZATION      CARDIAC ELECTROPHYSIOLOGY PROCEDURE N/A 07/22/2022    Procedure: Pacemaker SC new [employer]tr[employer];  Surgeon: Kevin Eisenberg MD;  Location:  [facility] CATH INVASIVE LOCATION;  Service: Cardiology;  Laterality: N/A;    CAROTID ENDARTERECTOMY  2018    CORONARY ARTERY BYPASS GRAFT  2008    Triple bypass    CORONARY STENT PLACEMENT  10/2011    ILIAC ARTERY STENT      Aorta-iliac stent 2009    INSERT / REPLACE / REMOVE PACEMAKER  07/2022    RENAL ARTERY STENT Left 2008       Physical Exam  ED Triage Vitals [01/02/25 2216]   Temp Heart Rate Resp BP SpO2   97.1 °F (36.2 °C) 87 20 160/98 90 %      Temp src Heart Rate Source Patient Position BP Location FiO2 (%)   -- -- -- -- --     Physical Exam  Constitutional:       Appearance: Normal appearance.   HENT:      Head: Normocephalic and atraumatic.   Eyes:      Conjunctiva/sclera: Conjunctivae normal.   Pulmonary:      Effort: Pulmonary effort is normal. Tachypnea present. No respiratory distress.      Breath sounds: Wheezing and rhonchi present.   Abdominal:      Tenderness: There is no abdominal tenderness. There is no right CVA tenderness, left CVA tenderness, guarding or rebound.   Neurological:      Mental Status: She is alert and oriented to person, place, and time.         Medical Decision  Making:    Differential Diagnosis includes but not limited to: COPD exacerbation, viral infection    Alternative Historian Required Due To: Contribute additional history    Medical Record Review: Reviewed cardiology office visit note 10/24/2024    Labs Results:  Recent Results (from the past 24 hours)   Comprehensive Metabolic Panel    Collection Time: 01/02/25 10:35 PM    Specimen: Blood   Result Value Ref Range    Glucose 536 (C) 65 - 99 mg/dL    BUN 60 (H) 8 - 23 mg/dL    Creatinine 2.48 (H) 0.57 - 1.00 mg/dL    Sodium 133 (L) 136 - 145 mmol/L    Potassium 3.9 3.5 - 5.2 mmol/L    Chloride 97 (L) 98 - 107 mmol/L    CO2 26.2 22.0 - 29.0 mmol/L    Calcium 9.5 8.6 - 10.5 mg/dL    Total Protein 6.7 6.0 - 8.5 g/dL    Albumin 3.7 3.5 - 5.2 g/dL    ALT (SGPT) 174 (H) 1 - 33 U/L    AST (SGOT) 242 (H) 1 - 32 U/L    Alkaline Phosphatase 142 (H) 39 - 117 U/L    Total Bilirubin 0.7 0.0 - 1.2 mg/dL    Globulin 3.0 gm/dL    A/G Ratio 1.2 g/dL    BUN/Creatinine Ratio 24.2 7.0 - 25.0    Anion Gap 9.8 5.0 - 15.0 mmol/L    eGFR 18.1 (L) >60.0 mL/min/1.73   BNP    Collection Time: 01/02/25 10:35 PM    Specimen: Blood   Result Value Ref Range    proBNP 7,970.0 (H) 0.0 - 1,800.0 pg/mL   High Sensitivity Troponin T    Collection Time: 01/02/25 10:35 PM    Specimen: Blood   Result Value Ref Range    HS Troponin T 125 (C) <14 ng/L   Respiratory Panel PCR w/COVID-19(SARS-CoV-2) [facility] In-House, NP Swab in UT/VTM, 2 HR TAT - Swab, Nasopharynx    Collection Time: 01/02/25 10:35 PM    Specimen: Nasopharynx; Swab   Result Value Ref Range    ADENOVIRUS, PCR Not Detected Not Detected    Coronavirus 229E Not Detected Not Detected    Coronavirus HKU1 Not Detected Not Detected    Coronavirus NL63 Not Detected Not Detected    Coronavirus OC43 Not Detected Not Detected    COVID19 Not Detected Not Detected - Ref. Range    Human Metapneumovirus Not Detected Not Detected    Human Rhinovirus/Enterovirus Not Detected Not Detected     Influenza A PCR Not Detected Not Detected    Influenza B PCR Not Detected Not Detected    Parainfluenza Virus 1 Not Detected Not Detected    Parainfluenza Virus 2 Not Detected Not Detected    Parainfluenza Virus 3 Not Detected Not Detected    Parainfluenza Virus 4 Not Detected Not Detected    RSV, PCR Not Detected Not Detected    Bordetella pertussis pcr Not Detected Not Detected    Bordetella parapertussis PCR Not Detected Not Detected    Chlamydophila pneumoniae PCR Not Detected Not Detected    Mycoplasma pneumo by PCR Not Detected Not Detected   Green Top (Gel)    Collection Time: 01/02/25 10:35 PM   Result Value Ref Range    Extra Tube Hold for add-ons.    Lavender Top    Collection Time: 01/02/25 10:35 PM   Result Value Ref Range    Extra Tube hold for add-on    Gold Top - SST    Collection Time: 01/02/25 10:35 PM   Result Value Ref Range    Extra Tube Hold for add-ons.    Light Blue Top    Collection Time: 01/02/25 10:35 PM   Result Value Ref Range    Extra Tube Hold for add-ons.    CBC Auto Differential    Collection Time: 01/02/25 10:35 PM    Specimen: Blood   Result Value Ref Range    WBC 12.76 (H) 3.40 - 10.80 10*3/mm3    RBC 5.31 (H) 3.77 - 5.28 10*6/mm3    Hemoglobin 16.6 (H) 12.0 - 15.9 g/dL    Hematocrit 49.9 (H) 34.0 - 46.6 %    MCV 94.0 79.0 - 97.0 fL    MCH 31.3 26.6 - 33.0 pg    MCHC 33.3 31.5 - 35.7 g/dL    RDW 13.2 12.3 - 15.4 %    RDW-SD 45.5 37.0 - 54.0 fl    MPV 10.3 6.0 - 12.0 fL    Platelets 118 (L) 140 - 450 10*3/mm3    Neutrophil % 85.6 (H) 42.7 - 76.0 %    Lymphocyte % 7.1 (L) 19.6 - 45.3 %    Monocyte % 6.6 5.0 - 12.0 %    Eosinophil % 0.1 (L) 0.3 - 6.2 %    Basophil % 0.1 0.0 - 1.5 %    Immature Grans % 0.5 0.0 - 0.5 %    Neutrophils, Absolute 10.93 (H) 1.70 - 7.00 10*3/mm3    Lymphocytes, Absolute 0.90 0.70 - 3.10 10*3/mm3    Monocytes, Absolute 0.84 0.10 - 0.90 10*3/mm3    Eosinophils, Absolute 0.01 0.00 - 0.40 10*3/mm3    Basophils, Absolute 0.01 0.00 - 0.20 10*3/mm3    Immature  Grans, Absolute 0.07 (H) 0.00 - 0.05 10*3/mm3    nRBC 0.0 0.0 - 0.2 /100 WBC   Gray Top    Collection Time: 01/02/25 10:38 PM   Result Value Ref Range    Extra Tube Hold for add-ons.    Lactic Acid, Plasma    Collection Time: 01/02/25 10:38 PM    Specimen: Blood   Result Value Ref Range    Lactate 1.5 0.5 - 2.0 mmol/L   ECG 12 Lead Dyspnea    Collection Time: 01/02/25 10:44 PM   Result Value Ref Range    QT Interval 391 ms    QTC Interval 422 ms   POC Glucose Once    Collection Time: 01/03/25 12:58 AM    Specimen: Blood   Result Value Ref Range    Glucose 366 (H) 70 - 130 mg/dL     Lab Comments:  My independent interpretation of the above labs: Elevated troponin and BNP, CKD, hyperglycemia without evidence of DKA or HHS, leukocytosis      Radiology:  XR Chest 1 View    Result Date: 1/3/2025  Patient: LORETA MENDOZA  Time Out: 01:01 Exam(s): XR CXR 1 VIEW EXAM:   XR Chest, 1 View CLINICAL HISTORY:    Reason for exam: CHF COPD Protocol. TECHNIQUE:   Frontal view of the chest. COMPARISON:   No relevant prior studies available. FINDINGS:   Lungs:  Mild pulmonary vascular congestion.  No consolidation.   Pleural space:  Unremarkable.  No pneumothorax.   Heart:  Unremarkable.  No cardiomegaly.   Mediastinum:  Unremarkable.  Normal mediastinal contour.   Bones joints:  Unremarkable.  No acute fracture.   Tubes, lines and devices:  Left subclavian access dual-lead pacer is noted with tips overlying the right atrium and right ventricle. IMPRESSION:       Mild pulmonary vascular congestion.     Electronically signed by Tip Lozano MD on 01-03-25 at 0101   Radiology Comments:  I ordered the above imaging and reviewed the results.    My independent interpretation of the knee xr: Vascular calcifications    EKG Interpreted by me: AV paced      Medications given in ED:  Medications   sodium chloride 0.9 % flush 10 mL (has no administration in time range)   sodium chloride 0.9 % flush 10 mL (has no administration in time range)    cefTRIAXone (ROCEPHIN) 1,000 mg in sodium chloride 0.9 % 100 mL MBP (has no administration in time range)   azithromycin (ZITHROMAX) 500 mg in sodium chloride 0.9 % 250 mL IVPB-VTB (has no administration in time range)   furosemide (LASIX) injection 80 mg (has no administration in time range)   ipratropium-albuterol (DUO-NEB) nebulizer solution 9 mL (9 mL Nebulization Given 1/2/25 2307)   aspirin chewable tablet 324 mg (324 mg Oral Given 1/2/25 2358)   insulin regular (humuLIN R,novoLIN R) injection 6 Units (6 Units Intravenous Given 1/3/25 0000)   lactated ringers bolus 500 mL (0 mL Intravenous Stopped 1/3/25 0057)       Rationale:  This is an 89-year-old female with a past medical history of COPD and chronic respiratory failure on home nasal cannula oxygen who is presenting today with increased cough and shortness of breath.  She looks ill, but is not acutely toxic appearing.  She presents with increasing oxygen requirements from baseline, hypertension, but otherwise benign vital signs.  Exam is notable for wheezing and rhonchi bilaterally.    Patient was evaluated with an EKG, which demonstrates a paced rhythm.  Her troponin is mildly elevated, but exam and symptoms are not consistent with acute coronary syndrome.  Suspect this elevation is due to chronic respiratory failure with superimposed edema/heart failure and infection.    Considered pulmonary embolism.  However, the patient is not tachycardic, exam and history is far more consistent with acute infection and pulmonary edema.  I think this is less likely and therefore would defer further imaging in the emergency department unless she clinically changes or fails to improve with standard therapy.    Chest x-ray is notable for vascular congestion, pulmonary edema, and cannot exclude associated infiltrate.  Given associated productive cough and symptoms of community-acquired pneumonia, we will plan on treating.  Meets 2 SIRS criteria in the emergency  department.  No evidence of endorgan damage secondary to infection (chronic CKD).  She will be treated with broad-spectrum antibiotics.    Labs otherwise notable for hyperglycemia without evidence of DKA or HHS.  Will treat with a small fluid bolus, insulin.    Given exam, history and workup, will plan on admission for further care and management.    Progress Notes:  1:10 AM: I saw and reevaluated the patient.  Resting comfortably no acute distress.  she is overall feeling improved.  She is newly complaining of some right knee pain.  Has a mild effusion on exam, but is otherwise neurovascularly intact.  X-ray obtained and fairly benign outside of vascular calcifications.  I spoke with the patient about her ED work up and diagnosis. I informed the patient that she will be admitted for further evaluation/treatment. All questions answered.      Consult:  1:24 AM EST: Discussed case with LUIS Kapadia with A.  Reviewed history, exam, results and treatments.  Will admit to Dr. Vega        Diagnosis:  Final diagnoses:   Acute on chronic respiratory failure with hypoxia   Acute pulmonary edema   Community acquired pneumonia, unspecified laterality   Acute pain of right knee               EMR Dragon/Transcription disclaimer:  Some of this encounter note is an electronic transcription/translation of spoken language to printed text. The electronic translation of spoken language may permit erroneous, or at times, nonsensical words or phrases to be inadvertently transcribed; Although I have reviewed the note for such errors, some may still exist.        Provider Note Signed by:     Christie Yeh MD  01/03/25 0130

## 2025-01-04 ENCOUNTER — APPOINTMENT (OUTPATIENT)
Dept: CARDIOLOGY | Facility: HOSPITAL | Age: OVER 89
End: 2025-01-04
Payer: MEDICARE

## 2025-01-04 PROBLEM — J15.9 BACTERIAL PNEUMONIA: Status: ACTIVE | Noted: 2025-01-04

## 2025-01-04 PROBLEM — I10 HYPERTENSION: Status: ACTIVE | Noted: 2025-01-04

## 2025-01-04 LAB
ALBUMIN SERPL-MCNC: 3.3 G/DL (ref 3.5–5.2)
ANION GAP SERPL CALCULATED.3IONS-SCNC: 20.3 MMOL/L (ref 5–15)
BASOPHILS # BLD AUTO: 0.02 10*3/MM3 (ref 0–0.2)
BASOPHILS NFR BLD AUTO: 0.1 % (ref 0–1.5)
BUN SERPL-MCNC: 67 MG/DL (ref 8–23)
BUN/CREAT SERPL: 21.3 (ref 7–25)
CALCIUM SPEC-SCNC: 9 MG/DL (ref 8.6–10.5)
CHLORIDE SERPL-SCNC: 85 MMOL/L (ref 98–107)
CK SERPL-CCNC: 110 U/L (ref 20–180)
CO2 SERPL-SCNC: 22.7 MMOL/L (ref 22–29)
CREAT SERPL-MCNC: 3.14 MG/DL (ref 0.57–1)
DEPRECATED RDW RBC AUTO: 46.6 FL (ref 37–54)
EGFRCR SERPLBLD CKD-EPI 2021: 13.7 ML/MIN/1.73
EOSINOPHIL # BLD AUTO: 0.02 10*3/MM3 (ref 0–0.4)
EOSINOPHIL NFR BLD AUTO: 0.1 % (ref 0.3–6.2)
ERYTHROCYTE [DISTWIDTH] IN BLOOD BY AUTOMATED COUNT: 13.5 % (ref 12.3–15.4)
GLUCOSE BLDC GLUCOMTR-MCNC: 371 MG/DL (ref 70–130)
GLUCOSE BLDC GLUCOMTR-MCNC: 391 MG/DL (ref 70–130)
GLUCOSE BLDC GLUCOMTR-MCNC: 409 MG/DL (ref 70–130)
GLUCOSE BLDC GLUCOMTR-MCNC: 438 MG/DL (ref 70–130)
GLUCOSE BLDC GLUCOMTR-MCNC: 464 MG/DL (ref 70–130)
GLUCOSE SERPL-MCNC: 506 MG/DL (ref 65–99)
HCT VFR BLD AUTO: 52.2 % (ref 34–46.6)
HGB BLD-MCNC: 16.8 G/DL (ref 12–15.9)
IMM GRANULOCYTES # BLD AUTO: 0.08 10*3/MM3 (ref 0–0.05)
IMM GRANULOCYTES NFR BLD AUTO: 0.6 % (ref 0–0.5)
LYMPHOCYTES # BLD AUTO: 1.47 10*3/MM3 (ref 0.7–3.1)
LYMPHOCYTES NFR BLD AUTO: 10.9 % (ref 19.6–45.3)
MCH RBC QN AUTO: 30.3 PG (ref 26.6–33)
MCHC RBC AUTO-ENTMCNC: 32.2 G/DL (ref 31.5–35.7)
MCV RBC AUTO: 94.2 FL (ref 79–97)
MONOCYTES # BLD AUTO: 1.6 10*3/MM3 (ref 0.1–0.9)
MONOCYTES NFR BLD AUTO: 11.9 % (ref 5–12)
NEUTROPHILS NFR BLD AUTO: 10.3 10*3/MM3 (ref 1.7–7)
NEUTROPHILS NFR BLD AUTO: 76.4 % (ref 42.7–76)
NRBC BLD AUTO-RTO: 0 /100 WBC (ref 0–0.2)
PHOSPHATE SERPL-MCNC: 3.5 MG/DL (ref 2.5–4.5)
PLATELET # BLD AUTO: 118 10*3/MM3 (ref 140–450)
PMV BLD AUTO: 11.2 FL (ref 6–12)
POTASSIUM SERPL-SCNC: 3 MMOL/L (ref 3.5–5.2)
RBC # BLD AUTO: 5.54 10*6/MM3 (ref 3.77–5.28)
SODIUM SERPL-SCNC: 128 MMOL/L (ref 136–145)
WBC NRBC COR # BLD AUTO: 13.49 10*3/MM3 (ref 3.4–10.8)

## 2025-01-04 PROCEDURE — 94799 UNLISTED PULMONARY SVC/PX: CPT

## 2025-01-04 PROCEDURE — 94761 N-INVAS EAR/PLS OXIMETRY MLT: CPT

## 2025-01-04 PROCEDURE — 94664 DEMO&/EVAL PT USE INHALER: CPT

## 2025-01-04 PROCEDURE — 63710000001 INSULIN GLARGINE PER 5 UNITS: Performed by: NURSE PRACTITIONER

## 2025-01-04 PROCEDURE — 80069 RENAL FUNCTION PANEL: CPT | Performed by: HOSPITALIST

## 2025-01-04 PROCEDURE — 82948 REAGENT STRIP/BLOOD GLUCOSE: CPT

## 2025-01-04 PROCEDURE — 25010000002 ALBUMIN HUMAN 25% PER 50 ML: Performed by: INTERNAL MEDICINE

## 2025-01-04 PROCEDURE — P9047 ALBUMIN (HUMAN), 25%, 50ML: HCPCS | Performed by: INTERNAL MEDICINE

## 2025-01-04 PROCEDURE — 93306 TTE W/DOPPLER COMPLETE: CPT

## 2025-01-04 PROCEDURE — 99232 SBSQ HOSP IP/OBS MODERATE 35: CPT | Performed by: NURSE PRACTITIONER

## 2025-01-04 PROCEDURE — 97166 OT EVAL MOD COMPLEX 45 MIN: CPT

## 2025-01-04 PROCEDURE — 63710000001 INSULIN LISPRO (HUMAN) PER 5 UNITS: Performed by: NURSE PRACTITIONER

## 2025-01-04 PROCEDURE — 82550 ASSAY OF CK (CPK): CPT | Performed by: HOSPITALIST

## 2025-01-04 PROCEDURE — 36415 COLL VENOUS BLD VENIPUNCTURE: CPT | Performed by: INTERNAL MEDICINE

## 2025-01-04 PROCEDURE — 63710000001 INSULIN LISPRO (HUMAN) PER 5 UNITS: Performed by: HOSPITALIST

## 2025-01-04 PROCEDURE — 93306 TTE W/DOPPLER COMPLETE: CPT | Performed by: INTERNAL MEDICINE

## 2025-01-04 PROCEDURE — 85025 COMPLETE CBC W/AUTO DIFF WBC: CPT | Performed by: INTERNAL MEDICINE

## 2025-01-04 RX ORDER — IPRATROPIUM BROMIDE AND ALBUTEROL SULFATE 2.5; .5 MG/3ML; MG/3ML
3 SOLUTION RESPIRATORY (INHALATION)
Status: DISCONTINUED | OUTPATIENT
Start: 2025-01-04 | End: 2025-01-09 | Stop reason: HOSPADM

## 2025-01-04 RX ORDER — FLUTICASONE PROPIONATE 50 MCG
2 SPRAY, SUSPENSION (ML) NASAL DAILY
Status: DISCONTINUED | OUTPATIENT
Start: 2025-01-04 | End: 2025-01-09 | Stop reason: HOSPADM

## 2025-01-04 RX ORDER — GABAPENTIN 300 MG/1
300 CAPSULE ORAL 2 TIMES DAILY
Status: DISCONTINUED | OUTPATIENT
Start: 2025-01-04 | End: 2025-01-07

## 2025-01-04 RX ORDER — INSULIN LISPRO 100 [IU]/ML
5 INJECTION, SOLUTION INTRAVENOUS; SUBCUTANEOUS
Status: DISCONTINUED | OUTPATIENT
Start: 2025-01-04 | End: 2025-01-05

## 2025-01-04 RX ORDER — LATANOPROST 50 UG/ML
1 SOLUTION/ DROPS OPHTHALMIC NIGHTLY
Status: DISCONTINUED | OUTPATIENT
Start: 2025-01-04 | End: 2025-01-09 | Stop reason: HOSPADM

## 2025-01-04 RX ORDER — ROSUVASTATIN CALCIUM 10 MG/1
10 TABLET, COATED ORAL DAILY
Status: DISCONTINUED | OUTPATIENT
Start: 2025-01-04 | End: 2025-01-07

## 2025-01-04 RX ORDER — FAMOTIDINE 20 MG/1
10 TABLET, FILM COATED ORAL DAILY
Status: DISCONTINUED | OUTPATIENT
Start: 2025-01-04 | End: 2025-01-09 | Stop reason: HOSPADM

## 2025-01-04 RX ORDER — GABAPENTIN 300 MG/1
300 CAPSULE ORAL NIGHTLY
Status: DISCONTINUED | OUTPATIENT
Start: 2025-01-04 | End: 2025-01-04 | Stop reason: DRUGHIGH

## 2025-01-04 RX ORDER — ISOSORBIDE MONONITRATE 30 MG/1
30 TABLET, EXTENDED RELEASE ORAL DAILY
Status: DISCONTINUED | OUTPATIENT
Start: 2025-01-04 | End: 2025-01-09 | Stop reason: HOSPADM

## 2025-01-04 RX ORDER — MONTELUKAST SODIUM 10 MG/1
10 TABLET ORAL NIGHTLY
Status: DISCONTINUED | OUTPATIENT
Start: 2025-01-04 | End: 2025-01-09 | Stop reason: HOSPADM

## 2025-01-04 RX ORDER — ARFORMOTEROL TARTRATE 15 UG/2ML
15 SOLUTION RESPIRATORY (INHALATION)
Status: DISCONTINUED | OUTPATIENT
Start: 2025-01-04 | End: 2025-01-09 | Stop reason: HOSPADM

## 2025-01-04 RX ORDER — NITROGLYCERIN 0.4 MG/1
0.4 TABLET SUBLINGUAL
Status: DISCONTINUED | OUTPATIENT
Start: 2025-01-04 | End: 2025-01-09 | Stop reason: HOSPADM

## 2025-01-04 RX ORDER — HYDRALAZINE HYDROCHLORIDE 25 MG/1
25 TABLET, FILM COATED ORAL EVERY 8 HOURS SCHEDULED
Status: DISCONTINUED | OUTPATIENT
Start: 2025-01-04 | End: 2025-01-04

## 2025-01-04 RX ORDER — ASPIRIN 81 MG/1
81 TABLET ORAL DAILY
Status: DISCONTINUED | OUTPATIENT
Start: 2025-01-04 | End: 2025-01-09 | Stop reason: HOSPADM

## 2025-01-04 RX ORDER — BUDESONIDE AND FORMOTEROL FUMARATE DIHYDRATE 160; 4.5 UG/1; UG/1
2 AEROSOL RESPIRATORY (INHALATION)
Status: DISCONTINUED | OUTPATIENT
Start: 2025-01-04 | End: 2025-01-04

## 2025-01-04 RX ORDER — INSULIN LISPRO 100 [IU]/ML
2-9 INJECTION, SOLUTION INTRAVENOUS; SUBCUTANEOUS
Status: DISCONTINUED | OUTPATIENT
Start: 2025-01-04 | End: 2025-01-05

## 2025-01-04 RX ORDER — TIMOLOL MALEATE 5 MG/ML
1 SOLUTION/ DROPS OPHTHALMIC 2 TIMES DAILY
Status: DISCONTINUED | OUTPATIENT
Start: 2025-01-04 | End: 2025-01-04 | Stop reason: SDUPTHER

## 2025-01-04 RX ORDER — POTASSIUM CHLORIDE 750 MG/1
40 TABLET, FILM COATED, EXTENDED RELEASE ORAL EVERY 4 HOURS
Status: COMPLETED | OUTPATIENT
Start: 2025-01-04 | End: 2025-01-04

## 2025-01-04 RX ORDER — AZITHROMYCIN 250 MG/1
250 TABLET, FILM COATED ORAL
Status: COMPLETED | OUTPATIENT
Start: 2025-01-04 | End: 2025-01-08

## 2025-01-04 RX ORDER — ALLOPURINOL 100 MG/1
100 TABLET ORAL DAILY
Status: DISCONTINUED | OUTPATIENT
Start: 2025-01-04 | End: 2025-01-07

## 2025-01-04 RX ORDER — ALBUMIN (HUMAN) 12.5 G/50ML
25 SOLUTION INTRAVENOUS EVERY 6 HOURS
Status: COMPLETED | OUTPATIENT
Start: 2025-01-04 | End: 2025-01-05

## 2025-01-04 RX ORDER — BUDESONIDE 0.5 MG/2ML
0.5 INHALANT ORAL
Status: DISCONTINUED | OUTPATIENT
Start: 2025-01-04 | End: 2025-01-09 | Stop reason: HOSPADM

## 2025-01-04 RX ORDER — METOPROLOL SUCCINATE 100 MG/1
100 TABLET, EXTENDED RELEASE ORAL DAILY
Status: DISCONTINUED | OUTPATIENT
Start: 2025-01-04 | End: 2025-01-09 | Stop reason: HOSPADM

## 2025-01-04 RX ORDER — ACETAMINOPHEN 325 MG/1
650 TABLET ORAL EVERY 4 HOURS PRN
Status: DISCONTINUED | OUTPATIENT
Start: 2025-01-04 | End: 2025-01-09 | Stop reason: HOSPADM

## 2025-01-04 RX ORDER — DORZOLAMIDE HYDROCHLORIDE AND TIMOLOL MALEATE 20; 5 MG/ML; MG/ML
1 SOLUTION/ DROPS OPHTHALMIC 2 TIMES DAILY
Status: DISCONTINUED | OUTPATIENT
Start: 2025-01-04 | End: 2025-01-09 | Stop reason: HOSPADM

## 2025-01-04 RX ADMIN — DORZOLAMIDE HYDROCHLORIDE AND TIMOLOL MALEATE 1 DROP: 20; 5 SOLUTION/ DROPS OPHTHALMIC at 20:39

## 2025-01-04 RX ADMIN — BUDESONIDE AND FORMOTEROL FUMARATE DIHYDRATE 2 PUFF: 160; 4.5 AEROSOL RESPIRATORY (INHALATION) at 11:48

## 2025-01-04 RX ADMIN — IPRATROPIUM BROMIDE AND ALBUTEROL SULFATE 3 ML: 2.5; .5 SOLUTION RESPIRATORY (INHALATION) at 16:03

## 2025-01-04 RX ADMIN — FLUTICASONE PROPIONATE 2 SPRAY: 50 SPRAY, METERED NASAL at 12:59

## 2025-01-04 RX ADMIN — AZITHROMYCIN DIHYDRATE 250 MG: 250 TABLET ORAL at 12:58

## 2025-01-04 RX ADMIN — GABAPENTIN 300 MG: 300 CAPSULE ORAL at 09:30

## 2025-01-04 RX ADMIN — LATANOPROST 1 DROP: 50 SOLUTION OPHTHALMIC at 20:39

## 2025-01-04 RX ADMIN — INSULIN LISPRO 9 UNITS: 100 INJECTION, SOLUTION INTRAVENOUS; SUBCUTANEOUS at 12:58

## 2025-01-04 RX ADMIN — SENNOSIDES AND DOCUSATE SODIUM 2 TABLET: 50; 8.6 TABLET ORAL at 12:59

## 2025-01-04 RX ADMIN — ASPIRIN 81 MG: 81 TABLET, COATED ORAL at 09:30

## 2025-01-04 RX ADMIN — ALBUMIN (HUMAN) 25 G: 0.25 INJECTION, SOLUTION INTRAVENOUS at 19:52

## 2025-01-04 RX ADMIN — ALLOPURINOL 100 MG: 100 TABLET ORAL at 09:30

## 2025-01-04 RX ADMIN — GABAPENTIN 300 MG: 300 CAPSULE ORAL at 20:38

## 2025-01-04 RX ADMIN — INSULIN LISPRO 5 UNITS: 100 INJECTION, SOLUTION INTRAVENOUS; SUBCUTANEOUS at 12:58

## 2025-01-04 RX ADMIN — POTASSIUM CHLORIDE 40 MEQ: 750 TABLET, EXTENDED RELEASE ORAL at 15:24

## 2025-01-04 RX ADMIN — INSULIN LISPRO 5 UNITS: 100 INJECTION, SOLUTION INTRAVENOUS; SUBCUTANEOUS at 18:08

## 2025-01-04 RX ADMIN — INSULIN LISPRO 9 UNITS: 100 INJECTION, SOLUTION INTRAVENOUS; SUBCUTANEOUS at 18:08

## 2025-01-04 RX ADMIN — INSULIN GLARGINE 15 UNITS: 100 INJECTION, SOLUTION SUBCUTANEOUS at 19:21

## 2025-01-04 RX ADMIN — POTASSIUM CHLORIDE 40 MEQ: 750 TABLET, EXTENDED RELEASE ORAL at 20:38

## 2025-01-04 RX ADMIN — FAMOTIDINE 10 MG: 20 TABLET, FILM COATED ORAL at 09:30

## 2025-01-04 RX ADMIN — DORZOLAMIDE HYDROCHLORIDE AND TIMOLOL MALEATE 1 DROP: 20; 5 SOLUTION/ DROPS OPHTHALMIC at 12:59

## 2025-01-04 RX ADMIN — INSULIN LISPRO 8 UNITS: 100 INJECTION, SOLUTION INTRAVENOUS; SUBCUTANEOUS at 09:30

## 2025-01-04 RX ADMIN — INSULIN GLARGINE 15 UNITS: 100 INJECTION, SOLUTION SUBCUTANEOUS at 02:33

## 2025-01-04 RX ADMIN — BUDESONIDE 0.5 MG: 0.5 INHALANT RESPIRATORY (INHALATION) at 19:35

## 2025-01-04 RX ADMIN — ARFORMOTEROL TARTRATE 15 MCG: 15 SOLUTION RESPIRATORY (INHALATION) at 19:36

## 2025-01-04 RX ADMIN — MONTELUKAST 10 MG: 10 TABLET, FILM COATED ORAL at 20:38

## 2025-01-04 RX ADMIN — TIOTROPIUM BROMIDE INHALATION SPRAY 2 PUFF: 3.12 SPRAY, METERED RESPIRATORY (INHALATION) at 11:49

## 2025-01-04 RX ADMIN — ROSUVASTATIN 10 MG: 10 TABLET, FILM COATED ORAL at 09:30

## 2025-01-04 RX ADMIN — INSULIN LISPRO 8 UNITS: 100 INJECTION, SOLUTION INTRAVENOUS; SUBCUTANEOUS at 21:06

## 2025-01-04 RX ADMIN — IPRATROPIUM BROMIDE AND ALBUTEROL SULFATE 3 ML: 2.5; .5 SOLUTION RESPIRATORY (INHALATION) at 19:34

## 2025-01-04 NOTE — CONSULTS
Referring Provider: Dr. Cerrato  Reason for Consultation: Pulmonary infiltrates    Patient Care Team:  Stephen Long APRN as PCP - General (Nurse Practitioner)    Chief complaint:   Dyspnea and cough    History of present illness:    Subjective   This is an 89-year-old female patient, former smoker (45 packs year, stopped at the age of 65) with  history of COPD/emphysema, on Trelegy inhaler and chronic respiratory failure on oxygen 3 L/min (follows with Dr. Altman but in the process of switching providers). CKD stage III.  CAD s/p CABG.    She reported feeling sick right before [place] with sinus congestion, stuffed up nose and cough started as brownish and worsening dyspnea.  She was treated with a course of steroid and antibiotics.  She continued to have those symptoms but now having more difficulty breathing.  Cough cleared up to lighter colored but now again brownish.    No fever.  Yovanny SpO2 89%.  Currently on 6 L with SpO2 in the mid 90s. RVP negative.  Blood culture x 2 negative.  WBC 10-13 K.  BNP 7970 on presentation.    Data: Echo 5/17/2023: EF 54%; RVSP <35; grade 1 diastolic dysfunction;    Review of Systems  Constitutional: No fever or chills.  Feels weak.  ENMT: No sinus congestion  Cardiovascular: No chest pain, palpitation or legs swelling.    Respiratory: See above.  Gastrointestinal: No constipation, diarrhea or abdominal pain   Neurology: No headache, weakness, numbness or dizziness.   Musculoskeletal: No joints pain, stiffness or swelling.   Psychiatry: No depression.  Genitourinary: No dysuria or frequent urination  Endo: No weight changes. No cold or warm intolerance.  Lymphatic: No swollen glands.  Integumentary: No rash.    History  Past Medical History:   Diagnosis Date    AAA (abdominal aortic aneurysm)     stated in 2- Dr. Lokesh Santoro office note    Atherosclerotic heart disease     stated in 2- Dr. Lokesh Santoro office note    Bradycardia      Carotid artery stenosis     stated in 2- Dr. Lokesh Santoro office note    Cataract Removal 10/27/2008    Right (10/27/08) and Left (11/25/08)    Chronic kidney disease, stage 3a     Chronic respiratory failure with hypoxia     Coronary artery disease     Coronary atherosclerosis     stated in 2- Dr. Lokesh Santoro office note    Diabetes mellitus     Diffuse large B cell lymphoma 06/21/2016    Elevated cholesterol     H/O angioplasty 12/31/2007    Hx of CABG 09/21/2009    Triple bypass    Hyperlipidemia     Hyperparathyroidism 04/17/2017    Hypertension     Hypertensive disorder     stated in 2- Dr. Lokesh Santoro office note    Hypothyroidism (acquired)     Ischemic heart disease 12/15/2016    Pulmonary emphysema     Retinal tear 02/06/2009    Right eye, repaired 06/2009    S/P arterial stent 08/26/2009    Left leg and aorta    S/P renal artery angioplasty 01/21/2008    Left    Shingles 03/26/2014    Status post carotid surgery 06/13/2018    Stenosis of iliac artery     stated in 2- Dr. Lokesh Santoro office note   ,   Past Surgical History:   Procedure Laterality Date    CARDIAC CATHETERIZATION      CARDIAC ELECTROPHYSIOLOGY PROCEDURE N/A 07/22/2022    Procedure: Pacemaker SC new [employer];  Surgeon: Kevin Eisenberg MD;  Location: [facility] INVASIVE LOC[facility];  Service: Cardiology;  Laterality: N/A;    CAROTID ENDARTERECTOMY  2018    CORONARY ARTERY BYPASS GRAFT  2008    Triple bypass    CORONARY STENT PLACEMENT  10/2011    ILIAC ARTERY STENT      Aorta-iliac stent 2009    INSERT / REPLACE / REMOVE PACEMAKER  07/2022    RENAL ARTERY STENT Left 2008   , History reviewed. No pertinent family history.,   Social History     Socioeconomic History    Marital status:    Tobacco Use    Smoking status: Never    Smokeless tobacco: Never   Vaping Use    Vaping status: Never Used   Substance and Sexual Activity    Alcohol use: Never    Drug use: Never    Sexual  activity: Defer     E-cigarette/Vaping    E-cigarette/Vaping Use Never User     Passive Exposure No     Counseling Given No      E-cigarette/Vaping Substances    Nicotine No     THC No     CBD No     Flavoring No      E-cigarette/Vaping Devices    Disposable No     Pre-filled or Refillable Cartridge No     Refillable Tank No     Pre-filled Pod No          ,   Medications Prior to Admission   Medication Sig Dispense Refill Last Dose/Taking    allopurinol (ZYLOPRIM) 100 MG tablet Take 1 tablet by mouth Daily.   1/2/2025 Morning    aspirin 81 MG EC tablet Take 1 tablet by mouth Daily. Indications: Disease involving Lipid Deposits in the Arteries   1/2/2025 Morning    calcium carbonate (TUMS) 500 MG chewable tablet Chew 2 tablets 2 (Two) Times a Day As Needed for Heartburn.   Past Month    cetirizine (zyrTEC) 10 MG tablet Take 1 tablet by mouth Daily.   1/2/2025    dorzolamide-timolol (COSOPT) 22.3-6.8 MG/ML ophthalmic solution Administer 1 drop into the left eye 2 (Two) Times a Day. Indications: Wide-Angle Glaucoma   1/2/2025    empagliflozin (Jardiance) 25 MG tablet tablet Take 1 tablet by mouth Daily.   1/2/2025    estradiol (ESTRACE) 0.1 MG/GM vaginal cream Insert 2 g into the vagina 2 (Two) Times a Week. Tues and thursday   Past Week    ezetimibe (ZETIA) 10 MG tablet Take 1 tablet by mouth Daily. Indications: Ischemic Heart Disease   1/2/2025    famotidine (PEPCID) 20 MG tablet Take 0.5 tablets by mouth 2 (Two) Times a Day. (Patient taking differently: Take 0.5 tablets by mouth Daily. Indications: Heartburn)   1/2/2025    fluticasone (FLONASE) 50 MCG/ACT nasal spray Administer 2 sprays into the nostril(s) as directed by provider Daily. Indications: Stuffy Nose   1/2/2025    Fluticasone-Umeclidin-Vilant (Trelegy Ellipta) 100-62.5-25 MCG/ACT inhaler Inhale 1 puff Daily.   1/2/2025    gabapentin (NEURONTIN) 300 MG capsule Take 1 capsule by mouth 2 (Two) Times a Day. Indications: Neuropathic Pain   1/2/2025     glipizide (GLUCOTROL XL) 5 MG ER tablet Take 2 tablets by mouth Daily. Indications: Type 2 Diabetes   1/2/2025    hydrALAZINE (APRESOLINE) 25 MG tablet Take 1 tablet by mouth Every 8 (Eight) Hours. 90 tablet 0 1/2/2025    insulin glargine (LANTUS, SEMGLEE) 100 UNIT/ML injection Inject 15 Units under the skin into the appropriate area as directed Every Afternoon.   1/2/2025 Noon    isosorbide mononitrate (IMDUR) 30 MG 24 hr tablet Take 1 tablet by mouth Daily. 30 tablet 1 1/2/2025    latanoprost (XALATAN) 0.005 % ophthalmic solution Administer 1 drop into the left eye Every Night. Indications: Wide-Angle Glaucoma   1/2/2025 Evening    metoprolol succinate XL (TOPROL-XL) 100 MG 24 hr tablet Take 1 tablet by mouth Daily.   1/2/2025    miconazole (Zeasorb-AF) 2 % powder Apply 1 Application topically to the appropriate area as directed As Needed for Itching.   1/2/2025    montelukast (SINGULAIR) 10 MG tablet Take 1 tablet by mouth Every Night. Indications: Hayfever   1/2/2025    O2 (OXYGEN) Inhale 3 L/min Continuous.   1/3/2025    rosuvastatin (CRESTOR) 20 MG tablet Take 2 tablets by mouth Daily. Indications: High Amount of Fats in the Blood   1/2/2025    timolol (TIMOPTIC) 0.5 % ophthalmic solution Administer 1 drop to both eyes 2 (Two) Times a Day.   1/2/2025    torsemide (DEMADEX) 20 MG tablet Take 1 tablet by mouth Daily. 30 tablet 1 1/2/2025 Morning    Vibegron (Gemtesa) 75 MG tablet Take 1 tablet by mouth Daily.   1/2/2025    gabapentin (NEURONTIN) 300 MG capsule Take 1 capsule by mouth Every Night.      , Scheduled Meds:  allopurinol, 100 mg, Oral, Daily  aspirin, 81 mg, Oral, Daily  budesonide-formoterol, 2 puff, Inhalation, BID - RT   And  tiotropium bromide monohydrate, 2 puff, Inhalation, Daily - RT  cefTRIAXone, 1,000 mg, Intravenous, Q24H  dorzolamide-timolol, 1 drop, Left Eye, BID  famotidine, 10 mg, Oral, Daily  fluticasone, 2 spray, Nasal, Daily  gabapentin, 300 mg, Oral, BID  hydrALAZINE, 25 mg, Oral,  Q8H  insulin glargine, 15 Units, Subcutaneous, Every Afternoon  insulin lispro, 2-9 Units, Subcutaneous, 4x Daily AC & at Bedtime  insulin lispro, 5 Units, Subcutaneous, TID With Meals  isosorbide mononitrate, 30 mg, Oral, Daily  latanoprost, 1 drop, Left Eye, Nightly  metoprolol succinate XL, 100 mg, Oral, Daily  montelukast, 10 mg, Oral, Nightly  rosuvastatin, 10 mg, Oral, Daily   , Continuous Infusions:  O2, 3 L/min, Last Rate: 3 L/min (01/04/25 0931)   , PRN Meds:    acetaminophen    senna-docusate sodium **AND** polyethylene glycol **AND** bisacodyl **AND** bisacodyl    dextrose    dextrose    glucagon (human recombinant)    nitroglycerin    sodium chloride, and Allergies:  Patient has no known allergies.    Objective     Vital Signs   Temp:  [97.3 °F (36.3 °C)-98.8 °F (37.1 °C)] 98.8 °F (37.1 °C)  Heart Rate:  [69-82] 82  Resp:  [16-18] 18  BP: (100-152)/(57-88) 100/57    PPE used per hospital policy    Physical Exam:  Constitutional: Not in acute distress.  Eyes: Injected conjunctivae, EOMI. pupils equal reactive to light.  ENMT: Garcia 3. No oral thrush.   Neck:  Trachea midline. No thyromegaly  Heart: RRR, no murmur  Lungs: Equal but diminished air entry throughout.  Mild expiratory crackles at the bases.  Abdomen:  Soft. No tenderness or dullness. No HSM.  Extremities: No cyanosis, clubbing or pitting edema.  Warm extremities and well-perfused.  Neuro: Conscious, alert, oriented x3.  Strength 5/5 in arms.  Psych: Appropriate mood and affect.    Integumentary: No rash.  Normal skin turgor  Lymphatic: No palpable cervical or supraclavicular lymph nodes.      Diagnostic imaging:  I personally and independently reviewed the following images:   CXR 1/2/2025: Mild pulmonary edema.  Pacemaker.    Laboratory workup:    Results from last 7 days   Lab Units 01/03/25  0547 01/02/25  2235   SODIUM mmol/L 136 133*   POTASSIUM mmol/L 3.8 3.9   CHLORIDE mmol/L 100 97*   CO2 mmol/L 22.7 26.2   BUN mg/dL 56* 60*    CREATININE mg/dL 2.25* 2.48*   GLUCOSE mg/dL 295* 536*   CALCIUM mg/dL 9.5 9.5     Results from last 7 days   Lab Units 01/03/25  0547 01/03/25  0054 01/02/25  2235   HSTROP T ng/L 137* 128* 125*     Results from last 7 days   Lab Units 01/04/25  0441 01/03/25  0547 01/02/25  2235   WBC 10*3/mm3 13.49* 10.27 12.76*   HEMOGLOBIN g/dL 16.8* 15.5 16.6*   HEMATOCRIT % 52.2* 49.9* 49.9*   PLATELETS 10*3/mm3 118* 102* 118*         Results from last 7 days   Lab Units 01/02/25  2235   PROBNP pg/mL 7,970.0*       Assessment   Acute pulmonary edema, mild  Acute HFpEF  COPD with current exacerbation  Acute on chronic hypoxic respiratory failure, on O2 3 L/min at baseline.  Pulmonary infiltrates, suspect secondary #1.  Could have mild pneumonia as well but she was already treated with antibiotics.    Recommendations:    DC Symbicort and Spiriva as patient probably unable to inhale deeply.  Start Pulmicort and Brovana twice a day  Start scheduled DuoNeb 4 times a day  Repeat CXR.  Check echocardiogram.  Can hold off on Rocephin.  Treat with azithromycin for COPD exacerbation.      Estrellita Mai MD  01/04/25  11:18 EST

## 2025-01-04 NOTE — H&P
"    Patient Name:  Salma Hatfield  YOB: 1935  MRN:  [id number]  Admit Date:  1/2/2025  Patient Care Team:  Stephen Long APRN as PCP - General (Nurse Practitioner)      Subjective   History Present Illness     Chief Complaint   Patient presents with    Shortness of Breath    Cough       Ms. Hatfield is a 89 y.o. female with a history of COPD with chronic respiratory failure, CKD, CAD, diabetes, lymphoma, HTN, and PAD that presents to [facility] complaining of shortness of breath and cough. When I entered the room her chief complaint is of poor vision.  She states this is chronic and unchanged.  When asked if she presented to the emergency feeling short of breath she said \"oh yeah.\"  He seemingly feels better this morning.  Had significant cough.  Otherwise had a rather poor story and      Review of Systems   Respiratory:  Positive for cough and shortness of breath.         Personal History     Past Medical History:   Diagnosis Date    AAA (abdominal aortic aneurysm)     stated in 2- Dr. Lokesh Santoro office note    Atherosclerotic heart disease     stated in 2- Dr. Lokesh Santoro office note    Bradycardia     Carotid artery stenosis     stated in 2- Dr. Lokesh Santoro office note    Cataract Removal 10/27/2008    Right (10/27/08) and Left (11/25/08)    Chronic kidney disease, stage 3a     Chronic respiratory failure with hypoxia     Coronary artery disease     Coronary atherosclerosis     stated in 2- Dr. Lokesh Santoro office note    Diabetes mellitus     Diffuse large B cell lymphoma 06/21/2016    Elevated cholesterol     H/O angioplasty 12/31/2007    Hx of CABG 09/21/2009    Triple bypass    Hyperlipidemia     Hyperparathyroidism 04/17/2017    Hypertension     Hypertensive disorder     stated in 2- Dr. Lokesh Santoro office note    Hypothyroidism (acquired)     Ischemic heart disease 12/15/2016    Pulmonary " emphysema     Retinal tear 02/06/2009    Right eye, repaired 06/2009    S/P arterial stent 08/26/2009    Left leg and aorta    S/P renal artery angioplasty 01/21/2008    Left    Shingles 03/26/2014    Status post carotid surgery 06/13/2018    Stenosis of iliac artery     stated in 2- Dr. Lokesh Santoro office note     Past Surgical History:   Procedure Laterality Date    CARDIAC CATHETERIZATION      CARDIAC ELECTROPHYSIOLOGY PROCEDURE N/A 07/22/2022    Procedure: Pacemaker SC new [employer];  Surgeon: Kevin Eisenberg MD;  Location: [facility] CATH INVASIVE LOCATION;  Service: Cardiology;  Laterality: N/A;    CAROTID ENDARTERECTOMY  2018    CORONARY ARTERY BYPASS GRAFT  2008    Triple bypass    CORONARY STENT PLACEMENT  10/2011    ILIAC ARTERY STENT      Aorta-iliac stent 2009    INSERT / REPLACE / REMOVE PACEMAKER  07/2022    RENAL ARTERY STENT Left 2008     History reviewed. No pertinent family history.  Social History     Tobacco Use    Smoking status: Never    Smokeless tobacco: Never   Vaping Use    Vaping status: Never Used   Substance Use Topics    Alcohol use: Never    Drug use: Never     No current facility-administered medications on file prior to encounter.     Current Outpatient Medications on File Prior to Encounter   Medication Sig Dispense Refill    allopurinol (ZYLOPRIM) 100 MG tablet Take 1 tablet by mouth Daily.      aspirin 81 MG EC tablet Take 1 tablet by mouth Daily. Indications: Disease involving Lipid Deposits in the Arteries      calcium carbonate (TUMS) 500 MG chewable tablet Chew 2 tablets 2 (Two) Times a Day As Needed for Heartburn.      cetirizine (zyrTEC) 10 MG tablet Take 1 tablet by mouth Daily.      dorzolamide-timolol (COSOPT) 22.3-6.8 MG/ML ophthalmic solution Administer 1 drop into the left eye 2 (Two) Times a Day. Indications: Wide-Angle Glaucoma      empagliflozin (Jardiance) 25 MG tablet tablet Take 1 tablet by mouth Daily.      estradiol (ESTRACE) 0.1 MG/GM vaginal cream  Insert 2 g into the vagina 2 (Two) Times a Week. Tues and thursday      ezetimibe (ZETIA) 10 MG tablet Take 1 tablet by mouth Daily. Indications: Ischemic Heart Disease      famotidine (PEPCID) 20 MG tablet Take 0.5 tablets by mouth 2 (Two) Times a Day. (Patient taking differently: Take 0.5 tablets by mouth Daily. Indications: Heartburn)      fluticasone (FLONASE) 50 MCG/ACT nasal spray Administer 2 sprays into the nostril(s) as directed by provider Daily. Indications: Stuffy Nose      Fluticasone-Umeclidin-Vilant (Trelegy Ellipta) 100-62.5-25 MCG/ACT inhaler Inhale 1 puff Daily.      gabapentin (NEURONTIN) 300 MG capsule Take 1 capsule by mouth 2 (Two) Times a Day. Indications: Neuropathic Pain      glipizide (GLUCOTROL XL) 5 MG ER tablet Take 2 tablets by mouth Daily. Indications: Type 2 Diabetes      hydrALAZINE (APRESOLINE) 25 MG tablet Take 1 tablet by mouth Every 8 (Eight) Hours. 90 tablet 0    insulin glargine (LANTUS, SEMGLEE) 100 UNIT/ML injection Inject 15 Units under the skin into the appropriate area as directed Every Afternoon.      isosorbide mononitrate (IMDUR) 30 MG 24 hr tablet Take 1 tablet by mouth Daily. 30 tablet 1    latanoprost (XALATAN) 0.005 % ophthalmic solution Administer 1 drop into the left eye Every Night. Indications: Wide-Angle Glaucoma      metoprolol succinate XL (TOPROL-XL) 100 MG 24 hr tablet Take 1 tablet by mouth Daily.      miconazole (Zeasorb-AF) 2 % powder Apply 1 Application topically to the appropriate area as directed As Needed for Itching.      montelukast (SINGULAIR) 10 MG tablet Take 1 tablet by mouth Every Night. Indications: Hayfever      O2 (OXYGEN) Inhale 3 L/min Continuous.      rosuvastatin (CRESTOR) 20 MG tablet Take 2 tablets by mouth Daily. Indications: High Amount of Fats in the Blood      timolol (TIMOPTIC) 0.5 % ophthalmic solution Administer 1 drop to both eyes 2 (Two) Times a Day.      torsemide (DEMADEX) 20 MG tablet Take 1 tablet by mouth Daily. 30  tablet 1    Vibegron (Gemtesa) 75 MG tablet Take 1 tablet by mouth Daily.      gabapentin (NEURONTIN) 300 MG capsule Take 1 capsule by mouth Every Night.       No Known Allergies    Objective    Objective     Vital Signs  Temp:  [97.3 °F (36.3 °C)-98.8 °F (37.1 °C)] 98.8 °F (37.1 °C)  Heart Rate:  [69-82] 82  Resp:  [16-18] 18  BP: (100-152)/(57-89) 100/57  SpO2:  [89 %-93 %] 93 %  on  Flow (L/min) (Oxygen Therapy):  [6-8] 6;   Device (Oxygen Therapy): nasal cannula;humidified  Body mass index is 25.01 kg/m².    Physical Exam  Vitals and nursing note reviewed.   Constitutional:       Appearance: She is ill-appearing.   HENT:      Head: Normocephalic and atraumatic.   Cardiovascular:      Rate and Rhythm: Normal rate and regular rhythm.   Pulmonary:      Effort: Pulmonary effort is normal.      Breath sounds: Wheezing and rhonchi present.   Abdominal:      General: Bowel sounds are normal. There is no distension.      Palpations: Abdomen is soft.      Tenderness: There is no abdominal tenderness.   Musculoskeletal:      Right lower leg: No edema.      Left lower leg: No edema.   Skin:     General: Skin is warm and dry.   Neurological:      Mental Status: She is alert and oriented to person, place, and time.   Psychiatric:         Cognition and Memory: Memory is impaired.         Results Review:  I reviewed the patient's new clinical results.  I reviewed the patient's new imaging results and agree with the interpretation.  I reviewed the patient's other test results and agree with the interpretation  I personally viewed and interpreted the patient's EKG/Telemetry data  Discussed with ED provider.    Lab Results (last 24 hours)       Procedure Component Value Units Date/Time    POC Glucose Once [[id number]]  (Abnormal) Collected: 01/03/25 1204    Specimen: Blood Updated: 01/03/25 1208     Glucose 382 mg/dL     POC Glucose Once [[id number]]  (Abnormal) Collected: 01/03/25 1644    Specimen: Blood Updated: 01/03/25 1652      Glucose 434 mg/dL     POC Glucose Once [[id number]]  (Abnormal) Collected: 01/03/25 1646    Specimen: Blood Updated: 01/03/25 1653     Glucose 452 mg/dL     POC Glucose Once [[id number]]  (Abnormal) Collected: 01/03/25 2039    Specimen: Blood Updated: 01/03/25 2046     Glucose 491 mg/dL     POC Glucose Once [[id number]]  (Abnormal) Collected: 01/03/25 2040    Specimen: Blood Updated: 01/03/25 2046     Glucose 504 mg/dL     Renal Function Panel [[id number]] Updated: 01/04/25 0659    Specimen: Blood     CK [[id number]] Updated: 01/04/25 0659    Specimen: Blood     POC Glucose Once [[id number]]  (Abnormal) Collected: 01/03/25 2344    Specimen: Blood Updated: 01/03/25 2347     Glucose 460 mg/dL     CBC & Differential [[id number]]  (Abnormal) Collected: 01/04/25 0441    Specimen: Blood Updated: 01/04/25 0534    Narrative:      The following orders were created for panel order CBC & Differential.  Procedure                               Abnormality         Status                     ---------                               -----------         ------                     CBC Auto Differential[[id number]]        Abnormal            Final result                 Please view results for these tests on the individual orders.    CBC Auto Differential [[id number]]  (Abnormal) Collected: 01/04/25 0441    Specimen: Blood Updated: 01/04/25 0534     WBC 13.49 10*3/mm3      RBC 5.54 10*6/mm3      Hemoglobin 16.8 g/dL      Hematocrit 52.2 %      MCV 94.2 fL      MCH 30.3 pg      MCHC 32.2 g/dL      RDW 13.5 %      RDW-SD 46.6 fl      MPV 11.2 fL      Platelets 118 10*3/mm3      Neutrophil % 76.4 %      Lymphocyte % 10.9 %      Monocyte % 11.9 %      Eosinophil % 0.1 %      Basophil % 0.1 %      Immature Grans % 0.6 %      Neutrophils, Absolute 10.30 10*3/mm3      Lymphocytes, Absolute 1.47 10*3/mm3      Monocytes, Absolute 1.60 10*3/mm3      Eosinophils, Absolute 0.02 10*3/mm3      Basophils, Absolute 0.02 10*3/mm3      Immature Grans, Absolute  0.08 10*3/mm3      nRBC 0.0 /100 WBC     POC Glucose Once [[id number]]  (Abnormal) Collected: 01/04/25 0750    Specimen: Blood Updated: 01/04/25 0751     Glucose 371 mg/dL             Imaging Results (Last 24 Hours)       Procedure Component Value Units Date/Time    US Renal Bilateral [[id number]] Collected: 01/03/25 0955     Updated: 01/03/25 1009    Narrative:      ULTRASOUND RENAL BILATERAL     INDICATION: Acute on chronic kidney disease.     COMPARISON: None     TECHNIQUE: Real-time sonographic evaluation of the kidneys with  grayscale and color-flow imaging. Representative images were saved for  review.     FINDINGS:     RIGHT KIDNEY: 9.1 cm in length. No renal mass is seen. No  hydronephrosis.     LEFT KIDNEY: 7.8 cm in length. Cortical thinning. Simple appearing cyst  in the superior pole measuring 1.8 x 1.3 x 1.6 cm. No solid mass  identified. No hydronephrosis.     BLADDER: Distended bladder. Bilateral ureteral jets seen. Prevoid  bladder volume measures 948.5 mL.          Impression:         1. Atrophic appearing left kidney.  2. Simple appearing left renal cyst.  3. No solid renal mass identified. No hydronephrosis.  4. Moderate to severe bladder distention              This report was finalized on 1/3/2025 10:06 AM by Dr. Dread Crisostomo M.D on Workstation: [id number]               Results for orders placed during the hospital encounter of 05/17/23    Adult Transthoracic Echo Complete w/ Color, Spectral and Contrast if Necessary Per Protocol    Interpretation Summary    Left ventricular systolic function is normal. Calculated left ventricular EF = 54.4%    The left ventricular cavity is small in size.    Left ventricular wall thickness is consistent with moderate to severe septal asymmetric hypertrophy.    Left ventricular diastolic function is consistent with (grade Ia w/high LAP) impaired relaxation.    Moderately reduced right ventricular systolic function noted.    Estimated right ventricular  systolic pressure from tricuspid regurgitation is normal (<35 mmHg).    ECG 12 Lead Dyspnea   Final Result   HEART RATE=70  bpm   RR Apmvuwsl=843  ms   OK Interval=35  ms   P Horizontal Axis=-62  deg   P Front Axis=64  deg   QRSD Dontqkho=222  ms   QT Iyqumeuf=677  ms   OCqG=034  ms   QRS Axis=-59  deg   T Wave Axis=87  deg   - ABNORMAL ECG -   Atrial-ventricular dual-paced rhythm   No further analysis attempted due to paced rhythm   No change from previous tracing   Electronically Signed By: Paola Jaimes ([facility]) 2025-01-03 17:20:45   Date and Time of Study:2025-01-02 22:44:56      Telemetry Scan   Final Result      Telemetry Scan   Final Result      Telemetry Scan   Final Result        Assessment/Plan   Assessment & Plan   Active Hospital Problems    Diagnosis  POA    **Acute on chronic heart failure with preserved ejection fraction (HFpEF) [I50.33]  Yes    Bacterial pneumonia [J15.9]  Yes    Hypertension [I10]  Unknown    MARTIN (acute kidney injury) [N17.9]  Yes    Acute on chronic respiratory failure with hypoxia [J96.21]  Yes    Type 2 diabetes mellitus with hyperglycemia, without long-term current use of insulin [E11.65]  Yes    Stage 3b chronic kidney disease [N18.32]  Yes    Coronary artery disease involving native coronary artery without angina pectoris [I25.10]  Yes      Resolved Hospital Problems   No resolved problems to display.       89 y.o. female admitted with Acute on chronic heart failure with preserved ejection fraction (HFpEF).    She has evidence of MARTIN on CKD and nephrology has seen her.  Will hold her Jardiance and glipizide.  Defer diuretics to nephrology.  Chest x-ray showed pulmonary edema and she received Lasix in the emergency department.  Troponins are elevated as well.  Cardiology consulted.  She received antibiotics in the emergency department for community-acquired pneumonia.  She does have significant cough and for now we will continue a course of empiric antibiotic.  Follow-up  cultures.  Given acute and chronic respiratory failure will ask pulmonology to see.  Blood sugars quite elevated.  No evidence of DKA.  She is on insulin at home.  Will continue Lantus and add scheduled AC insulin and monitor closely.      SCDs for DVT prophylaxis.  Full code.  Discussed with patient and ED provider.      Will Cerrato MD  [facility]  01/04/25  09:12 EST

## 2025-01-04 NOTE — PLAN OF CARE
Goal Outcome Evaluation:  Plan of Care Reviewed With: patient        Progress: no change  Outcome Evaluation: Pt is an 89 y/o F admitted to [facility] 1/2 with CHF, pneumonia. Pt lives with adult grandson and has good family support w/ daughter present in room at time of evaluation. Upon entering room Pt verb'd she just got up to the chair with nursing assistance x2 person this AM and feels very tired/fatigued. Pt w/ gen'd weakness and deconditioning, impaired standing tolerance, and standing balance impairments 2/2 gen'd weakness. Pt is not at baseline with functional tasks and ADLs, and would continue to benefit from skilled OT intervention to address ADL and mobility impairments and rec DC SNU when medically stable. Pt also on increased supplemental oxygen from 3L to 6L today on oxymizer.    Anticipated Discharge Disposition (OT): skilled nursing facility

## 2025-01-04 NOTE — PLAN OF CARE
Goal Outcome Evaluation:   BS at 2046 = 504 notified the on [facility] on call nurse practitioner and she ordered an extra 5 U of regular insulin aside from the due 7 U, so total of 12 units were given. Re-check it it before MN it was 460 so still up and notified them again and ordered a 15 units of Lantus and was given. Still on humidified high flow O2 AT 8 LPM not on any distress but her O2 sats were 89-91 %. Voiding freely and adequately with  purewick on.Continue to monitor.

## 2025-01-04 NOTE — PROGRESS NOTES
[facility] Progress Note      Patient Name: Salma Hatfield  : 1935  MRN: [id number]  Primary Care Physician:  Stephen Long APRN  Date of admission: 2025    Subjective     Interval History:   F/u MARTIN CKD3B    Review of Systems:   UOP 4200   SBP down to 90s this afternoon   Renal US: atrophic LK, no hydro   Wt NR today   Denies dyspnea but on 5L O2     Objective     Vitals:   Temp:  [97.3 °F (36.3 °C)-98.8 °F (37.1 °C)] 97.3 °F (36.3 °C)  Heart Rate:  [69-82] 72  Resp:  [16-20] 20  BP: ()/(43-88) 91/49  Flow (L/min) (Oxygen Therapy):  [6-8] 6    Intake/Output Summary (Last 24 hours) at 2025 1544  Last data filed at 2025 1528  Gross per 24 hour   Intake 222 ml   Output 3000 ml   Net -2778 ml       Physical Exam:    General Appearance: frail WF on NC O2  Neck: supple, no JVD  Lungs: coarse BS bilat   Heart: RRR, normal S1 and S2  Abdomen: soft, nontender, nondistended  Extremities: no edema, cyanosis or clubbing      Scheduled Meds:     allopurinol, 100 mg, Oral, Daily  arformoterol, 15 mcg, Nebulization, BID - RT  aspirin, 81 mg, Oral, Daily  azithromycin, 250 mg, Oral, Q24H  budesonide, 0.5 mg, Nebulization, BID - RT  dorzolamide-timolol, 1 drop, Left Eye, BID  famotidine, 10 mg, Oral, Daily  fluticasone, 2 spray, Nasal, Daily  gabapentin, 300 mg, Oral, BID  hydrALAZINE, 25 mg, Oral, Q8H  insulin glargine, 15 Units, Subcutaneous, Every Afternoon  insulin lispro, 2-9 Units, Subcutaneous, 4x Daily AC & at Bedtime  insulin lispro, 5 Units, Subcutaneous, TID With Meals  ipratropium-albuterol, 3 mL, Nebulization, 4x Daily - RT  isosorbide mononitrate, 30 mg, Oral, Daily  latanoprost, 1 drop, Left Eye, Nightly  metoprolol succinate XL, 100 mg, Oral, Daily  montelukast, 10 mg, Oral, Nightly  potassium chloride, 40 mEq, Oral, Q4H  rosuvastatin, 10 mg, Oral, Daily      IV Meds:   O2, 3 L/min, Last Rate: 3 L/min (25)        Results Reviewed:   I have  personally reviewed the results from the time of this admission to 1/4/2025 15:44 EST     Results from last 7 days   Lab Units 01/04/25  1258 01/03/25  0547 01/02/25  2235   SODIUM mmol/L 128* 136 133*   POTASSIUM mmol/L 3.0* 3.8 3.9   CHLORIDE mmol/L 85* 100 97*   CO2 mmol/L 22.7 22.7 26.2   BUN mg/dL 67* 56* 60*   CREATININE mg/dL 3.14* 2.25* 2.48*   CALCIUM mg/dL 9.0 9.5 9.5   BILIRUBIN mg/dL  --   --  0.7   ALK PHOS U/L  --   --  142*   ALT (SGPT) U/L  --   --  174*   AST (SGOT) U/L  --   --  242*   GLUCOSE mg/dL 506* 295* 536*     Estimated Creatinine Clearance: 10.1 mL/min (A) (by C-G formula based on SCr of 3.14 mg/dL (H)).  Results from last 7 days   Lab Units 01/04/25  1258   PHOSPHORUS mg/dL 3.5         Results from last 7 days   Lab Units 01/04/25  0441 01/03/25  0547 01/02/25  2235   WBC 10*3/mm3 13.49* 10.27 12.76*   HEMOGLOBIN g/dL 16.8* 15.5 16.6*   PLATELETS 10*3/mm3 118* 102* 118*           Assessment / Plan     ASSESSMENT:  Non olig MARTIN - likely prerenal azotemia due to osmotic diuresis from hyperglycemia (BG 500s) in combination with needed IV diuresis.  Cr up to 3.  Also relative hypotension, SBP down to 90s now.  UOP robust. UA NEG.  K down to 3 with diuresis. Hyponatremic, Na 128.    CKD stage 3B, BL Cr 1.5 to 1.6, multifactorial: HTN, renovascular dz, DM2, CHF  Chronic diastolic CHF  Dm2 + hyperglycemia, BG still 500, mgmt per primary team  COPD exac - PULM eval noted.  Cont'd azithro   Acute on chronic hypoxic resp failure, on 3L O2 at home, 5L currently   HTN - BP labile thus far, now too low 90s/40s     PLAN:  Give gentle volume back in form of 25% albumin rather than saline  Hold diuretics today  DC hydralazine  Replace K  Follow up echo   Fluid restrict 1500 cc/day      Dread Jason MD  01/04/25  15:44 EST    [facility]  [phone]

## 2025-01-04 NOTE — PLAN OF CARE
Goal Outcome Evaluation:  Plan of Care Reviewed With: patient        Progress: improving  Outcome Evaluation: Up to chair with assist x2 with gait belt. Large bm after giving stool softener. BG still >400, fixed dose of insulin added with each meal. Unable to wean down O2, still on 6L HF. VSS, BP soft, LHA aware. K 3.0, Nephrology aware, see new order. Pulmonolgy aware of worsening cough, currently on nebs. Daughter at bedside, call light within reach, all needs met.

## 2025-01-04 NOTE — PROGRESS NOTES
HOSPITAL PROGRESS NOTE    Date of Service: 25  LOS:  LOS: 1 day   Patient Name: Salma Hatfield  Age/Sex: 89 y.o. female  : 1935  MRN: [id number]  Primary Cardiologist: Dr. Giovanny Santoro ([facility])    Subjective:     Chief Complaint/Follow up:   Shortness of breath, productive cough    Interval History:   Resting in bed.  Daughter at bedside.  Wearing 6 L of oxygen.  Shortness of breath has improved.  Denies chest pain, palpitations, dizziness, or edema.    Objective:     Objective:  Temp:  [97.3 °F (36.3 °C)-98.8 °F (37.1 °C)] 97.3 °F (36.3 °C)  Heart Rate:  [69-82] 72  Resp:  [16-20] 20  BP: ()/(43-88) 91/49  Body mass index is 25.01 kg/m².    Intake/Output Summary (Last 24 hours) at 2025 1532  Last data filed at 2025 1528  Gross per 24 hour   Intake 222 ml   Output 3000 ml   Net -2778 ml         25  0516 25  0638   Weight: 63.6 kg (140 lb 3.4 oz) 60 kg (132 lb 4.4 oz)       Physical Exam:   General Appearance: Alert, cooperative, in no acute distress. AAOx4.   Lungs: Normal respiratory rate.  Diminished lung sounds.  6 L of oxygen  Heart: Regular rate and rhythm, normal S1 and S2, no murmurs, gallops or rubs.  Extremities: No edema.     Lab Review:   Results from last 7 days   Lab Units 25  1258 25  0547 25  2235   SODIUM mmol/L 128* 136 133*   POTASSIUM mmol/L 3.0* 3.8 3.9   CHLORIDE mmol/L 85* 100 97*   CO2 mmol/L 22.7 22.7 26.2   BUN mg/dL 67* 56* 60*   CREATININE mg/dL 3.14* 2.25* 2.48*   GLUCOSE mg/dL 506* 295* 536*   CALCIUM mg/dL 9.0 9.5 9.5   AST (SGOT) U/L  --   --  242*   ALT (SGPT) U/L  --   --  174*     Results from last 7 days   Lab Units 25  1258 25  0547 25  0054 25  2235   CK TOTAL U/L 110  --   --   --    HSTROP T ng/L  --  137* 128* 125*     Results from last 7 days   Lab Units 25  0441 25  0547   WBC 10*3/mm3 13.49* 10.27   HEMOGLOBIN g/dL 16.8* 15.5   HEMATOCRIT % 52.2* 49.9*   PLATELETS  10*3/mm3 118* 102*                 Results from last 7 days   Lab Units 01/02/25  2235   PROBNP pg/mL 7,970.0*           Results for orders placed during the hospital encounter of 05/17/23    Adult Transthoracic Echo Complete w/ Color, Spectral and Contrast if Necessary Per Protocol    Interpretation Summary    Left ventricular systolic function is normal. Calculated left ventricular EF = 54.4%    The left ventricular cavity is small in size.    Left ventricular wall thickness is consistent with moderate to severe septal asymmetric hypertrophy.    Left ventricular diastolic function is consistent with (grade Ia w/high LAP) impaired relaxation.    Moderately reduced right ventricular systolic function noted.    Estimated right ventricular systolic pressure from tricuspid regurgitation is normal (<35 mmHg).    I reviewed the patient's new clinical results.  I personally viewed and interpreted the patient's EKG/Telemetry data/Labs/Test Results.     Current Medications:   Scheduled Meds:  allopurinol, 100 mg, Oral, Daily  arformoterol, 15 mcg, Nebulization, BID - RT  aspirin, 81 mg, Oral, Daily  azithromycin, 250 mg, Oral, Q24H  budesonide, 0.5 mg, Nebulization, BID - RT  dorzolamide-timolol, 1 drop, Left Eye, BID  famotidine, 10 mg, Oral, Daily  fluticasone, 2 spray, Nasal, Daily  gabapentin, 300 mg, Oral, BID  hydrALAZINE, 25 mg, Oral, Q8H  insulin glargine, 15 Units, Subcutaneous, Every Afternoon  insulin lispro, 2-9 Units, Subcutaneous, 4x Daily AC & at Bedtime  insulin lispro, 5 Units, Subcutaneous, TID With Meals  ipratropium-albuterol, 3 mL, Nebulization, 4x Daily - RT  isosorbide mononitrate, 30 mg, Oral, Daily  latanoprost, 1 drop, Left Eye, Nightly  metoprolol succinate XL, 100 mg, Oral, Daily  montelukast, 10 mg, Oral, Nightly  potassium chloride, 40 mEq, Oral, Q4H  rosuvastatin, 10 mg, Oral, Daily      Continuous Infusions:  O2, 3 L/min, Last Rate: 3 L/min (01/04/25 0931)        Allergies:  No Known  Allergies    Assessment:       Acute on chronic heart failure with preserved ejection fraction (HFpEF)    Coronary artery disease involving native coronary artery without angina pectoris    Type 2 diabetes mellitus with hyperglycemia, without long-term current use of insulin    Stage 3b chronic kidney disease    Acute on chronic respiratory failure with hypoxia    MARTIN (acute kidney injury)    Bacterial pneumonia    Hypertension        Plan:   Assessment & Plan       1.  HFpEF and pulmonary edema.  We appreciate nephrology managing diuretics.  Weight down 8 pounds overnight  2.  CAD.  History of CABG.  Troponin elevated, but flat.  Not ACS.  3.  History of ischemic cardiomyopathy.  On ASA  4.  Sick sinus syndrome.  Status post pacemaker  5.  Hypertension-actually hypotensive today  6.  Hyperlipidemia  7.  Right carotid artery stenosis; status post carotid enterectomy  8.  History of renal artery stenosis.  Bilateral renal artery stents  9.  Acute on chronic hypoxic respiratory failure, usually on 3 L NC  10.  COPD  11.  Chronic kidney disease  12.  Reports poor vision  13.  Poor historian  14.  Type 2 diabetes mellitus on insulin  15.  Hypokalemia  16.  Hyponatremia      Plan:  -Echocardiogram and chest x-ray have been ordered by pulmonology  -Did not receive beta-blocker or isosorbide due to low BP today    SORIN Leonardo  [facility]   01/04/25  15:32 EST

## 2025-01-04 NOTE — THERAPY EVALUATION
Patient Name: Salma Hatfield  : 1935    MRN: [id number]                              Today's Date: 2025       Admit Date: 2025    Visit Dx:     ICD-10-CM ICD-9-CM   1. Acute on chronic respiratory failure with hypoxia  J96.21 518.84     799.02   2. Acute pulmonary edema  J81.0 518.4   3. Community acquired pneumonia, unspecified laterality  J18.9 486   4. Acute pain of right knee  M25.561 719.46     Patient Active Problem List   Diagnosis    Bradycardia    Pulmonary emphysema    Diffuse large B cell lymphoma    S/P CABG (coronary artery bypass graft)    Coronary artery disease involving native coronary artery without angina pectoris    Chronic respiratory failure with hypoxia    Type 2 diabetes mellitus with hyperglycemia, without long-term current use of insulin    Hypothyroidism (acquired)    Stage 3b chronic kidney disease    Second degree AV block    Presence of cardiac pacemaker    Acute UTI (urinary tract infection)    History of 2019 novel coronavirus disease (COVID-19)    Acute on chronic respiratory failure with hypoxia    PVD (peripheral vascular disease)    Diastolic CHF, acute on chronic    MARTIN (acute kidney injury)    Acute on chronic heart failure with preserved ejection fraction (HFpEF)    Respiratory failure    Bacterial pneumonia    Hypertension     Past Medical History:   Diagnosis Date    AAA (abdominal aortic aneurysm)     stated in 2022 Dr. Lokesh Santoro office note    Atherosclerotic heart disease     stated in 2022 Dr. Lokesh Santoro office note    Bradycardia     Carotid artery stenosis     stated in 2022 Dr. Lokesh Santoro office note    Cataract Removal 10/27/2008    Right (10/27/08) and Left (08)    Chronic kidney disease, stage 3a     Chronic respiratory failure with hypoxia     Coronary artery disease     Coronary atherosclerosis     stated in 2022 Dr. Lokesh Santoro office note    Diabetes mellitus     Diffuse large B  cell lymphoma 06/21/2016    Elevated cholesterol     H/O angioplasty 12/31/2007    Hx of CABG 09/21/2009    Triple bypass    Hyperlipidemia     Hyperparathyroidism 04/17/2017    Hypertension     Hypertensive disorder     stated in 2- Dr. Lokesh Santoro office note    Hypothyroidism (acquired)     Ischemic heart disease 12/15/2016    Pulmonary emphysema     Retinal tear 02/06/2009    Right eye, repaired 06/2009    S/P arterial stent 08/26/2009    Left leg and aorta    S/P renal artery angioplasty 01/21/2008    Left    Shingles 03/26/2014    Status post carotid surgery 06/13/2018    Stenosis of iliac artery     stated in 2- Dr. Lokesh Santoro office note     Past Surgical History:   Procedure Laterality Date    CARDIAC CATHETERIZATION      CARDIAC ELECTROPHYSIOLOGY PROCEDURE N/A 07/22/2022    Procedure: Pacemaker SC new [employer];  Surgeon: Kevin Eisenberg MD;  Location: [facility] INVASIVE LOCATION;  Service: Cardiology;  Laterality: N/A;    CAROTID ENDARTERECTOMY  2018    CORONARY ARTERY BYPASS GRAFT  2008    Triple bypass    CORONARY STENT PLACEMENT  10/2011    ILIAC ARTERY STENT      Aorta-iliac stent 2009    INSERT / REPLACE / REMOVE PACEMAKER  07/2022    RENAL ARTERY STENT Left 2008      General Information       Row Name 01/04/25 1322          OT Time and Intention    Subjective Information complains of;fatigue;weakness  -BC     Document Type evaluation  -BC     Mode of Treatment individual therapy;occupational therapy  -BC     Patient Effort adequate  -BC     Symptoms Noted During/After Treatment fatigue  -BC       Row Name 01/04/25 1322          General Information    Patient Profile Reviewed yes  -BC     Prior Level of Function independent:;ADL's  -BC     Existing Precautions/Restrictions fall;oxygen therapy device and L/min  -BC     Barriers to Rehab medically complex  -BC       Row Name 01/04/25 1322          Living Environment    People in Home grandchild(shyam)  -BC       Row  Name 01/04/25 1322          Home Main Entrance    Number of Stairs, Main Entrance --  ramp entrance  -BC       Row Name 01/04/25 1322          Stairs Within Home, Primary    Number of Stairs, Within Home, Primary none  -BC       Row Name 01/04/25 1322          Cognition    Orientation Status (Cognition) oriented x 3  -BC       Row Name 01/04/25 1322          Safety Issues/Impairments Affecting Functional Mobility    Impairments Affecting Function (Mobility) endurance/activity tolerance;balance;strength;shortness of breath  -BC               User Key  (r) = Recorded By, (t) = Taken By, (c) = Cosigned By      Initials Name Provider Type    Gaetano Bar OT Occupational Therapist                     Mobility/ADL's       Row Name 01/04/25 1329          Bed Mobility    Comment, (Bed Mobility) NT: [facility] pre/post  -BC       Row Name 01/04/25 1329          Transfers    Transfers sit-stand transfer;stand-sit transfer  -BC       Row Name 01/04/25 1329          Sit-Stand Transfer    Sit-Stand [place] (Transfers) moderate assist (50% patient effort);1 person assist  -BC     Comment, (Sit-Stand Transfer) mod A to CTS from chair but decreased act tolerance to sustain or continue/progress w/ steps this date.  -BC       Row Name 01/04/25 1329          Stand-Sit Transfer    Stand-Sit [place] (Transfers) minimum assist (75% patient effort);verbal cues  -BC       Row Name 01/04/25 1329          Functional Mobility    Patient was able to Ambulate no, other medical factors prevent ambulation  -BC       Row Name 01/04/25 1329          Activities of Daily Living    BADL Assessment/Intervention --  Pt politely declined any/all ADL practice reports just got completely cleaned up, and grooming tasks completed this AM, [place] total A for toileting.  -BC               User Key  (r) = Recorded By, (t) = Taken By, (c) = Cosigned By      Initials Name Provider Type    Gaetano Bar OT Occupational Therapist                    Obj/Interventions       Row Name 01/04/25 5315          Range of Motion Comprehensive    General Range of Motion no range of motion deficits identified  -BC       Row Name 01/04/25 1335          Strength Comprehensive (MMT)    Comment, General Manual Muscle Testing (MMT) Assessment gross weakness BUES/BLES 3+/5. Hx of gout R knee w some difficulty w knee flex/ext but functional.  -BC       Row Name 01/04/25 1335          Balance    Balance Assessment sitting static balance  -BC     Static Sitting Balance supervision  -BC     Position, Sitting Balance other (see comments)  seated unsupported anteriorly in chair  -BC     Comment, Balance min to mod A ~20 sec standing from chair w/ support  -BC               User Key  (r) = Recorded By, (t) = Taken By, (c) = Cosigned By      Initials Name Provider Type    Gaetano Bar OT Occupational Therapist                   Goals/Plan       Row Name 01/04/25 1340          Bed Mobility Goal 1 (OT)    Activity/Assistive Device (Bed Mobility Goal 1, OT) bed mobility activities, all  -BC     [place] Level/Cues Needed (Bed Mobility Goal 1, OT) supervision required  -BC     Time Frame (Bed Mobility Goal 1, OT) short term goal (STG);2 weeks  -BC     Progress/Outcomes (Bed Mobility Goal 1, OT) new goal  -BC       Row Name 01/04/25 1344          Transfer Goal 1 (OT)    Activity/Assistive Device (Transfer Goal 1, OT) sit-to-stand/stand-to-sit;bed-to-chair/chair-to-bed;toilet  -BC     [place] Level/Cues Needed (Transfer Goal 1, OT) supervision required  -BC     Time Frame (Transfer Goal 1, OT) short term goal (STG);2 weeks  -BC     Progress/Outcome (Transfer Goal 1, OT) new goal  -BC       Row Name 01/04/25 1341          Dressing Goal 1 (OT)    Activity/Device (Dressing Goal 1, OT) upper body dressing;lower body dressing  -BC     [place]/Cues Needed (Dressing Goal 1, OT) supervision required  -BC     Time Frame (Dressing Goal 1, OT) short term goal (STG);2 weeks  -BC      Progress/Outcome (Dressing Goal 1, OT) new goal  -BC       Row Name 01/04/25 1344          Toileting Goal 1 (OT)    Activity/Device (Toileting Goal 1, OT) toileting skills, all;adjust/manage clothing;perform perineal hygiene  -BC     [place] Level/Cues Needed (Toileting Goal 1, OT) supervision required  -BC     Time Frame (Toileting Goal 1, OT) short term goal (STG);2 weeks  -BC     Progress/Outcome (Toileting Goal 1, OT) new goal  -BC       Row Name 01/04/25 1344          Grooming Goal 1 (OT)    Activity/Device (Grooming Goal 1, OT) grooming skills, all  -BC     [place] (Grooming Goal 1, OT) supervision required  -BC     Time Frame (Grooming Goal 1, OT) 2 weeks;short term goal (STG)  -BC     Strategies/Barriers (Grooming Goal 1, OT) seated vs standing sinkside for ADL (I)  -BC     Progress/Outcome (Grooming Goal 1, OT) new goal  -BC       Row Name 01/04/25 1344          Problem Specific Goal 1 (OT)    Problem Specific Goal 1 (OT) Pt will increase ADL IND and endurace for a 2-3 step ADL in room w/ walker and sup A.  -BC     Time Frame (Problem Specific Goal 1, OT) 2 weeks;short term goal (STG)  -BC     Progress/Outcome (Problem Specific Goal 1, OT) new goal  -BC       Row Name 01/04/25 1347          Therapy Assessment/Plan (OT)    Planned Therapy Interventions (OT) activity tolerance training;BADL retraining;functional balance retraining;neuromuscular control/coordination retraining;occupation/activity based interventions;patient/caregiver education/training;strengthening exercise;transfer/mobility retraining;IADL retraining  -BC               User Key  (r) = Recorded By, (t) = Taken By, (c) = Cosigned By      Initials Name Provider Type    BC Gaetano Robert OT Occupational Therapist                   Clinical Impression       Row Name 01/04/25 3248          Pain Assessment    Pre/Posttreatment Pain Comment did not c/o pain  -BC       Row Name 01/04/25 9386          Plan of Care Review    Plan of Care  Reviewed With patient  -BC     Progress no change  -BC     Outcome Evaluation Pt is an 89 y/o F admitted to [facility] 1[facility]2 with CHF, pneumonia with c/o cough and SOB. PMH: bradycardia, CABG, resp failure w/ hypoxia, DMII, CKD, UTIs, PVD, CHF, MARTIN. Pt lives with adult grandson and has good family support w/ daughter present in room at time of evaluation. Upon entering room Pt verb'd she just got up to the chair with nursing assistance x2 person this AM and feels very tired/fatigued. Pt w/ gen'd weakness and deconditioning, impaired standing tolerance, and standing balance impairments 2/2 gen'd weakness. Pt is not at baseline with functional tasks and ADLs, and would continue to benefit from skilled OT intervention to address ADL and mobility impairments and rec DC SNU when medically stable. Pt also on increased supplemental oxygen from 3L to 6L today on oxymizer.  -BC       Row Name 01/04/25 133          Therapy Assessment/Plan (OT)    Rehab Potential (OT) fair  -BC     Criteria for Skilled Therapeutic Interventions Met (OT) yes;meets criteria  -BC     Therapy Frequency (OT) 5 times/wk  -BC     Predicted Duration of Therapy Intervention (OT) 2 weeks  -BC       Row Name 01/04/25 1331          Therapy Plan Review/Discharge Plan (OT)    Anticipated Discharge Disposition (OT) [facility] nursing facility  -BC       Row Name 01/04/25 1331          Vital Signs    O2 Delivery Pre Treatment nasal cannula  5L NC  -BC     O2 Delivery Intra Treatment nasal cannula  -BC     O2 Delivery Post Treatment nasal cannula  -BC     Pre Patient Position Sitting  -BC     Intra Patient Position Standing  -BC     Post Patient Position Sitting  -BC       Row Name 01/04/25 1952          Positioning and Restraints    Pre-Treatment Position sitting in chair/recliner  -BC     Post Treatment Position chair  -BC     In Chair call light within reach;encouraged to call for assist;exit alarm on;with family/caregiver;reclined;sitting  -BC               User Key   (r) = Recorded By, (t) = Taken By, (c) = Cosigned By      Initials Name Provider Type    Gaetano Bar OT Occupational Therapist                   Outcome Measures       Row Name 01/04/25 1345          How much help from another is currently needed...    Putting on and taking off regular lower body clothing? 2  -BC     Bathing (including washing, rinsing, and drying) 2  -BC     Toileting (which includes using toilet bed pan or urinal) 1  -BC     Putting on and taking off regular upper body clothing 3  -BC     Taking care of personal grooming (such as brushing teeth) 3  -BC     Eating meals 4  -BC     AM-PAC 6 Clicks Score (OT) 15  -BC       Row Name 01/04/25 0400          How much help from another person do you currently need...    Turning from your back to your side while in flat bed without using bedrails? 3  -BB     Moving from lying on back to sitting on the side of a flat bed without bedrails? 3  -BB     Moving to and from a bed to a chair (including a wheelchair)? 3  -BB     Standing up from a chair using your arms (e.g., wheelchair, bedside chair)? 3  -BB     Climbing 3-5 steps with a railing? 1  -BB     To walk in hospital room? 2  -BB     AM-PAC 6 Clicks Score (PT) 15  -BB       Row Name 01/04/25 1345          Functional Assessment    Outcome Measure Options AM-PAC 6 Clicks Daily Activity (OT)  -BC               User Key  (r) = Recorded By, (t) = Taken By, (c) = Cosigned By      Initials Name Provider Type    Arvind Mireles RN Registered Nurse    BC Gaetano Robert OT Occupational Therapist                    Occupational Therapy Education       Title: PT OT SLP Therapies (In Progress)       Topic: Occupational Therapy (In Progress)       Point: ADL training (Done)       Description:   Instruct learner(s) on proper safety adaptation and remediation techniques during self care or transfers.   Instruct in proper use of assistive devices.                  Learning Progress Summary             Patient Acceptance, E,TB, VU by BC at 1/4/2025 0750    Comment: role of OT at acute level and DC recs, POC and planning w/ ADLs/mobility, cont teaching                      Point: Home exercise program (Not Started)       Description:   Instruct learner(s) on appropriate technique for monitoring, assisting and/or progressing therapeutic exercises/activities.                  Learner Progress:  Not documented in this visit.              Point: Precautions (Not Started)       Description:   Instruct learner(s) on prescribed precautions during self-care and functional transfers.                  Learner Progress:  Not documented in this visit.              Point: Body mechanics (Not Started)       Description:   Instruct learner(s) on proper positioning and spine alignment during self-care, functional mobility activities and/or exercises.                  Learner Progress:  Not documented in this visit.                              User Key       Initials Effective Dates Name Provider Type Discipline    BC 07/29/24 -  Gaetano Robert OT Occupational Therapist OT                  OT Recommendation and Plan  Planned Therapy Interventions (OT): activity tolerance training, BADL retraining, functional balance retraining, neuromuscular control/coordination retraining, occupation/activity based interventions, patient/caregiver education/training, strengthening exercise, transfer/mobility retraining, IADL retraining  Therapy Frequency (OT): 5 times/wk  Plan of Care Review  Plan of Care Reviewed With: patient  Progress: no change  Outcome Evaluation: Pt is an 89 y/o F admitted to [facility] 1/2 with CHF, pneumonia with c/o cough and SOB. PMH: bradycardia, CABG, resp failure w/ hypoxia, DMII, CKD, UTIs, PVD, CHF, MARTIN. Pt lives with adult grandson and has good family support w/ daughter present in room at time of evaluation. Upon entering room Pt verb'd she just got up to the chair with nursing assistance x2 person this AM and feels  very tired/fatigued. Pt w/ gen'd weakness and deconditioning, impaired standing tolerance, and standing balance impairments 2/2 gen'd weakness. Pt is not at baseline with functional tasks and ADLs, and would continue to benefit from skilled OT intervention to address ADL and mobility impairments and rec DC SNU when medically stable. Pt also on increased supplemental oxygen from 3L to 6L today on oxymizer.     Time Calculation:   Evaluation Complexity (OT)  Review Occupational Profile/Medical/Therapy History Complexity: expanded/moderate complexity  Assessment, Occupational Performance/Identification of Deficit Complexity: 3-5 performance deficits  Clinical Decision Making Complexity (OT): detailed assessment/moderate complexity  Overall Complexity of Evaluation (OT): moderate complexity     Time Calculation- OT       Row Name 01/04/25 1350             Time Calculation- OT    OT Start Time 1157  -BC      OT Stop Time 1216  -BC      OT Time Calculation (min) 19 min  -BC      Total Timed Code Minutes- OT 0 minute(s)  -BC      OT Received On 01/04/25  -BC      OT - Next Appointment 01/07/25  -BC      OT Goal Re-Cert Due Date 01/18/25  -BC                User Key  (r) = Recorded By, (t) = Taken By, (c) = Cosigned By      Initials Name Provider Type    BC Gaetano Robert OT Occupational Therapist                  Therapy Charges for Today       Code Description Service Date Service Provider Modifiers Qty    [id number]  OT EVAL MOD COMPLEXITY 3 1/4/2025 Gaetano Robert OT GO 1                 Gaetano Robert OT  1/4/2025

## 2025-01-05 ENCOUNTER — APPOINTMENT (OUTPATIENT)
Dept: GENERAL RADIOLOGY | Facility: HOSPITAL | Age: OVER 89
End: 2025-01-05
Payer: MEDICARE

## 2025-01-05 LAB
ALBUMIN SERPL-MCNC: 4 G/DL (ref 3.5–5.2)
ANION GAP SERPL CALCULATED.3IONS-SCNC: 12.8 MMOL/L (ref 5–15)
AORTIC DIMENSIONLESS INDEX: 0.6 (DI)
ASCENDING AORTA: 3.2 CM
AV MEAN PRESS GRAD SYS DOP V1V2: 4.8 MMHG
AV VMAX SYS DOP: 150.8 CM/SEC
BH CV ECHO LEFT VENTRICLE GLOBAL LONGITUDINAL STRAIN: -13.9 %
BH CV ECHO MEAS - AO MAX PG: 9.1 MMHG
BH CV ECHO MEAS - AO ROOT DIAM: 2.42 CM
BH CV ECHO MEAS - AO V2 VTI: 27.4 CM
BH CV ECHO MEAS - AVA(I,D): 2.11 CM2
BH CV ECHO MEAS - EDV(CUBED): 43.6 ML
BH CV ECHO MEAS - EDV(MOD-SP2): 32 ML
BH CV ECHO MEAS - EDV(MOD-SP4): 29 ML
BH CV ECHO MEAS - EF(MOD-SP2): 62.5 %
BH CV ECHO MEAS - EF(MOD-SP4): 62.1 %
BH CV ECHO MEAS - ESV(CUBED): 15.8 ML
BH CV ECHO MEAS - ESV(MOD-SP2): 12 ML
BH CV ECHO MEAS - ESV(MOD-SP4): 11 ML
BH CV ECHO MEAS - FS: 28.7 %
BH CV ECHO MEAS - IVS/LVPW: 1.3 CM
BH CV ECHO MEAS - IVSD: 1.45 CM
BH CV ECHO MEAS - LAT PEAK E' VEL: 9.6 CM/SEC
BH CV ECHO MEAS - LV DIASTOLIC VOL/BSA (35-75): 18.4 CM2
BH CV ECHO MEAS - LV MASS(C)D: 151.8 GRAMS
BH CV ECHO MEAS - LV MAX PG: 4.3 MMHG
BH CV ECHO MEAS - LV MEAN PG: 2.02 MMHG
BH CV ECHO MEAS - LV SYSTOLIC VOL/BSA (12-30): 7 CM2
BH CV ECHO MEAS - LV V1 MAX: 103.2 CM/SEC
BH CV ECHO MEAS - LV V1 VTI: 17.4 CM
BH CV ECHO MEAS - LVIDD: 3.5 CM
BH CV ECHO MEAS - LVIDS: 2.5 CM
BH CV ECHO MEAS - LVOT AREA: 3.3 CM2
BH CV ECHO MEAS - LVOT DIAM: 2.06 CM
BH CV ECHO MEAS - LVPWD: 1.12 CM
BH CV ECHO MEAS - MED PEAK E' VEL: 5 CM/SEC
BH CV ECHO MEAS - MV A MAX VEL: 87 CM/SEC
BH CV ECHO MEAS - MV DEC SLOPE: 230.8 CM/SEC2
BH CV ECHO MEAS - MV DEC TIME: 0.3 SEC
BH CV ECHO MEAS - MV E MAX VEL: 54 CM/SEC
BH CV ECHO MEAS - MV E/A: 0.62
BH CV ECHO MEAS - MV MAX PG: 4.6 MMHG
BH CV ECHO MEAS - MV MEAN PG: 1.32 MMHG
BH CV ECHO MEAS - MV P1/2T: 77.4 MSEC
BH CV ECHO MEAS - MV V2 VTI: 25.9 CM
BH CV ECHO MEAS - MVA(P1/2T): 2.8 CM2
BH CV ECHO MEAS - MVA(VTI): 2.24 CM2
BH CV ECHO MEAS - PA ACC SLOPE: 979 CM/SEC2
BH CV ECHO MEAS - PA ACC TIME: 0.08 SEC
BH CV ECHO MEAS - PA V2 MAX: 84.1 CM/SEC
BH CV ECHO MEAS - QP/QS: 0.56
BH CV ECHO MEAS - RAP SYSTOLE: 3 MMHG
BH CV ECHO MEAS - RV MAX PG: 1.94 MMHG
BH CV ECHO MEAS - RV V1 MAX: 69.7 CM/SEC
BH CV ECHO MEAS - RV V1 VTI: 9.4 CM
BH CV ECHO MEAS - RVOT DIAM: 2.09 CM
BH CV ECHO MEAS - RVSP: 24 MMHG
BH CV ECHO MEAS - SV(LVOT): 57.9 ML
BH CV ECHO MEAS - SV(MOD-SP2): 20 ML
BH CV ECHO MEAS - SV(MOD-SP4): 18 ML
BH CV ECHO MEAS - SV(RVOT): 32.4 ML
BH CV ECHO MEAS - SVI(LVOT): 36.7 ML/M2
BH CV ECHO MEAS - SVI(MOD-SP2): 12.7 ML/M2
BH CV ECHO MEAS - SVI(MOD-SP4): 11.4 ML/M2
BH CV ECHO MEAS - TR MAX PG: 21.3 MMHG
BH CV ECHO MEAS - TR MAX VEL: 231 CM/SEC
BH CV ECHO MEASUREMENTS AVERAGE E/E' RATIO: 7.4
BH CV XLRA - RV BASE: 2.6 CM
BH CV XLRA - RV LENGTH: 6.3 CM
BH CV XLRA - RV MID: 2.38 CM
BH CV XLRA - TDI S': 5.8 CM/SEC
BUN SERPL-MCNC: 67 MG/DL (ref 8–23)
BUN/CREAT SERPL: 20.8 (ref 7–25)
CALCIUM SPEC-SCNC: 9.1 MG/DL (ref 8.6–10.5)
CHLORIDE SERPL-SCNC: 92 MMOL/L (ref 98–107)
CO2 SERPL-SCNC: 22.2 MMOL/L (ref 22–29)
CREAT SERPL-MCNC: 3.22 MG/DL (ref 0.57–1)
DEPRECATED RDW RBC AUTO: 44.2 FL (ref 37–54)
EGFRCR SERPLBLD CKD-EPI 2021: 13.3 ML/MIN/1.73
ERYTHROCYTE [DISTWIDTH] IN BLOOD BY AUTOMATED COUNT: 13.1 % (ref 12.3–15.4)
GLUCOSE BLDC GLUCOMTR-MCNC: 177 MG/DL (ref 70–130)
GLUCOSE BLDC GLUCOMTR-MCNC: 246 MG/DL (ref 70–130)
GLUCOSE BLDC GLUCOMTR-MCNC: 294 MG/DL (ref 70–130)
GLUCOSE BLDC GLUCOMTR-MCNC: 324 MG/DL (ref 70–130)
GLUCOSE SERPL-MCNC: 335 MG/DL (ref 65–99)
HCT VFR BLD AUTO: 46.7 % (ref 34–46.6)
HGB BLD-MCNC: 15.3 G/DL (ref 12–15.9)
LEFT ATRIUM VOLUME INDEX: 14.4 ML/M2
LV EF BIPLANE MOD: 62.3 %
Lab: 95.6
MCH RBC QN AUTO: 30.3 PG (ref 26.6–33)
MCHC RBC AUTO-ENTMCNC: 32.8 G/DL (ref 31.5–35.7)
MCV RBC AUTO: 92.5 FL (ref 79–97)
PHOSPHATE SERPL-MCNC: 4.2 MG/DL (ref 2.5–4.5)
PLATELET # BLD AUTO: 99 10*3/MM3 (ref 140–450)
PMV BLD AUTO: 10.6 FL (ref 6–12)
POTASSIUM SERPL-SCNC: 4.2 MMOL/L (ref 3.5–5.2)
PV VALVE AREA: 2.86 CM2
RBC # BLD AUTO: 5.05 10*6/MM3 (ref 3.77–5.28)
SINUS: 2.8 CM
SODIUM SERPL-SCNC: 127 MMOL/L (ref 136–145)
WBC NRBC COR # BLD AUTO: 15.18 10*3/MM3 (ref 3.4–10.8)

## 2025-01-05 PROCEDURE — 82948 REAGENT STRIP/BLOOD GLUCOSE: CPT

## 2025-01-05 PROCEDURE — 94799 UNLISTED PULMONARY SVC/PX: CPT

## 2025-01-05 PROCEDURE — 97530 THERAPEUTIC ACTIVITIES: CPT

## 2025-01-05 PROCEDURE — 25010000002 ALBUMIN HUMAN 25% PER 50 ML: Performed by: INTERNAL MEDICINE

## 2025-01-05 PROCEDURE — 99232 SBSQ HOSP IP/OBS MODERATE 35: CPT | Performed by: NURSE PRACTITIONER

## 2025-01-05 PROCEDURE — 97110 THERAPEUTIC EXERCISES: CPT

## 2025-01-05 PROCEDURE — 63710000001 INSULIN GLARGINE PER 5 UNITS: Performed by: HOSPITALIST

## 2025-01-05 PROCEDURE — 85027 COMPLETE CBC AUTOMATED: CPT | Performed by: HOSPITALIST

## 2025-01-05 PROCEDURE — P9047 ALBUMIN (HUMAN), 25%, 50ML: HCPCS | Performed by: INTERNAL MEDICINE

## 2025-01-05 PROCEDURE — 94664 DEMO&/EVAL PT USE INHALER: CPT

## 2025-01-05 PROCEDURE — 71046 X-RAY EXAM CHEST 2 VIEWS: CPT

## 2025-01-05 PROCEDURE — 94761 N-INVAS EAR/PLS OXIMETRY MLT: CPT

## 2025-01-05 PROCEDURE — 63710000001 INSULIN LISPRO (HUMAN) PER 5 UNITS: Performed by: HOSPITALIST

## 2025-01-05 PROCEDURE — 80069 RENAL FUNCTION PANEL: CPT | Performed by: INTERNAL MEDICINE

## 2025-01-05 PROCEDURE — 94760 N-INVAS EAR/PLS OXIMETRY 1: CPT

## 2025-01-05 PROCEDURE — 97162 PT EVAL MOD COMPLEX 30 MIN: CPT

## 2025-01-05 RX ORDER — INSULIN LISPRO 100 [IU]/ML
3-14 INJECTION, SOLUTION INTRAVENOUS; SUBCUTANEOUS
Status: DISCONTINUED | OUTPATIENT
Start: 2025-01-05 | End: 2025-01-09 | Stop reason: HOSPADM

## 2025-01-05 RX ORDER — INSULIN LISPRO 100 [IU]/ML
8 INJECTION, SOLUTION INTRAVENOUS; SUBCUTANEOUS
Status: DISCONTINUED | OUTPATIENT
Start: 2025-01-05 | End: 2025-01-07

## 2025-01-05 RX ADMIN — ISOSORBIDE MONONITRATE 30 MG: 30 TABLET, EXTENDED RELEASE ORAL at 09:35

## 2025-01-05 RX ADMIN — ARFORMOTEROL TARTRATE 15 MCG: 15 SOLUTION RESPIRATORY (INHALATION) at 20:24

## 2025-01-05 RX ADMIN — ALBUMIN (HUMAN) 25 G: 0.25 INJECTION, SOLUTION INTRAVENOUS at 01:04

## 2025-01-05 RX ADMIN — INSULIN LISPRO 10 UNITS: 100 INJECTION, SOLUTION INTRAVENOUS; SUBCUTANEOUS at 13:31

## 2025-01-05 RX ADMIN — ROSUVASTATIN 10 MG: 10 TABLET, FILM COATED ORAL at 09:35

## 2025-01-05 RX ADMIN — IPRATROPIUM BROMIDE AND ALBUTEROL SULFATE 3 ML: 2.5; .5 SOLUTION RESPIRATORY (INHALATION) at 15:26

## 2025-01-05 RX ADMIN — DORZOLAMIDE HYDROCHLORIDE AND TIMOLOL MALEATE 1 DROP: 20; 5 SOLUTION/ DROPS OPHTHALMIC at 21:34

## 2025-01-05 RX ADMIN — IPRATROPIUM BROMIDE AND ALBUTEROL SULFATE 3 ML: 2.5; .5 SOLUTION RESPIRATORY (INHALATION) at 07:35

## 2025-01-05 RX ADMIN — INSULIN GLARGINE 25 UNITS: 100 INJECTION, SOLUTION SUBCUTANEOUS at 17:10

## 2025-01-05 RX ADMIN — INSULIN LISPRO 8 UNITS: 100 INJECTION, SOLUTION INTRAVENOUS; SUBCUTANEOUS at 09:36

## 2025-01-05 RX ADMIN — INSULIN LISPRO 8 UNITS: 100 INJECTION, SOLUTION INTRAVENOUS; SUBCUTANEOUS at 13:31

## 2025-01-05 RX ADMIN — FAMOTIDINE 10 MG: 20 TABLET, FILM COATED ORAL at 09:35

## 2025-01-05 RX ADMIN — LATANOPROST 1 DROP: 50 SOLUTION OPHTHALMIC at 22:13

## 2025-01-05 RX ADMIN — INSULIN LISPRO 5 UNITS: 100 INJECTION, SOLUTION INTRAVENOUS; SUBCUTANEOUS at 17:10

## 2025-01-05 RX ADMIN — GABAPENTIN 300 MG: 300 CAPSULE ORAL at 21:34

## 2025-01-05 RX ADMIN — IPRATROPIUM BROMIDE AND ALBUTEROL SULFATE 3 ML: 2.5; .5 SOLUTION RESPIRATORY (INHALATION) at 11:52

## 2025-01-05 RX ADMIN — AZITHROMYCIN DIHYDRATE 250 MG: 250 TABLET ORAL at 09:35

## 2025-01-05 RX ADMIN — INSULIN LISPRO 8 UNITS: 100 INJECTION, SOLUTION INTRAVENOUS; SUBCUTANEOUS at 09:35

## 2025-01-05 RX ADMIN — INSULIN LISPRO 8 UNITS: 100 INJECTION, SOLUTION INTRAVENOUS; SUBCUTANEOUS at 17:10

## 2025-01-05 RX ADMIN — MONTELUKAST 10 MG: 10 TABLET, FILM COATED ORAL at 21:34

## 2025-01-05 RX ADMIN — ASPIRIN 81 MG: 81 TABLET, COATED ORAL at 09:34

## 2025-01-05 RX ADMIN — ARFORMOTEROL TARTRATE 15 MCG: 15 SOLUTION RESPIRATORY (INHALATION) at 07:36

## 2025-01-05 RX ADMIN — BUDESONIDE 0.5 MG: 0.5 INHALANT RESPIRATORY (INHALATION) at 07:35

## 2025-01-05 RX ADMIN — BUDESONIDE 0.5 MG: 0.5 INHALANT RESPIRATORY (INHALATION) at 20:23

## 2025-01-05 RX ADMIN — ALLOPURINOL 100 MG: 100 TABLET ORAL at 09:35

## 2025-01-05 RX ADMIN — GABAPENTIN 300 MG: 300 CAPSULE ORAL at 09:35

## 2025-01-05 RX ADMIN — DORZOLAMIDE HYDROCHLORIDE AND TIMOLOL MALEATE 1 DROP: 20; 5 SOLUTION/ DROPS OPHTHALMIC at 09:35

## 2025-01-05 RX ADMIN — INSULIN LISPRO 3 UNITS: 100 INJECTION, SOLUTION INTRAVENOUS; SUBCUTANEOUS at 21:33

## 2025-01-05 RX ADMIN — IPRATROPIUM BROMIDE AND ALBUTEROL SULFATE 3 ML: 2.5; .5 SOLUTION RESPIRATORY (INHALATION) at 20:22

## 2025-01-05 RX ADMIN — METOPROLOL SUCCINATE 100 MG: 100 TABLET, EXTENDED RELEASE ORAL at 09:34

## 2025-01-05 RX ADMIN — FLUTICASONE PROPIONATE 2 SPRAY: 50 SPRAY, METERED NASAL at 09:35

## 2025-01-05 NOTE — PLAN OF CARE
Goal Outcome Evaluation:   Blood sugar slightly improve to 391 covered wit 8 units of regular insulin. 2 doses of 25% albumin IV given. O2 sats drops when she is on her left side position. Still on humidified high flow at 6 lpm. On 1500 fluid restriction. Continue to monitor.

## 2025-01-05 NOTE — PLAN OF CARE
Goal Outcome Evaluation:  Plan of Care Reviewed With: patient        Progress: improving  Outcome Evaluation: Patient is an 89y.o. F with PMH of COPD, CKD, CAD, diabetes, HTN, and PAD that presents to [facility] complaining of shortness of breath and cough. Pt was A&Ox3 with adult daughter at bedside feeding pt breakfast. Pt reports she lives in a house with her adult grandson who helps with the house. She uses a rollator at baseline with a ramp to enter the house and is independent with ADLs. She transferred from supine to sitting at EOB w/ mod A. Once sitting at EOB, she maintained balance with SBA. Pt was able to perform STS w/ min/mod A. Once standing, pt was able to maintain static standing balance w/ SBA while hygeine needs were met. Pt was able to follow commands at PT helped family member change pt's briefs. Pt was able to then side step to HOB w/ CGA. Pt returned to  bed w/ mod A. PT recommendation at discharge is SNF.    Anticipated Discharge Disposition (PT): skilled nursing facility

## 2025-01-05 NOTE — PROGRESS NOTES
"                                              LOS: 2 days   Patient Care Team:  Stephen Long APRN as PCP - General (Nurse Practitioner)    Chief Complaint:  F/up pulmonary infiltrates, COPD and medical problems listed below.    Subjective   Interval History    Oxygen requirement increased at night to 8 L but back down to 6 L today.  Reported slightly improved breathing and cough.  Cough is more productive now with clear phlegm.    REVIEW OF SYSTEMS:   CARDIOVASCULAR: No chest pain, chest pressure or chest discomfort. No palpitations or edema.   GASTROINTESTINAL: No anorexia, nausea, vomiting or diarrhea. No abdominal pain.  CONSTITUTIONAL: No fever or chills.     Ventilator/Non-Invasive Ventilation Settings (From admission, onward)      None                  Physical Exam:     Vital Signs  Temp:  [97.3 °F (36.3 °C)-97.8 °F (36.6 °C)] 97.5 °F (36.4 °C)  Heart Rate:  [69-78] 72  Resp:  [18-22] 20  BP: ()/(43-66) 133/64    Intake/Output Summary (Last 24 hours) at 1/5/2025 1047  Last data filed at 1/5/2025 0943  Gross per 24 hour   Intake 560 ml   Output 200 ml   Net 360 ml     Flowsheet Rows      Flowsheet Row First Filed Value   Admission Height 154.9 cm (60.98\") Documented at 01/03/2025 0516   Admission Weight 63.6 kg (140 lb 3.4 oz) Documented at 01/03/2025 0516            PPE used per hospital policy    General Appearance:   Alert, cooperative, in no acute distress   ENMT:  Mallampati score 3, moist mucous membrane   Eyes:  Pupils equal and reactive to light. EOMI   Neck:    Trachea midline. No thyromegaly.   Lungs:   Equal but diminished overall.  Minimal inspiratory crackles at the bases anteriorly.    Heart:   Regular rhythm and normal rate, normal S1 and S2, no         murmur   Skin:   No rash or ecchymosis   Abdomen:     Soft. No tenderness. No HSM.   Neuro/psych:  Conscious, alert, oriented x3. Strength 5/5 in upper and lower  ext.  Appropriate mood and affect   Extremities:  No cyanosis, clubbing or " edema.  Warm extremities and well-perfused          Results Review:        Results from last 7 days   Lab Units 01/05/25  0355 01/04/25  1258 01/03/25  0547   SODIUM mmol/L 127* 128* 136   POTASSIUM mmol/L 4.2 3.0* 3.8   CHLORIDE mmol/L 92* 85* 100   CO2 mmol/L 22.2 22.7 22.7   BUN mg/dL 67* 67* 56*   CREATININE mg/dL 3.22* 3.14* 2.25*   GLUCOSE mg/dL 335* 506* 295*   CALCIUM mg/dL 9.1 9.0 9.5     Results from last 7 days   Lab Units 01/04/25  1258 01/03/25  0547 01/03/25  0054 01/02/25  2235   CK TOTAL U/L 110  --   --   --    HSTROP T ng/L  --  137* 128* 125*     Results from last 7 days   Lab Units 01/05/25  0635 01/04/25  0441 01/03/25  0547   WBC 10*3/mm3 15.18* 13.49* 10.27   HEMOGLOBIN g/dL 15.3 16.8* 15.5   HEMATOCRIT % 46.7* 52.2* 49.9*   PLATELETS 10*3/mm3 99* 118* 102*         Results from last 7 days   Lab Units 01/02/25  2235   PROBNP pg/mL 7,970.0*                           I reviewed the patient's new clinical results.        Medication Review:   allopurinol, 100 mg, Oral, Daily  arformoterol, 15 mcg, Nebulization, BID - RT  aspirin, 81 mg, Oral, Daily  azithromycin, 250 mg, Oral, Q24H  budesonide, 0.5 mg, Nebulization, BID - RT  dorzolamide-timolol, 1 drop, Left Eye, BID  famotidine, 10 mg, Oral, Daily  fluticasone, 2 spray, Nasal, Daily  gabapentin, 300 mg, Oral, BID  insulin glargine, 25 Units, Subcutaneous, Every Afternoon  insulin lispro, 3-14 Units, Subcutaneous, 4x Daily AC & at Bedtime  insulin lispro, 8 Units, Subcutaneous, TID With Meals  ipratropium-albuterol, 3 mL, Nebulization, 4x Daily - RT  isosorbide mononitrate, 30 mg, Oral, Daily  latanoprost, 1 drop, Left Eye, Nightly  metoprolol succinate XL, 100 mg, Oral, Daily  montelukast, 10 mg, Oral, Nightly  rosuvastatin, 10 mg, Oral, Daily        O2, 3 L/min, Last Rate: 3 L/min (01/04/25 0945)        Diagnostic imaging:  I personally and independently reviewed the following images:  CXR 1/5/2025: Bibasilar infiltrates.  Increased pulmonary  vascular markings.    Assessment     Acute pulmonary edema, mild  Acute HFpEF  COPD with current exacerbation  Acute on chronic hypoxic respiratory failure, on O2 3 L/min at baseline.  Pulmonary infiltrates, suspect secondary #1.  Could have mild pneumonia as well but she was already treated with antibiotics.        Plan     Pulmicort and Brovana twice daily.  DuoNeb 4 times a day  Oxygen by NC and titrate to keep SpO2 >90%.  Requiring 6 L now.  Azithromycin for COPD exacerbation  Volume management per nephrology  F/up result of the echocardiogram  Up to chair as tolerated        Estrellita Mai MD  01/05/25  10:47 EST          This note was dictated utilizing Dragon dictation

## 2025-01-05 NOTE — PROGRESS NOTES
Name: Salma Hatfield ADMIT: 2025   : 1935  PCP: Stephen Long APRN    MRN: [id number] LOS: 2 days   AGE/SEX: 89 y.o. female  ROOM: [facility]     Subjective   Subjective   No new specific complaints.       Objective   Objective   Vital Signs  Temp:  [97.3 °F (36.3 °C)-97.8 °F (36.6 °C)] 97.5 °F (36.4 °C)  Heart Rate:  [69-78] 72  Resp:  [18-22] 20  BP: ()/(43-66) 133/64  SpO2:  [87 %-96 %] 92 %  on  Flow (L/min) (Oxygen Therapy):  [6-8] 6;   Device (Oxygen Therapy): high-flow nasal cannula;humidified  Body mass index is 25.64 kg/m².    Intake/Output Summary (Last 24 hours) at 2025 0934  Last data filed at 2025 0509  Gross per 24 hour   Intake 200 ml   Output 200 ml   Net 0 ml   Net IO Since Admission: -5,228 mL [25 0934]   Wt Readings from Last 1 Encounters:   25 0509 60.8 kg (134 lb 0.6 oz)   25 1838 60 kg (132 lb 4.4 oz)   25 0638 60 kg (132 lb 4.4 oz)   25 0516 63.6 kg (140 lb 3.4 oz)     Wt Readings from Last 3 Encounters:   25 60.8 kg (134 lb 0.6 oz)   23 63.6 kg (140 lb 4.8 oz)   22 64.9 kg (143 lb 1.6 oz)       Physical Exam  Vitals and nursing note reviewed.   Constitutional:       General: She is not in acute distress.     Appearance: She is ill-appearing.   Cardiovascular:      Rate and Rhythm: Normal rate and regular rhythm.   Pulmonary:      Effort: Pulmonary effort is normal.      Breath sounds: Wheezing present.   Abdominal:      General: Bowel sounds are normal.      Palpations: Abdomen is soft.      Tenderness: There is no abdominal tenderness.   Musculoskeletal:         General: No swelling.   Skin:     General: Skin is warm and dry.   Neurological:      Mental Status: She is alert. Mental status is at baseline.         Results Review     I reviewed the patient's new clinical results.  Results from last 7 days   Lab Units 25  0635 25  0441 25  0547 25  2235   WBC 10*3/mm3 15.18* 13.49* 10.27 12.76*  "  HEMOGLOBIN g/dL 15.3 16.8* 15.5 16.6*   PLATELETS 10*3/mm3 99* 118* 102* 118*     Results from last 7 days   Lab Units 01/05/25 0355 01/04/25  1258 01/03/25  0547 01/02/25 2235   SODIUM mmol/L 127* 128* 136 133*   POTASSIUM mmol/L 4.2 3.0* 3.8 3.9   CHLORIDE mmol/L 92* 85* 100 97*   CO2 mmol/L 22.2 22.7 22.7 26.2   BUN mg/dL 67* 67* 56* 60*   CREATININE mg/dL 3.22* 3.14* 2.25* 2.48*   GLUCOSE mg/dL 335* 506* 295* 536*   EGFR mL/min/1.73 13.3* 13.7* 20.4* 18.1*     Results from last 7 days   Lab Units 01/05/25 0355 01/04/25  1258 01/03/25 0547 01/02/25 2235   CALCIUM mg/dL 9.1 9.0 9.5 9.5   ALBUMIN g/dL 4.0 3.3*  --  3.7   PHOSPHORUS mg/dL 4.2 3.5  --   --      Results from last 7 days   Lab Units 01/04/25  1258 01/03/25 0547 01/03/25 0054 01/02/25 2235   CK TOTAL U/L 110  --   --   --    HSTROP T ng/L  --  137* 128* 125*   PROBNP pg/mL  --   --   --  7,970.0*         Invalid input(s): \"LDLCALC\"  Results from last 7 days   Lab Units 01/02/25 2235   COVID19  Not Detected     Hemoglobin A1C   Date/Time Value Ref Range Status   01/02/2025 2235 10.70 (H) 4.80 - 5.60 % Final     Glucose   Date/Time Value Ref Range Status   01/05/2025 0925 294 (H) 70 - 130 mg/dL Final   01/04/2025 2056 391 (H) 70 - 130 mg/dL Final   01/04/2025 2055 438 (C) 70 - 130 mg/dL Final   01/04/2025 1714 409 (C) 70 - 130 mg/dL Final   01/04/2025 1228 464 (C) 70 - 130 mg/dL Final   01/04/2025 0750 371 (H) 70 - 130 mg/dL Final   01/03/2025 2344 460 (C) 70 - 130 mg/dL Final     US Renal Bilateral  Narrative: ULTRASOUND RENAL BILATERAL     INDICATION: Acute on chronic kidney disease.     COMPARISON: None     TECHNIQUE: Real-time sonographic evaluation of the kidneys with  grayscale and color-flow imaging. Representative images were saved for  review.     FINDINGS:     RIGHT KIDNEY: 9.1 cm in length. No renal mass is seen. No  hydronephrosis.     LEFT KIDNEY: 7.8 cm in length. Cortical thinning. Simple appearing cyst  in the superior pole " measuring 1.8 x 1.3 x 1.6 cm. No solid mass  identified. No hydronephrosis.     BLADDER: Distended bladder. Bilateral ureteral jets seen. Prevoid  bladder volume measures 948.5 mL.        Impression:    1. Atrophic appearing left kidney.  2. Simple appearing left renal cyst.  3. No solid renal mass identified. No hydronephrosis.  4. Moderate to severe bladder distention              This report was finalized on 1/3/2025 10:06 AM by Dr. Dread Crisostomo M.D on Workstation: [id number]     XR Knee 3 View Right  Narrative: XR KNEE 3 VW RIGHT-     INDICATIONS: Joint effusion.     TECHNIQUE: 3 VIEWS OF THE RIGHT KNEE     COMPARISON: None available     FINDINGS:     Mild knee effusion is apparent. Some calcified bodies are present in the  suprapatellar space. Calcifications posterior to the knee may be debris  in a popliteal cyst. Arterial calcifications are conspicuous. Chondral  calcifications are noted. No acute fracture, erosion, or dislocation is  identified. Small degenerative spurring is present. Follow-up/further  evaluation can be obtained as indications persist.     Impression:    As described.           This report was finalized on 1/3/2025 7:10 AM by Dr. Kenton Vallejo M.D on Workstation: [id number]     XR Chest 1 View  Narrative: Patient: LORETA MENDOZA  Time Out: 01:01  Exam(s): XR CXR 1 VIEW     EXAM:    XR Chest, 1 View    CLINICAL HISTORY:     Reason for exam: CHF COPD Protocol.    TECHNIQUE:    Frontal view of the chest.    COMPARISON:    No relevant prior studies available.    FINDINGS:    Lungs:  Mild pulmonary vascular congestion.  No consolidation.    Pleural space:  Unremarkable.  No pneumothorax.    Heart:  Unremarkable.  No cardiomegaly.    Mediastinum:  Unremarkable.  Normal mediastinal contour.    Bones joints:  Unremarkable.  No acute fracture.    Tubes, lines and devices:  Left subclavian access dual-lead pacer is   noted with tips overlying the right atrium and right  ventricle.    IMPRESSION:         Mild pulmonary vascular congestion.  Impression: Electronically signed by Tip Lozano MD on 01-03-25 at 0101    Results for orders placed during the hospital encounter of 05/17/23    Adult Transthoracic Echo Complete w/ Color, Spectral and Contrast if Necessary Per Protocol    Interpretation Summary    Left ventricular systolic function is normal. Calculated left ventricular EF = 54.4%    The left ventricular cavity is small in size.    Left ventricular wall thickness is consistent with moderate to severe septal asymmetric hypertrophy.    Left ventricular diastolic function is consistent with (grade Ia w/high LAP) impaired relaxation.    Moderately reduced right ventricular systolic function noted.    Estimated right ventricular systolic pressure from tricuspid regurgitation is normal (<35 mmHg).    I have personally reviewed all medications:  Scheduled Medications  allopurinol, 100 mg, Oral, Daily  arformoterol, 15 mcg, Nebulization, BID - RT  aspirin, 81 mg, Oral, Daily  azithromycin, 250 mg, Oral, Q24H  budesonide, 0.5 mg, Nebulization, BID - RT  dorzolamide-timolol, 1 drop, Left Eye, BID  famotidine, 10 mg, Oral, Daily  fluticasone, 2 spray, Nasal, Daily  gabapentin, 300 mg, Oral, BID  insulin glargine, 25 Units, Subcutaneous, Every Afternoon  insulin lispro, 3-14 Units, Subcutaneous, 4x Daily AC & at Bedtime  insulin lispro, 8 Units, Subcutaneous, TID With Meals  ipratropium-albuterol, 3 mL, Nebulization, 4x Daily - RT  isosorbide mononitrate, 30 mg, Oral, Daily  latanoprost, 1 drop, Left Eye, Nightly  metoprolol succinate XL, 100 mg, Oral, Daily  montelukast, 10 mg, Oral, Nightly  rosuvastatin, 10 mg, Oral, Daily    Infusions  O2, 3 L/min, Last Rate: 3 L/min (01/04/25 0931)    Diet  Diet: Cardiac, Diabetic, Fluid Restriction (240 mL/tray); Healthy Heart (2-3 Na+); Consistent Carbohydrate; 1500 mL/day; Fluid Consistency: Thin (IDDSI 0)    I have personally reviewed:  [x]   Laboratory   [x]  Microbiology   [x]  Radiology   [x]  EKG/Telemetry  [x]  Cardiology/Vascular   []  Pathology    []  Records       Assessment/Plan     Active Hospital Problems    Diagnosis  POA    **Acute on chronic heart failure with preserved ejection fraction (HFpEF) [I50.33]  Yes    Bacterial pneumonia [J15.9]  Yes    Hypertension [I10]  Unknown    MARTIN (acute kidney injury) [N17.9]  Yes    Acute on chronic respiratory failure with hypoxia [J96.21]  Yes    Type 2 diabetes mellitus with hyperglycemia, without long-term current use of insulin [E11.65]  Yes    Stage 3b chronic kidney disease [N18.32]  Yes    Coronary artery disease involving native coronary artery without angina pectoris [I25.10]  Yes      Resolved Hospital Problems   No resolved problems to display.       89 y.o. female admitted with Acute on chronic heart failure with preserved ejection fraction (HFpEF).    AFVSS  Soft /66  Currently on 8 L high flow oxygen replacement  Creatinine up to 3.22 from 2.25 two days ago  Sodium 127  Platelets low but stable  BS 300s and 400s, A1c 10.7%  On azithromycin, Rocephin stopped by pulmonology  Renal ultrasound atrophic left kidney no hydronephrosis moderate to severe bladder distention    Increase basal/bolus insulin and change correctional factor   Appreciate assistance nephrology and pulmonology    Still has a wheezy cough and is ill-appearing.  O2 saturation seems stable.  She is on azithromycin per pulmonology.  Rocephin discontinued.  Uncertain significance of leukocytosis.         SCDs for DVT prophylaxis.  Full code.  Discussed with patient.  Anticipate discharge home with HH vs SNU facility next week.  Expected Discharge Date: 1/8/2025; Expected Discharge Time:       Will Cerrato MD  [facility]  01/05/25  09:34 EST

## 2025-01-05 NOTE — THERAPY EVALUATION
Patient Name: Salma Hatfield  : 1935    MRN: [id number]                              Today's Date: 2025       Admit Date: 2025    Visit Dx:     ICD-10-CM ICD-9-CM   1. Acute on chronic respiratory failure with hypoxia  J96.21 518.84     799.02   2. Acute pulmonary edema  J81.0 518.4   3. Community acquired pneumonia, unspecified laterality  J18.9 486   4. Acute pain of right knee  M25.561 719.46     Patient Active Problem List   Diagnosis    Bradycardia    Pulmonary emphysema    Diffuse large B cell lymphoma    S/P CABG (coronary artery bypass graft)    Coronary artery disease involving native coronary artery without angina pectoris    Chronic respiratory failure with hypoxia    Type 2 diabetes mellitus with hyperglycemia, without long-term current use of insulin    Hypothyroidism (acquired)    Stage 3b chronic kidney disease    Second degree AV block    Presence of cardiac pacemaker    Acute UTI (urinary tract infection)    History of 2019 novel coronavirus disease (COVID-19)    Acute on chronic respiratory failure with hypoxia    PVD (peripheral vascular disease)    Diastolic CHF, acute on chronic    MARTIN (acute kidney injury)    Acute on chronic heart failure with preserved ejection fraction (HFpEF)    Respiratory failure    Bacterial pneumonia    Hypertension     Past Medical History:   Diagnosis Date    AAA (abdominal aortic aneurysm)     stated in 2022 Dr. Lokesh Santoro office note    Atherosclerotic heart disease     stated in 2022 Dr. Lokesh Santoro office note    Bradycardia     Carotid artery stenosis     stated in 2022 Dr. Lokesh Santoro office note    Cataract Removal 10/27/2008    Right (10/27/08) and Left (08)    Chronic kidney disease, stage 3a     Chronic respiratory failure with hypoxia     Coronary artery disease     Coronary atherosclerosis     stated in 2022 Dr. Lokesh Santoro office note    Diabetes mellitus     Diffuse large B  cell lymphoma 06/21/2016    Elevated cholesterol     H/O angioplasty 12/31/2007    Hx of CABG 09/21/2009    Triple bypass    Hyperlipidemia     Hyperparathyroidism 04/17/2017    Hypertension     Hypertensive disorder     stated in 2- Dr. Lokesh Santoro office note    Hypothyroidism (acquired)     Ischemic heart disease 12/15/2016    Pulmonary emphysema     Retinal tear 02/06/2009    Right eye, repaired 06/2009    S/P arterial stent 08/26/2009    Left leg and aorta    S/P renal artery angioplasty 01/21/2008    Left    Shingles 03/26/2014    Status post carotid surgery 06/13/2018    Stenosis of iliac artery     stated in 2- Dr. Lokesh Santoro office note     Past Surgical History:   Procedure Laterality Date    CARDIAC CATHETERIZATION      CARDIAC ELECTROPHYSIOLOGY PROCEDURE N/A 07/22/2022    Procedure: Pacemaker SC new [employer];  Surgeon: Kevin Eisenberg MD;  Location: [facility] INVASIVE LOCATION;  Service: Cardiology;  Laterality: N/A;    CAROTID ENDARTERECTOMY  2018    CORONARY ARTERY BYPASS GRAFT  2008    Triple bypass    CORONARY STENT PLACEMENT  10/2011    ILIAC ARTERY STENT      Aorta-iliac stent 2009    INSERT / REPLACE / REMOVE PACEMAKER  07/2022    RENAL ARTERY STENT Left 2008      General Information       Row Name 01/05/25 1126          Physical Therapy Time and Intention    Document Type evaluation  -JL     Mode of Treatment physical therapy  -JL       Row Name 01/05/25 1126          General Information    Patient Profile Reviewed yes  -JL     Prior Level of Function independent:;ADL's  Uses rollator at baseline  -JL     Existing Precautions/Restrictions fall;oxygen therapy device and L/min  -JL     Barriers to Rehab medically complex  -JL       Row Name 01/05/25 1126          Living Environment    People in Home grandchild(shyam)  -JL       Row Name 01/05/25 1126          Home Main Entrance    Number of Stairs, Main Entrance other (see comments)  Ramp entrance  -JL       Row  Name 01/05/25 1126          Stairs Within Home, Primary    Number of Stairs, Within Home, Primary none  -JL       Row Name 01/05/25 1126          Cognition    Orientation Status (Cognition) oriented x 3  -JL       Row Name 01/05/25 1126          Safety Issues/Impairments Affecting Functional Mobility    Safety Issues Affecting Function (Mobility) awareness of need for assistance;insight into deficits/self-awareness  -JL     Impairments Affecting Function (Mobility) balance;endurance/activity tolerance;pain;range of motion (ROM);strength  -JL               User Key  (r) = Recorded By, (t) = Taken By, (c) = Cosigned By      Initials Name Provider Type    Christie Yung, PT Physical Therapist                   Mobility       Row Name 01/05/25 1128          Bed Mobility    Bed Mobility supine-sit;sit-supine  -JL     Supine-Sit [place] (Bed Mobility) moderate assist (50% patient effort);1 person assist  -JL     Sit-Supine [place] (Bed Mobility) moderate assist (50% patient effort);maximum assist (25% patient effort);1 person assist  -JL     Assistive Device (Bed Mobility) bed rails;head of bed elevated  -JL       Row Name 01/05/25 1128          Sit-Stand Transfer    Sit-Stand [place] (Transfers) moderate assist (50% patient effort);minimum assist (75% patient effort)  -JL     Assistive Device (Sit-Stand Transfers) walker, front-wheeled  -JL     Comment, (Sit-Stand Transfer) mod A to STS, but able to maintain standing balance and side step  -JL       Row Name 01/05/25 1128          Gait/Stairs (Locomotion)    [place] Level (Gait) standby assist  -JL     Assistive Device (Gait) walker, front-wheeled  -JL     Patient was able to Ambulate yes  -JL     Distance in Feet (Gait) 3  Side stepped to HOB  -JL     Deviations/Abnormal Patterns (Gait) festinating/shuffling;weight shifting decreased  -JL     Bilateral Gait Deviations forward flexed posture  -JL     Comment, (Gait/Stairs) SBA for side stepping to  HOB  -               User Key  (r) = Recorded By, (t) = Taken By, (c) = Cosigned By      Initials Name Provider Type    Christie Yung PT Physical Therapist                   Obj/Interventions       Row Name 01/05/25 1131          Range of Motion Comprehensive    General Range of Motion lower extremity range of motion deficits identified  -JL     Comment, General Range of Motion Decreased B hip and knee ROM  -[facility] Name 01/05/25 1131          Strength Comprehensive (MMT)    General Manual Muscle Testing (MMT) Assessment lower extremity strength deficits identified  -     Comment, General Manual Muscle Testing (MMT) Assessment Gross weakness in BLE 3+/5. Difficulty with R knee due to lingering pain  -       Row Name 01/05/25 1131          Motor Skills    Therapeutic Exercise other (see comments)  Ankle pumps 10x, knee flexion 10x, leg lift 5x  -[facility] Name 01/05/25 1131          Balance    Balance Assessment sitting static balance;standing static balance  -JL     Static Sitting Balance standby assist  -JL     Position, Sitting Balance unsupported;sitting edge of bed  -JL     Static Standing Balance contact guard  -JL     Position/Device Used, Standing Balance walker, front-wheeled  -               User Key  (r) = Recorded By, (t) = Taken By, (c) = Cosigned By      Initials Name Provider Type    Christie Yung PT Physical Therapist                   Goals/Plan       [facility] Name 01/05/25 1140          Bed Mobility Goal 1 (PT)    [place] Level/Cues Needed (Bed Mobility Goal 1, PT) minimum assist (75% or more patient effort)  -JL     Time Frame (Bed Mobility Goal 1, PT) short term goal (STG);10 days  -[facility] Name 01/05/25 1140          Transfer Goal 1 (PT)    Activity/Assistive Device (Transfer Goal 1, PT) transfers, all;walker, standard  -JL     [place] Level/Cues Needed (Transfer Goal 1, PT) contact guard required  -JL     Time Frame (Transfer Goal 1, PT) short term goal  (STG);10 days  -JL       Row Name 01/05/25 1140          Gait Training Goal 1 (PT)    Activity/Assistive Device (Gait Training Goal 1, PT) gait (walking locomotion);assistive device use;increase endurance/gait distance  -JL     [place] Level (Gait Training Goal 1, PT) contact guard required  -JL     Distance (Gait Training Goal 1, PT) 50  -JL     Time Frame (Gait Training Goal 1, PT) short term goal (STG);10 days  -JL       Row Name 01/05/25 1140          Therapy Assessment/Plan (PT)    Planned Therapy Interventions (PT) balance training;bed mobility training;patient/family education;gait training;ROM (range of motion);transfer training  -JL               User Key  (r) = Recorded By, (t) = Taken By, (c) = Cosigned By      Initials Name Provider Type    Christie Yung, PT Physical Therapist                   Clinical Impression       Row Name 01/05/25 1136          Pain    Pre/Posttreatment Pain Comment No numerical value of pain given, but pt did say R knee was hurting some  -       Row Name 01/05/25 7238          Plan of Care Review    Plan of Care Reviewed With patient  -JL     Progress improving  -JL     Outcome Evaluation Patient is an 89y.o. F with PMH of COPD, CKD, CAD, diabetes, HTN, and PAD that presents to [facility] complaining of shortness of breath and cough. Pt was A&Ox3 with adult daughter at bedside feeding pt breakfast. Pt reports she lives in a house with her adult grandson who helps with the house. She uses a rollator at baseline with a ramp to enter the house and is independent with ADLs. She transferred from supine to sitting at EOB w/ mod A. Once sitting at EOB, she maintained balance with SBA. Pt was able to perform STS w/ min/mod A. Once standing, pt was able to maintain static standing balance w/ SBA while hygeine needs were met. Pt was able to follow commands at PT helped family member change pt's briefs. Pt was able to then side step to HOB w/ CGA. Pt returned to   bed w/ mod A. PT recommendation at discharge is SNF.  -       Row Name 01/05/25 1134          Therapy Assessment/Plan (PT)    Rehab Potential (PT) fair  -JL     Criteria for Skilled Interventions Met (PT) yes;meets criteria;skilled treatment is necessary  -JL     Therapy Frequency (PT) 5 times/wk  -       Row Name 01/05/25 1134          Positioning and Restraints    Pre-Treatment Position in bed  -JL     Post Treatment Position bed  -JL     In Bed supine;call light within reach;encouraged to call for assist;exit alarm on;with family/caregiver  -               User Key  (r) = Recorded By, (t) = Taken By, (c) = Cosigned By      Initials Name Provider Type    Christie Yung, LACI Physical Therapist                   Outcome Measures       Row Name 01/05/25 1141 01/05/25 0400       How much help from another person do you currently need...    Turning from your back to your side while in flat bed without using bedrails? 3  -JL 3  -BB    Moving from lying on back to sitting on the side of a flat bed without bedrails? 3  -JL 3  -BB    Moving to and from a bed to a chair (including a wheelchair)? 3  -JL 3  -BB    Standing up from a chair using your arms (e.g., wheelchair, bedside chair)? 3  -JL 3  -BB    Climbing 3-5 steps with a railing? 1  -JL 1  -BB    To walk in hospital room? 3  -JL 2  -BB    AM-PAC 6 Clicks Score (PT) 16  -JL 15  -BB              User Key  (r) = Recorded By, (t) = Taken By, (c) = Cosigned By      Initials Name Provider Type    Arvind Mireles, RN Registered Nurse    Christie Yung, LACI Physical Therapist                                 Physical Therapy Education       Title: PT OT SLP Therapies (In Progress)       Topic: Physical Therapy (Done)       Point: Mobility training (Done)       Learning Progress Summary            Patient Acceptance, E, VU by ROXY at 1/5/2025 1142                      Point: Home exercise program (Done)       Learning Progress Summary            Patient  Acceptance, E, VU by  at 1/5/2025 1142                      Point: Body mechanics (Done)       Learning Progress Summary            Patient Acceptance, E, VU by ROXY at 1/5/2025 1142                      Point: Precautions (Done)       Learning Progress Summary            Patient Acceptance, E, VU by ROXY at 1/5/2025 1142                                      User Key       Initials Effective Dates Name Provider Type Discipline     01/16/24 -  Christie Gan, PT Physical Therapist PT                  PT Recommendation and Plan  Planned Therapy Interventions (PT): balance training, bed mobility training, patient/family education, gait training, ROM (range of motion), transfer training  Progress: improving  Outcome Evaluation: Patient is an 89y.o. F with PMH of COPD, CKD, CAD, diabetes, HTN, and PAD that presents to [facility] complaining of shortness of breath and cough. Pt was A&Ox3 with adult daughter at bedside feeding pt breakfast. Pt reports she lives in a house with her adult grandson who helps with the house. She uses a rollator at baseline with a ramp to enter the house and is independent with ADLs. She transferred from supine to sitting at EOB w/ mod A. Once sitting at EOB, she maintained balance with SBA. Pt was able to perform STS w/ min/mod A. Once standing, pt was able to maintain static standing balance w/ SBA while hygeine needs were met. Pt was able to follow commands at PT helped family member change pt's briefs. Pt was able to then side step to HOB w/ CGA. Pt returned to  bed w/ mod A. PT recommendation at discharge is SNF.     Time Calculation:         PT Charges       Row Name 01/05/25 1142             Time Calculation    Start Time 0816  -      Stop Time 0847  -      Time Calculation (min) 31 min  -ROXY      PT Non-Billable Time (min) 6 min  -ROXY      PT Received On 01/05/25  -ROXY      PT - Next Appointment 01/07/25  -ROXY      PT Goal Re-Cert Due Date 01/15/25  -ROXY         Time  Calculation- PT    Total Timed Code Minutes- PT 25 minute(s)  -JL         Timed Charges    02540 - PT Therapeutic Exercise Minutes 15  -JL      83812 - PT Therapeutic Activity Minutes 10  -JL         Untimed Charges    PT Eval/Re-eval Minutes 6  -JL         Total Minutes    Timed Charges Total Minutes 25  -JL      Untimed Charges Total Minutes 6  -JL       Total Minutes 31  -JL                User Key  (r) = Recorded By, (t) = Taken By, (c) = Cosigned By      Initials Name Provider Type     Christie Gan, PT Physical Therapist                  Therapy Charges for Today       Code Description Service Date Service Provider Modifiers Qty    56333805647 HC PT THER PROC EA 15 MIN 1/5/2025 Christie Gan, PT GP 1    54546379251 HC PT THERAPEUTIC ACT EA 15 MIN 1/5/2025 Christie Gan, PT GP 1    79100028753 HC PT EVAL MOD COMPLEXITY 3 1/5/2025 Christie Gan, PT GP 1            PT G-Codes  Outcome Measure Options: AM-PAC 6 Clicks Daily Activity (OT)  AM-PAC 6 Clicks Score (PT): 16  AM-PAC 6 Clicks Score (OT): 15  PT Discharge Summary  Anticipated Discharge Disposition (PT): skilled nursing facility    Christie Gan PT  1/5/2025

## 2025-01-05 NOTE — PROGRESS NOTES
HOSPITAL PROGRESS NOTE    Date of Service: 25  LOS:  LOS: 2 days   Patient Name: Salma Hatfield  Age/Sex: 89 y.o. female  : 1935  MRN: [id number]  Primary Cardiologist: Dr. Giovanny Santoro ([facility])    Subjective:     Chief Complaint/Follow up:   Shortness of breath, productive cough acute on chronic respiratory failure with hypoxia, acute pulmonary edema, pneumonia    Interval History:   Hypotensive earlier today.  Hydralazine DC'd.  BP normal now.  Weight up 2 pounds overnight.  Net volume positive.    Resting in bed.  Daughter at bedside.  She feels really good today.  Denies shortness of breath.  On 6 L of oxygen.  Denies chest pain, palpitations, dizziness, or edema.  I reviewed echo results with them.    Objective:     Objective:  Temp:  [97.5 °F (36.4 °C)-99.1 °F (37.3 °C)] 99.1 °F (37.3 °C)  Heart Rate:  [69-76] 72  Resp:  [18-22] 20  BP: ()/(49-66) 126/59  Body mass index is 25.64 kg/m².    Intake/Output Summary (Last 24 hours) at 2025 1412  Last data filed at 2025 0943  Gross per 24 hour   Intake 560 ml   Output 200 ml   Net 360 ml         25  0638 25  1838 25  0509   Weight: 60 kg (132 lb 4.4 oz) 60 kg (132 lb 4.4 oz) 60.8 kg (134 lb 0.6 oz)       Physical Exam:   General Appearance: Alert, cooperative, in no acute distress. AAOx4.   Lungs: Normal respiratory rate.  Diminished lung sounds.  6 L of oxygen   heart: Regular rate and rhythm, normal S1 and S2, no murmurs, gallops or rubs.  Extremities: No edema.   : Pure wick    Lab Review:   Results from last 7 days   Lab Units 25  0355 25  1258 25  0547 25  2235   SODIUM mmol/L 127* 128*   < > 133*   POTASSIUM mmol/L 4.2 3.0*   < > 3.9   CHLORIDE mmol/L 92* 85*   < > 97*   CO2 mmol/L 22.2 22.7   < > 26.2   BUN mg/dL 67* 67*   < > 60*   CREATININE mg/dL 3.22* 3.14*   < > 2.48*   GLUCOSE mg/dL 335* 506*   < > 536*   CALCIUM mg/dL 9.1 9.0   < > 9.5   AST (SGOT) U/L  --   --    --  242*   ALT (SGPT) U/L  --   --   --  174*    < > = values in this interval not displayed.     Results from last 7 days   Lab Units 01/04/25  1258 01/03/25  0547 01/03/25  0054 01/02/25  2235   CK TOTAL U/L 110  --   --   --    HSTROP T ng/L  --  137* 128* 125*     Results from last 7 days   Lab Units 01/05/25  0635 01/04/25  0441   WBC 10*3/mm3 15.18* 13.49*   HEMOGLOBIN g/dL 15.3 16.8*   HEMATOCRIT % 46.7* 52.2*   PLATELETS 10*3/mm3 99* 118*                 Results from last 7 days   Lab Units 01/02/25  2235   PROBNP pg/mL 7,970.0*           Results for orders placed during the hospital encounter of 01/02/25    Adult Transthoracic Echo Complete W/ Cont if Necessary Per Protocol    Interpretation Summary    Left ventricular systolic function is normal. Calculated left ventricular EF = 62.3%    Left ventricular wall thickness is consistent with moderate to  moderate concentric hypertrophy with component of asymmetric basal septal hypertrophy.  No significant gradient noted.    Left ventricular diastolic function is consistent with (grade I) impaired relaxation.    Mildly reduced right ventricular systolic function noted.    No significant valvular stenosis or regurgitation present.    I reviewed the patient's new clinical results.  I personally viewed and interpreted the patient's EKG/Telemetry data/Labs/Test Results.     Current Medications:   Scheduled Meds:  allopurinol, 100 mg, Oral, Daily  arformoterol, 15 mcg, Nebulization, BID - RT  aspirin, 81 mg, Oral, Daily  azithromycin, 250 mg, Oral, Q24H  budesonide, 0.5 mg, Nebulization, BID - RT  dorzolamide-timolol, 1 drop, Left Eye, BID  famotidine, 10 mg, Oral, Daily  fluticasone, 2 spray, Nasal, Daily  gabapentin, 300 mg, Oral, BID  insulin glargine, 25 Units, Subcutaneous, Every Afternoon  insulin lispro, 3-14 Units, Subcutaneous, 4x Daily AC & at Bedtime  insulin lispro, 8 Units, Subcutaneous, TID With Meals  ipratropium-albuterol, 3 mL, Nebulization, 4x  Daily - RT  isosorbide mononitrate, 30 mg, Oral, Daily  latanoprost, 1 drop, Left Eye, Nightly  metoprolol succinate XL, 100 mg, Oral, Daily  montelukast, 10 mg, Oral, Nightly  rosuvastatin, 10 mg, Oral, Daily          Allergies:  No Known Allergies    Assessment:       Acute on chronic heart failure with preserved ejection fraction (HFpEF)    Coronary artery disease involving native coronary artery without angina pectoris    Type 2 diabetes mellitus with hyperglycemia, without long-term current use of insulin    Stage 3b chronic kidney disease    Acute on chronic respiratory failure with hypoxia    MARTIN (acute kidney injury)    Bacterial pneumonia    Hypertension        Plan:   Assessment & Plan       1.  HFpEF and pulmonary edema.  No longer on diuretics.    2.  CAD.  History of CABG.  Troponin elevated, but flat.  Not ACS.  3.  History of ischemic cardiomyopathy.  Resolved.  4.  Sick sinus syndrome.  Status post pacemaker  5.  Acute on chronic hypoxic respiratory failure  6.  COPD  7.  Pneumonia-on azithromycin  8.  MARTIN on stage IIIb chronic kidney disease-nephro following  9.  Hypertension  10.  Hyperlipidemia  11.  Type 2 diabetes mellitus on insulin  12.  Hypokalemia  13.  Hyponatremia-on fluid restriction  14.  History of carotid artery stenosis renal artery stenosis  15.  Poor historian    Plan:  -Echo completed yesterday showed LVEF 62%, grade 1 diastolic dysfunction, mildly reduced RV systolic function, moderate LVH, aortic sclerosis, mitral valve calcification, mild tricuspid regurgitation, no pulmonary hypertension.  Results discussed with family  -Agree with stopping hydralazine because of hypotension  -Well compensated from a cardiac standpoint  -Cardiology to sign off.  I am recommended patient follow-up with Dr. Santoro, her primary cardiologist in 1 week postdischarge.  They will call to make an appointment.      SORIN Leonardo  [facility]   01/05/25  14:12 EST

## 2025-01-05 NOTE — PLAN OF CARE
Goal Outcome Evaluation:  Plan of Care Reviewed With: patient        Progress: improving  Outcome Evaluation: Bladder scan showing 596ml this afternoon, Dr. Jason wants F/C put in, got 925ml out after insertion. Turned and weight shifted, skin care done, brief changed. BG in 200s at dinner time, improved with current treatment plan. No c/o pain. No c/o bladder spasm or feel urgent to pee(even before F/C insertion). Tolerated F/C well. Daughter at bedside, call light within reach.

## 2025-01-05 NOTE — PROGRESS NOTES
[facility] Progress Note      Patient Name: Salma Hatfield  : 1935  MRN: [id number]  Primary Care Physician:  Stephen Long APRN  Date of admission: 2025    Subjective     Interval History:   F/u MARTIN CKD3B    Review of Systems:      On 8L O2   Coughing often   No n/v     Objective     Vitals:   Temp:  [97.3 °F (36.3 °C)-97.8 °F (36.6 °C)] 97.5 °F (36.4 °C)  Heart Rate:  [69-78] 72  Resp:  [18-22] 20  BP: ()/(43-66) 133/64  Flow (L/min) (Oxygen Therapy):  [6-8] 6    Intake/Output Summary (Last 24 hours) at 2025 1106  Last data filed at 2025 0943  Gross per 24 hour   Intake 560 ml   Output 200 ml   Net 360 ml       Physical Exam:    General Appearance: frail WF on NC O2 coughing   Lungs: bilat rhonchi + wheezes  Heart: RRR, normal S1 and S2  Abdomen: soft, nontender, nondistended  : pure wick   Extremities: no edema, cyanosis or clubbing       Scheduled Meds:     allopurinol, 100 mg, Oral, Daily  arformoterol, 15 mcg, Nebulization, BID - RT  aspirin, 81 mg, Oral, Daily  azithromycin, 250 mg, Oral, Q24H  budesonide, 0.5 mg, Nebulization, BID - RT  dorzolamide-timolol, 1 drop, Left Eye, BID  famotidine, 10 mg, Oral, Daily  fluticasone, 2 spray, Nasal, Daily  gabapentin, 300 mg, Oral, BID  insulin glargine, 25 Units, Subcutaneous, Every Afternoon  insulin lispro, 3-14 Units, Subcutaneous, 4x Daily AC & at Bedtime  insulin lispro, 8 Units, Subcutaneous, TID With Meals  ipratropium-albuterol, 3 mL, Nebulization, 4x Daily - RT  isosorbide mononitrate, 30 mg, Oral, Daily  latanoprost, 1 drop, Left Eye, Nightly  metoprolol succinate XL, 100 mg, Oral, Daily  montelukast, 10 mg, Oral, Nightly  rosuvastatin, 10 mg, Oral, Daily      IV Meds:   O2, 3 L/min, Last Rate: 3 L/min (25 0916)        Results Reviewed:   I have personally reviewed the results from the time of this admission to 2025 11:06 EST     Results from last 7 days   Lab Units  01/05/25  0355 01/04/25  1258 01/03/25  0547 01/02/25  2235   SODIUM mmol/L 127* 128* 136 133*   POTASSIUM mmol/L 4.2 3.0* 3.8 3.9   CHLORIDE mmol/L 92* 85* 100 97*   CO2 mmol/L 22.2 22.7 22.7 26.2   BUN mg/dL 67* 67* 56* 60*   CREATININE mg/dL 3.22* 3.14* 2.25* 2.48*   CALCIUM mg/dL 9.1 9.0 9.5 9.5   BILIRUBIN mg/dL  --   --   --  0.7   ALK PHOS U/L  --   --   --  142*   ALT (SGPT) U/L  --   --   --  174*   AST (SGOT) U/L  --   --   --  242*   GLUCOSE mg/dL 335* 506* 295* 536*     Estimated Creatinine Clearance: 9.8 mL/min (A) (by C-G formula based on SCr of 3.22 mg/dL (H)).  Results from last 7 days   Lab Units 01/05/25  0355 01/04/25  1258   PHOSPHORUS mg/dL 4.2 3.5         Results from last 7 days   Lab Units 01/05/25  0635 01/04/25  0441 01/03/25  0547 01/02/25  2235   WBC 10*3/mm3 15.18* 13.49* 10.27 12.76*   HEMOGLOBIN g/dL 15.3 16.8* 15.5 16.6*   PLATELETS 10*3/mm3 99* 118* 102* 118*           Assessment / Plan     ASSESSMENT:  olig MARTIN - suspected prerenal azotemia due to osmotic diuresis from hyperglycemia (BG 500s) in combination with needed IV diuresis, but also developed relative hypotension, so ATN possible.  Cr up to 3.1 yesterday and 3.2 today so hopefully @ plateau.  UOP marginal.  Gave 25% albumin yesterday.  US: atrophic LK, no hydro.  UA NEG.  K repleted.  Na is 127 but corrects ~ 130 for   CKD stage 3B, BL Cr 1.5 to 1.6, multifactorial: HTN, renovascular dz, DM2, CHF (and dec nephron mass).  Sees Dr Joey Browning  Chronic diastolic CHF - initial CXR showed mild congestion  Dm2 + hyperglycemia, BG 300s, mgmt per primary team  COPD exac - PULM eval noted.  Cont'd azithro   Acute on chronic hypoxic resp failure, on 3L O2 at home, 8L currently.  Driven by COPD moreso than volume?    HTN - over corrected to 90s/40s, stopped hydralazine, better now     PLAN:  Hold diuretics pending repeat CXR review - if showing worsening congestion can revisit this   Stopped hydralazine   Verify checked random  bladder scan   Fluid restrict 1500 cc/day      Dread Jason MD  01/05/25  11:06 [facility]    [facility]  [phone]

## 2025-01-06 LAB
ALBUMIN SERPL-MCNC: 3.4 G/DL (ref 3.5–5.2)
ANION GAP SERPL CALCULATED.3IONS-SCNC: 15.8 MMOL/L (ref 5–15)
ARTERIAL PATENCY WRIST A: POSITIVE
ATMOSPHERIC PRESS: 754.8 MMHG
BASE EXCESS BLDA CALC-SCNC: -1.8 MMOL/L (ref 0–2)
BDY SITE: ABNORMAL
BUN SERPL-MCNC: 71 MG/DL (ref 8–23)
BUN/CREAT SERPL: 21.6 (ref 7–25)
CALCIUM SPEC-SCNC: 9.1 MG/DL (ref 8.6–10.5)
CHLORIDE SERPL-SCNC: 94 MMOL/L (ref 98–107)
CO2 BLDA-SCNC: 24.9 MMOL/L (ref 23–27)
CO2 SERPL-SCNC: 20.2 MMOL/L (ref 22–29)
CREAT SERPL-MCNC: 3.28 MG/DL (ref 0.57–1)
DEPRECATED RDW RBC AUTO: 45.3 FL (ref 37–54)
DEVICE COMMENT: ABNORMAL
EGFRCR SERPLBLD CKD-EPI 2021: 13 ML/MIN/1.73
ERYTHROCYTE [DISTWIDTH] IN BLOOD BY AUTOMATED COUNT: 13.5 % (ref 12.3–15.4)
GAS FLOW AIRWAY: 9 LPM
GLUCOSE BLDC GLUCOMTR-MCNC: 203 MG/DL (ref 70–130)
GLUCOSE BLDC GLUCOMTR-MCNC: 262 MG/DL (ref 70–130)
GLUCOSE BLDC GLUCOMTR-MCNC: 289 MG/DL (ref 70–130)
GLUCOSE BLDC GLUCOMTR-MCNC: 385 MG/DL (ref 70–130)
GLUCOSE BLDC GLUCOMTR-MCNC: 411 MG/DL (ref 70–130)
GLUCOSE SERPL-MCNC: 200 MG/DL (ref 65–99)
HCO3 BLDA-SCNC: 23.6 MMOL/L (ref 22–28)
HCT VFR BLD AUTO: 45.6 % (ref 34–46.6)
HEMODILUTION: NO
HGB BLD-MCNC: 15.2 G/DL (ref 12–15.9)
MCH RBC QN AUTO: 30.8 PG (ref 26.6–33)
MCHC RBC AUTO-ENTMCNC: 33.3 G/DL (ref 31.5–35.7)
MCV RBC AUTO: 92.5 FL (ref 79–97)
MODALITY: ABNORMAL
PCO2 BLDA: 41.6 MM HG (ref 35–45)
PH BLDA: 7.36 PH UNITS (ref 7.35–7.45)
PHOSPHATE SERPL-MCNC: 3.9 MG/DL (ref 2.5–4.5)
PLATELET # BLD AUTO: 119 10*3/MM3 (ref 140–450)
PMV BLD AUTO: 11 FL (ref 6–12)
PO2 BLDA: 70.6 MM HG (ref 80–100)
POTASSIUM SERPL-SCNC: 4.4 MMOL/L (ref 3.5–5.2)
RBC # BLD AUTO: 4.93 10*6/MM3 (ref 3.77–5.28)
SAO2 % BLDCOA: 93.3 % (ref 92–98.5)
SODIUM SERPL-SCNC: 130 MMOL/L (ref 136–145)
TOTAL RATE: 24 BREATHS/MINUTE
WBC NRBC COR # BLD AUTO: 15.5 10*3/MM3 (ref 3.4–10.8)

## 2025-01-06 PROCEDURE — 63710000001 INSULIN LISPRO (HUMAN) PER 5 UNITS: Performed by: HOSPITALIST

## 2025-01-06 PROCEDURE — 82948 REAGENT STRIP/BLOOD GLUCOSE: CPT

## 2025-01-06 PROCEDURE — 94799 UNLISTED PULMONARY SVC/PX: CPT

## 2025-01-06 PROCEDURE — 80069 RENAL FUNCTION PANEL: CPT | Performed by: INTERNAL MEDICINE

## 2025-01-06 PROCEDURE — 63710000001 INSULIN GLARGINE PER 5 UNITS: Performed by: HOSPITALIST

## 2025-01-06 PROCEDURE — 82803 BLOOD GASES ANY COMBINATION: CPT

## 2025-01-06 PROCEDURE — 94761 N-INVAS EAR/PLS OXIMETRY MLT: CPT

## 2025-01-06 PROCEDURE — 85027 COMPLETE CBC AUTOMATED: CPT | Performed by: HOSPITALIST

## 2025-01-06 PROCEDURE — 36600 WITHDRAWAL OF ARTERIAL BLOOD: CPT

## 2025-01-06 RX ORDER — INSULIN LISPRO 100 [IU]/ML
25 INJECTION, SOLUTION INTRAVENOUS; SUBCUTANEOUS ONCE
Status: COMPLETED | OUTPATIENT
Start: 2025-01-06 | End: 2025-01-06

## 2025-01-06 RX ADMIN — INSULIN LISPRO 8 UNITS: 100 INJECTION, SOLUTION INTRAVENOUS; SUBCUTANEOUS at 12:56

## 2025-01-06 RX ADMIN — ARFORMOTEROL TARTRATE 15 MCG: 15 SOLUTION RESPIRATORY (INHALATION) at 05:43

## 2025-01-06 RX ADMIN — MONTELUKAST 10 MG: 10 TABLET, FILM COATED ORAL at 21:15

## 2025-01-06 RX ADMIN — BUDESONIDE 0.5 MG: 0.5 INHALANT RESPIRATORY (INHALATION) at 19:29

## 2025-01-06 RX ADMIN — DORZOLAMIDE HYDROCHLORIDE AND TIMOLOL MALEATE 1 DROP: 20; 5 SOLUTION/ DROPS OPHTHALMIC at 08:46

## 2025-01-06 RX ADMIN — IPRATROPIUM BROMIDE AND ALBUTEROL SULFATE 3 ML: 2.5; .5 SOLUTION RESPIRATORY (INHALATION) at 15:46

## 2025-01-06 RX ADMIN — IPRATROPIUM BROMIDE AND ALBUTEROL SULFATE 3 ML: 2.5; .5 SOLUTION RESPIRATORY (INHALATION) at 19:27

## 2025-01-06 RX ADMIN — ALLOPURINOL 100 MG: 100 TABLET ORAL at 08:46

## 2025-01-06 RX ADMIN — INSULIN LISPRO 25 UNITS: 100 INJECTION, SOLUTION INTRAVENOUS; SUBCUTANEOUS at 17:35

## 2025-01-06 RX ADMIN — AZITHROMYCIN DIHYDRATE 250 MG: 250 TABLET ORAL at 08:46

## 2025-01-06 RX ADMIN — ROSUVASTATIN 10 MG: 10 TABLET, FILM COATED ORAL at 08:46

## 2025-01-06 RX ADMIN — FAMOTIDINE 10 MG: 20 TABLET, FILM COATED ORAL at 08:46

## 2025-01-06 RX ADMIN — ISOSORBIDE MONONITRATE 30 MG: 30 TABLET, EXTENDED RELEASE ORAL at 09:04

## 2025-01-06 RX ADMIN — ARFORMOTEROL TARTRATE 15 MCG: 15 SOLUTION RESPIRATORY (INHALATION) at 19:34

## 2025-01-06 RX ADMIN — INSULIN LISPRO 8 UNITS: 100 INJECTION, SOLUTION INTRAVENOUS; SUBCUTANEOUS at 08:34

## 2025-01-06 RX ADMIN — LATANOPROST 1 DROP: 50 SOLUTION OPHTHALMIC at 21:10

## 2025-01-06 RX ADMIN — INSULIN LISPRO 12 UNITS: 100 INJECTION, SOLUTION INTRAVENOUS; SUBCUTANEOUS at 21:10

## 2025-01-06 RX ADMIN — GABAPENTIN 300 MG: 300 CAPSULE ORAL at 08:46

## 2025-01-06 RX ADMIN — IPRATROPIUM BROMIDE AND ALBUTEROL SULFATE 3 ML: 2.5; .5 SOLUTION RESPIRATORY (INHALATION) at 05:41

## 2025-01-06 RX ADMIN — FLUTICASONE PROPIONATE 2 SPRAY: 50 SPRAY, METERED NASAL at 08:46

## 2025-01-06 RX ADMIN — INSULIN LISPRO 5 UNITS: 100 INJECTION, SOLUTION INTRAVENOUS; SUBCUTANEOUS at 08:35

## 2025-01-06 RX ADMIN — ASPIRIN 81 MG: 81 TABLET, COATED ORAL at 08:46

## 2025-01-06 RX ADMIN — INSULIN GLARGINE 25 UNITS: 100 INJECTION, SOLUTION SUBCUTANEOUS at 16:58

## 2025-01-06 RX ADMIN — DORZOLAMIDE HYDROCHLORIDE AND TIMOLOL MALEATE 1 DROP: 20; 5 SOLUTION/ DROPS OPHTHALMIC at 20:47

## 2025-01-06 RX ADMIN — GABAPENTIN 300 MG: 300 CAPSULE ORAL at 21:15

## 2025-01-06 RX ADMIN — IPRATROPIUM BROMIDE AND ALBUTEROL SULFATE 3 ML: 2.5; .5 SOLUTION RESPIRATORY (INHALATION) at 11:52

## 2025-01-06 RX ADMIN — BUDESONIDE 0.5 MG: 0.5 INHALANT RESPIRATORY (INHALATION) at 05:44

## 2025-01-06 NOTE — DISCHARGE PLACEMENT REQUEST
"Salma Hatfield (89 y.o. Female)       Date of Birth   1935    Social Security Number       Address   [address] [place] [address]    Home Phone   [phone]    MRN   [id number]       Spiritism   None    Marital Status                               Admission Date   1/2/25    Admission Type   Emergency    Admitting Provider   Will Cerrato MD    Attending Provider   Will Cerrato MD    Department, Room/Bed   [address], E462/1       Discharge Date       Discharge Disposition       Discharge Destination                                 Attending Provider: Will Cerrato MD    Allergies: No Known Allergies    Isolation: None   Infection: None   Code Status: CPR    Ht: 154 cm (60.63\")   Wt: 58.5 kg (128 lb 15.5 oz)    Admission Cmt: None   Principal Problem: Acute on chronic heart failure with preserved ejection fraction (HFpEF) [I50.33]                   Active Insurance as of 1/2/2025       Primary Coverage       Payor Plan Insurance Group Employer/Plan Group    HUMANA MEDICARE REPLACEMENT HU[employer] MED ADV GROUP [id number]       Payor Plan Address Payor Plan Phone Number Payor Plan Fax Number Effective Dates    [address] [address] [phone]  1/1/2018 - None Entered    [facility] [address]         Subscriber Name Subscriber Birth Date Member ID       SALMA HATFIELD 1935 [id number]                     Emergency Contacts        (Rel.) Home Phone Work Phone Mobile Phone    YOSEFELINA (Daughter) [phone] -- [phone]    REJI,TROY (Son) [phone] -- [phone]    Marlyn Hatfield (Relative) -- -- [phone]              "

## 2025-01-06 NOTE — PLAN OF CARE
Problem: Adult Inpatient Plan of Care  Goal: Plan of Care Review  Outcome: Progressing   Goal Outcome Evaluation:      Vss.Pt rested in bed comfortably throughout the day.F/c remains in place.On o2 @ 7L high flow.No c/o pain or n/v.

## 2025-01-06 NOTE — PLAN OF CARE
Goal Outcome Evaluation:  Plan of Care Reviewed With: patient, family        Progress: improving             Patient on 8 Liters HHFNC O2, Blood sugar improvement noted. Daughter at bedside, AV paced on the monitor. NO pain, sleeping most of shift. Fluid restriction 1500 ML.

## 2025-01-06 NOTE — PROGRESS NOTES
[facility] Progress Note      Patient Name: Salma Hatfield  : 1935  MRN: [id number]  Primary Care Physician:  Stephen Long APRN  Date of admission: 2025    Subjective     Interval History:   F/u MARTIN CKD3B    Review of Systems:     Patient lying in bed  Patient with improved shortness of breath and lower extremity edema  Noticed urinary retention that required Parikh catheter placement  Urine output 2 L    Objective     Vitals:   Temp:  [97.7 °F (36.5 °C)-98.6 °F (37 °C)] 98.2 °F (36.8 °C)  Heart Rate:  [70-73] 70  Resp:  [16-22] 22  BP: ()/(42-91) 143/70  Flow (L/min) (Oxygen Therapy):  [4-8] 7    Intake/Output Summary (Last 24 hours) at 2025 1406  Last data filed at 2025 1325  Gross per 24 hour   Intake 640 ml   Output 2025 ml   Net -1385 ml       Physical Exam:    General Appearance: frail WF on NC O2 coughing   Lungs: bilat rhonchi + wheezes  Heart: RRR, normal S1 and S2  Abdomen: soft, nontender, nondistended  : pure wick   Extremities: no edema, cyanosis or clubbing       Scheduled Meds:     allopurinol, 100 mg, Oral, Daily  arformoterol, 15 mcg, Nebulization, BID - RT  aspirin, 81 mg, Oral, Daily  azithromycin, 250 mg, Oral, Q24H  budesonide, 0.5 mg, Nebulization, BID - RT  dorzolamide-timolol, 1 drop, Left Eye, BID  famotidine, 10 mg, Oral, Daily  fluticasone, 2 spray, Nasal, Daily  gabapentin, 300 mg, Oral, BID  insulin glargine, 25 Units, Subcutaneous, Every Afternoon  insulin lispro, 3-14 Units, Subcutaneous, 4x Daily AC & at Bedtime  insulin lispro, 8 Units, Subcutaneous, TID With Meals  ipratropium-albuterol, 3 mL, Nebulization, 4x Daily - RT  isosorbide mononitrate, 30 mg, Oral, Daily  latanoprost, 1 drop, Left Eye, Nightly  metoprolol succinate XL, 100 mg, Oral, Daily  montelukast, 10 mg, Oral, Nightly  rosuvastatin, 10 mg, Oral, Daily      IV Meds:   O2, 3 L/min, Last Rate: 3 L/min (25)        Results Reviewed:   I have  personally reviewed the results from the time of this admission to 1/6/2025 14:06 EST     Results from last 7 days   Lab Units 01/06/25  0428 01/05/25  0355 01/04/25  1258 01/03/25  0547 01/02/25  2235   SODIUM mmol/L 130* 127* 128*   < > 133*   POTASSIUM mmol/L 4.4 4.2 3.0*   < > 3.9   CHLORIDE mmol/L 94* 92* 85*   < > 97*   CO2 mmol/L 20.2* 22.2 22.7   < > 26.2   BUN mg/dL 71* 67* 67*   < > 60*   CREATININE mg/dL 3.28* 3.22* 3.14*   < > 2.48*   CALCIUM mg/dL 9.1 9.1 9.0   < > 9.5   BILIRUBIN mg/dL  --   --   --   --  0.7   ALK PHOS U/L  --   --   --   --  142*   ALT (SGPT) U/L  --   --   --   --  174*   AST (SGOT) U/L  --   --   --   --  242*   GLUCOSE mg/dL 200* 335* 506*   < > 536*    < > = values in this interval not displayed.     Estimated Creatinine Clearance: 9.5 mL/min (A) (by C-G formula based on SCr of 3.28 mg/dL (H)).  Results from last 7 days   Lab Units 01/06/25 0428 01/05/25  0355 01/04/25  1258   PHOSPHORUS mg/dL 3.9 4.2 3.5         Results from last 7 days   Lab Units 01/06/25 0428 01/05/25  0635 01/04/25  0441 01/03/25  0547 01/02/25  2235   WBC 10*3/mm3 15.50* 15.18* 13.49* 10.27 12.76*   HEMOGLOBIN g/dL 15.2 15.3 16.8* 15.5 16.6*   PLATELETS 10*3/mm3 119* 99* 118* 102* 118*           Assessment / Plan     ASSESSMENT:  olig MARTIN - suspected prerenal azotemia due to osmotic diuresis from hyperglycemia (BG 500s) in combination with needed IV diuresis, but also developed relative hypotension, so ATN possible.  .  US: atrophic LK, no hydro.  UA NEG.   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  As per primary team  HTN with CKD adjustment    PLAN:  -Sodium gradually improving with medical management.  Creatinine seems to plateau   -Evidence of urinary  retention with Parikh catheter placement and increased urine output continue urinary catheter care.  -Continue surveillance labs    Thank you for allowing us to participate for this patient care      Martinez Mancia MD  01/06/25  14:06 [facility]    [facility]  [phone]

## 2025-01-06 NOTE — PROGRESS NOTES
Consult Daily Progress Note  [facility]   01/06/25      Patient Name:  Salma Hatfield  MRN:  [id number]   YOB: 1935  Age: 89 y.o.  Sex: female  LOS: 3    Reason for Consult:  Pulmonary infiltrates, COPD    Hospital Course:   89-year-old female with chronic obstructive pulmonary disease, chronic respiratory failure, heart failure who presented with shortness of breath and cough found to have acute exacerbation of heart failure with preserved ejection fraction.    Interval History:  No acute events overnight  Net -1.2 L over the past 24 hours, net -6.5 L for the hospitalization  On 8 L nasal cannula  Chest x-ray from yesterday stable  Doing well on evaluation, states that shortness of breath is improved  Daughter is at bedside  Some cough with minimal sputum production  No nausea or vomiting    Physical Exam:  Vitals:    01/06/25 0548   BP:    Pulse: 71   Resp: 18   Temp:    SpO2: 97%       Intake/Output    Intake/Output Summary (Last 24 hours) at 1/6/2025 0741  Last data filed at 1/6/2025 0501  Gross per 24 hour   Intake 840 ml   Output 2025 ml   Net -1185 ml       General: Alert, nontoxic, NAD  HEENT: NC/AT, EOMI, MMM  Neck: Supple, trachea midline  Cardiac: RRR, no murmur, gallops, rubs  Pulmonary: Diminished at bases  GI: Soft, non-tender, non-distended, normal bowel sounds  Extremities: Warm, well perfused, no LE edema  Skin: no visible rash  Neuro: CN II - XII grossly intact  Psychiatry: Normal mood and affect      Data Review:  Results from last 7 days   Lab Units 01/06/25  0428 01/05/25  0635 01/04/25  0441 01/03/25  0547 01/02/25  2235   WBC 10*3/mm3 15.50* 15.18* 13.49* 10.27 12.76*   HEMOGLOBIN g/dL 15.2 15.3 16.8* 15.5 16.6*   PLATELETS 10*3/mm3 119* 99* 118* 102* 118*     Results from last 7 days   Lab Units 01/06/25  0428 01/05/25  0355 01/04/25  1258 01/03/25  0547 01/02/25  2235   SODIUM mmol/L 130* 127* 128* 136 133*   POTASSIUM mmol/L 4.4 4.2 3.0* 3.8 3.9   CHLORIDE  mmol/L 94* 92* 85* 100 97*   CO2 mmol/L 20.2* 22.2 22.7 22.7 26.2   BUN mg/dL 71* 67* 67* 56* 60*   CREATININE mg/dL 3.28* 3.22* 3.14* 2.25* 2.48*   GLUCOSE mg/dL 200* 335* 506* 295* 536*   CALCIUM mg/dL 9.1 9.1 9.0 9.5 9.5   PHOSPHORUS mg/dL 3.9 4.2 3.5  --   --    Estimated Creatinine Clearance: 9.5 mL/min (A) (by C-G formula based on SCr of 3.28 mg/dL (H)).    Results from last 7 days   Lab Units 01/06/25  0428 01/05/25  0635 01/04/25  0441 01/03/25  0547 01/02/25  2238 01/02/25  2235   AST (SGOT) U/L  --   --   --   --   --  242*   ALT (SGPT) U/L  --   --   --   --   --  174*   LACTATE mmol/L  --   --   --   --  1.5  --    PLATELETS 10*3/mm3 119* 99* 118*   < >  --  118*    < > = values in this interval not displayed.               Imaging:  Reviewed chest images personally from past 3 days    ASSESSMENT  /  PLAN:    Acute pulmonary edema, mild  Acute on chronic HFpEF  COPD with current exacerbation  Acute on chronic hypoxic respiratory failure, on O2 3 L/min at baseline.  Pulmonary infiltrates, suspect secondary #1.  Could have mild pneumonia as well but she was already treated with antibiotics.    -Patient presented to the hospital with shortness of breath and found to have acute COPD and CHF exacerbation.  -Continue Pulmicort and Brovana.  DuoNebs every 6 hours.  -On 8 L nasal cannula during evaluation, was able to wean her to 4 L with saturations maintaining greater than 92%.  Wean for SpO2 goal 88 to 92%.  Uses 3 L home O2.  -Azithromycin for her COPD exacerbation  -Ongoing management of diuresis as per nephrology  -Echocardiogram with preserved ejection fraction and mild diastolic dysfunction, moderate concentric hypertrophy  -Incentive spirometer, ambulation as tolerated  -Patient states she would like to be set up with pulmonary as an outpatient.  I will set her up to be seen in pulmonary clinic upon discharge.    All issues new to me today.  Prior hospital course, labs and imaging reviewed.    Thank you  for allowing us to participate in this patients care. Pulmonary will continue to follow.     Anshu Morris MD  [facility]  Pulmonary and Critical Care Medicine, Interventional Pulmonology    Parts of this note may be an electronic transcription/translation of spoken language to printed text using the Dragon dictation system.

## 2025-01-06 NOTE — PROGRESS NOTES
Name: Salma Hatfield ADMIT: 2025   : 1935  PCP: Stephen Long APRN    MRN: [id number] LOS: 3 days   AGE/SEX: 89 y.o. female  ROOM: [facility]     Subjective   Subjective   Feels like she is doing better.  Still on 7 L of oxygen.  No new complaints.       Objective   Objective   Vital Signs  Temp:  [97.7 °F (36.5 °C)-99.1 °F (37.3 °C)] 98.6 °F (37 °C)  Heart Rate:  [70-76] 70  Resp:  [16-20] 20  BP: ()/(42-91) 109/91  SpO2:  [86 %-97 %] 91 %  on  Flow (L/min) (Oxygen Therapy):  [4-8] 7;   Device (Oxygen Therapy): humidified;high-flow nasal cannula  Body mass index is 24.67 kg/m².    Intake/Output Summary (Last 24 hours) at 2025 1032  Last data filed at 2025 0501  Gross per 24 hour   Intake 480 ml   Output 2025 ml   Net -1545 ml   Net IO Since Admission: -6,413 mL [25 1032]   Wt Readings from Last 1 Encounters:   25 0409 58.5 kg (128 lb 15.5 oz)   25 0509 60.8 kg (134 lb 0.6 oz)   25 1838 60 kg (132 lb 4.4 oz)   25 0638 60 kg (132 lb 4.4 oz)   25 0516 63.6 kg (140 lb 3.4 oz)     Wt Readings from Last 3 Encounters:   25 58.5 kg (128 lb 15.5 oz)   23 63.6 kg (140 lb 4.8 oz)   22 64.9 kg (143 lb 1.6 oz)       Physical Exam  Vitals and nursing note reviewed.   Constitutional:       General: She is not in acute distress.     Appearance: She is ill-appearing.   Cardiovascular:      Rate and Rhythm: Normal rate and regular rhythm.   Pulmonary:      Effort: Pulmonary effort is normal.      Breath sounds: Wheezing present.   Abdominal:      General: Bowel sounds are normal.      Palpations: Abdomen is soft.      Tenderness: There is no abdominal tenderness.   Musculoskeletal:         General: No swelling.   Skin:     General: Skin is warm and dry.   Neurological:      Mental Status: She is alert. Mental status is at baseline.         Results Review     I reviewed the patient's new clinical results.  Results from last 7 days   Lab Units  "01/06/25  0428 01/05/25  0635 01/04/25  0441 01/03/25  0547   WBC 10*3/mm3 15.50* 15.18* 13.49* 10.27   HEMOGLOBIN g/dL 15.2 15.3 16.8* 15.5   PLATELETS 10*3/mm3 119* 99* 118* 102*     Results from last 7 days   Lab Units 01/06/25  0428 01/05/25  0355 01/04/25  1258 01/03/25  0547   SODIUM mmol/L 130* 127* 128* 136   POTASSIUM mmol/L 4.4 4.2 3.0* 3.8   CHLORIDE mmol/L 94* 92* 85* 100   CO2 mmol/L 20.2* 22.2 22.7 22.7   BUN mg/dL 71* 67* 67* 56*   CREATININE mg/dL 3.28* 3.22* 3.14* 2.25*   GLUCOSE mg/dL 200* 335* 506* 295*   EGFR mL/min/1.73 13.0* 13.3* 13.7* 20.4*     Results from last 7 days   Lab Units 01/06/25  0428 01/05/25  0355 01/04/25  1258 01/03/25  0547 01/02/25  2235   CALCIUM mg/dL 9.1 9.1 9.0 9.5 9.5   ALBUMIN g/dL 3.4* 4.0 3.3*  --  3.7   PHOSPHORUS mg/dL 3.9 4.2 3.5  --   --      Results from last 7 days   Lab Units 01/04/25  1258 01/03/25  0547 01/03/25  0054 01/02/25  2235   CK TOTAL U/L 110  --   --   --    HSTROP T ng/L  --  137* 128* 125*   PROBNP pg/mL  --   --   --  7,970.0*         Invalid input(s): \"LDLCALC\"  Results from last 7 days   Lab Units 01/02/25 2235   COVID19  Not Detected     Glucose   Date/Time Value Ref Range Status   01/06/2025 0753 203 (H) 70 - 130 mg/dL Final   01/05/2025 1958 177 (H) 70 - 130 mg/dL Final   01/05/2025 1632 246 (H) 70 - 130 mg/dL Final   01/05/2025 1144 324 (H) 70 - 130 mg/dL Final   01/05/2025 0925 294 (H) 70 - 130 mg/dL Final   01/04/2025 2056 391 (H) 70 - 130 mg/dL Final   01/04/2025 2055 438 (C) 70 - 130 mg/dL Final     XR Chest PA & Lateral  Narrative: XR CHEST PA AND LATERAL-        INDICATION: Follow-up pulmonary edema     COMPARISON: Chest radiograph January 2, 2025     TECHNIQUE: 2 view chest     FINDINGS:      Increased lung markings. Heterogeneous basilar lung opacities. No  effusions. Stable mediastinum. Suspect CABG. Median sternotomy.  Left-sided pacemaker with a right atrial right ventricular lead.     Impression:    1. Stable chest.  2. Airways " disease.  3. Heterogeneous basilar opacities. Could be atelectasis, scarring or  pneumonia.     This report was finalized on 1/5/2025 12:40 PM by Dr. Dread Crisostomo M.D on Workstation: [id number]     Adult Transthoracic Echo Complete W/ Cont if Necessary Per Protocol    Left ventricular systolic function is normal. Calculated left   ventricular EF = 62.3%    Left ventricular wall thickness is consistent with moderate to    moderate concentric hypertrophy with component of asymmetric basal septal   hypertrophy.  No significant gradient noted.    Left ventricular diastolic function is consistent with (grade I)   impaired relaxation.    Mildly reduced right ventricular systolic function noted.    No significant valvular stenosis or regurgitation present.    Results for orders placed during the hospital encounter of 01/02/25    Adult Transthoracic Echo Complete W/ Cont if Necessary Per Protocol    Interpretation Summary    Left ventricular systolic function is normal. Calculated left ventricular EF = 62.3%    Left ventricular wall thickness is consistent with moderate to  moderate concentric hypertrophy with component of asymmetric basal septal hypertrophy.  No significant gradient noted.    Left ventricular diastolic function is consistent with (grade I) impaired relaxation.    Mildly reduced right ventricular systolic function noted.    No significant valvular stenosis or regurgitation present.    I have personally reviewed all medications:  Scheduled Medications  allopurinol, 100 mg, Oral, Daily  arformoterol, 15 mcg, Nebulization, BID - RT  aspirin, 81 mg, Oral, Daily  azithromycin, 250 mg, Oral, Q24H  budesonide, 0.5 mg, Nebulization, BID - RT  dorzolamide-timolol, 1 drop, Left Eye, BID  famotidine, 10 mg, Oral, Daily  fluticasone, 2 spray, Nasal, Daily  gabapentin, 300 mg, Oral, BID  insulin glargine, 25 Units, Subcutaneous, Every Afternoon  insulin lispro, 3-14 Units, Subcutaneous, 4x Daily AC & at  "Bedtime  insulin lispro, 8 Units, Subcutaneous, TID With Meals  ipratropium-albuterol, 3 mL, Nebulization, 4x Daily - RT  isosorbide mononitrate, 30 mg, Oral, Daily  latanoprost, 1 drop, Left Eye, Nightly  metoprolol succinate XL, 100 mg, Oral, Daily  montelukast, 10 mg, Oral, Nightly  rosuvastatin, 10 mg, Oral, Daily    Infusions  O2, 3 L/min, Last Rate: 3 L/min (01/04/25 0931)    Diet  Diet: Cardiac, Diabetic, Fluid Restriction (240 mL/tray); Healthy Heart (2-3 Na+); Consistent Carbohydrate; 1500 mL/day; Fluid Consistency: Thin (IDDSI 0)    I have personally reviewed:  [x]  Laboratory   [x]  Microbiology   [x]  Radiology   [x]  EKG/Telemetry  [x]  Cardiology/Vascular   []  Pathology    []  Records       Assessment/Plan     Active Hospital Problems    Diagnosis  POA    **Acute on chronic heart failure with preserved ejection fraction (HFpEF) [I50.33]  Yes    Bacterial pneumonia [J15.9]  Yes    Hypertension [I10]  Unknown    MARTIN (acute kidney injury) [N17.9]  Yes    Acute on chronic respiratory failure with hypoxia [J96.21]  Yes    Type 2 diabetes mellitus with hyperglycemia, without long-term current use of insulin [E11.65]  Yes    Stage 3b chronic kidney disease [N18.32]  Yes    Coronary artery disease involving native coronary artery without angina pectoris [I25.10]  Yes      Resolved Hospital Problems   No resolved problems to display.       89 y.o. female admitted with Acute on chronic heart failure with preserved ejection fraction (HFpEF).    Interval history  Afebrile, BP soft 95/52 last evening (hydralazine DC'd yesterday)  91% on 7 L oxygen  WBC remains elevated (no steroids)  Platelets trending back up  Sodium improved 130 from 127 yesterday (partially related to hyperglycemia)  Creatinine slightly worse 3.3 (baseline around 1.6)  Blood sugars much improved  Echo with normal EF.  Cardiology feels compensated and have signed off.  Follow-up within 1 week.  Repeat chest x-ray 1/5 \"stable " "chest\"      Plan  Pulmonology following.  Continuing to wean oxygen hopefully down to her baseline of 3 L.  Monitor WBC.  Zithromax only per pulmonology.  Nephrology monitoring off diuretics.  Cardiology has signed off.  Seems compensated.    Continue same insulin for today.      SCDs for DVT prophylaxis.  Full code.  Discussed with patient and care team on multidisciplinary rounds.  Anticipate discharge home with  vs SNU facility next week.  Expected Discharge Date: 1/8/2025; Expected Discharge Time:       Will Cerrato MD  [facility]  01/06/25  10:32 EST  "

## 2025-01-07 ENCOUNTER — APPOINTMENT (OUTPATIENT)
Dept: GENERAL RADIOLOGY | Facility: HOSPITAL | Age: OVER 89
End: 2025-01-07
Payer: MEDICARE

## 2025-01-07 PROBLEM — R33.9 URINARY RETENTION: Status: ACTIVE | Noted: 2025-01-07

## 2025-01-07 LAB
ALBUMIN SERPL-MCNC: 3.3 G/DL (ref 3.5–5.2)
ANION GAP SERPL CALCULATED.3IONS-SCNC: 12.7 MMOL/L (ref 5–15)
ARTERIAL PATENCY WRIST A: POSITIVE
ATMOSPHERIC PRESS: 759.6 MMHG
BASE EXCESS BLDA CALC-SCNC: -3.4 MMOL/L (ref 0–2)
BDY SITE: ABNORMAL
BUN SERPL-MCNC: 78 MG/DL (ref 8–23)
BUN/CREAT SERPL: 25.6 (ref 7–25)
CALCIUM SPEC-SCNC: 9.2 MG/DL (ref 8.6–10.5)
CHLORIDE SERPL-SCNC: 97 MMOL/L (ref 98–107)
CO2 BLDA-SCNC: 21.8 MMOL/L (ref 23–27)
CO2 SERPL-SCNC: 20.3 MMOL/L (ref 22–29)
CREAT SERPL-MCNC: 3.05 MG/DL (ref 0.57–1)
DEPRECATED RDW RBC AUTO: 43.6 FL (ref 37–54)
EGFRCR SERPLBLD CKD-EPI 2021: 14.2 ML/MIN/1.73
ERYTHROCYTE [DISTWIDTH] IN BLOOD BY AUTOMATED COUNT: 13.1 % (ref 12.3–15.4)
GLUCOSE BLDC GLUCOMTR-MCNC: 187 MG/DL (ref 70–130)
GLUCOSE BLDC GLUCOMTR-MCNC: 357 MG/DL (ref 70–130)
GLUCOSE BLDC GLUCOMTR-MCNC: 377 MG/DL (ref 70–130)
GLUCOSE BLDC GLUCOMTR-MCNC: 393 MG/DL (ref 70–130)
GLUCOSE SERPL-MCNC: 199 MG/DL (ref 65–99)
HCO3 BLDA-SCNC: 20.8 MMOL/L (ref 22–28)
HCT VFR BLD AUTO: 42.3 % (ref 34–46.6)
HEMODILUTION: NO
HGB BLD-MCNC: 14.1 G/DL (ref 12–15.9)
MCH RBC QN AUTO: 30.4 PG (ref 26.6–33)
MCHC RBC AUTO-ENTMCNC: 33.3 G/DL (ref 31.5–35.7)
MCV RBC AUTO: 91.2 FL (ref 79–97)
MODALITY: ABNORMAL
NT-PROBNP SERPL-MCNC: 2125 PG/ML (ref 0–1800)
PCO2 BLDA: 34.4 MM HG (ref 35–45)
PH BLDA: 7.39 PH UNITS (ref 7.35–7.45)
PHOSPHATE SERPL-MCNC: 4.9 MG/DL (ref 2.5–4.5)
PLATELET # BLD AUTO: 117 10*3/MM3 (ref 140–450)
PMV BLD AUTO: 10.4 FL (ref 6–12)
PO2 BLDA: 63.7 MM HG (ref 80–100)
POTASSIUM SERPL-SCNC: 3.8 MMOL/L (ref 3.5–5.2)
RBC # BLD AUTO: 4.64 10*6/MM3 (ref 3.77–5.28)
SAO2 % BLDCOA: 92 % (ref 92–98.5)
SODIUM SERPL-SCNC: 130 MMOL/L (ref 136–145)
WBC NRBC COR # BLD AUTO: 10.85 10*3/MM3 (ref 3.4–10.8)

## 2025-01-07 PROCEDURE — 94799 UNLISTED PULMONARY SVC/PX: CPT

## 2025-01-07 PROCEDURE — 94761 N-INVAS EAR/PLS OXIMETRY MLT: CPT

## 2025-01-07 PROCEDURE — 85027 COMPLETE CBC AUTOMATED: CPT | Performed by: HOSPITALIST

## 2025-01-07 PROCEDURE — 63710000001 INSULIN LISPRO (HUMAN) PER 5 UNITS: Performed by: HOSPITALIST

## 2025-01-07 PROCEDURE — 63710000001 INSULIN GLARGINE PER 5 UNITS: Performed by: HOSPITALIST

## 2025-01-07 PROCEDURE — 82803 BLOOD GASES ANY COMBINATION: CPT | Performed by: HOSPITALIST

## 2025-01-07 PROCEDURE — 97530 THERAPEUTIC ACTIVITIES: CPT

## 2025-01-07 PROCEDURE — 82948 REAGENT STRIP/BLOOD GLUCOSE: CPT

## 2025-01-07 PROCEDURE — 83880 ASSAY OF NATRIURETIC PEPTIDE: CPT | Performed by: HOSPITALIST

## 2025-01-07 PROCEDURE — 94664 DEMO&/EVAL PT USE INHALER: CPT

## 2025-01-07 PROCEDURE — 80069 RENAL FUNCTION PANEL: CPT | Performed by: INTERNAL MEDICINE

## 2025-01-07 PROCEDURE — 94760 N-INVAS EAR/PLS OXIMETRY 1: CPT

## 2025-01-07 PROCEDURE — 71045 X-RAY EXAM CHEST 1 VIEW: CPT

## 2025-01-07 PROCEDURE — 36600 WITHDRAWAL OF ARTERIAL BLOOD: CPT | Performed by: HOSPITALIST

## 2025-01-07 RX ORDER — SODIUM CHLORIDE FOR INHALATION 7 %
4 VIAL, NEBULIZER (ML) INHALATION
Status: DISCONTINUED | OUTPATIENT
Start: 2025-01-07 | End: 2025-01-09 | Stop reason: HOSPADM

## 2025-01-07 RX ORDER — GABAPENTIN 100 MG/1
200 CAPSULE ORAL 2 TIMES DAILY
Status: DISCONTINUED | OUTPATIENT
Start: 2025-01-07 | End: 2025-01-09 | Stop reason: HOSPADM

## 2025-01-07 RX ORDER — INSULIN LISPRO 100 [IU]/ML
15 INJECTION, SOLUTION INTRAVENOUS; SUBCUTANEOUS
Status: DISCONTINUED | OUTPATIENT
Start: 2025-01-07 | End: 2025-01-09 | Stop reason: HOSPADM

## 2025-01-07 RX ORDER — IPRATROPIUM BROMIDE AND ALBUTEROL SULFATE 2.5; .5 MG/3ML; MG/3ML
3 SOLUTION RESPIRATORY (INHALATION) ONCE
Status: COMPLETED | OUTPATIENT
Start: 2025-01-07 | End: 2025-01-07

## 2025-01-07 RX ADMIN — INSULIN LISPRO 15 UNITS: 100 INJECTION, SOLUTION INTRAVENOUS; SUBCUTANEOUS at 13:05

## 2025-01-07 RX ADMIN — DORZOLAMIDE HYDROCHLORIDE AND TIMOLOL MALEATE 1 DROP: 20; 5 SOLUTION/ DROPS OPHTHALMIC at 10:05

## 2025-01-07 RX ADMIN — GABAPENTIN 200 MG: 100 CAPSULE ORAL at 22:22

## 2025-01-07 RX ADMIN — INSULIN GLARGINE 30 UNITS: 100 INJECTION, SOLUTION SUBCUTANEOUS at 17:48

## 2025-01-07 RX ADMIN — GABAPENTIN 300 MG: 300 CAPSULE ORAL at 10:04

## 2025-01-07 RX ADMIN — INSULIN LISPRO 12 UNITS: 100 INJECTION, SOLUTION INTRAVENOUS; SUBCUTANEOUS at 22:21

## 2025-01-07 RX ADMIN — INSULIN LISPRO 15 UNITS: 100 INJECTION, SOLUTION INTRAVENOUS; SUBCUTANEOUS at 17:48

## 2025-01-07 RX ADMIN — ARFORMOTEROL TARTRATE 15 MCG: 15 SOLUTION RESPIRATORY (INHALATION) at 20:50

## 2025-01-07 RX ADMIN — IPRATROPIUM BROMIDE AND ALBUTEROL SULFATE 3 ML: 2.5; .5 SOLUTION RESPIRATORY (INHALATION) at 11:33

## 2025-01-07 RX ADMIN — ALLOPURINOL 100 MG: 100 TABLET ORAL at 10:03

## 2025-01-07 RX ADMIN — IPRATROPIUM BROMIDE AND ALBUTEROL SULFATE 3 ML: 2.5; .5 SOLUTION RESPIRATORY (INHALATION) at 20:48

## 2025-01-07 RX ADMIN — MONTELUKAST 10 MG: 10 TABLET, FILM COATED ORAL at 22:22

## 2025-01-07 RX ADMIN — AZITHROMYCIN DIHYDRATE 250 MG: 250 TABLET ORAL at 10:04

## 2025-01-07 RX ADMIN — FAMOTIDINE 10 MG: 20 TABLET, FILM COATED ORAL at 10:03

## 2025-01-07 RX ADMIN — ARFORMOTEROL TARTRATE 15 MCG: 15 SOLUTION RESPIRATORY (INHALATION) at 09:11

## 2025-01-07 RX ADMIN — DORZOLAMIDE HYDROCHLORIDE AND TIMOLOL MALEATE 1 DROP: 20; 5 SOLUTION/ DROPS OPHTHALMIC at 22:20

## 2025-01-07 RX ADMIN — INSULIN LISPRO 3 UNITS: 100 INJECTION, SOLUTION INTRAVENOUS; SUBCUTANEOUS at 10:02

## 2025-01-07 RX ADMIN — INSULIN LISPRO 12 UNITS: 100 INJECTION, SOLUTION INTRAVENOUS; SUBCUTANEOUS at 17:48

## 2025-01-07 RX ADMIN — ASPIRIN 81 MG: 81 TABLET, COATED ORAL at 10:03

## 2025-01-07 RX ADMIN — LATANOPROST 1 DROP: 50 SOLUTION OPHTHALMIC at 22:21

## 2025-01-07 RX ADMIN — ISOSORBIDE MONONITRATE 30 MG: 30 TABLET, EXTENDED RELEASE ORAL at 10:04

## 2025-01-07 RX ADMIN — Medication 4 ML: at 20:52

## 2025-01-07 RX ADMIN — BUDESONIDE 0.5 MG: 0.5 INHALANT RESPIRATORY (INHALATION) at 20:49

## 2025-01-07 RX ADMIN — ROSUVASTATIN 10 MG: 10 TABLET, FILM COATED ORAL at 10:03

## 2025-01-07 RX ADMIN — IPRATROPIUM BROMIDE AND ALBUTEROL SULFATE 3 ML: 2.5; .5 SOLUTION RESPIRATORY (INHALATION) at 09:12

## 2025-01-07 RX ADMIN — INSULIN LISPRO 10 UNITS: 100 INJECTION, SOLUTION INTRAVENOUS; SUBCUTANEOUS at 13:05

## 2025-01-07 RX ADMIN — FLUTICASONE PROPIONATE 2 SPRAY: 50 SPRAY, METERED NASAL at 10:05

## 2025-01-07 RX ADMIN — BUDESONIDE 0.5 MG: 0.5 INHALANT RESPIRATORY (INHALATION) at 09:10

## 2025-01-07 NOTE — PLAN OF CARE
Goal Outcome Evaluation:  Plan of Care Reviewed With: patient, family        Progress: improving          Patient sleeping most of shift, AV-Paced on the monitor. Rate 70s, patient seemed more lethargic than previous night. Issues with O2 saturation rate now on 9 Liters. Patient coughing infrequently. Parikh catheter. Son at bedside. ABGs looking normal from Respiratory standpoint. Will continue to monitor. WBC count elevated.

## 2025-01-07 NOTE — PROGRESS NOTES
[facility] Progress Note      Patient Name: Salma Hatfield  : 1935  MRN: [id number]  Primary Care Physician:  Stephen Long APRN  Date of admission: 2025    Subjective     Interval History:   Follow-up acute kidney injury on CKD 3B.  Eating well.  Difficult night last night with coughing and increased oxygen requirements but improved some today.  Still very fatigued.  Sitting up on the side of the bed.  Bowels moved over the weekend.  Urine output not all recorded.    Review of Systems:   As noted above    Objective     Vitals:   Temp:  [98.4 °F (36.9 °C)-98.6 °F (37 °C)] 98.6 °F (37 °C)  Heart Rate:  [69-78] 78  Resp:  [18-20] 20  BP: (108-148)/(52-80) 122/54  Flow (L/min) (Oxygen Therapy):  [4-11] 4    Intake/Output Summary (Last 24 hours) at 2025 1600  Last data filed at 2025 0736  Gross per 24 hour   Intake 0 ml   Output 800 ml   Net -800 ml       Physical Exam:    General Appearance: Awake alert, sitting on the side of the bed.  Frail.  Skin: warm and dry  HEENT: oral mucosa normal, nonicteric sclera  Neck: supple, no JVD  Lungs: Coarse breath sounds.  Decreased breath sounds right base.  No wheezing.  On oxygen.  4 L high flow  Heart: RRR, normal S1 and S2  Abdomen: soft, nontender, nondistended. +bs  : Parikh catheter (placed for retention 25)  Extremities: Trace lower extremity edema  Neuro: normal speech and mental status     Scheduled Meds:     allopurinol, 100 mg, Oral, Daily  arformoterol, 15 mcg, Nebulization, BID - RT  aspirin, 81 mg, Oral, Daily  azithromycin, 250 mg, Oral, Q24H  budesonide, 0.5 mg, Nebulization, BID - RT  dorzolamide-timolol, 1 drop, Left Eye, BID  famotidine, 10 mg, Oral, Daily  fluticasone, 2 spray, Nasal, Daily  gabapentin, 300 mg, Oral, BID  insulin glargine, 30 Units, Subcutaneous, Every Afternoon  insulin lispro, 15 Units, Subcutaneous, TID With Meals  insulin lispro, 3-14 Units, Subcutaneous, 4x Daily AC & at  Bedtime  ipratropium-albuterol, 3 mL, Nebulization, 4x Daily - RT  isosorbide mononitrate, 30 mg, Oral, Daily  latanoprost, 1 drop, Left Eye, Nightly  metoprolol succinate XL, 100 mg, Oral, Daily  montelukast, 10 mg, Oral, Nightly  rosuvastatin, 10 mg, Oral, Daily  sodium chloride, 4 mL, Nebulization, BID - RT      IV Meds:   O2, 3 L/min, Last Rate: 3 L/min (01/04/25 0931)        Results Reviewed:   I have personally reviewed the results from the time of this admission to 1/7/2025 16:00 EST     Results from last 7 days   Lab Units 01/07/25 0518 01/06/25 0428 01/05/25  0355 01/03/25  0547 01/02/25  2235   SODIUM mmol/L 130* 130* 127*   < > 133*   POTASSIUM mmol/L 3.8 4.4 4.2   < > 3.9   CHLORIDE mmol/L 97* 94* 92*   < > 97*   CO2 mmol/L 20.3* 20.2* 22.2   < > 26.2   BUN mg/dL 78* 71* 67*   < > 60*   CREATININE mg/dL 3.05* 3.28* 3.22*   < > 2.48*   CALCIUM mg/dL 9.2 9.1 9.1   < > 9.5   BILIRUBIN mg/dL  --   --   --   --  0.7   ALK PHOS U/L  --   --   --   --  142*   ALT (SGPT) U/L  --   --   --   --  174*   AST (SGOT) U/L  --   --   --   --  242*   GLUCOSE mg/dL 199* 200* 335*   < > 536*    < > = values in this interval not displayed.       Estimated Creatinine Clearance: 10.2 mL/min (A) (by C-G formula based on SCr of 3.05 mg/dL (H)).    Results from last 7 days   Lab Units 01/07/25 0518 01/06/25 0428 01/05/25  0355   PHOSPHORUS mg/dL 4.9* 3.9 4.2             Results from last 7 days   Lab Units 01/07/25 0518 01/06/25 0428 01/05/25  0635 01/04/25  0441 01/03/25  0547   WBC 10*3/mm3 10.85* 15.50* 15.18* 13.49* 10.27   HEMOGLOBIN g/dL 14.1 15.2 15.3 16.8* 15.5   PLATELETS 10*3/mm3 117* 119* 99* 118* 102*             Assessment / Plan     ASSESSMENT:  Acute kidney injury on CKD 3B, baseline creatinine 1.5-1.6.  Atrophic left kidney, renal vascular disease and hypertension.  Acute component due to prerenal azotemia with significant diuresis from hyperglycemia and diuretics as well as relative hypotension.   Creatinine at plateau to slightly improved at 3.05.  Azotemia in the 70s.  Urine output not all recorded.  2.  Chronic obstructive pulmonary disease with acute on chronic hypoxic respiratory failure.  Oxygen requirements increased overnight but better today.  Coughing.  3.  Acute on chronic heart failure preserved ejection fraction.  4.  Diabetes mellitus type 2.  Blood sugars better overall today.  5.  Thrombocytopenia.  6. Elevated transaminases.  Recheck and hold statin.   PLAN:  Reduce gabapentin to 200 mg twice daily.  Stop allopurinol for now.  Consider stopping Pepcid if platelets do not improve.  4. DW Patient and family .  Thank you for involving us in the care of Salma Hatfield.  Please feel free to call with any questions.    Yessi Parekh MD  01/07/25  16:00 [facility]    [facility]  [phone]    Please note that portions of this note were completed with a voice recognition program.

## 2025-01-07 NOTE — PLAN OF CARE
Goal Outcome Evaluation:  Plan of Care Reviewed With: patient, family        Progress: improving  Outcome Evaluation: Pt seen for PT this AM, requesting to sit up for breakfast. Mod A to reach EOB, min A to stand and take a few side steps to reach recliner. fatigues quickly with activity - SOA noted but sats WNL. Encouraged pt to sit up for a while this AM to change position and improve breathing efficiency. Recommend SNU at d/c    Anticipated Discharge Disposition (PT): skilled nursing facility

## 2025-01-07 NOTE — NURSING NOTE
Pt up in chair and coughing persistently. Cough is productive with yellow sputum. Pt requiring 11L HFNC during episode. Dr. Morris paged and RT notified. Pt moved back to bed. Per Dr. Morris CXR ABG and 1 x duoneb ordered. Pt weaned back down to 4.5L. Dr. Morris aware and to come evaluate pt.

## 2025-01-07 NOTE — PROGRESS NOTES
Name: Salma Hatfield ADMIT: 2025   : 1935  PCP: Stephen Long APRN    MRN: [id number] LOS: 4 days   AGE/SEX: 89 y.o. female  ROOM: [facility]/     Subjective   Subjective   Sitting up in chair had rough night last night but a little better this morning.  More alert.  Family at bedside.  Still with significant cough.       Objective   Objective   Vital Signs  Temp:  [98.2 °F (36.8 °C)-98.4 °F (36.9 °C)] 98.4 °F (36.9 °C)  Heart Rate:  [69-73] 72  Resp:  [18-22] 18  BP: (108-148)/(52-80) 148/52  SpO2:  [87 %-98 %] 91 %  on  Flow (L/min) (Oxygen Therapy):  [7-9] 7;   Device (Oxygen Therapy): humidified;high-flow nasal cannula  Body mass index is 24.67 kg/m².    Intake/Output Summary (Last 24 hours) at 2025 1024  Last data filed at 2025 0736  Gross per 24 hour   Intake 400 ml   Output 800 ml   Net -400 ml   Net IO Since Admission: -6,813 mL [25 1024]   Wt Readings from Last 1 Encounters:   25 0409 58.5 kg (128 lb 15.5 oz)   25 0509 60.8 kg (134 lb 0.6 oz)   25 1838 60 kg (132 lb 4.4 oz)   25 0638 60 kg (132 lb 4.4 oz)   25 0516 63.6 kg (140 lb 3.4 oz)     Wt Readings from Last 3 Encounters:   25 58.5 kg (128 lb 15.5 oz)   23 63.6 kg (140 lb 4.8 oz)   22 64.9 kg (143 lb 1.6 oz)       Physical Exam  Vitals and nursing note reviewed.   Constitutional:       General: She is not in acute distress.     Appearance: She is ill-appearing.   Cardiovascular:      Rate and Rhythm: Normal rate and regular rhythm.   Pulmonary:      Effort: Pulmonary effort is normal.      Breath sounds: No wheezing.   Abdominal:      General: Bowel sounds are normal.      Palpations: Abdomen is soft.      Tenderness: There is no abdominal tenderness.   Musculoskeletal:         General: No swelling.   Skin:     General: Skin is warm and dry.   Neurological:      Mental Status: She is alert. Mental status is at baseline.         Results Review     I reviewed the patient's new  "clinical results.  Results from last 7 days   Lab Units 01/07/25  0518 01/06/25  0428 01/05/25  0635 01/04/25  0441   WBC 10*3/mm3 10.85* 15.50* 15.18* 13.49*   HEMOGLOBIN g/dL 14.1 15.2 15.3 16.8*   PLATELETS 10*3/mm3 117* 119* 99* 118*     Results from last 7 days   Lab Units 01/07/25 0518 01/06/25  0428 01/05/25  0355 01/04/25  1258   SODIUM mmol/L 130* 130* 127* 128*   POTASSIUM mmol/L 3.8 4.4 4.2 3.0*   CHLORIDE mmol/L 97* 94* 92* 85*   CO2 mmol/L 20.3* 20.2* 22.2 22.7   BUN mg/dL 78* 71* 67* 67*   CREATININE mg/dL 3.05* 3.28* 3.22* 3.14*   GLUCOSE mg/dL 199* 200* 335* 506*   EGFR mL/min/1.73 14.2* 13.0* 13.3* 13.7*     Results from last 7 days   Lab Units 01/07/25 0518 01/06/25  0428 01/05/25  0355 01/04/25  1258   CALCIUM mg/dL 9.2 9.1 9.1 9.0   ALBUMIN g/dL 3.3* 3.4* 4.0 3.3*   PHOSPHORUS mg/dL 4.9* 3.9 4.2 3.5     Results from last 7 days   Lab Units 01/07/25 0518 01/04/25  1258 01/03/25  0547 01/03/25  0054 01/02/25 2235   CK TOTAL U/L  --  110  --   --   --    HSTROP T ng/L  --   --  137* 128* 125*   PROBNP pg/mL 2,125.0*  --   --   --  7,970.0*         Invalid input(s): \"LDLCALC\"  Results from last 7 days   Lab Units 01/02/25 2235   COVID19  Not Detected     Glucose   Date/Time Value Ref Range Status   01/07/2025 0826 187 (H) 70 - 130 mg/dL Final   01/06/2025 2043 385 (H) 70 - 130 mg/dL Final   01/06/2025 1645 411 (C) 70 - 130 mg/dL Final   01/06/2025 1234 289 (H) 70 - 130 mg/dL Final   01/06/2025 0753 203 (H) 70 - 130 mg/dL Final   01/05/2025 1958 177 (H) 70 - 130 mg/dL Final   01/05/2025 1632 246 (H) 70 - 130 mg/dL Final     XR Chest PA & Lateral  Narrative: XR CHEST PA AND LATERAL-        INDICATION: Follow-up pulmonary edema     COMPARISON: Chest radiograph January 2, 2025     TECHNIQUE: 2 view chest     FINDINGS:      Increased lung markings. Heterogeneous basilar lung opacities. No  effusions. Stable mediastinum. Suspect CABG. Median sternotomy.  Left-sided pacemaker with a right atrial right " ventricular lead.     Impression:    1. Stable chest.  2. Airways disease.  3. Heterogeneous basilar opacities. Could be atelectasis, scarring or  pneumonia.     This report was finalized on 1/5/2025 12:40 PM by Dr. Dread Crisostomo M.D on Workstation: [id number]     Adult Transthoracic Echo Complete W/ Cont if Necessary Per Protocol    Left ventricular systolic function is normal. Calculated left   ventricular EF = 62.3%    Left ventricular wall thickness is consistent with moderate to    moderate concentric hypertrophy with component of asymmetric basal septal   hypertrophy.  No significant gradient noted.    Left ventricular diastolic function is consistent with (grade I)   impaired relaxation.    Mildly reduced right ventricular systolic function noted.    No significant valvular stenosis or regurgitation present.    Results for orders placed during the hospital encounter of 01/02/25    Adult Transthoracic Echo Complete W/ Cont if Necessary Per Protocol    Interpretation Summary    Left ventricular systolic function is normal. Calculated left ventricular EF = 62.3%    Left ventricular wall thickness is consistent with moderate to  moderate concentric hypertrophy with component of asymmetric basal septal hypertrophy.  No significant gradient noted.    Left ventricular diastolic function is consistent with (grade I) impaired relaxation.    Mildly reduced right ventricular systolic function noted.    No significant valvular stenosis or regurgitation present.    I have personally reviewed all medications:  Scheduled Medications  allopurinol, 100 mg, Oral, Daily  arformoterol, 15 mcg, Nebulization, BID - RT  aspirin, 81 mg, Oral, Daily  azithromycin, 250 mg, Oral, Q24H  budesonide, 0.5 mg, Nebulization, BID - RT  dorzolamide-timolol, 1 drop, Left Eye, BID  famotidine, 10 mg, Oral, Daily  fluticasone, 2 spray, Nasal, Daily  gabapentin, 300 mg, Oral, BID  insulin glargine, 30 Units, Subcutaneous, Every  Afternoon  insulin lispro, 15 Units, Subcutaneous, TID With Meals  insulin lispro, 3-14 Units, Subcutaneous, 4x Daily AC & at Bedtime  ipratropium-albuterol, 3 mL, Nebulization, 4x Daily - RT  isosorbide mononitrate, 30 mg, Oral, Daily  latanoprost, 1 drop, Left Eye, Nightly  metoprolol succinate XL, 100 mg, Oral, Daily  montelukast, 10 mg, Oral, Nightly  rosuvastatin, 10 mg, Oral, Daily    Infusions  O2, 3 L/min, Last Rate: 3 L/min (01/04/25 0931)    Diet  Diet: Cardiac, Diabetic, Fluid Restriction (240 mL/tray); Healthy Heart (2-3 Na+); Consistent Carbohydrate; 1500 mL/day; Fluid Consistency: Thin (IDDSI 0)    I have personally reviewed:  [x]  Laboratory   [x]  Microbiology   [x]  Radiology   [x]  EKG/Telemetry  [x]  Cardiology/Vascular   []  Pathology    []  Records       Assessment/Plan     Active Hospital Problems    Diagnosis  POA    **Acute on chronic heart failure with preserved ejection fraction (HFpEF) [I50.33]  Yes    Urinary retention [R33.9]  Unknown    Bacterial pneumonia [J15.9]  Yes    Hypertension [I10]  Unknown    MARTIN (acute kidney injury) [N17.9]  Yes    Acute on chronic respiratory failure with hypoxia [J96.21]  Yes    Type 2 diabetes mellitus with hyperglycemia, without long-term current use of insulin [E11.65]  Yes    Stage 3b chronic kidney disease [N18.32]  Yes    Coronary artery disease involving native coronary artery without angina pectoris [I25.10]  Yes      Resolved Hospital Problems   No resolved problems to display.       89 y.o. female admitted with Acute on chronic heart failure with preserved ejection fraction (HFpEF).    AFVSS  Required 9 L overnight back down to 7 L this morning  WBC improving  Platelets low but stable  Sodium stable at 130  Creatinine trending down  Blood sugars elevated during the day yesterday, better in mornings      Respiratory status stable though O2 requirement still above baseline.  Appreciate pulmonology assistance.  Encouraged her to sit up in chair is  much as possible today and work with physical therapy.     Will increase basal/bolus insulin  Parikh catheter in place for retention      SCDs for DVT prophylaxis.  Full code.  Discussed with patient and care team on multidisciplinary rounds.  Anticipate discharge home with  vs SNU facility later this week.  Expected Discharge Date: 1/10/2025; Expected Discharge Time:       Will Cerrato MD  [facility]  01/07/25  10:24 EST

## 2025-01-07 NOTE — THERAPY TREATMENT NOTE
Patient Name: Salma Hatfield  : 1935    MRN: [id number]                              Today's Date: 2025       Admit Date: 2025    Visit Dx:     ICD-10-CM ICD-9-CM   1. Acute on chronic respiratory failure with hypoxia  J96.21 518.84     799.02   2. Acute pulmonary edema  J81.0 518.4   3. Community acquired pneumonia, unspecified laterality  J18.9 486   4. Acute pain of right knee  M25.561 719.46     Patient Active Problem List   Diagnosis    Bradycardia    Pulmonary emphysema    Diffuse large B cell lymphoma    S/P CABG (coronary artery bypass graft)    Coronary artery disease involving native coronary artery without angina pectoris    Chronic respiratory failure with hypoxia    Type 2 diabetes mellitus with hyperglycemia, without long-term current use of insulin    Hypothyroidism (acquired)    Stage 3b chronic kidney disease    Second degree AV block    Presence of cardiac pacemaker    Acute UTI (urinary tract infection)    History of 2019 novel coronavirus disease (COVID-19)    Acute on chronic respiratory failure with hypoxia    PVD (peripheral vascular disease)    Diastolic CHF, acute on chronic    MARTIN (acute kidney injury)    Acute on chronic heart failure with preserved ejection fraction (HFpEF)    Respiratory failure    Bacterial pneumonia    Hypertension     Past Medical History:   Diagnosis Date    AAA (abdominal aortic aneurysm)     stated in 2022 Dr. Lokesh Santoro office note    Atherosclerotic heart disease     stated in 2022 Dr. Lokesh Santoro office note    Bradycardia     Carotid artery stenosis     stated in 2022 Dr. Lokesh Santoro office note    Cataract Removal 10/27/2008    Right (10/27/08) and Left (08)    Chronic kidney disease, stage 3a     Chronic respiratory failure with hypoxia     Coronary artery disease     Coronary atherosclerosis     stated in 2022 Dr. Lokesh Santoro office note    Diabetes mellitus     Diffuse large B  cell lymphoma 06/21/2016    Elevated cholesterol     H/O angioplasty 12/31/2007    Hx of CABG 09/21/2009    Triple bypass    Hyperlipidemia     Hyperparathyroidism 04/17/2017    Hypertension     Hypertensive disorder     stated in 2- Dr. Lokesh Santoro office note    Hypothyroidism (acquired)     Ischemic heart disease 12/15/2016    Pulmonary emphysema     Retinal tear 02/06/2009    Right eye, repaired 06/2009    S/P arterial stent 08/26/2009    Left leg and aorta    S/P renal artery angioplasty 01/21/2008    Left    Shingles 03/26/2014    Status post carotid surgery 06/13/2018    Stenosis of iliac artery     stated in 2- Dr. Lokesh Santoro office note     Past Surgical History:   Procedure Laterality Date    CARDIAC CATHETERIZATION      CARDIAC ELECTROPHYSIOLOGY PROCEDURE N/A 07/22/2022    Procedure: Pacemaker SC new [employer];  Surgeon: Kevin Eisenberg MD;  Location: [facility] INVASIVE LOCATION;  Service: Cardiology;  Laterality: N/A;    CAROTID ENDARTERECTOMY  2018    CORONARY ARTERY BYPASS GRAFT  2008    Triple bypass    CORONARY STENT PLACEMENT  10/2011    ILIAC ARTERY STENT      Aorta-iliac stent 2009    INSERT / REPLACE / REMOVE PACEMAKER  07/2022    RENAL ARTERY STENT Left 2008      General Information       Row Name 01/07/25 1017          Physical Therapy Time and Intention    Document Type therapy note (daily note)  -ST     Mode of Treatment physical therapy  -ST       Row Name 01/07/25 1017          General Information    Patient Profile Reviewed yes  -ST     Existing Precautions/Restrictions fall;oxygen therapy device and L/min  7L  -ST       Row Name 01/07/25 1017          Cognition    Orientation Status (Cognition) oriented x 3  -ST       Row Name 01/07/25 1017          Safety Issues/Impairments Affecting Functional Mobility    Impairments Affecting Function (Mobility) balance;endurance/activity tolerance;pain;range of motion (ROM);strength  -ST     Comment, Safety  Issues/Impairments (Mobility) nonskid socks, gait belt donned  -ST               User Key  (r) = Recorded By, (t) = Taken By, (c) = Cosigned By      Initials Name Provider Type    Emmy Cantu PT Physical Therapist                   Mobility       Row Name 01/07/25 1017          Bed Mobility    Bed Mobility supine-sit;sit-supine  -ST     Supine-Sit [place] (Bed Mobility) moderate assist (50% patient effort);1 person assist  -ST     Assistive Device (Bed Mobility) bed rails;head of bed elevated  -ST       Row Name 01/07/25 1017          Bed-Chair Transfer    Bed-Chair [place] (Transfers) verbal cues;minimum assist (75% patient effort)  -ST     Comment, (Bed-Chair Transfer) stand pivot, HHA  -ST       Row Name 01/07/25 1017          Sit-Stand Transfer    Sit-Stand [place] (Transfers) minimum assist (75% patient effort)  -ST     Comment, (Sit-Stand Transfer) HHA  -ST       Row Name 01/07/25 1017          Gait/Stairs (Locomotion)    [place] Level (Gait) minimum assist (75% patient effort)  -ST     Assistive Device (Gait) --  HHA  -ST     Distance in Feet (Gait) 1  a few side steps to pivot to recliner  -ST     Deviations/Abnormal Patterns (Gait) festinating/shuffling;weight shifting decreased  -ST     Bilateral Gait Deviations forward flexed posture  -ST     Comment, (Gait/Stairs) cues for sequencing with pivot  -ST               User Key  (r) = Recorded By, (t) = Taken By, (c) = Cosigned By      Initials Name Provider Type    Emmy Cantu PT Physical Therapist                   Obj/Interventions       Row Name 01/07/25 1018          Balance    Comment, Balance SBA with unsupported sitting, min A for dynamic balance  -ST               User Key  (r) = Recorded By, (t) = Taken By, (c) = Cosigned By      Initials Name Provider Type    Emmy Cantu PT Physical Therapist                   Goals/Plan    No documentation.                  Clinical Impression       Row Name  01/07/25 1019          Pain    Pretreatment Pain Rating 0/10 - no pain  -ST     Posttreatment Pain Rating 0/10 - no pain  -ST       Row Name 01/07/25 1019          Plan of Care Review    Plan of Care Reviewed With patient;family  -ST     Progress improving  -ST     Outcome Evaluation Pt seen for PT this AM, requesting to sit up for breakfast. Mod A to reach EOB, min A to stand and take a few side steps to reach recliner. fatigues quickly with activity - SOA noted but sats WNL. Encouraged pt to sit up for a while this AM to change position and improve breathing efficiency. Recommend SNU at d/c  -ST       Row Name 01/07/25 1019          Therapy Assessment/Plan (PT)    Rehab Potential (PT) fair  -ST     Criteria for Skilled Interventions Met (PT) yes;meets criteria;skilled treatment is necessary  -ST     Therapy Frequency (PT) 5 times/wk  -ST       Row Name 01/07/25 1019          Positioning and Restraints    Pre-Treatment Position in bed  -ST     Post Treatment Position chair  -ST     In Chair sitting;call light within reach;encouraged to call for assist;exit alarm on;with family/caregiver;notified nsg  -ST               User Key  (r) = Recorded By, (t) = Taken By, (c) = Cosigned By      Initials Name Provider Type    Emmy Cantu, LACI Physical Therapist                   Outcome Measures       Row Name 01/07/25 1020          How much help from another person do you currently need...    Turning from your back to your side while in flat bed without using bedrails? 2  -ST     Moving from lying on back to sitting on the side of a flat bed without bedrails? 2  -ST     Moving to and from a bed to a chair (including a wheelchair)? 3  -ST     Standing up from a chair using your arms (e.g., wheelchair, bedside chair)? 3  -ST     Climbing 3-5 steps with a railing? 2  -ST     To walk in hospital room? 3  -ST     AM-PAC 6 Clicks Score (PT) 15  -ST               User Key  (r) = Recorded By, (t) = Taken By, (c) = Cosigned  By      Initials Name Provider Type     Emmy Gandara, PT Physical Therapist                                 Physical Therapy Education       Title: PT OT SLP Therapies (In Progress)       Topic: Physical Therapy (Done)       Point: Mobility training (Done)       Learning Progress Summary            Patient Acceptance, E,TB, VU,NR by  at 1/7/2025 1020    Acceptance, E, VU by  at 1/5/2025 1142   Family Acceptance, E,TB, VU,NR by ST at 1/7/2025 1020                      Point: Home exercise program (Done)       Learning Progress Summary            Patient Acceptance, E, VU by ROXY at 1/5/2025 1142                      Point: Body mechanics (Done)       Learning Progress Summary            Patient Acceptance, E,TB, VU,NR by  at 1/7/2025 1020    Acceptance, E, VU by  at 1/5/2025 1142   Family Acceptance, E,TB, VU,NR by  at 1/7/2025 1020                      Point: Precautions (Done)       Learning Progress Summary            Patient Acceptance, E, VU by  at 1/5/2025 1142                                      User Key       Initials Effective Dates Name Provider Type Discipline     09/22/22 -  Emmy Gandara, PT Physical Therapist PT    ROXY 01/16/24 -  Christie Gan PT Physical Therapist PT                  PT Recommendation and Plan     Progress: improving  Outcome Evaluation: Pt seen for PT this AM, requesting to sit up for breakfast. Mod A to reach EOB, min A to stand and take a few side steps to reach recliner. fatigues quickly with activity - SOA noted but sats WNL. Encouraged pt to sit up for a while this AM to change position and improve breathing efficiency. Recommend SNU at d/c     Time Calculation:         PT Charges       Row Name 01/07/25 1020             Time Calculation    Start Time 0828  -ST      Stop Time 0839  -ST      Time Calculation (min) 11 min  -ST      PT Received On 01/07/25  -ST      PT - Next Appointment 01/08/25  -ST         Time Calculation- PT    Total Timed Code  Minutes- PT 11 minute(s)  -ST         Timed Charges    34104 - PT Therapeutic Activity Minutes 11  -ST         Total Minutes    Timed Charges Total Minutes 11  -ST       Total Minutes 11  -ST                User Key  (r) = Recorded By, (t) = Taken By, (c) = Cosigned By      Initials Name Provider Type    Emmy Cantu, LACI Physical Therapist                  Therapy Charges for Today       Code Description Service Date Service Provider Modifiers Qty    [id number] HC PT THERAPEUTIC ACT EA 15 MIN 1/7/2025 Emmy Gandara, PT GP 1            PT G-Codes  Outcome Measure Options: AM-PAC 6 Clicks Daily Activity (OT)  AM-PAC 6 Clicks Score (PT): 15  AM-PAC 6 Clicks Score (OT): 15  PT Discharge Summary  Anticipated Discharge Disposition (PT): skilled nursing facility    Emmy Gandara, LACI  1/7/2025

## 2025-01-07 NOTE — PROGRESS NOTES
Consult Daily Progress Note  [facility]   01/07/25      Patient Name:  Salma Hatfield  MRN:  [id number]   YOB: 1935  Age: 89 y.o.  Sex: female  LOS: 4    Reason for Consult:  Pulmonary infiltrates, COPD    Hospital Course:   89-year-old female with chronic obstructive pulmonary disease, chronic respiratory failure, heart failure who presented with shortness of breath and cough found to have acute exacerbation of heart failure with preserved ejection fraction.    Interval History:  Worsening respiratory status and increased oxygen requirements to 15 L  Significant cough associated with hypoxia episode  On evaluation back down to 4.5 L  Continues to have cough with sputum production, using Yankauer suction  Breathing is better when she is not coughing  Does complain of some chest pain during cough  Family is at bedside    Physical Exam:  Vitals:    01/07/25 1100   BP:    Pulse:    Resp:    Temp:    SpO2: 93%       Intake/Output    Intake/Output Summary (Last 24 hours) at 1/7/2025 1146  Last data filed at 1/7/2025 0736  Gross per 24 hour   Intake 400 ml   Output 800 ml   Net -400 ml       General: Alert, weak appearing  HEENT: NC/AT, EOMI, MMM  Neck: Supple, trachea midline  Cardiac: RRR, no murmur, gallops, rubs  Pulmonary: Diminished at bases  GI: Soft, non-tender, non-distended, normal bowel sounds  Extremities: Warm, well perfused, no LE edema  Skin: no visible rash  Neuro: CN II - XII grossly intact  Psychiatry: Normal mood and affect      Data Review:  Results from last 7 days   Lab Units 01/07/25  0518 01/06/25  0428 01/05/25  0635 01/04/25  0441 01/03/25  0547 01/02/25  2235   WBC 10*3/mm3 10.85* 15.50* 15.18* 13.49* 10.27 12.76*   HEMOGLOBIN g/dL 14.1 15.2 15.3 16.8* 15.5 16.6*   PLATELETS 10*3/mm3 117* 119* 99* 118* 102* 118*     Results from last 7 days   Lab Units 01/07/25  0518 01/06/25  0428 01/05/25  0355 01/04/25  1258 01/03/25  0547 01/02/25  2235   SODIUM mmol/L 130*  130* 127* 128* 136 133*   POTASSIUM mmol/L 3.8 4.4 4.2 3.0* 3.8 3.9   CHLORIDE mmol/L 97* 94* 92* 85* 100 97*   CO2 mmol/L 20.3* 20.2* 22.2 22.7 22.7 26.2   BUN mg/dL 78* 71* 67* 67* 56* 60*   CREATININE mg/dL 3.05* 3.28* 3.22* 3.14* 2.25* 2.48*   GLUCOSE mg/dL 199* 200* 335* 506* 295* 536*   CALCIUM mg/dL 9.2 9.1 9.1 9.0 9.5 9.5   PHOSPHORUS mg/dL 4.9* 3.9 4.2 3.5  --   --    Estimated Creatinine Clearance: 10.2 mL/min (A) (by C-G formula based on SCr of 3.05 mg/dL (H)).    Results from last 7 days   Lab Units 01/07/25  0518 01/06/25  0428 01/05/25  0635 01/03/25  0547 01/02/25  2238 01/02/25  2235   AST (SGOT) U/L  --   --   --   --   --  242*   ALT (SGPT) U/L  --   --   --   --   --  174*   LACTATE mmol/L  --   --   --   --  1.5  --    PLATELETS 10*3/mm3 117* 119* 99*   < >  --  118*    < > = values in this interval not displayed.       Results from last 7 days   Lab Units 01/07/25  1132 01/06/25  2242   PH, ARTERIAL pH units 7.389 7.362   PCO2, ARTERIAL mm Hg 34.4* 41.6   PO2 ART mm Hg 63.7* 70.6*   HCO3 ART mmol/L 20.8* 23.6         Imaging:  Reviewed chest images personally from past 3 days    ASSESSMENT  /  PLAN:    Acute pulmonary edema, mild  Acute on chronic HFpEF  COPD with acute exacerbation  Acute on chronic hypoxic respiratory failure, on O2 3 L/min at baseline.  Pulmonary infiltrates, suspect secondary #1.  Could have mild pneumonia as well but she was already treated with antibiotics.    -Patient presented to the hospital with shortness of breath and found to have acute COPD and CHF exacerbation.  -Continue Pulmicort and Brovana.  DuoNebs every 6 hours.  -On 4.5 L nasal cannula Wean for SpO2 goal 88 to 92%.  Uses 3 L home O2.  -Chest x-ray overall stable  -Suspect that her decompensation was due to mucous plugging with significant cough and secretions.  Increase airway clearance to include hypertonic saline and flutter valve.  May need to consider Mucomyst.  -Azithromycin for her COPD  exacerbation  -Ongoing management of diuresis as per nephrology  -Echocardiogram with preserved ejection fraction and mild diastolic dysfunction, moderate concentric hypertrophy  -Incentive spirometer, ambulation as tolerated  -Patient states she would like to be set up with pulmonary as an outpatient.  I will set her up to be seen in pulmonary clinic upon discharge.    Thank you for allowing us to participate in this patients care. Pulmonary will continue to follow.     Anshu Morris MD  [facility]  Pulmonary and Critical Care Medicine, Interventional Pulmonology    Parts of this note may be an electronic transcription/translation of spoken language to printed text using the Dragon dictation system.

## 2025-01-07 NOTE — CONSULTS
"Diabetes Education  Assessment/Teaching    Patient Name:  Salma Hatfield  YOB: 1935  MRN: [id number]  Admit Date:  1/2/2025      Assessment Date:  1/7/2025  Flowsheet Row Most Recent Value   General Information     Referral From: Other- RN thru order set. Attempt to meet with 89 y/o at bedside. Pt is sleeping. Introduce self to family members and explain role of DM educator.    Height 154 cm (60.63\")   Height Method Stated   Weight 58.5 kg (128 lb 15.5 oz)   Weight Method Bed scale   Diabetes History    What type of diabetes do you have? Type 2   Education Preferences    Barriers to Learning -- pt is sound asleep. pls note ed-assessment performed w/pt's family at bedside.   Assessment Topics    Taking Medication - Assessment -- Per family at bedside, pt previously on Ozempic. Her PCP w/[facility] dc'd Ozempic d/t abnL labs.   Healthy Coping - Assessment Competent   Monitoring - Assessment Competent   DM Goals    Contact Plan Follow-up medical care with PCP.            Flowsheet Row Most Recent Value   DM Education Needs    Meter Has own   Medication Other injectables   Healthy Coping Appropriate   Discharge Plan Home -wt family assistance as needed.   Teaching Method Discussion   Patient Response Verbalized understanding        Other Comments:  Family denies new educational needs at this time.   Electronically signed by:  Luz Maria Fischer RN, BSN, [facility]  01/07/25 14:18 EST  "

## 2025-01-07 NOTE — PLAN OF CARE
Goal Outcome Evaluation:              Outcome Evaluation: Pt weaned to 4L this shift. This morning pt had a persistent coughing episode that required Pt to be on 11L HFNC. MD aware and RT aware. CXR and ABG obtained per pulmonary. 1x duoneb per pulmonary. Pt later weaned to 4L. Tolerating well since. Daily weight per renal. Folet inplace. No other complaints. Family at bedside. Needs met at this time.

## 2025-01-08 LAB
ALBUMIN SERPL-MCNC: 2.9 G/DL (ref 3.5–5.2)
ALBUMIN/GLOB SERPL: 0.9 G/DL
ALP SERPL-CCNC: 98 U/L (ref 39–117)
ALT SERPL W P-5'-P-CCNC: 128 U/L (ref 1–33)
ANION GAP SERPL CALCULATED.3IONS-SCNC: 12.7 MMOL/L (ref 5–15)
AST SERPL-CCNC: 174 U/L (ref 1–32)
BACTERIA SPEC AEROBE CULT: NORMAL
BACTERIA SPEC AEROBE CULT: NORMAL
BILIRUB SERPL-MCNC: 0.5 MG/DL (ref 0–1.2)
BUN SERPL-MCNC: 85 MG/DL (ref 8–23)
BUN/CREAT SERPL: 29.3 (ref 7–25)
CALCIUM SPEC-SCNC: 9.1 MG/DL (ref 8.6–10.5)
CHLORIDE SERPL-SCNC: 99 MMOL/L (ref 98–107)
CO2 SERPL-SCNC: 18.3 MMOL/L (ref 22–29)
CREAT SERPL-MCNC: 2.9 MG/DL (ref 0.57–1)
DEPRECATED RDW RBC AUTO: 45 FL (ref 37–54)
EGFRCR SERPLBLD CKD-EPI 2021: 15 ML/MIN/1.73
ERYTHROCYTE [DISTWIDTH] IN BLOOD BY AUTOMATED COUNT: 13.3 % (ref 12.3–15.4)
GLOBULIN UR ELPH-MCNC: 3.2 GM/DL
GLUCOSE BLDC GLUCOMTR-MCNC: 130 MG/DL (ref 70–130)
GLUCOSE BLDC GLUCOMTR-MCNC: 134 MG/DL (ref 70–130)
GLUCOSE BLDC GLUCOMTR-MCNC: 290 MG/DL (ref 70–130)
GLUCOSE BLDC GLUCOMTR-MCNC: 307 MG/DL (ref 70–130)
GLUCOSE BLDC GLUCOMTR-MCNC: 311 MG/DL (ref 70–130)
GLUCOSE SERPL-MCNC: 155 MG/DL (ref 65–99)
HCT VFR BLD AUTO: 40.4 % (ref 34–46.6)
HGB BLD-MCNC: 13.3 G/DL (ref 12–15.9)
MCH RBC QN AUTO: 30.6 PG (ref 26.6–33)
MCHC RBC AUTO-ENTMCNC: 32.9 G/DL (ref 31.5–35.7)
MCV RBC AUTO: 93.1 FL (ref 79–97)
NT-PROBNP SERPL-MCNC: 1389 PG/ML (ref 0–1800)
PHOSPHATE SERPL-MCNC: 5.3 MG/DL (ref 2.5–4.5)
PLATELET # BLD AUTO: 132 10*3/MM3 (ref 140–450)
PMV BLD AUTO: 10.6 FL (ref 6–12)
POTASSIUM SERPL-SCNC: 3.9 MMOL/L (ref 3.5–5.2)
PROT SERPL-MCNC: 6.1 G/DL (ref 6–8.5)
RBC # BLD AUTO: 4.34 10*6/MM3 (ref 3.77–5.28)
SODIUM SERPL-SCNC: 130 MMOL/L (ref 136–145)
WBC NRBC COR # BLD AUTO: 9.07 10*3/MM3 (ref 3.4–10.8)

## 2025-01-08 PROCEDURE — 97530 THERAPEUTIC ACTIVITIES: CPT

## 2025-01-08 PROCEDURE — 82948 REAGENT STRIP/BLOOD GLUCOSE: CPT

## 2025-01-08 PROCEDURE — 83880 ASSAY OF NATRIURETIC PEPTIDE: CPT | Performed by: INTERNAL MEDICINE

## 2025-01-08 PROCEDURE — 85027 COMPLETE CBC AUTOMATED: CPT | Performed by: HOSPITALIST

## 2025-01-08 PROCEDURE — 25810000003 SODIUM CHLORIDE 0.9 % SOLUTION: Performed by: INTERNAL MEDICINE

## 2025-01-08 PROCEDURE — 63710000001 INSULIN LISPRO (HUMAN) PER 5 UNITS: Performed by: HOSPITALIST

## 2025-01-08 PROCEDURE — 94799 UNLISTED PULMONARY SVC/PX: CPT

## 2025-01-08 PROCEDURE — 94761 N-INVAS EAR/PLS OXIMETRY MLT: CPT

## 2025-01-08 PROCEDURE — 94664 DEMO&/EVAL PT USE INHALER: CPT

## 2025-01-08 PROCEDURE — 94760 N-INVAS EAR/PLS OXIMETRY 1: CPT

## 2025-01-08 PROCEDURE — 80053 COMPREHEN METABOLIC PANEL: CPT | Performed by: INTERNAL MEDICINE

## 2025-01-08 PROCEDURE — 63710000001 INSULIN GLARGINE PER 5 UNITS: Performed by: HOSPITALIST

## 2025-01-08 PROCEDURE — 84100 ASSAY OF PHOSPHORUS: CPT | Performed by: INTERNAL MEDICINE

## 2025-01-08 RX ORDER — SODIUM CHLORIDE 9 MG/ML
50 INJECTION, SOLUTION INTRAVENOUS CONTINUOUS
Status: DISCONTINUED | OUTPATIENT
Start: 2025-01-08 | End: 2025-01-09

## 2025-01-08 RX ADMIN — INSULIN LISPRO 10 UNITS: 100 INJECTION, SOLUTION INTRAVENOUS; SUBCUTANEOUS at 17:47

## 2025-01-08 RX ADMIN — SENNOSIDES AND DOCUSATE SODIUM 2 TABLET: 50; 8.6 TABLET ORAL at 21:19

## 2025-01-08 RX ADMIN — GABAPENTIN 200 MG: 100 CAPSULE ORAL at 08:51

## 2025-01-08 RX ADMIN — SODIUM CHLORIDE 50 ML/HR: 9 INJECTION, SOLUTION INTRAVENOUS at 18:38

## 2025-01-08 RX ADMIN — Medication 4 ML: at 18:57

## 2025-01-08 RX ADMIN — FAMOTIDINE 10 MG: 20 TABLET, FILM COATED ORAL at 08:50

## 2025-01-08 RX ADMIN — METOPROLOL SUCCINATE 100 MG: 100 TABLET, EXTENDED RELEASE ORAL at 08:50

## 2025-01-08 RX ADMIN — Medication 4 ML: at 09:15

## 2025-01-08 RX ADMIN — INSULIN LISPRO 8 UNITS: 100 INJECTION, SOLUTION INTRAVENOUS; SUBCUTANEOUS at 12:13

## 2025-01-08 RX ADMIN — INSULIN LISPRO 15 UNITS: 100 INJECTION, SOLUTION INTRAVENOUS; SUBCUTANEOUS at 12:13

## 2025-01-08 RX ADMIN — INSULIN LISPRO 15 UNITS: 100 INJECTION, SOLUTION INTRAVENOUS; SUBCUTANEOUS at 09:22

## 2025-01-08 RX ADMIN — MONTELUKAST 10 MG: 10 TABLET, FILM COATED ORAL at 21:19

## 2025-01-08 RX ADMIN — BUDESONIDE 0.5 MG: 0.5 INHALANT RESPIRATORY (INHALATION) at 09:14

## 2025-01-08 RX ADMIN — ASPIRIN 81 MG: 81 TABLET, COATED ORAL at 08:51

## 2025-01-08 RX ADMIN — ARFORMOTEROL TARTRATE 15 MCG: 15 SOLUTION RESPIRATORY (INHALATION) at 09:13

## 2025-01-08 RX ADMIN — INSULIN LISPRO 10 UNITS: 100 INJECTION, SOLUTION INTRAVENOUS; SUBCUTANEOUS at 21:19

## 2025-01-08 RX ADMIN — INSULIN GLARGINE 30 UNITS: 100 INJECTION, SOLUTION SUBCUTANEOUS at 17:47

## 2025-01-08 RX ADMIN — AZITHROMYCIN DIHYDRATE 250 MG: 250 TABLET ORAL at 08:50

## 2025-01-08 RX ADMIN — LATANOPROST 1 DROP: 50 SOLUTION OPHTHALMIC at 21:20

## 2025-01-08 RX ADMIN — FLUTICASONE PROPIONATE 2 SPRAY: 50 SPRAY, METERED NASAL at 08:51

## 2025-01-08 RX ADMIN — GABAPENTIN 200 MG: 100 CAPSULE ORAL at 21:19

## 2025-01-08 RX ADMIN — IPRATROPIUM BROMIDE AND ALBUTEROL SULFATE 3 ML: 2.5; .5 SOLUTION RESPIRATORY (INHALATION) at 18:57

## 2025-01-08 RX ADMIN — ISOSORBIDE MONONITRATE 30 MG: 30 TABLET, EXTENDED RELEASE ORAL at 08:51

## 2025-01-08 RX ADMIN — BUDESONIDE 0.5 MG: 0.5 INHALANT RESPIRATORY (INHALATION) at 18:57

## 2025-01-08 RX ADMIN — IPRATROPIUM BROMIDE AND ALBUTEROL SULFATE 3 ML: 2.5; .5 SOLUTION RESPIRATORY (INHALATION) at 09:04

## 2025-01-08 RX ADMIN — DORZOLAMIDE HYDROCHLORIDE AND TIMOLOL MALEATE 1 DROP: 20; 5 SOLUTION/ DROPS OPHTHALMIC at 08:51

## 2025-01-08 RX ADMIN — INSULIN LISPRO 15 UNITS: 100 INJECTION, SOLUTION INTRAVENOUS; SUBCUTANEOUS at 17:47

## 2025-01-08 RX ADMIN — IPRATROPIUM BROMIDE AND ALBUTEROL SULFATE 3 ML: 2.5; .5 SOLUTION RESPIRATORY (INHALATION) at 14:50

## 2025-01-08 RX ADMIN — DORZOLAMIDE HYDROCHLORIDE AND TIMOLOL MALEATE 1 DROP: 20; 5 SOLUTION/ DROPS OPHTHALMIC at 21:20

## 2025-01-08 NOTE — PLAN OF CARE
Goal Outcome Evaluation:  Plan of Care Reviewed With: patient, child        Progress: improving  Outcome Evaluation: PAtient able to stay on regular Nasal Cannula sleeping most of the evening. Patient and son educated on Aspiration Pneumonia, sitting up and using abdominal muscles, to potentiate good tidal volume when each breath when sitting up. PAtient still having poor clearance of phlegm but able to spit up some remnants early in shift change with suction at bedside to avoid swallowing excess secretions. Son at bedside helping with care, attentive to medical/physical needs for patient to improve possibly contributing to patient decreased oxygenation needs. No Pain, no nausea, no s/s of acute distress noted. [facility]. proBNP improved from 7900's to 2100's. Will continue to monitor.

## 2025-01-08 NOTE — PROGRESS NOTES
Consult Daily Progress Note  [facility]   01/08/25      Patient Name:  Salma Hatfield  MRN:  [id number]   YOB: 1935  Age: 89 y.o.  Sex: female  LOS: 5    Reason for Consult:  Pulmonary infiltrates, COPD    Hospital Course:   89-year-old female with chronic obstructive pulmonary disease, chronic respiratory failure, heart failure who presented with shortness of breath and cough found to have acute exacerbation of heart failure with preserved ejection fraction.    Interval History:  No acute events overnight  Net -1.3 L over the past 24 hours  Renal function improving  3 L nasal cannula  States that she feels significantly better  Breathing is markedly improved  Continues to have cough with some sputum production however this is also improved  No chest pain or palpitations  No nausea vomiting  Family is at bedside  Wants to go home    Physical Exam:  Vitals:    01/08/25 0805   BP:    Pulse: 76   Resp:    Temp:    SpO2: 96%       Intake/Output    Intake/Output Summary (Last 24 hours) at 1/8/2025 0901  Last data filed at 1/8/2025 0714  Gross per 24 hour   Intake 0 ml   Output 1300 ml   Net -1300 ml       General: Alert, weak appearing  HEENT: NC/AT, EOMI, MMM  Neck: Supple, trachea midline  Cardiac: RRR, no murmur, gallops, rubs  Pulmonary: Diminished at bases  GI: Soft, non-tender, non-distended, normal bowel sounds  Extremities: Warm, well perfused, no LE edema  Skin: no visible rash  Neuro: CN II - XII grossly intact  Psychiatry: Normal mood and affect      Data Review:  Results from last 7 days   Lab Units 01/08/25  0319 01/07/25  0518 01/06/25  0428 01/05/25  0635 01/04/25  0441 01/03/25  0547 01/02/25  2235   WBC 10*3/mm3 9.07 10.85* 15.50* 15.18* 13.49* 10.27 12.76*   HEMOGLOBIN g/dL 13.3 14.1 15.2 15.3 16.8* 15.5 16.6*   PLATELETS 10*3/mm3 132* 117* 119* 99* 118* 102* 118*     Results from last 7 days   Lab Units 01/08/25  0319 01/07/25  0518 01/06/25  0428 01/05/25  0355  01/04/25  1258 01/03/25  0547 01/02/25  2235   SODIUM mmol/L 130* 130* 130* 127* 128* 136 133*   POTASSIUM mmol/L 3.9 3.8 4.4 4.2 3.0* 3.8 3.9   CHLORIDE mmol/L 99 97* 94* 92* 85* 100 97*   CO2 mmol/L 18.3* 20.3* 20.2* 22.2 22.7 22.7 26.2   BUN mg/dL 85* 78* 71* 67* 67* 56* 60*   CREATININE mg/dL 2.90* 3.05* 3.28* 3.22* 3.14* 2.25* 2.48*   GLUCOSE mg/dL 155* 199* 200* 335* 506* 295* 536*   CALCIUM mg/dL 9.1 9.2 9.1 9.1 9.0 9.5 9.5   PHOSPHORUS mg/dL 5.3* 4.9* 3.9 4.2 3.5  --   --    Estimated Creatinine Clearance: 11.3 mL/min (A) (by C-G formula based on SCr of 2.9 mg/dL (H)).    Results from last 7 days   Lab Units 01/08/25  0319 01/07/25  0518 01/06/25  0428 01/03/25  0547 01/02/25  2238 01/02/25  2235   AST (SGOT) U/L 174*  --   --   --   --  242*   ALT (SGPT) U/L 128*  --   --   --   --  174*   LACTATE mmol/L  --   --   --   --  1.5  --    PLATELETS 10*3/mm3 132* 117* 119*   < >  --  118*    < > = values in this interval not displayed.       Results from last 7 days   Lab Units 01/07/25  1132 01/06/25  2242   PH, ARTERIAL pH units 7.389 7.362   PCO2, ARTERIAL mm Hg 34.4* 41.6   PO2 ART mm Hg 63.7* 70.6*   HCO3 ART mmol/L 20.8* 23.6         Imaging:  Reviewed chest images personally from past 3 days    ASSESSMENT  /  PLAN:    Acute pulmonary edema, mild  Acute on chronic HFpEF  COPD with acute exacerbation  Acute on chronic hypoxic respiratory failure, on O2 3 L/min at baseline.  Pulmonary infiltrates, suspect secondary #1.  Could have mild pneumonia as well but she was already treated with antibiotics.    -Patient presented to the hospital with shortness of breath and found to have acute COPD and CHF exacerbation.  -Continue Pulmicort and Brovana.  DuoNebs every 6 hours.  -On 3L nasal cannula wean for SpO2 goal 88 to 92%.  Uses 3 L home O2.  -Chest x-ray overall stable  -Decompensation secondary to mucous plugging with significant cough and secretions.  This is markedly improved with increased airway clearance  with hypertonic saline and flutter valve.    -Azithromycin for her COPD exacerbation  -Ongoing management of diuresis as per nephrology  -Echocardiogram with preserved ejection fraction and mild diastolic dysfunction, moderate concentric hypertrophy  -Incentive spirometer, ambulation as tolerated  -Work with physical therapy/OT  -Patient states she would like to be set up with pulmonary as an outpatient.  I will set her up to be seen in pulmonary clinic upon discharge.  -Anticipate stability for discharge from pulmonary standpoint in the next 24 hours.    Thank you for allowing us to participate in this patients care. Pulmonary will continue to follow.     Anshu Morris MD  [facility]  Pulmonary and Critical Care Medicine, Interventional Pulmonology    Parts of this note may be an electronic transcription/translation of spoken language to printed text using the Dragon dictation system.

## 2025-01-08 NOTE — PROGRESS NOTES
Name: Salma Hatfield ADMIT: 2025   : 1935  PCP: Stephen Long APRN    MRN: [id number] LOS: 5 days   AGE/SEX: 89 y.o. female  ROOM: [facility]     Subjective   Subjective   Feels better today.       Objective   Objective   Vital Signs  Temp:  [97.9 °F (36.6 °C)-98.6 °F (37 °C)] 97.9 °F (36.6 °C)  Heart Rate:  [70-78] 72  Resp:  [16-20] 16  BP: (112-122)/(54-66) 112/66  SpO2:  [91 %-99 %] 99 %  on  Flow (L/min) (Oxygen Therapy):  [3-5] 3;   Device (Oxygen Therapy): nasal cannula  Body mass index is 22.99 kg/m².    Intake/Output Summary (Last 24 hours) at 2025 1126  Last data filed at 2025 0900  Gross per 24 hour   Intake 237 ml   Output 1300 ml   Net -1063 ml   Net IO Since Admission: -7,876 mL [25 1126]   Wt Readings from Last 1 Encounters:   25 0805 54.5 kg (120 lb 3.2 oz)   25 0409 58.5 kg (128 lb 15.5 oz)   25 0509 60.8 kg (134 lb 0.6 oz)   25 1838 60 kg (132 lb 4.4 oz)   25 0638 60 kg (132 lb 4.4 oz)   25 0516 63.6 kg (140 lb 3.4 oz)     Wt Readings from Last 3 Encounters:   25 54.5 kg (120 lb 3.2 oz)   23 63.6 kg (140 lb 4.8 oz)   22 64.9 kg (143 lb 1.6 oz)       Physical Exam  Vitals and nursing note reviewed.   Constitutional:       General: She is not in acute distress.     Appearance: She is ill-appearing.   Cardiovascular:      Rate and Rhythm: Normal rate and regular rhythm.   Pulmonary:      Effort: Pulmonary effort is normal.      Breath sounds: No wheezing.   Abdominal:      General: Bowel sounds are normal.      Palpations: Abdomen is soft.      Tenderness: There is no abdominal tenderness.   Musculoskeletal:         General: No swelling.   Skin:     General: Skin is warm and dry.   Neurological:      Mental Status: She is alert. Mental status is at baseline.         Results Review     I reviewed the patient's new clinical results.  Results from last 7 days   Lab Units 25  0319 25  0518 25  0428  "01/05/25  0635   WBC 10*3/mm3 9.07 10.85* 15.50* 15.18*   HEMOGLOBIN g/dL 13.3 14.1 15.2 15.3   PLATELETS 10*3/mm3 132* 117* 119* 99*     Results from last 7 days   Lab Units 01/08/25 0319 01/07/25  0518 01/06/25  0428 01/05/25  0355   SODIUM mmol/L 130* 130* 130* 127*   POTASSIUM mmol/L 3.9 3.8 4.4 4.2   CHLORIDE mmol/L 99 97* 94* 92*   CO2 mmol/L 18.3* 20.3* 20.2* 22.2   BUN mg/dL 85* 78* 71* 67*   CREATININE mg/dL 2.90* 3.05* 3.28* 3.22*   GLUCOSE mg/dL 155* 199* 200* 335*   EGFR mL/min/1.73 15.0* 14.2* 13.0* 13.3*     Results from last 7 days   Lab Units 01/08/25 0319 01/07/25 0518 01/06/25 0428 01/05/25  0355   CALCIUM mg/dL 9.1 9.2 9.1 9.1   ALBUMIN g/dL 2.9* 3.3* 3.4* 4.0   PHOSPHORUS mg/dL 5.3* 4.9* 3.9 4.2     Results from last 7 days   Lab Units 01/07/25  0518 01/04/25  1258 01/03/25  0547 01/03/25  0054 01/02/25 2235   CK TOTAL U/L  --  110  --   --   --    HSTROP T ng/L  --   --  137* 128* 125*   PROBNP pg/mL 2,125.0*  --   --   --  7,970.0*         Invalid input(s): \"LDLCALC\"  Results from last 7 days   Lab Units 01/02/25 2235   COVID19  Not Detected     Glucose   Date/Time Value Ref Range Status   01/08/2025 0849 134 (H) 70 - 130 mg/dL Final   01/08/2025 0801 130 70 - 130 mg/dL Final   01/07/2025 1944 377 (H) 70 - 130 mg/dL Final   01/07/2025 1702 393 (H) 70 - 130 mg/dL Final   01/07/2025 1146 357 (H) 70 - 130 mg/dL Final   01/07/2025 0826 187 (H) 70 - 130 mg/dL Final   01/06/2025 2043 385 (H) 70 - 130 mg/dL Final     XR Chest 1 View  XR CHEST 1 VW-     Clinical: Increased O2 demand     COMPARISON examination 1/5/2025     FINDINGS: Transvenous pacemaker in position as before, cardiac size  within normal limits. There is atherosclerotic calcification of the  aorta. Increasing infiltrate/atelectasis demonstrated at the right lung  base. Infiltrates/atelectasis at the left lung base as before. No  pleural effusion or pulmonary edema is demonstrated. The remainder is  unremarkable.     This report " was finalized on 1/7/2025 11:47 AM by Dr. Joey Carrington M.D  on Workstation: [id number]       Results for orders placed during the hospital encounter of 01/02/25    Adult Transthoracic Echo Complete W/ Cont if Necessary Per Protocol    Interpretation Summary    Left ventricular systolic function is normal. Calculated left ventricular EF = 62.3%    Left ventricular wall thickness is consistent with moderate to  moderate concentric hypertrophy with component of asymmetric basal septal hypertrophy.  No significant gradient noted.    Left ventricular diastolic function is consistent with (grade I) impaired relaxation.    Mildly reduced right ventricular systolic function noted.    No significant valvular stenosis or regurgitation present.    I have personally reviewed all medications:  Scheduled Medications  arformoterol, 15 mcg, Nebulization, BID - RT  aspirin, 81 mg, Oral, Daily  budesonide, 0.5 mg, Nebulization, BID - RT  dorzolamide-timolol, 1 drop, Left Eye, BID  famotidine, 10 mg, Oral, Daily  fluticasone, 2 spray, Nasal, Daily  gabapentin, 200 mg, Oral, BID  insulin glargine, 30 Units, Subcutaneous, Every Afternoon  insulin lispro, 15 Units, Subcutaneous, TID With Meals  insulin lispro, 3-14 Units, Subcutaneous, 4x Daily AC & at Bedtime  ipratropium-albuterol, 3 mL, Nebulization, 4x Daily - RT  isosorbide mononitrate, 30 mg, Oral, Daily  latanoprost, 1 drop, Left Eye, Nightly  metoprolol succinate XL, 100 mg, Oral, Daily  montelukast, 10 mg, Oral, Nightly  sodium chloride, 4 mL, Nebulization, BID - RT    Infusions  O2, 3 L/min, Last Rate: 3 L/min (01/04/25 0931)    Diet  Diet: Cardiac, Diabetic, Fluid Restriction (240 mL/tray); Healthy Heart (2-3 Na+); Consistent Carbohydrate; 1500 mL/day; Fluid Consistency: Thin (IDDSI 0)    I have personally reviewed:  [x]  Laboratory   [x]  Microbiology   [x]  Radiology   [x]  EKG/Telemetry  [x]  Cardiology/Vascular   []  Pathology    []  Records       Assessment/Plan      Active Hospital Problems    Diagnosis  POA    **Acute on chronic heart failure with preserved ejection fraction (HFpEF) [I50.33]  Yes    Urinary retention [R33.9]  Unknown    Bacterial pneumonia [J15.9]  Yes    Hypertension [I10]  Unknown    MARTIN (acute kidney injury) [N17.9]  Yes    Acute on chronic respiratory failure with hypoxia [J96.21]  Yes    Type 2 diabetes mellitus with hyperglycemia, without long-term current use of insulin [E11.65]  Yes    Stage 3b chronic kidney disease [N18.32]  Yes    Coronary artery disease involving native coronary artery without angina pectoris [I25.10]  Yes      Resolved Hospital Problems   No resolved problems to display.       89 y.o. female admitted with Acute on chronic heart failure with preserved ejection fraction (HFpEF).    AFVSS  Currently weaned down to 3 L oxygen  Sodium 130, stable  Creatinine 2.9 down from 3.05  Bicarb still low 18.3  Platelets almost back to normal  Leukocytosis resolved  Blood sugars elevated yesterday, improved this morning.      Worsening hypoxia yesterday felt secondary to mucous plugging.  Now back down to baseline O2 requirement.  Continue hypertonic saline and flutter device.  Per pulmonology possible stable for discharge in the next 24 hours.    Will attempt voiding trial in AM.      SCDs for DVT prophylaxis.  Full code.  Discussed with patient and care team on multidisciplinary rounds.  Anticipate discharge home with home health in 1-2 days.  Expected Discharge Date: 1/10/2025; Expected Discharge Time:       Will Cerrato MD  [facility]  01/08/25  11:26 EST

## 2025-01-08 NOTE — PROGRESS NOTES
[facility] Progress Note      Patient Name: Salma Hatfield  : 1935  MRN: [id number]  Primary Care Physician:  Stephen Long APRN  Date of admission: 2025    Subjective     Interval History:   Follow-up acute kidney injury on CKD 3B.  Eating well.  Feeling better today.  1300 cc urine output.  Intake not recorded.  Blood pressure stable.  On home nasal cannula 3 L oxygen.  Worked with physical therapy today.  Review of Systems:   As noted above    Objective     Vitals:   Temp:  [97.3 °F (36.3 °C)-97.9 °F (36.6 °C)] 97.3 °F (36.3 °C)  Heart Rate:  [70-76] 70  Resp:  [16-20] 18  BP: (112-127)/(62-66) 127/62  Flow (L/min) (Oxygen Therapy):  [3] 3    Intake/Output Summary (Last 24 hours) at 2025 1650  Last data filed at 2025 1253  Gross per 24 hour   Intake 757 ml   Output 1150 ml   Net -393 ml       Physical Exam:    General Appearance: Awake alert, sitting up in bed.  Looks stronger.  Skin: warm and dry  HEENT: oral mucosa normal, nonicteric sclera.  Right eye blind.  Neck: supple, no JVD  Lungs: Coarse breath sounds.  Decreased breath sounds right base.  No wheezing.  On oxygen.  3 L nc  Heart: RRR, normal S1 and S2  Abdomen: soft, nontender, nondistended. +bs  : Parikh catheter (placed for retention 25)  Extremities: Trace lower extremity edema  Neuro: normal speech and mental status     Scheduled Meds:     arformoterol, 15 mcg, Nebulization, BID - RT  aspirin, 81 mg, Oral, Daily  budesonide, 0.5 mg, Nebulization, BID - RT  dorzolamide-timolol, 1 drop, Left Eye, BID  famotidine, 10 mg, Oral, Daily  fluticasone, 2 spray, Nasal, Daily  gabapentin, 200 mg, Oral, BID  insulin glargine, 30 Units, Subcutaneous, Every Afternoon  insulin lispro, 15 Units, Subcutaneous, TID With Meals  insulin lispro, 3-14 Units, Subcutaneous, 4x Daily AC & at Bedtime  ipratropium-albuterol, 3 mL, Nebulization, 4x Daily - RT  isosorbide mononitrate, 30 mg, Oral, Daily  latanoprost, 1  drop, Left Eye, Nightly  metoprolol succinate XL, 100 mg, Oral, Daily  montelukast, 10 mg, Oral, Nightly  sodium chloride, 4 mL, Nebulization, BID - RT      IV Meds:   O2, 3 L/min, Last Rate: 3 L/min (01/04/25 0931)        Results Reviewed:   I have personally reviewed the results from the time of this admission to 1/8/2025 16:50 EST     Results from last 7 days   Lab Units 01/08/25 0319 01/07/25 0518 01/06/25 0428 01/03/25  0547 01/02/25  2235   SODIUM mmol/L 130* 130* 130*   < > 133*   POTASSIUM mmol/L 3.9 3.8 4.4   < > 3.9   CHLORIDE mmol/L 99 97* 94*   < > 97*   CO2 mmol/L 18.3* 20.3* 20.2*   < > 26.2   BUN mg/dL 85* 78* 71*   < > 60*   CREATININE mg/dL 2.90* 3.05* 3.28*   < > 2.48*   CALCIUM mg/dL 9.1 9.2 9.1   < > 9.5   BILIRUBIN mg/dL 0.5  --   --   --  0.7   ALK PHOS U/L 98  --   --   --  142*   ALT (SGPT) U/L 128*  --   --   --  174*   AST (SGOT) U/L 174*  --   --   --  242*   GLUCOSE mg/dL 155* 199* 200*   < > 536*    < > = values in this interval not displayed.       Estimated Creatinine Clearance: 11.3 mL/min (A) (by C-G formula based on SCr of 2.9 mg/dL (H)).    Results from last 7 days   Lab Units 01/08/25 0319 01/07/25 0518 01/06/25 0428   PHOSPHORUS mg/dL 5.3* 4.9* 3.9             Results from last 7 days   Lab Units 01/08/25 0319 01/07/25 0518 01/06/25  0428 01/05/25  0635 01/04/25  0441   WBC 10*3/mm3 9.07 10.85* 15.50* 15.18* 13.49*   HEMOGLOBIN g/dL 13.3 14.1 15.2 15.3 16.8*   PLATELETS 10*3/mm3 132* 117* 119* 99* 118*             Assessment / Plan     ASSESSMENT:  Acute kidney injury on CKD 3B, baseline creatinine 1.5-1.6.  Atrophic left kidney, renal vascular disease and hypertension.  Acute component due to prerenal azotemia with significant diuresis from hyperglycemia and diuretics as well as relative hypotension.  Creatinine at plateau to slightly improved at 2.9.  Persistent Azotemia.  Mild hyponatremia.  May need to give back some IVF as it looks as though she has had significant  output greater than intake .  2.  Chronic obstructive pulmonary disease with acute on chronic hypoxic respiratory failure.  Oxygen requirements increased overnight but better today.  Cough improved.  3. Chronic heart failure preserved ejection fraction.  4.  Diabetes mellitus type 2.  Blood sugars labile.  134-307.  5.  Thrombocytopenia.  Platelets better today.  6. Elevated transaminases.  Slowly improving.  Holding statin.   PLAN:  Very slow IVF overnight.  Thank you for involving us in the care of Salma Hatfield.  Please feel free to call with any questions.    Yessi Parekh MD  01/08/25  16:50 EST    [facility]  [phone]    Please note that portions of this note were completed with a voice recognition program.

## 2025-01-08 NOTE — THERAPY TREATMENT NOTE
Patient Name: Salma Hatfield  : 1935    MRN: [id number]                              Today's Date: 2025       Admit Date: 2025    Visit Dx:     ICD-10-CM ICD-9-CM   1. Acute on chronic respiratory failure with hypoxia  J96.21 518.84     799.02   2. Acute pulmonary edema  J81.0 518.4   3. Community acquired pneumonia, unspecified laterality  J18.9 486   4. Acute pain of right knee  M25.561 719.46     Patient Active Problem List   Diagnosis    Bradycardia    Pulmonary emphysema    Diffuse large B cell lymphoma    S/P CABG (coronary artery bypass graft)    Coronary artery disease involving native coronary artery without angina pectoris    Chronic respiratory failure with hypoxia    Type 2 diabetes mellitus with hyperglycemia, without long-term current use of insulin    Hypothyroidism (acquired)    Stage 3b chronic kidney disease    Second degree AV block    Presence of cardiac pacemaker    Acute UTI (urinary tract infection)    History of 2019 novel coronavirus disease (COVID-19)    Acute on chronic respiratory failure with hypoxia    PVD (peripheral vascular disease)    Diastolic CHF, acute on chronic    MARTIN (acute kidney injury)    Acute on chronic heart failure with preserved ejection fraction (HFpEF)    Respiratory failure    Bacterial pneumonia    Hypertension    Urinary retention     Past Medical History:   Diagnosis Date    AAA (abdominal aortic aneurysm)     stated in 2022 Dr. Lokesh Santoro office note    Atherosclerotic heart disease     stated in 2022 Dr. Lokesh Santoro office note    Bradycardia     Carotid artery stenosis     stated in 2022 Dr. Lokesh Santoro office note    Cataract Removal 10/27/2008    Right (10/27/08) and Left (08)    Chronic kidney disease, stage 3a     Chronic respiratory failure with hypoxia     Coronary artery disease     Coronary atherosclerosis     stated in 2022 Dr. Lokesh Santoro office note    Diabetes  mellitus     Diffuse large B cell lymphoma 06/21/2016    Elevated cholesterol     H/O angioplasty 12/31/2007    Hx of CABG 09/21/2009    Triple bypass    Hyperlipidemia     Hyperparathyroidism 04/17/2017    Hypertension     Hypertensive disorder     stated in 2- Dr. Lkoesh Santoro office note    Hypothyroidism (acquired)     Ischemic heart disease 12/15/2016    Pulmonary emphysema     Retinal tear 02/06/2009    Right eye, repaired 06/2009    S/P arterial stent 08/26/2009    Left leg and aorta    S/P renal artery angioplasty 01/21/2008    Left    Shingles 03/26/2014    Status post carotid surgery 06/13/2018    Stenosis of iliac artery     stated in 2- Dr. Lokesh Santoro office note     Past Surgical History:   Procedure Laterality Date    CARDIAC CATHETERIZATION      CARDIAC ELECTROPHYSIOLOGY PROCEDURE N/A 07/22/2022    Procedure: Pacemaker SC new [employer];  Surgeon: Kevin Eisenberg MD;  Location: [facility] CATH INVASIVE [facility];  Service: Cardiology;  Laterality: N/A;    CAROTID ENDARTERECTOMY  2018    CORONARY ARTERY BYPASS GRAFT  2008    Triple bypass    CORONARY STENT PLACEMENT  10/2011    ILIAC ARTERY STENT      Aorta-iliac stent 2009    INSERT / REPLACE / REMOVE PACEMAKER  07/2022    RENAL ARTERY STENT Left 2008      General Information       Row Name 01/08/25 1525          Physical Therapy Time and Intention    Document Type therapy note (daily note)  -MS     Mode of Treatment physical therapy;individual therapy  -MS       Row Name 01/08/25 1525          General Information    Patient Profile Reviewed yes  -MS     Existing Precautions/Restrictions fall;oxygen therapy device and L/min   Exit alarm  -MS     Barriers to Rehab none identified  -MS       Row Name 01/08/25 1525          Cognition    Orientation Status (Cognition) oriented x 3  -MS       Row Name 01/08/25 1525          Safety Issues/Impairments Affecting Functional Mobility    Comment, Safety Issues/Impairments (Mobility)  Gait belt used for safety.  -MS               User Key  (r) = Recorded By, (t) = Taken By, (c) = Cosigned By      Initials Name Provider Type    Salazar Dorantes PT Physical Therapist                   Mobility       Row Name 01/08/25 1525          Bed Mobility    Supine-Sit [place] (Bed Mobility) contact guard  -MS     Sit-Supine [place] (Bed Mobility) contact guard  -MS       Row Name 01/08/25 1525          Sit-Stand Transfer    Sit-Stand [place] (Transfers) minimum assist (75% patient effort)  -MS     Assistive Device (Sit-Stand Transfers) walker, front-wheeled  -MS       Row Name 01/08/25 1525          Gait/Stairs (Locomotion)    [place] Level (Gait) contact guard  -MS     Assistive Device (Gait) walker, front-wheeled  -MS     Distance in Feet (Gait) 35  -MS     Deviations/Abnormal Patterns (Gait) chandana decreased  -MS     Comment, (Gait/Stairs) Verbal/tactile cues given for posture correction and Rwx guidance.  -MS               User Key  (r) = Recorded By, (t) = Taken By, (c) = Cosigned By      Initials Name Provider Type    Salazar Dorantes PT Physical Therapist                   Obj/Interventions       Row Name 01/08/25 1526          Motor Skills    Therapeutic Exercise --  BLE ther. ex. program x 10 reps completed (Hip Flexion, LAQ's)  -MS               User Key  (r) = Recorded By, (t) = Taken By, (c) = Cosigned By      Initials Name Provider Type    Salazar Dorantes, PT Physical Therapist                   Goals/Plan    No documentation.                  Clinical Impression       Row Name 01/08/25 1527          Pain    Pretreatment Pain Rating 0/10 - no pain  -MS     Posttreatment Pain Rating 0/10 - no pain  -MS       Row Name 01/08/25 1527          Positioning and Restraints    Pre-Treatment Position in bed  -MS     Post Treatment Position bed  -MS     In Bed notified nsg;supine;call light within reach;encouraged to call for assist;exit alarm on;with family/caregiver   All lines intact.  -MS               User Key  (r) = Recorded By, (t) = Taken By, (c) = Cosigned By      Initials Name Provider Type    MS Wu Salazar POZO, PT Physical Therapist                   Outcome Measures       Row Name 01/08/25 1527          How much help from another person do you currently need...    Turning from your back to your side while in flat bed without using bedrails? 4  -MS     Moving from lying on back to sitting on the side of a flat bed without bedrails? 3  -MS     Moving to and from a bed to a chair (including a wheelchair)? 3  -MS     Standing up from a chair using your arms (e.g., wheelchair, bedside chair)? 3  -MS     Climbing 3-5 steps with a railing? 3  -MS     To walk in hospital room? 3  -MS     AM-PAC 6 Clicks Score (PT) 19  -MS     Highest Level of Mobility Goal 6 --> Walk 10 steps or more  -MS       Row Name 01/08/25 1527          Functional Assessment    Outcome Measure Options AM-PAC 6 Clicks Basic Mobility (PT)  -MS               User Key  (r) = Recorded By, (t) = Taken By, (c) = Cosigned By      Initials Name Provider Type    MS Wu Salazar POZO, PT Physical Therapist                                 Physical Therapy Education       Title: PT OT SLP Therapies (In Progress)       Topic: Physical Therapy (Done)       Point: Mobility training (Done)       Learning Progress Summary            Patient Acceptance, E,D, VU,NR by MS at 1/8/2025 1528    Acceptance, E,TB, VU,NR by ST at 1/7/2025 1020    Acceptance, E, VU by ROXY at 1/5/2025 1142   Family Acceptance, E,TB, VU,NR by ST at 1/7/2025 1020                      Point: Home exercise program (Done)       Learning Progress Summary            Patient Acceptance, E,D, VU,NR by MS at 1/8/2025 1528    Acceptance, E, VU by ROXY at 1/5/2025 1142                      Point: Body mechanics (Done)       Learning Progress Summary            Patient Acceptance, E,D, VU,NR by MS at 1/8/2025 1528    Acceptance, E,TB, VU,NR by ST at 1/7/2025 1020     Acceptance, E, VU by  at 1/5/2025 1142   Family Acceptance, E,TB, VU,NR by  at 1/7/2025 1020                      Point: Precautions (Done)       Learning Progress Summary            Patient Acceptance, E,D, VU,NR by MS at 1/8/2025 1528    Acceptance, E, VU by  at 1/5/2025 1142                                      User Key       Initials Effective Dates Name Provider Type Discipline    MS 06/16/21 -  Salazar Wu, PT Physical Therapist PT     09/22/22 -  Emmy Gandara, PT Physical Therapist PT     01/16/24 -  Christie Gan, PT Physical Therapist PT                  PT Recommendation and Plan     Outcome Evaluation: Upon entering room, pt. supine in bed, awake/alert, and agreeable to work with P.T. this date.  This PM, pt. able to ambulate 35 feet, CGA x 1, with use of Rwx.  Pt. requries CGA x 1 for bed mobility and Min. assist x 1 for sit <-> stand transfers.  BLE ther. ex. program x 10 reps completed for general strengthening.  Verbal/tactile cues given during ambulation for posture correction and Rwx guidance.  Per family, pt. will have assist at home and would like discharge home with HHPT.  P.T. agreeable with this given her current functional status.  Will continue to progress functional mobility as tolerated.     Time Calculation:         PT Charges       Row Name 01/08/25 1529             Time Calculation    Start Time 1340  -MS      Stop Time 1355  -MS      Time Calculation (min) 15 min  -MS      PT Received On 01/08/25  -MS      PT - Next Appointment 01/09/25  -MS         Time Calculation- PT    Total Timed Code Minutes- PT 14 minute(s)  -MS                User Key  (r) = Recorded By, (t) = Taken By, (c) = Cosigned By      Initials Name Provider Type    MS Salazar Wu, PT Physical Therapist                  Therapy Charges for Today       Code Description Service Date Service Provider Modifiers Qty    [id number]  PT THERAPEUTIC ACT EA 15 MIN 1/8/2025 Salazar Wu, PT  GP 1            PT G-Codes  Outcome Measure Options: AM-PAC 6 Clicks Basic Mobility (PT)  AM-PAC 6 Clicks Score (PT): 19  AM-PAC 6 Clicks Score (OT): 15  PT Discharge Summary  Anticipated Discharge Disposition (PT): home with assist, home with home health    Salazar Wu, PT  1/8/2025

## 2025-01-08 NOTE — PLAN OF CARE
Goal Outcome Evaluation:              Outcome Evaluation: Upon entering room, pt. supine in bed, awake/alert, and agreeable to work with P.T. this date.  This PM, pt. able to ambulate 35 feet, CGA x 1, with use of Rwx.  Pt. requries CGA x 1 for bed mobility and Min. assist x 1 for sit <-> stand transfers.  BLE ther. ex. program x 10 reps completed for general strengthening.  Verbal/tactile cues given during ambulation for posture correction and Rwx guidance.  Per family, pt. will have assist at home and would like discharge home with HHPT.  P.T. agreeable with this given her current functional status.  Will continue to progress functional mobility as tolerated.    Anticipated Discharge Disposition (PT): home with assist, home with home health

## 2025-01-08 NOTE — PLAN OF CARE
Goal Outcome Evaluation:              Outcome Evaluation: VSS. Pt remains on baseline 3L this shift. Up with PT. Plan for possible d/c tomorrow. Voiding trial in the morning. Conitnue SSI. Family at bedside. No other complaints. Needs met at this time.

## 2025-01-09 ENCOUNTER — READMISSION MANAGEMENT (OUTPATIENT)
Dept: CALL CENTER | Facility: HOSPITAL | Age: OVER 89
End: 2025-01-09
Payer: MEDICARE

## 2025-01-09 VITALS
TEMPERATURE: 97.5 F | SYSTOLIC BLOOD PRESSURE: 129 MMHG | RESPIRATION RATE: 20 BRPM | OXYGEN SATURATION: 98 % | WEIGHT: 120.2 LBS | BODY MASS INDEX: 22.69 KG/M2 | DIASTOLIC BLOOD PRESSURE: 67 MMHG | HEART RATE: 70 BPM | HEIGHT: 61 IN

## 2025-01-09 LAB
ALBUMIN SERPL-MCNC: 3.3 G/DL (ref 3.5–5.2)
ANION GAP SERPL CALCULATED.3IONS-SCNC: 11 MMOL/L (ref 5–15)
BUN SERPL-MCNC: 68 MG/DL (ref 8–23)
BUN/CREAT SERPL: 36.2 (ref 7–25)
CALCIUM SPEC-SCNC: 9.2 MG/DL (ref 8.6–10.5)
CHLORIDE SERPL-SCNC: 103 MMOL/L (ref 98–107)
CO2 SERPL-SCNC: 24 MMOL/L (ref 22–29)
CREAT SERPL-MCNC: 1.88 MG/DL (ref 0.57–1)
DEPRECATED RDW RBC AUTO: 45.6 FL (ref 37–54)
EGFRCR SERPLBLD CKD-EPI 2021: 25.3 ML/MIN/1.73
ERYTHROCYTE [DISTWIDTH] IN BLOOD BY AUTOMATED COUNT: 13 % (ref 12.3–15.4)
GLUCOSE BLDC GLUCOMTR-MCNC: 175 MG/DL (ref 70–130)
GLUCOSE BLDC GLUCOMTR-MCNC: 287 MG/DL (ref 70–130)
GLUCOSE SERPL-MCNC: 261 MG/DL (ref 65–99)
HCT VFR BLD AUTO: 43.1 % (ref 34–46.6)
HGB BLD-MCNC: 13.7 G/DL (ref 12–15.9)
MCH RBC QN AUTO: 30.2 PG (ref 26.6–33)
MCHC RBC AUTO-ENTMCNC: 31.8 G/DL (ref 31.5–35.7)
MCV RBC AUTO: 95.1 FL (ref 79–97)
PHOSPHATE SERPL-MCNC: 4.1 MG/DL (ref 2.5–4.5)
PLATELET # BLD AUTO: 151 10*3/MM3 (ref 140–450)
PMV BLD AUTO: 10.4 FL (ref 6–12)
POTASSIUM SERPL-SCNC: 3.8 MMOL/L (ref 3.5–5.2)
RBC # BLD AUTO: 4.53 10*6/MM3 (ref 3.77–5.28)
SODIUM SERPL-SCNC: 138 MMOL/L (ref 136–145)
WBC NRBC COR # BLD AUTO: 7.41 10*3/MM3 (ref 3.4–10.8)

## 2025-01-09 PROCEDURE — 94664 DEMO&/EVAL PT USE INHALER: CPT

## 2025-01-09 PROCEDURE — 97530 THERAPEUTIC ACTIVITIES: CPT

## 2025-01-09 PROCEDURE — 94799 UNLISTED PULMONARY SVC/PX: CPT

## 2025-01-09 PROCEDURE — 94761 N-INVAS EAR/PLS OXIMETRY MLT: CPT

## 2025-01-09 PROCEDURE — 63710000001 INSULIN LISPRO (HUMAN) PER 5 UNITS: Performed by: HOSPITALIST

## 2025-01-09 PROCEDURE — 85027 COMPLETE CBC AUTOMATED: CPT | Performed by: HOSPITALIST

## 2025-01-09 PROCEDURE — 80069 RENAL FUNCTION PANEL: CPT | Performed by: INTERNAL MEDICINE

## 2025-01-09 PROCEDURE — 82948 REAGENT STRIP/BLOOD GLUCOSE: CPT

## 2025-01-09 RX ORDER — ATORVASTATIN CALCIUM 80 MG/1
80 TABLET, FILM COATED ORAL DAILY
Qty: 30 TABLET | Refills: 0 | Status: SHIPPED | OUTPATIENT
Start: 2025-01-09

## 2025-01-09 RX ADMIN — METOPROLOL SUCCINATE 100 MG: 100 TABLET, EXTENDED RELEASE ORAL at 09:25

## 2025-01-09 RX ADMIN — GABAPENTIN 200 MG: 100 CAPSULE ORAL at 09:25

## 2025-01-09 RX ADMIN — IPRATROPIUM BROMIDE AND ALBUTEROL SULFATE 3 ML: 2.5; .5 SOLUTION RESPIRATORY (INHALATION) at 07:04

## 2025-01-09 RX ADMIN — BUDESONIDE 0.5 MG: 0.5 INHALANT RESPIRATORY (INHALATION) at 07:06

## 2025-01-09 RX ADMIN — ARFORMOTEROL TARTRATE 15 MCG: 15 SOLUTION RESPIRATORY (INHALATION) at 07:05

## 2025-01-09 RX ADMIN — ASPIRIN 81 MG: 81 TABLET, COATED ORAL at 09:25

## 2025-01-09 RX ADMIN — INSULIN LISPRO 3 UNITS: 100 INJECTION, SOLUTION INTRAVENOUS; SUBCUTANEOUS at 09:25

## 2025-01-09 RX ADMIN — INSULIN LISPRO 15 UNITS: 100 INJECTION, SOLUTION INTRAVENOUS; SUBCUTANEOUS at 12:20

## 2025-01-09 RX ADMIN — ISOSORBIDE MONONITRATE 30 MG: 30 TABLET, EXTENDED RELEASE ORAL at 09:25

## 2025-01-09 RX ADMIN — Medication 4 ML: at 07:07

## 2025-01-09 RX ADMIN — FLUTICASONE PROPIONATE 2 SPRAY: 50 SPRAY, METERED NASAL at 09:25

## 2025-01-09 RX ADMIN — FAMOTIDINE 10 MG: 20 TABLET, FILM COATED ORAL at 09:25

## 2025-01-09 RX ADMIN — INSULIN LISPRO 8 UNITS: 100 INJECTION, SOLUTION INTRAVENOUS; SUBCUTANEOUS at 12:19

## 2025-01-09 RX ADMIN — DORZOLAMIDE HYDROCHLORIDE AND TIMOLOL MALEATE 1 DROP: 20; 5 SOLUTION/ DROPS OPHTHALMIC at 09:25

## 2025-01-09 RX ADMIN — IPRATROPIUM BROMIDE AND ALBUTEROL SULFATE 3 ML: 2.5; .5 SOLUTION RESPIRATORY (INHALATION) at 11:42

## 2025-01-09 NOTE — NURSING NOTE
Bladder scan showed 453ml around 1345, then get pt up to [facility], eventually voided 425ml. D'cd afterwards.

## 2025-01-09 NOTE — PLAN OF CARE
Goal Outcome Evaluation:  Plan of Care Reviewed With: patient           Outcome Evaluation: Upon entering room, pt. supine in bed, initially asleep but awakens with verbal stimuli, and is agreeable/eager to work with P.T. this date.  This AM, pt. able to ambulate 50 feet, CGA x 1, with use of Rwx.  Pt. requires SBA x 1 for bed mobility and CGA x 1 for sit <-> stand transfers.  BLE ther. ex. program x 10 reps completed for general strengthening.  Verbal/tactile cues given during ambulation for posture correction and Rwx guidance. Overall improved tolerance to functional activity this date compared to last P.T. session with an increase in gait distance.  Will continue to progress functional mobility as tolerated.    Anticipated Discharge Disposition (PT): home with home health, home with assist

## 2025-01-09 NOTE — THERAPY TREATMENT NOTE
Patient Name: Salma Hatfield  : 1935    MRN: [id number]                              Today's Date: 2025       Admit Date: 2025    Visit Dx:     ICD-10-CM ICD-9-CM   1. Acute on chronic respiratory failure with hypoxia  J96.21 518.84     799.02   2. Acute pulmonary edema  J81.0 518.4   3. Community acquired pneumonia, unspecified laterality  J18.9 486   4. Acute pain of right knee  M25.561 719.46     Patient Active Problem List   Diagnosis    Bradycardia    Pulmonary emphysema    Diffuse large B cell lymphoma    S/P CABG (coronary artery bypass graft)    Coronary artery disease involving native coronary artery without angina pectoris    Chronic respiratory failure with hypoxia    Type 2 diabetes mellitus with hyperglycemia, without long-term current use of insulin    Hypothyroidism (acquired)    Stage 3b chronic kidney disease    Second degree AV block    Presence of cardiac pacemaker    Acute UTI (urinary tract infection)    History of 2019 novel coronavirus disease (COVID-19)    Acute on chronic respiratory failure with hypoxia    PVD (peripheral vascular disease)    Diastolic CHF, acute on chronic    MARTIN (acute kidney injury)    Acute on chronic heart failure with preserved ejection fraction (HFpEF)    Respiratory failure    Bacterial pneumonia    Hypertension    Urinary retention     Past Medical History:   Diagnosis Date    AAA (abdominal aortic aneurysm)     stated in 2022 Dr. Lokesh Santoro office note    Atherosclerotic heart disease     stated in 2022 Dr. Lokesh Santoro office note    Bradycardia     Carotid artery stenosis     stated in 2022 Dr. Lokesh Santoro office note    Cataract Removal 10/27/2008    Right (10/27/08) and Left (08)    Chronic kidney disease, stage 3a     Chronic respiratory failure with hypoxia     Coronary artery disease     Coronary atherosclerosis     stated in 2022 Dr. Lokesh Santoro office note    Diabetes  mellitus     Diffuse large B cell lymphoma 06/21/2016    Elevated cholesterol     H/O angioplasty 12/31/2007    Hx of CABG 09/21/2009    Triple bypass    Hyperlipidemia     Hyperparathyroidism 04/17/2017    Hypertension     Hypertensive disorder     stated in 2- Dr. Lokesh Santoro office note    Hypothyroidism (acquired)     Ischemic heart disease 12/15/2016    Pulmonary emphysema     Retinal tear 02/06/2009    Right eye, repaired 06/2009    S/P arterial stent 08/26/2009    Left leg and aorta    S/P renal artery angioplasty 01/21/2008    Left    Shingles 03/26/2014    Status post carotid surgery 06/13/2018    Stenosis of iliac artery     stated in 2- Dr. Lokesh Santoro office note     Past Surgical History:   Procedure Laterality Date    CARDIAC CATHETERIZATION      CARDIAC ELECTROPHYSIOLOGY PROCEDURE N/A 07/22/2022    Procedure: Pacemaker SC new [employer];  Surgeon: Kevin Eisenberg MD;  Location: [facility] CATH INVASIVE LOCATION;  Service: Cardiology;  Laterality: N/A;    CAROTID ENDARTERECTOMY  2018    CORONARY ARTERY BYPASS GRAFT  2008    Triple bypass    CORONARY STENT PLACEMENT  10/2011    ILIAC ARTERY STENT      Aorta-iliac stent 2009    INSERT / REPLACE / REMOVE PACEMAKER  07/2022    RENAL ARTERY STENT Left 2008      General Information       Row Name 01/09/25 1121          Physical Therapy Time and Intention    Document Type therapy note (daily note)  -MS     Mode of Treatment physical therapy;individual therapy  -MS       Row Name 01/09/25 1121          General Information    Patient Profile Reviewed yes  -MS     Existing Precautions/Restrictions fall;oxygen therapy device and L/min   Exit alarm;  2 liters oxygen  -MS     Barriers to Rehab visual deficit   -MS       Row Name 01/09/25 1121          Cognition    Orientation Status (Cognition) oriented x 3  -MS       Row Name 01/09/25 1121          Safety Issues/Impairments Affecting Functional Mobility    Comment, Safety  Issues/Impairments (Mobility) Gait belt used for safety.  -MS               User Key  (r) = Recorded By, (t) = Taken By, (c) = Cosigned By      Initials Name Provider Type    Salazar Dorantes PT Physical Therapist                   Mobility       Row Name 01/09/25 1122          Bed Mobility    Supine-Sit Rains (Bed Mobility) standby assist  -MS     Sit-Supine Rains (Bed Mobility) standby assist  -MS       Row Name 01/09/25 1122          Sit-Stand Transfer    Sit-Stand Rains (Transfers) contact guard  -MS     Assistive Device (Sit-Stand Transfers) walker, front-wheeled  -MS       Row Name 01/09/25 1122          Gait/Stairs (Locomotion)    Rains Level (Gait) contact guard  -MS     Assistive Device (Gait) walker, front-wheeled  -MS     Distance in Feet (Gait) 50  -MS     Deviations/Abnormal Patterns (Gait) chandana decreased  -MS     Comment, (Gait/Stairs) Verbal/tactile cues given for Rwx guidance (visual impairment)  -MS               User Key  (r) = Recorded By, (t) = Taken By, (c) = Cosigned By      Initials Name Provider Type    Salazar Dorantes PT Physical Therapist                   Obj/Interventions       Row Name 01/09/25 1123          Motor Skills    Therapeutic Exercise --  BLE ther. ex. program x 10 reps completed  -MS               User Key  (r) = Recorded By, (t) = Taken By, (c) = Cosigned By      Initials Name Provider Type    Salazar Dorantes PT Physical Therapist                   Goals/Plan    No documentation.                  Clinical Impression       Row Name 01/09/25 1123          Pain    Pretreatment Pain Rating 0/10 - no pain  -MS     Posttreatment Pain Rating 0/10 - no pain  -MS       Row Name 01/09/25 1123          Positioning and Restraints    Pre-Treatment Position in bed  -MS     Post Treatment Position bed  -MS     In Bed notified nsg;supine;call light within reach;encouraged to call for assist;exit alarm on;with family/caregiver  All lines intact.   -MS               User Key  (r) = Recorded By, (t) = Taken By, (c) = Cosigned By      Initials Name Provider Type    MS WuSalazar, PT Physical Therapist                   Outcome Measures       Row Name 01/09/25 1123          How much help from another person do you currently need...    Turning from your back to your side while in flat bed without using bedrails? 4  -MS     Moving from lying on back to sitting on the side of a flat bed without bedrails? 3  -MS     Moving to and from a bed to a chair (including a wheelchair)? 3  -MS     Standing up from a chair using your arms (e.g., wheelchair, bedside chair)? 3  -MS     Climbing 3-5 steps with a railing? 3  -MS     To walk in hospital room? 3  -MS     AM-PAC 6 Clicks Score (PT) 19  -MS     Highest Level of Mobility Goal 6 --> Walk 10 steps or more  -MS       Row Name 01/09/25 1123          Functional Assessment    Outcome Measure Options AM-PAC 6 Clicks Basic Mobility (PT)  -MS               User Key  (r) = Recorded By, (t) = Taken By, (c) = Cosigned By      Initials Name Provider Type    MS WuSalazar, PT Physical Therapist                                 Physical Therapy Education       Title: PT OT SLP Therapies (In Progress)       Topic: Physical Therapy (Done)       Point: Mobility training (Done)       Learning Progress Summary            Patient Acceptance, E,D, VU,NR by MS at 1/9/2025 1123    Acceptance, E,D, VU,NR by MS at 1/8/2025 1528    Acceptance, E,TB, VU,NR by ST at 1/7/2025 1020    Acceptance, E, VU by ROXY at 1/5/2025 1142   Family Acceptance, E,TB, VU,NR by ST at 1/7/2025 1020                      Point: Home exercise program (Done)       Learning Progress Summary            Patient Acceptance, E,D, VU,NR by MS at 1/9/2025 1123    Acceptance, E,D, VU,NR by MS at 1/8/2025 1528    Acceptance, E, VU by ROXY at 1/5/2025 1142                      Point: Body mechanics (Done)       Learning Progress Summary            Patient Acceptance,  E,D, VU,NR by MS at 1/9/2025 1123    Acceptance, E,D, VU,NR by MS at 1/8/2025 1528    Acceptance, E,TB, VU,NR by ST at 1/7/2025 1020    Acceptance, E, VU by JL at 1/5/2025 1142   Family Acceptance, E,TB, VU,NR by ST at 1/7/2025 1020                      Point: Precautions (Done)       Learning Progress Summary            Patient Acceptance, E,D, VU,NR by MS at 1/9/2025 1123    Acceptance, E,D, VU,NR by MS at 1/8/2025 1528    Acceptance, E, VU by JL at 1/5/2025 1142                                      User Key       Initials Effective Dates Name Provider Type Discipline    MS 06/16/21 -  Salazar Wu, PT Physical Therapist PT    ST 09/22/22 -  Emmy Gandara, PT Physical Therapist PT     01/16/24 -  Christie Gan, PT Physical Therapist PT                  PT Recommendation and Plan     Outcome Evaluation: Upon entering room, pt. supine in bed, initially asleep but awakens with verbal stimuli, and is agreeable/eager to work with P.T. this date.  This AM, pt. able to ambulate 50 feet, CGA x 1, with use of Rwx.  Pt. requires SBA x 1 for bed mobility and CGA x 1 for sit <-> stand transfers.  BLE ther. ex. program x 10 reps completed for general strengthening.  Verbal/tactile cues given during ambulation for posture correction and Rwx guidance. Overall improved tolerance to functional activity this date compared to last P.T. session with an increase in gait distance.  Will continue to progress functional mobility as tolerated.     Time Calculation:         PT Charges       Row Name 01/09/25 1127             Time Calculation    Start Time 1045  -MS      Stop Time 1100  -MS      Time Calculation (min) 15 min  -MS      PT Received On 01/09/25  -MS      PT - Next Appointment 01/10/25  -MS         Time Calculation- PT    Total Timed Code Minutes- PT 14 minute(s)  -MS                User Key  (r) = Recorded By, (t) = Taken By, (c) = Cosigned By      Initials Name Provider Type    MS Salazar Wu, PT Physical  Therapist                  Therapy Charges for Today       Code Description Service Date Service Provider Modifiers Qty    42808583975 HC PT THERAPEUTIC ACT EA 15 MIN 1/8/2025 Salazar Wu, PT GP 1    74602555506 HC PT THERAPEUTIC ACT EA 15 MIN 1/9/2025 Salazar Wu, PT GP 1            PT G-Codes  Outcome Measure Options: AM-PAC 6 Clicks Basic Mobility (PT)  AM-PAC 6 Clicks Score (PT): 19  AM-PAC 6 Clicks Score (OT): 15  PT Discharge Summary  Anticipated Discharge Disposition (PT): home with home health, home with assist    Salazar Wu, PT  1/9/2025

## 2025-01-09 NOTE — PROGRESS NOTES
Consult Daily Progress Note  [facility]   01/09/25      Patient Name:  Salma Hatfield  MRN:  [id number]   YOB: 1935  Age: 89 y.o.  Sex: female  LOS: 6    Reason for Consult:  Pulmonary infiltrates, COPD    Hospital Course:   89-year-old female with chronic obstructive pulmonary disease, chronic respiratory failure, heart failure who presented with shortness of breath and cough found to have acute exacerbation of heart failure with preserved ejection fraction.    Interval History:  No acute events overnight  States that she continues to feel much better  On 2 L nasal cannula  Cough and sputum have both improved  No chest pain or palpitations  No nausea or vomiting  Wants to go home      Physical Exam:  Vitals:    01/09/25 0735   BP: 129/67   Pulse: 69   Resp:    Temp: 97.5 °F (36.4 °C)   SpO2: 93%       Intake/Output    Intake/Output Summary (Last 24 hours) at 1/9/2025 0902  Last data filed at 1/9/2025 0700  Gross per 24 hour   Intake 1120 ml   Output 1725 ml   Net -605 ml       General: Alert, weak appearing  HEENT: NC/AT, EOMI, MMM  Neck: Supple, trachea midline  Cardiac: RRR, no murmur, gallops, rubs  Pulmonary: Diminished at bases  GI: Soft, non-tender, non-distended, normal bowel sounds  Extremities: Warm, well perfused, no LE edema  Skin: no visible rash  Neuro: CN II - XII grossly intact  Psychiatry: Normal mood and affect      Data Review:  Results from last 7 days   Lab Units 01/09/25  0422 01/08/25 0319 01/07/25  0518 01/06/25  0428 01/05/25  0635 01/04/25  0441 01/03/25  0547   WBC 10*3/mm3 7.41 9.07 10.85* 15.50* 15.18* 13.49* 10.27   HEMOGLOBIN g/dL 13.7 13.3 14.1 15.2 15.3 16.8* 15.5   PLATELETS 10*3/mm3 151 132* 117* 119* 99* 118* 102*     Results from last 7 days   Lab Units 01/08/25 0319 01/07/25  0518 01/06/25  0428 01/05/25  0355 01/04/25  1258 01/03/25  0547 01/02/25  9205   SODIUM mmol/L 130* 130* 130* 127* 128* 136 133*   POTASSIUM mmol/L 3.9 3.8 4.4 4.2 3.0*  3.8 3.9   CHLORIDE mmol/L 99 97* 94* 92* 85* 100 97*   CO2 mmol/L 18.3* 20.3* 20.2* 22.2 22.7 22.7 26.2   BUN mg/dL 85* 78* 71* 67* 67* 56* 60*   CREATININE mg/dL 2.90* 3.05* 3.28* 3.22* 3.14* 2.25* 2.48*   GLUCOSE mg/dL 155* 199* 200* 335* 506* 295* 536*   CALCIUM mg/dL 9.1 9.2 9.1 9.1 9.0 9.5 9.5   PHOSPHORUS mg/dL 5.3* 4.9* 3.9 4.2 3.5  --   --    Estimated Creatinine Clearance: 11.3 mL/min (A) (by C-G formula based on SCr of 2.9 mg/dL (H)).    Results from last 7 days   Lab Units 01/09/25  0422 01/08/25  0319 01/07/25  0518 01/03/25  0547 01/02/25  2238 01/02/25  2235   AST (SGOT) U/L  --  174*  --   --   --  242*   ALT (SGPT) U/L  --  128*  --   --   --  174*   LACTATE mmol/L  --   --   --   --  1.5  --    PLATELETS 10*3/mm3 151 132* 117*   < >  --  118*    < > = values in this interval not displayed.       Results from last 7 days   Lab Units 01/07/25  1132 01/06/25  2242   PH, ARTERIAL pH units 7.389 7.362   PCO2, ARTERIAL mm Hg 34.4* 41.6   PO2 ART mm Hg 63.7* 70.6*   HCO3 ART mmol/L 20.8* 23.6         Imaging:  Reviewed chest images personally from past 3 days    ASSESSMENT  /  PLAN:    Acute pulmonary edema, mild  Acute on chronic HFpEF  COPD with acute exacerbation  Acute on chronic hypoxic respiratory failure, on O2 3 L/min at baseline.  Pulmonary infiltrates, suspect secondary #1.  Could have mild pneumonia as well but she was already treated with antibiotics.    -Patient presented to the hospital with shortness of breath and found to have acute COPD and CHF exacerbation.  -Continue Pulmicort and Brovana.  DuoNebs every 6 hours.  -On 3L nasal cannula wean for SpO2 goal 88 to 92%.  Uses 3 L home O2.  -Chest x-ray overall stable  -Decompensation secondary to mucous plugging with significant cough and secretions.  This is markedly improved with increased airway clearance with hypertonic saline and flutter valve.    -Azithromycin for her COPD exacerbation completed  -Ongoing management of diuresis as per  nephrology  -Echocardiogram with preserved ejection fraction and mild diastolic dysfunction, moderate concentric hypertrophy  -Incentive spirometer, ambulation as tolerated  -Work with physical therapy/OT  -Patient states she would like to be set up with pulmonary as an outpatient.  I will set her up to be seen in pulmonary clinic upon discharge.  -Stable for discharge from a pulmonary standpoint.    Thank you for allowing us to participate in this patients care.  Pulmonary will sign off at this time.  Please contact us if further questions or concerns arise.    Anshu Morris MD  [facility]  Pulmonary and Critical Care Medicine, Interventional Pulmonology    Parts of this note may be an electronic transcription/translation of spoken language to printed text using the Dragon dictation system.

## 2025-01-09 NOTE — DISCHARGE SUMMARY
Patient Name: Salma Hatfield  : 1935  MRN: [id number]    Date of Admission: 2025  Date of Discharge:  2025  Primary Care Physician: Stephen Long APRN      Chief Complaint:   Shortness of Breath and Cough      Discharge Diagnoses     Active Hospital Problems    Diagnosis  POA    **Acute on chronic heart failure with preserved ejection fraction (HFpEF) [I50.33]  Yes    Urinary retention [R33.9]  Unknown    Bacterial pneumonia [J15.9]  Yes    Hypertension [I10]  Unknown    MARTIN (acute kidney injury) [N17.9]  Yes    Acute on chronic respiratory failure with hypoxia [J96.21]  Yes    Type 2 diabetes mellitus with hyperglycemia, without long-term current use of insulin [E11.65]  Yes    Stage 3b chronic kidney disease [N18.32]  Yes    Coronary artery disease involving native coronary artery without angina pectoris [I25.10]  Yes      Resolved Hospital Problems   No resolved problems to display.        Hospital Course     Ms. Hatfield is a 89 y.o. female with a history of COPD with chronic respiratory failure, CKD, CAD, diabetes, lymphoma, HTN, and PAD who presented to [facility] initially complaining of shortness of breath and cough.  Please see the admitting history and physical for further details.  She was found to have decompensated CHF, mild MARTIN and acute on chronic respiratory failure and was admitted to the hospital for further evaluation and treatment.  She was seen in consultation by cardiology, pulmonology and nephrology.  Initially she was given IV diuresis but after the first day further diuresis was held.  She had drop in her blood pressure.  Hydralazine was discontinued.  Pulmonary treated her for COPD exacerbation.  She received 3 days of azithromycin.  She did have drop in O2 sats felt secondary to mucous plugging.  This is seemingly resolved and she is back to her home O2 requirement.  She received Pulmicort, Brovana and DuoNebs during her hospitalization and for the  mucous plugging she received hypertonic saline, flutter device, etc.  Echocardiogram showed preserved ejection fraction.  Renal function has improved to near baseline.  She has been cleared for discharge by nephrology and pulmonology.  She will restart her home dose torsemide tomorrow.  She will follow-up with pulmonary in their office postdischarge.    Due to her elevated creatinine will stop Jardiance at discharge.  Will increase Lantus.  She needs to follow-up with her PCP as her diabetes does not seem well-controlled.    Crestor changed to Lipitor due to renal function.      Day of Discharge     Subjective:  Feels better today wants to home.  Parikh catheter removed this morning.  No BM in several days but tolerating a diet with no complaint of abdominal pain or nausea.    Physical Exam:  Temp:  [97.3 °F (36.3 °C)-97.7 °F (36.5 °C)] 97.5 °F (36.4 °C)  Heart Rate:  [69-75] 70  Resp:  [18-20] 20  BP: (127-140)/(54-67) 129/67  Body mass index is 22.99 kg/m².  Physical Exam  Vitals and nursing note reviewed.   Constitutional:       General: She is not in acute distress.     Appearance: She is ill-appearing.   Cardiovascular:      Rate and Rhythm: Normal rate and regular rhythm.   Pulmonary:      Effort: Pulmonary effort is normal.      Breath sounds: No wheezing.   Abdominal:      General: Bowel sounds are normal.      Palpations: Abdomen is soft.      Tenderness: There is no abdominal tenderness.   Musculoskeletal:         General: No swelling.   Skin:     General: Skin is warm and dry.   Neurological:      Mental Status: She is alert. Mental status is at baseline.         Consultants     Consult Orders (all) (From admission, onward)       Start     Ordered    01/04/25 0910  Inpatient Pulmonology Consult  Once        Specialty:  Pulmonary Disease  Provider:  Camden Stallings Jr., MD    01/04/25 0910    01/03/25 0211  Inpatient Nephrology Consult  Once        Specialty:  Nephrology  Provider:  Timmy Chaney  MD Naun    01/03/25 0212 01/03/25 0210  Inpatient Cardiology Consult  Once        Specialty:  Cardiology  Provider:  Crystal Ordaz MD    01/03/25 0212            Procedures     Imaging Results (All)       Procedure Component Value Units Date/Time    XR Chest 1 View [[id number]] Collected: 01/07/25 1146     Updated: 01/07/25 1150    Narrative:      XR CHEST 1 VW-     Clinical: Increased O2 demand     COMPARISON examination 1/5/2025     FINDINGS: Transvenous pacemaker in position as before, cardiac size  within normal limits. There is atherosclerotic calcification of the  aorta. Increasing infiltrate/atelectasis demonstrated at the right lung  base. Infiltrates/atelectasis at the left lung base as before. No  pleural effusion or pulmonary edema is demonstrated. The remainder is  unremarkable.     This report was finalized on 1/7/2025 11:47 AM by Dr. Joey Carrington M.D  on Workstation: [id number]       XR Chest PA & Lateral [[id number]] Collected: 01/05/25 1238     Updated: 01/05/25 1244    Narrative:      XR CHEST PA AND LATERAL-        INDICATION: Follow-up pulmonary edema     COMPARISON: Chest radiograph January 2, 2025     TECHNIQUE: 2 view chest     FINDINGS:      Increased lung markings. Heterogeneous basilar lung opacities. No  effusions. Stable mediastinum. Suspect CABG. Median sternotomy.  Left-sided pacemaker with a right atrial right ventricular lead.       Impression:         1. Stable chest.  2. Airways disease.  3. Heterogeneous basilar opacities. Could be atelectasis, scarring or  pneumonia.          US Renal Bilateral [[id number]] Collected: 01/03/25 0955     Updated: 01/03/25 1009    Narrative:      ULTRASOUND RENAL BILATERAL     INDICATION: Acute on chronic kidney disease.     COMPARISON: None     TECHNIQUE: Real-time sonographic evaluation of the kidneys with  grayscale and color-flow imaging. Representative images were saved for  review.     FINDINGS:     RIGHT KIDNEY: 9.1 cm in length. No renal  mass is seen. No  hydronephrosis.     LEFT KIDNEY: 7.8 cm in length. Cortical thinning. Simple appearing cyst  in the superior pole measuring 1.8 x 1.3 x 1.6 cm. No solid mass  identified. No hydronephrosis.     BLADDER: Distended bladder. Bilateral ureteral jets seen. Prevoid  bladder volume measures 948.5 mL.          Impression:         1. Atrophic appearing left kidney.  2. Simple appearing left renal cyst.  3. No solid renal mass identified. No hydronephrosis.  4. Moderate to severe bladder distention           XR Knee 3 View Right [[id number]] Collected: 01/03/25 0709     Updated: 01/03/25 0714    Narrative:      XR KNEE 3 VW RIGHT-     INDICATIONS: Joint effusion.     TECHNIQUE: 3 VIEWS OF THE RIGHT KNEE     COMPARISON: None available     FINDINGS:     Mild knee effusion is apparent. Some calcified bodies are present in the  suprapatellar space. Calcifications posterior to the knee may be debris  in a popliteal cyst. Arterial calcifications are conspicuous. Chondral  calcifications are noted. No acute fracture, erosion, or dislocation is  identified. Small degenerative spurring is present. Follow-up/further  evaluation can be obtained as indications persist.       XR Chest 1 View [[id number]] Collected: 01/03/25 0101     Updated: 01/03/25 0101    Narrative:        Patient: LORETA MENDOZA  Time Out: 01:01  Exam(s): XR CXR 1 VIEW     EXAM:    XR Chest, 1 View    CLINICAL HISTORY:     Reason for exam: CHF COPD Protocol.    TECHNIQUE:    Frontal view of the chest.    COMPARISON:    No relevant prior studies available.    FINDINGS:    Lungs:  Mild pulmonary vascular congestion.  No consolidation.    Pleural space:  Unremarkable.  No pneumothorax.    Heart:  Unremarkable.  No cardiomegaly.    Mediastinum:  Unremarkable.  Normal mediastinal contour.    Bones joints:  Unremarkable.  No acute fracture.    Tubes, lines and devices:  Left subclavian access dual-lead pacer is   noted with tips overlying the right atrium and  right ventricle.    IMPRESSION:         Mild pulmonary vascular congestion.         Results for orders placed during the hospital encounter of 01/02/25    Adult Transthoracic Echo Complete W/ Cont if Necessary Per Protocol    Interpretation Summary    Left ventricular systolic function is normal. Calculated left ventricular EF = 62.3%    Left ventricular wall thickness is consistent with moderate to  moderate concentric hypertrophy with component of asymmetric basal septal hypertrophy.  No significant gradient noted.    Left ventricular diastolic function is consistent with (grade I) impaired relaxation.    Mildly reduced right ventricular systolic function noted.    No significant valvular stenosis or regurgitation present.    Pertinent Labs     Results from last 7 days   Lab Units 01/09/25  0422 01/08/25 0319 01/07/25 0518 01/06/25  0428   WBC 10*3/mm3 7.41 9.07 10.85* 15.50*   HEMOGLOBIN g/dL 13.7 13.3 14.1 15.2   PLATELETS 10*3/mm3 151 132* 117* 119*     Results from last 7 days   Lab Units 01/09/25 0859 01/08/25 0319 01/07/25 0518 01/06/25  0428   SODIUM mmol/L 138 130* 130* 130*   POTASSIUM mmol/L 3.8 3.9 3.8 4.4   CHLORIDE mmol/L 103 99 97* 94*   CO2 mmol/L 24.0 18.3* 20.3* 20.2*   BUN mg/dL 68* 85* 78* 71*   CREATININE mg/dL 1.88* 2.90* 3.05* 3.28*   GLUCOSE mg/dL 261* 155* 199* 200*   EGFR mL/min/1.73 25.3* 15.0* 14.2* 13.0*     Results from last 7 days   Lab Units 01/09/25  0859 01/08/25 0319 01/07/25 0518 01/06/25  0428 01/04/25  1258 01/02/25  2235   ALBUMIN g/dL 3.3* 2.9* 3.3* 3.4*   < > 3.7   BILIRUBIN mg/dL  --  0.5  --   --   --  0.7   ALK PHOS U/L  --  98  --   --   --  142*   AST (SGOT) U/L  --  174*  --   --   --  242*   ALT (SGPT) U/L  --  128*  --   --   --  174*    < > = values in this interval not displayed.     Results from last 7 days   Lab Units 01/09/25  0859 01/08/25  0319 01/07/25  0518 01/06/25  0428   CALCIUM mg/dL 9.2 9.1 9.2 9.1   ALBUMIN g/dL 3.3* 2.9* 3.3* 3.4*   PHOSPHORUS  "mg/dL 4.1 5.3* 4.9* 3.9       Results from last 7 days   Lab Units 01/08/25  0319 01/07/25  0518 01/04/25  1258 01/03/25  0547 01/03/25 0054 01/02/25 2235   CK TOTAL U/L  --   --  110  --   --   --    HSTROP T ng/L  --   --   --  137* 128* 125*   PROBNP pg/mL 1,389.0 2,125.0*  --   --   --  7,970.0*           Invalid input(s): \"LDLCALC\"  Results from last 7 days   Lab Units 01/03/25 0054   BLOODCX  No growth at 5 days  No growth at 5 days     Results from last 7 days   Lab Units 01/02/25 2235   COVID19  Not Detected       Test Results Pending at Discharge     Pending Results       None              Discharge Details        Discharge Medications        New Medications        Instructions Start Date   atorvastatin 80 MG tablet  Commonly known as: LIPITOR   80 mg, Oral, Daily             Changes to Medications        Instructions Start Date   famotidine 20 MG tablet  Commonly known as: PEPCID  What changed: when to take this   10 mg, Oral, 2 Times Daily      gabapentin 300 MG capsule  Commonly known as: NEURONTIN  What changed: Another medication with the same name was removed. Continue taking this medication, and follow the directions you see here.   300 mg, 2 Times Daily      insulin glargine 100 UNIT/ML injection  Commonly known as: LANTUS, SEMGLEE  What changed: how much to take   25 Units, Subcutaneous, Every Afternoon             Continue These Medications        Instructions Start Date   allopurinol 100 MG tablet  Commonly known as: ZYLOPRIM   100 mg, Daily      aspirin 81 MG EC tablet   1 tablet, Daily      calcium carbonate 500 MG chewable tablet  Commonly known as: TUMS   2 tablets, Oral, 2 Times Daily PRN      cetirizine 10 MG tablet  Commonly known as: zyrTEC   10 mg, Daily      dorzolamide-timolol 2-0.5 % ophthalmic solution  Commonly known as: COSOPT   1 drop, 2 Times Daily      estradiol 0.1 MG/GM vaginal cream  Commonly known as: ESTRACE   2 g, 2 Times Weekly      ezetimibe 10 MG tablet  Commonly " known as: ZETIA   10 mg, Daily      fluticasone 50 MCG/ACT nasal spray  Commonly known as: FLONASE   2 sprays, Daily      Gemtesa 75 MG tablet  Generic drug: Vibegron   75 mg, Daily      glipizide 5 MG ER tablet  Commonly known as: GLUCOTROL XL   10 mg, Daily      isosorbide mononitrate 30 MG 24 hr tablet  Commonly known as: IMDUR   30 mg, Oral, Daily      latanoprost 0.005 % ophthalmic solution  Commonly known as: XALATAN   1 drop, Nightly      metoprolol succinate  MG 24 hr tablet  Commonly known as: TOPROL-XL   100 mg, Daily      montelukast 10 MG tablet  Commonly known as: SINGULAIR   1 tablet, Nightly      O2  Commonly known as: OXYGEN   3 L/min, Continuous      timolol 0.5 % ophthalmic solution  Commonly known as: TIMOPTIC   1 drop, 2 Times Daily      torsemide 20 MG tablet  Commonly known as: DEMADEX   20 mg, Oral, Daily      Trelegy Ellipta 100-62.5-25 MCG/ACT inhaler  Generic drug: Fluticasone-Umeclidin-Vilant   1 puff, Daily - RT      Zeasorb-AF 2 % powder  Generic drug: miconazole   1 Application, As Needed             Stop These Medications      hydrALAZINE 25 MG tablet  Commonly known as: APRESOLINE     Jardiance 25 MG tablet tablet  Generic drug: empagliflozin     rosuvastatin 20 MG tablet  Commonly known as: CRESTOR              No Known Allergies    Discharge Disposition:  Home or Self Care      Discharge Diet:  Diet Order   Procedures    Diet: Cardiac, Diabetic, Fluid Restriction (240 mL/tray); Healthy Heart (2-3 Na+); Consistent Carbohydrate; 1500 mL/day; Fluid Consistency: Thin (IDDSI 0)       Discharge Activity:       CODE STATUS:    Code Status and Medical Interventions: CPR (Attempt to Resuscitate); Full Support   Ordered at: 01/03/25 0212     Code Status (Patient has no pulse and is not breathing):    CPR (Attempt to Resuscitate)     Medical Interventions (Patient has pulse or is breathing):    Full Support       No future appointments.  Additional Instructions for the Follow-ups that  You Need to Schedule       Ambulatory Referral to Home Health ([facility])   As directed      Face to Face Visit Date: 1/9/2025   Follow-up provider for Plan of Care?: I treated the patient in an acute care facility and will not continue treatment after discharge.   Follow-up provider: ERYN LONG [[id number]]   Reason/Clinical Findings: CHF, mobility below baseline   Describe mobility limitations that make leaving home difficult: Requires assistance to get out of home   Nursing/Therapeutic Services Requested: Skilled Nursing Physical Therapy   Skilled nursing orders: Medication education CHF management Cardiopulmonary assessments   PT orders: Therapeutic exercise Strengthening Home safety assessment   Frequency: 1 Week 1               Contact information for follow-up providers       Echols, Allison S, NP-C. Go on 2/14/2025.    Specialties: Nurse Practitioner, Nephrology  Why: Has follow-up with [facility] Allison Echols February 14 at 10 AM.  Contact information:  [address]  [place] [address]  [phone]               Eryn Long APRN .    Specialty: Nurse Practitioner  Contact information:  [address]  [address]  [place] [id number] [phone]               Anshu Morris MD Follow up.    Specialties: Pulmonary Disease, Intensive Care  Why: Office will contact you with an appointment  Contact information:  [address]  [facility] [id number] [phone]                       Contact information for after-discharge care       Home Medical Care       [facility] .    Services: Home Health Services, Home Nursing, Home Rehabilitation  Contact information:  [address] [place] [address] [phone]                                   Additional Instructions for the Follow-ups that You Need to Schedule       Ambulatory Referral to Home Health ([facility])   As directed      Face to Face Visit Date: 1/9/2025   Follow-up  provider for Plan of Care?: I treated the patient in an acute care facility and will not continue treatment after discharge.   Follow-up provider: ERYN LAY [[id number]]   Reason/Clinical Findings: CHF, mobility below baseline   Describe mobility limitations that make leaving home difficult: Requires assistance to get out of home   Nursing/Therapeutic Services Requested: Skilled Nursing Physical Therapy   Skilled nursing orders: Medication education CHF management Cardiopulmonary assessments   PT orders: Therapeutic exercise Strengthening Home safety assessment   Frequency: 1 Week 1            Time Spent on Discharge:  Greater than 30 minutes      Will Cerrato MD  [facility]  01/09/25  11:27 EST

## 2025-01-09 NOTE — PLAN OF CARE
Problem: Adult Inpatient Plan of Care  Goal: Plan of Care Review  1/9/2025 0817 by Kt Mcgee, RN  Outcome: Progressing  Flowsheets (Taken 1/9/2025 0817)  Progress: improving  Outcome Evaluation: Vital stable, pt remains on 3L o2, Parikh take off this am per order, Lindsay colace given for constipation. No c/o pain or n/v. Family at bedside, plan of care on going.  Plan of Care Reviewed With: patient  1/9/2025 0442 by Kt Mcgee, RN  Outcome: Progressing  Flowsheets (Taken 1/9/2025 0442)  Plan of Care Reviewed With:   patient   child

## 2025-01-09 NOTE — CASE MANAGEMENT/SOCIAL WORK
Case Management Discharge Note      Final Note: Home with family and [facility] HH. Has home O2. Family transport.    Provided Post Acute Provider List?: Yes  Post Acute Provider List: Home Health  Delivered To: Support Person  Support Person: Blossom Conde [facility] [phone]  Method of Delivery: Telephone    Selected Continued Care - Admitted Since 1/2/2025       Destination    No services have been selected for the patient.                Durable Medical Equipment    No services have been selected for the patient.                Dialysis/Infusion    No services have been selected for the patient.                Home Medical Care Coordination complete.      Service Provider Services Address Phone Fax Patient Preferred    [facility], Home Nursing, Home Rehabilitation [address] [place] [address] [phone] --              Therapy    No services have been selected for the patient.                Community Resources    No services have been selected for the patient.                Community & DME    No services have been selected for the patient.                    Transportation Services  Private: Car    Final Discharge Disposition Code: 06 - home with home health care

## 2025-01-09 NOTE — PROGRESS NOTES
[facility] Progress Note      Patient Name: Salma Hatfield  : 1935  MRN: [id number]  Primary Care Physician:  Stephen Long APRN  Date of admission: 2025    Subjective     Interval History:   Follow-up acute kidney injury on CKD 3B.  Eating well.  Did not sleep well due to IV pump beeping all night.  1700 cc urine output.  Parikh removed this morning.  152 cc residual currently.  Brief dry.  Coughing.  Thinks her cough is better and so does her family member at bedside.  Got up to the chair this morning already.  Review of Systems:   As noted above    Objective     Vitals:   Temp:  [97.3 °F (36.3 °C)-97.7 °F (36.5 °C)] 97.5 °F (36.4 °C)  Heart Rate:  [69-75] 69  Resp:  [16-20] 20  BP: (127-140)/(54-67) 129/67  Flow (L/min) (Oxygen Therapy):  [3] 3    Intake/Output Summary (Last 24 hours) at 2025 1026  Last data filed at 2025 0700  Gross per 24 hour   Intake 1120 ml   Output 1725 ml   Net -605 ml       Physical Exam:    General Appearance: Awake, alert, sitting up in bed.  Loose cough.  Skin: warm and dry  HEENT: oral mucosa normal, nonicteric sclera.  Right eye blind.  Neck: supple, no JVD  Lungs: Coarse breath sounds.  Decreased breath sounds right base.  No wheezing.  On oxygen.  3 L nc  Heart: RRR, normal S1 and S2  Abdomen: soft, nontender, nondistended. +bs  : Palpable bladder.  Extremities: Trace lower extremity edema  Neuro: normal speech and mental status     Scheduled Meds:     arformoterol, 15 mcg, Nebulization, BID - RT  aspirin, 81 mg, Oral, Daily  budesonide, 0.5 mg, Nebulization, BID - RT  dorzolamide-timolol, 1 drop, Left Eye, BID  famotidine, 10 mg, Oral, Daily  fluticasone, 2 spray, Nasal, Daily  gabapentin, 200 mg, Oral, BID  insulin glargine, 30 Units, Subcutaneous, Every Afternoon  insulin lispro, 15 Units, Subcutaneous, TID With Meals  insulin lispro, 3-14 Units, Subcutaneous, 4x Daily AC & at Bedtime  ipratropium-albuterol, 3 mL,  Nebulization, 4x Daily - RT  isosorbide mononitrate, 30 mg, Oral, Daily  latanoprost, 1 drop, Left Eye, Nightly  metoprolol succinate XL, 100 mg, Oral, Daily  montelukast, 10 mg, Oral, Nightly  sodium chloride, 4 mL, Nebulization, BID - RT      IV Meds:   O2, 3 L/min, Last Rate: 3 L/min (01/04/25 0931)        Results Reviewed:   I have personally reviewed the results from the time of this admission to 1/9/2025 10:26 EST     Results from last 7 days   Lab Units 01/09/25  0859 01/08/25 0319 01/07/25  0518 01/03/25  0547 01/02/25  2235   SODIUM mmol/L 138 130* 130*   < > 133*   POTASSIUM mmol/L 3.8 3.9 3.8   < > 3.9   CHLORIDE mmol/L 103 99 97*   < > 97*   CO2 mmol/L 24.0 18.3* 20.3*   < > 26.2   BUN mg/dL 68* 85* 78*   < > 60*   CREATININE mg/dL 1.88* 2.90* 3.05*   < > 2.48*   CALCIUM mg/dL 9.2 9.1 9.2   < > 9.5   BILIRUBIN mg/dL  --  0.5  --   --  0.7   ALK PHOS U/L  --  98  --   --  142*   ALT (SGPT) U/L  --  128*  --   --  174*   AST (SGOT) U/L  --  174*  --   --  242*   GLUCOSE mg/dL 261* 155* 199*   < > 536*    < > = values in this interval not displayed.       Estimated Creatinine Clearance: 17.5 mL/min (A) (by C-G formula based on SCr of 1.88 mg/dL (H)).    Results from last 7 days   Lab Units 01/09/25 0859 01/08/25 0319 01/07/25 0518   PHOSPHORUS mg/dL 4.1 5.3* 4.9*             Results from last 7 days   Lab Units 01/09/25  0422 01/08/25  0319 01/07/25  0518 01/06/25  0428 01/05/25  0635   WBC 10*3/mm3 7.41 9.07 10.85* 15.50* 15.18*   HEMOGLOBIN g/dL 13.7 13.3 14.1 15.2 15.3   PLATELETS 10*3/mm3 151 132* 117* 119* 99*             Assessment / Plan     ASSESSMENT:  Acute kidney injury on CKD 3B, baseline creatinine 1.5-1.6.  Atrophic left kidney, renal vascular disease and hypertension.  Acute component due to prerenal azotemia with significant diuresis from hyperglycemia and diuretics as well as relative hypotension.  Waste products improved with very low dose of IV fluids overnight.  2.  Chronic  obstructive pulmonary disease with acute on chronic hypoxic respiratory failure.  Oxygen requirements increased overnight but better today.  Cough improved.  3. Chronic heart failure preserved ejection fraction.  Compensated.  Resume p.o. torsemide tomorrow.  4.  Diabetes mellitus type 2.  Blood sugars labile.  175-311.  261 this morning.  5.  Thrombocytopenia.  Resolved.  6. Elevated transaminases.  Slowly improving.  Holding statin.   PLAN:  Resume p.o. torsemide 20 mg at home tomorrow.  Renal okay with discharge.  Confirmed with patient that she feels like going and family member thinks she looks better.  Has follow-up with [facility] February 14 at 10 AM.  Thank you for involving us in the care of Salma Hatfield.  Please feel free to call with any questions.    Yessi Parekh MD  01/09/25  10:26 RUST    [facility]  [phone]    Please note that portions of this note were completed with a voice recognition program.

## 2025-01-10 ENCOUNTER — READMISSION MANAGEMENT (OUTPATIENT)
Dept: CALL CENTER | Facility: HOSPITAL | Age: OVER 89
End: 2025-01-10
Payer: MEDICARE

## 2025-01-10 NOTE — OUTREACH NOTE
Prep Survey      Flowsheet Row Responses   [facility] facility patient discharged from? [facility]   Is LACE score < 7 ? No   Eligibility Readm Mgmt   Discharge diagnosis Acute on chronic heart failure with preserved ejection fraction (HFpEF)   Does the patient have one of the following disease processes/diagnoses(primary or secondary)? CHF   Does the patient have Home health ordered? Yes   What is the Home health agency?  [facility]    Is there a DME ordered? No   Prep survey completed? Yes            Becky VILLEGAS - Registered Nurse

## 2025-01-10 NOTE — OUTREACH NOTE
CHF Week 1 Survey      Flowsheet Row Responses   [facility] [place] patient discharged from? [facility]   Does the patient have one of the following disease processes/diagnoses(primary or secondary)? CHF   CHF Week 1 attempt successful? Yes   Call start time 1340   Call end time 1350   Discharge diagnosis Acute on chronic heart failure with preserved ejection fraction (HFpEF)   Person spoke with today (if not patient) and relationship patient   Meds reviewed with patient/caregiver? Yes   Does the patient have all medications ordered at discharge? Yes   Is the patient taking all medications as directed (includes completed medication regime)? Yes   Does the patient have a primary care provider?  Yes   Does the patient have an appointment with their PCP within 7 days of discharge? No   Comments regarding PCP Stephen Long   What is preventing the patient from scheduling follow up appointments within 7 days of discharge? Haven't had time   Has the patient kept scheduled appointments due by today? N/A   What is the Home health agency?  [facility]    Has home health visited the patient within 72 hours of discharge? Call prior to 72 hours   Psychosocial issues? No   Did the patient receive a copy of their discharge instructions? Yes   Nursing interventions Reviewed instructions with patient   What is the patient's perception of their health status since discharge? Improving   If the patient is a current smoker, are they able to teach back resources for cessation? Not a smoker    CHF Week 1 call completed? Yes   Is the patient interested in additional calls from an ambulatory ? No   Would this patient benefit from a Referral to Amb Social Work? No   Wrap up additional comments Pt doing ok at this time. Reviewed med changes with patient and son.   Call end time 1350            COCO ORDONEZ - Registered Nurse

## 2025-05-29 ENCOUNTER — HOSPITAL ENCOUNTER (EMERGENCY)
Facility: HOSPITAL | Age: OVER 89
Discharge: HOME | End: 2025-05-29
Attending: EMERGENCY MEDICINE
Payer: MEDICARE

## 2025-05-29 ENCOUNTER — APPOINTMENT (OUTPATIENT)
Dept: GENERAL RADIOLOGY | Facility: HOSPITAL | Age: OVER 89
End: 2025-05-29
Payer: MEDICARE

## 2025-05-29 VITALS
HEART RATE: 69 BPM | BODY MASS INDEX: 23.19 KG/M2 | RESPIRATION RATE: 20 BRPM | SYSTOLIC BLOOD PRESSURE: 168 MMHG | DIASTOLIC BLOOD PRESSURE: 86 MMHG | OXYGEN SATURATION: 92 % | TEMPERATURE: 98.1 F | WEIGHT: 121.25 LBS

## 2025-05-29 DIAGNOSIS — N39.0 ACUTE UTI: Primary | ICD-10-CM

## 2025-05-29 LAB
ALBUMIN SERPL-MCNC: 3.9 G/DL (ref 3.5–5.2)
ALBUMIN/GLOB SERPL: 1.3 G/DL
ALP SERPL-CCNC: 126 U/L (ref 39–117)
ALT SERPL W P-5'-P-CCNC: 22 U/L (ref 1–33)
ANION GAP SERPL CALCULATED.3IONS-SCNC: 12.1 MMOL/L (ref 5–15)
AST SERPL-CCNC: 24 U/L (ref 1–32)
BACTERIA UR QL AUTO: ABNORMAL /HPF
BASOPHILS # BLD AUTO: 0.03 10*3/MM3 (ref 0–0.2)
BASOPHILS NFR BLD AUTO: 0.3 % (ref 0–1.5)
BILIRUB SERPL-MCNC: 0.8 MG/DL (ref 0–1.2)
BILIRUB UR QL STRIP: NEGATIVE
BUN SERPL-MCNC: 44 MG/DL (ref 8–23)
BUN/CREAT SERPL: 30.1 (ref 7–25)
CALCIUM SPEC-SCNC: 10.2 MG/DL (ref 8.6–10.5)
CHLORIDE SERPL-SCNC: 99 MMOL/L (ref 98–107)
CLARITY UR: CLEAR
CO2 SERPL-SCNC: 27.9 MMOL/L (ref 22–29)
COLOR UR: YELLOW
CREAT SERPL-MCNC: 1.46 MG/DL (ref 0.57–1)
DEPRECATED RDW RBC AUTO: 46.7 FL (ref 37–54)
EGFRCR SERPLBLD CKD-EPI 2021: 34.3 ML/MIN/1.73
EOSINOPHIL # BLD AUTO: 0.23 10*3/MM3 (ref 0–0.4)
EOSINOPHIL NFR BLD AUTO: 2.4 % (ref 0.3–6.2)
ERYTHROCYTE [DISTWIDTH] IN BLOOD BY AUTOMATED COUNT: 13.2 % (ref 12.3–15.4)
GLOBULIN UR ELPH-MCNC: 3 GM/DL
GLUCOSE SERPL-MCNC: 290 MG/DL (ref 65–99)
GLUCOSE UR STRIP-MCNC: NEGATIVE MG/DL
HCT VFR BLD AUTO: 50.9 % (ref 34–46.6)
HGB BLD-MCNC: 16.5 G/DL (ref 12–15.9)
HGB UR QL STRIP.AUTO: ABNORMAL
HYALINE CASTS UR QL AUTO: ABNORMAL /LPF
IMM GRANULOCYTES # BLD AUTO: 0.04 10*3/MM3 (ref 0–0.05)
IMM GRANULOCYTES NFR BLD AUTO: 0.4 % (ref 0–0.5)
KETONES UR QL STRIP: NEGATIVE
LEUKOCYTE ESTERASE UR QL STRIP.AUTO: ABNORMAL
LYMPHOCYTES # BLD AUTO: 1.38 10*3/MM3 (ref 0.7–3.1)
LYMPHOCYTES NFR BLD AUTO: 14.6 % (ref 19.6–45.3)
MCH RBC QN AUTO: 31.7 PG (ref 26.6–33)
MCHC RBC AUTO-ENTMCNC: 32.4 G/DL (ref 31.5–35.7)
MCV RBC AUTO: 97.7 FL (ref 79–97)
MONOCYTES # BLD AUTO: 1.01 10*3/MM3 (ref 0.1–0.9)
MONOCYTES NFR BLD AUTO: 10.7 % (ref 5–12)
NEUTROPHILS NFR BLD AUTO: 6.79 10*3/MM3 (ref 1.7–7)
NEUTROPHILS NFR BLD AUTO: 71.6 % (ref 42.7–76)
NITRITE UR QL STRIP: NEGATIVE
NRBC BLD AUTO-RTO: 0 /100 WBC (ref 0–0.2)
PH UR STRIP.AUTO: 6 [PH] (ref 5–8)
PLATELET # BLD AUTO: 120 10*3/MM3 (ref 140–450)
PMV BLD AUTO: 9.8 FL (ref 6–12)
POTASSIUM SERPL-SCNC: 3.9 MMOL/L (ref 3.5–5.2)
PROT SERPL-MCNC: 6.9 G/DL (ref 6–8.5)
PROT UR QL STRIP: ABNORMAL
RBC # BLD AUTO: 5.21 10*6/MM3 (ref 3.77–5.28)
RBC # UR STRIP: ABNORMAL /HPF
REF LAB TEST METHOD: ABNORMAL
SODIUM SERPL-SCNC: 139 MMOL/L (ref 136–145)
SP GR UR STRIP: 1.01 (ref 1–1.03)
SQUAMOUS #/AREA URNS HPF: ABNORMAL /HPF
UROBILINOGEN UR QL STRIP: ABNORMAL
WBC # UR STRIP: ABNORMAL /HPF
WBC NRBC COR # BLD AUTO: 9.48 10*3/MM3 (ref 3.4–10.8)

## 2025-05-29 PROCEDURE — 71045 X-RAY EXAM CHEST 1 VIEW: CPT

## 2025-05-29 PROCEDURE — 25010000002 CEFTRIAXONE PER 250 MG: Performed by: PHYSICIAN ASSISTANT

## 2025-05-29 PROCEDURE — 87086 URINE CULTURE/COLONY COUNT: CPT | Performed by: PHYSICIAN ASSISTANT

## 2025-05-29 PROCEDURE — 36415 COLL VENOUS BLD VENIPUNCTURE: CPT

## 2025-05-29 PROCEDURE — 81001 URINALYSIS AUTO W/SCOPE: CPT | Performed by: PHYSICIAN ASSISTANT

## 2025-05-29 PROCEDURE — 80053 COMPREHEN METABOLIC PANEL: CPT | Performed by: PHYSICIAN ASSISTANT

## 2025-05-29 PROCEDURE — 99283 EMERGENCY DEPT VISIT LOW MDM: CPT

## 2025-05-29 PROCEDURE — 25010000002 ONDANSETRON PER 1 MG: Performed by: PHYSICIAN ASSISTANT

## 2025-05-29 PROCEDURE — 96365 THER/PROPH/DIAG IV INF INIT: CPT

## 2025-05-29 PROCEDURE — 85025 COMPLETE CBC W/AUTO DIFF WBC: CPT | Performed by: PHYSICIAN ASSISTANT

## 2025-05-29 PROCEDURE — 25810000003 SODIUM CHLORIDE 0.9 % SOLUTION: Performed by: PHYSICIAN ASSISTANT

## 2025-05-29 PROCEDURE — 96375 TX/PRO/DX INJ NEW DRUG ADDON: CPT

## 2025-05-29 RX ORDER — ONDANSETRON 2 MG/ML
4 INJECTION INTRAMUSCULAR; INTRAVENOUS ONCE
Status: COMPLETED | OUTPATIENT
Start: 2025-05-29 | End: 2025-05-29

## 2025-05-29 RX ORDER — CEPHALEXIN 500 MG/1
500 CAPSULE ORAL 3 TIMES DAILY
Qty: 21 CAPSULE | Refills: 0 | Status: SHIPPED | OUTPATIENT
Start: 2025-05-29 | End: 2025-06-05

## 2025-05-29 RX ORDER — SODIUM CHLORIDE 0.9 % (FLUSH) 0.9 %
10 SYRINGE (ML) INJECTION AS NEEDED
Status: DISCONTINUED | OUTPATIENT
Start: 2025-05-29 | End: 2025-05-29 | Stop reason: HOSPADM

## 2025-05-29 RX ADMIN — ONDANSETRON 4 MG: 2 INJECTION, SOLUTION INTRAMUSCULAR; INTRAVENOUS at 12:02

## 2025-05-29 RX ADMIN — CEFTRIAXONE SODIUM 1000 MG: 1 INJECTION, POWDER, FOR SOLUTION INTRAMUSCULAR; INTRAVENOUS at 13:36

## 2025-05-29 RX ADMIN — SODIUM CHLORIDE 500 ML: 9 INJECTION, SOLUTION INTRAVENOUS at 12:13

## 2025-05-29 NOTE — ED PROVIDER NOTES
EMERGENCY DEPARTMENT ENCOUNTER      PCP: Provider, No Known  Patient Care Team:  Provider, No Known as PCP - General   Independent Historians: Patient, EMS    HPI:  Chief Complaint: Dysuria   A complete HPI/ROS/PMH/PSH/SH/FH are unobtainable due to: None    Chronic or social conditions impacting patient care (social determinants of health): None    Context: Salma Hatfield is a 89 y.o. female w/ hx of CABG, emphysema, DM who presents to the ED c/o acute dysuria x 4 days. She also reports lower abdominal discomfort and urinary frequency. She denies known fevers but has had chills today. Denies flank pain. Pt on 2L NC at all times. Recently treated for pneumonia but denies current cough, SOA, or chest pain.    Review of prior external notes and/or external test results outside of this encounter: BMP on 1/9/25 showed creatinine of 1.88, glucose 261. Hemoglobin A1c on 1/2/25 was 10.70.      PAST MEDICAL HISTORY  Active Ambulatory Problems     Diagnosis Date Noted    Bradycardia 07/20/2022    Pulmonary emphysema 07/21/2022    Diffuse large B cell lymphoma 07/21/2022    S/P CABG (coronary artery bypass graft) 07/21/2022    Coronary artery disease involving native coronary artery without angina pectoris 07/21/2022    Chronic respiratory failure with hypoxia 07/21/2022    Type 2 diabetes mellitus with hyperglycemia, without long-term current use of insulin 07/21/2022    Hypothyroidism (acquired) 07/21/2022    Stage 3b chronic kidney disease 07/21/2022    Second degree AV block 07/20/2022    Presence of cardiac pacemaker 07/23/2022    Acute UTI (urinary tract infection) 09/21/2022    History of 2019 novel coronavirus disease (COVID-19) 09/21/2022    Acute on chronic respiratory failure with hypoxia 05/17/2023    PVD (peripheral vascular disease) 05/17/2023    Diastolic CHF, acute on chronic 05/17/2023    MARTIN (acute kidney injury) 05/18/2023    Acute on chronic heart failure with preserved ejection fraction (HFpEF)  05/21/2023    Respiratory failure 01/03/2025    Bacterial pneumonia 01/04/2025    Hypertension 01/04/2025    Urinary retention 01/07/2025     Resolved Ambulatory Problems     Diagnosis Date Noted    Chest pain with high risk for cardiac etiology 09/21/2022    Hypertensive urgency 09/21/2022     Past Medical History:   Diagnosis Date    AAA (abdominal aortic aneurysm)     Atherosclerotic heart disease     Carotid artery stenosis     Cataract Removal 10/27/2008    Chronic kidney disease, stage 3a     Coronary artery disease     Coronary atherosclerosis     Diabetes mellitus     Elevated cholesterol     H/O angioplasty 12/31/2007    Hx of CABG 09/21/2009    Hyperlipidemia     Hyperparathyroidism 04/17/2017    Hypertensive disorder     Ischemic heart disease 12/15/2016    Retinal tear 02/06/2009    S/P arterial stent 08/26/2009    S/P renal artery angioplasty 01/21/2008    Shingles 03/26/2014    Status post carotid surgery 06/13/2018    Stenosis of iliac artery        The patient has started, but not completed, their COVID-19 vaccination series.    PAST SURGICAL HISTORY  Past Surgical History:   Procedure Laterality Date    CARDIAC CATHETERIZATION      CARDIAC ELECTROPHYSIOLOGY PROCEDURE N/A 07/22/2022    Procedure: Pacemaker SC new [employer];  Surgeon: Kevin Eisenberg MD;  Location: [facility] CATH INVASIVE LOCATION;  Service: Cardiology;  Laterality: N/A;    CAROTID ENDARTERECTOMY  2018    CORONARY ARTERY BYPASS GRAFT  2008    Triple bypass    CORONARY STENT PLACEMENT  10/2011    ILIAC ARTERY STENT      Aorta-iliac stent 2009    INSERT / REPLACE / REMOVE PACEMAKER  07/2022    RENAL ARTERY STENT Left 2008         FAMILY HISTORY  No family history on file.      SOCIAL HISTORY  Social History     Socioeconomic History    Marital status:    Tobacco Use    Smoking status: Never    Smokeless tobacco: Never   Vaping Use    Vaping status: Never Used   Substance and Sexual Activity    Alcohol use: Never    Drug use: Never     Sexual activity: Defer         ALLERGIES  Patient has no known allergies.        REVIEW OF SYSTEMS  Review of Systems   Constitutional:  Positive for chills.   Respiratory:  Negative for shortness of breath.    Cardiovascular:  Negative for chest pain.   Gastrointestinal:  Positive for nausea.   Genitourinary:  Positive for dysuria and frequency.        All systems reviewed and negative except for those discussed in HPI.       PHYSICAL EXAM    I have reviewed the triage vital signs and nursing notes.    ED Triage Vitals [05/29/25 1125]   Temp Heart Rate Resp BP SpO2   98.1 °F (36.7 °C) 78 20 164/83 94 %      Temp src Heart Rate Source Patient Position BP Location FiO2 (%)   Oral Monitor Lying Left arm --       Physical Exam  GENERAL: alert, no acute distress  SKIN: Warm, dry  HENT: Normocephalic, atraumatic  EYES: no scleral icterus  CV: regular rhythm, regular rate  RESPIRATORY: normal effort, lungs clear  ABDOMEN: soft, mild suprapubic discomfort, nondistended  MUSCULOSKELETAL: no deformity  NEURO: alert, moves all extremities, follows commands          LAB RESULTS  Recent Results (from the past 24 hours)   Comprehensive Metabolic Panel    Collection Time: 05/29/25 11:50 AM    Specimen: Blood   Result Value Ref Range    Glucose 290 (H) 65 - 99 mg/dL    BUN 44.0 (H) 8.0 - 23.0 mg/dL    Creatinine 1.46 (H) 0.57 - 1.00 mg/dL    Sodium 139 136 - 145 mmol/L    Potassium 3.9 3.5 - 5.2 mmol/L    Chloride 99 98 - 107 mmol/L    CO2 27.9 22.0 - 29.0 mmol/L    Calcium 10.2 8.6 - 10.5 mg/dL    Total Protein 6.9 6.0 - 8.5 g/dL    Albumin 3.9 3.5 - 5.2 g/dL    ALT (SGPT) 22 1 - 33 U/L    AST (SGOT) 24 1 - 32 U/L    Alkaline Phosphatase 126 (H) 39 - 117 U/L    Total Bilirubin 0.8 0.0 - 1.2 mg/dL    Globulin 3.0 gm/dL    A/G Ratio 1.3 g/dL    BUN/Creatinine Ratio 30.1 (H) 7.0 - 25.0    Anion Gap 12.1 5.0 - 15.0 mmol/L    eGFR 34.3 (L) >60.0 mL/min/1.73   CBC Auto Differential    Collection Time: 05/29/25 11:50 AM    Specimen:  Blood   Result Value Ref Range    WBC 9.48 3.40 - 10.80 10*3/mm3    RBC 5.21 3.77 - 5.28 10*6/mm3    Hemoglobin 16.5 (H) 12.0 - 15.9 g/dL    Hematocrit 50.9 (H) 34.0 - 46.6 %    MCV 97.7 (H) 79.0 - 97.0 fL    MCH 31.7 26.6 - 33.0 pg    MCHC 32.4 31.5 - 35.7 g/dL    RDW 13.2 12.3 - 15.4 %    RDW-SD 46.7 37.0 - 54.0 fl    MPV 9.8 6.0 - 12.0 fL    Platelets 120 (L) 140 - 450 10*3/mm3    Neutrophil % 71.6 42.7 - 76.0 %    Lymphocyte % 14.6 (L) 19.6 - 45.3 %    Monocyte % 10.7 5.0 - 12.0 %    Eosinophil % 2.4 0.3 - 6.2 %    Basophil % 0.3 0.0 - 1.5 %    Immature Grans % 0.4 0.0 - 0.5 %    Neutrophils, Absolute 6.79 1.70 - 7.00 10*3/mm3    Lymphocytes, Absolute 1.38 0.70 - 3.10 10*3/mm3    Monocytes, Absolute 1.01 (H) 0.10 - 0.90 10*3/mm3    Eosinophils, Absolute 0.23 0.00 - 0.40 10*3/mm3    Basophils, Absolute 0.03 0.00 - 0.20 10*3/mm3    Immature Grans, Absolute 0.04 0.00 - 0.05 10*3/mm3    nRBC 0.0 0.0 - 0.2 /100 WBC   Urinalysis With Microscopic If Indicated (No Culture) - Urine, Clean Catch    Collection Time: 05/29/25 12:01 PM    Specimen: Urine, Clean Catch   Result Value Ref Range    Color, UA Yellow Yellow, Straw    Appearance, UA Clear Clear    pH, UA 6.0 5.0 - 8.0    Specific Gravity, UA 1.008 1.005 - 1.030    Glucose, UA Negative Negative    Ketones, UA Negative Negative    Bilirubin, UA Negative Negative    Blood, UA Trace (A) Negative    Protein, UA Trace (A) Negative    Leuk Esterase, UA Large (3+) (A) Negative    Nitrite, UA Negative Negative    Urobilinogen, UA 0.2 E.U./dL 0.2 - 1.0 E.U./dL   Urinalysis, Microscopic Only - Urine, Clean Catch    Collection Time: 05/29/25 12:01 PM    Specimen: Urine, Clean Catch   Result Value Ref Range    RBC, UA 0-2 None Seen, 0-2 /HPF    WBC, UA Too Numerous to Count (A) None Seen, 0-2 /HPF    Bacteria, UA None Seen None Seen /HPF    Squamous Epithelial Cells, UA 0-2 None Seen, 0-2 /HPF    Hyaline Casts, UA 0-2 None Seen /LPF    Methodology Automated Microscopy         Ordered the above labs and independently reviewed and interpreted the results.        RADIOLOGY  XR Chest 1 View  Result Date: 5/29/2025  XR CHEST 1 VW-  HISTORY: 89-year-old female with cough. Recent pneumonia. CABG in the past. Cardiac pacer.  FINDINGS: There are bibasilar airspace opacities, less prominent at the right lung base than on the 01/07/2025 radiograph, but slightly more prominent at the left lung base. There is also pulmonary vascular congestion and small pleural effusions cannot be excluded.        I ordered the above noted radiological studies. Independently reviewed and interpreted by me.  See dictation for official radiology interpretation.      PROCEDURES    Procedures      MEDICATIONS GIVEN IN ER    Medications   sodium chloride 0.9 % flush 10 mL (has no administration in time range)   cefTRIAXone (ROCEPHIN) 1,000 mg in sodium chloride 0.9 % 100 mL MBP (1,000 mg Intravenous New Bag 5/29/25 1336)   ondansetron (ZOFRAN) injection 4 mg (4 mg Intravenous Given 5/29/25 1202)   sodium chloride 0.9 % bolus 500 mL (500 mL Intravenous New Bag 5/29/25 1213)         PROGRESS, DATA ANALYSIS, CONSULTS, AND MEDICAL DECISION MAKING    All labs have been independently reviewed and interpreted by me.  All radiology studies have been independently reviewed and interpreted by me and discussed with radiologist dictating the report.   EKG's independently reviewed and interpreted by me.  Discussion below represents my analysis of pertinent findings related to patient's condition, differential diagnosis, treatment plan and final disposition.    Differential diagnosis: cystitis, pyelonephritis, vaginal atrophy    ED Course as of 05/29/25 1343   Thu May 29, 2025   1205 WBC: 9.48 [DC]   1229 Leukocytes, UA(!): Large (3+) [DC]   1229 WBC, UA(!): Too Numerous to Count [DC]   1239 Discussed lab results with patient. Will obtain chest xray due to some continued coughing and recent pneumonia. She denies feeling any  increase in her SOA. She lives at an assisted living facility and would feel comfortable being discharged home on antibiotics for UTI. [DC]   1340 XR shows improvement from previous. Antibiotics sent to preferred pharmacy for continued outpatient treatment. Return precautions discussed. EMS did not bring patient's portable O2 and she will require transport with O2 back to facility. [DC]      ED Course User Index  [DC] Hina Monk PA             AS OF 13:43 EDT VITALS:    BP - 155/80  HR - 69  TEMP - 98.1 °F (36.7 °C) (Oral)  O2 SATS - 91%        DIAGNOSIS  Final diagnoses:   Acute UTI         DISPOSITION  ED Disposition       ED Disposition   Discharge    Condition   Stable    Comment   --                  Note Disclaimer: At [facility], we believe that sharing information builds trust and better relationships. You are receiving this note because you recently visited [facility]. It is possible you will see health information before a provider has talked with you about it. This kind of information can be easy to misunderstand. To help you fully understand what it means for your health, we urge you to discuss this note with your provider.         Hina Monk PA  05/29/25 1378

## 2025-05-29 NOTE — ED NOTES
The patient verbalized understanding of discharge instructions, medications and follow up.  The patient was taken from the ER via wheelchair.  No further needs at this time. The patient's daughter took her rolling walker to the facility and the wheelchair van transported the patient on 3L O2 per her baseline

## 2025-05-29 NOTE — CASE MANAGEMENT/SOCIAL WORK
Discharge Planning Assessment  [facility]     Patient Name: Salma Hatfield  MRN: [id number]  Today's Date: 5/29/2025    Admit Date: 5/29/2025        Discharge Needs Assessment    No documentation.                  Discharge Plan       Row Name 05/29/25 1415       Plan    Plan Comments Patient slated for discharge, returning to [facility]. Patient with supplemental oxygen requirement, no tank with her. She is appropriate for wheelchair van transport. [facility] wheelchair van scheduled for 1645.                       Demographic Summary    No documentation.                  Functional Status    No documentation.                  Psychosocial    No documentation.                  Abuse/Neglect    No documentation.                  Legal    No documentation.                  Substance Abuse    No documentation.                  Patient Forms    No documentation.                     Dread Menezes RN

## 2025-05-29 NOTE — ED TRIAGE NOTES
Patient to ED via EMS from [facility] c/o painful urination x 4 days, patient has been having chills today. Patient is on 2LNC at all times.     Patient reports recently finishing medications for pneumonia.

## 2025-05-29 NOTE — ED NOTES
Nursing report ED to floor  Salma Hatfield  89 y.o.  female    HPI :  HPI  Stated Reason for Visit: dysuria, chills  History Obtained From: patient, EMS  Onset of Symptoms: worsening  Duration (Days): 4    Chief Complaint  Chief Complaint   Patient presents with    Dysuria       Admitting doctor:   No admitting provider for patient encounter.    Admitting diagnosis:   The encounter diagnosis was Acute UTI.    Code status:   Current Code Status       Date Active Code Status Order ID Comments User Context       Prior            Allergies:   Patient has no known allergies.    Isolation:   No active isolations    Intake and Output    Intake/Output Summary (Last 24 hours) at 5/29/2025 1512  Last data filed at 5/29/2025 1430  Gross per 24 hour   Intake 600 ml   Output 250 ml   Net 350 ml       Weight:       05/29/25  1210   Weight: 55 kg (121 lb 4.1 oz)       Most recent vitals:   Vitals:    05/29/25 1301 05/29/25 1401 05/29/25 1402 05/29/25 1431   BP: 150/75 152/73  168/86   BP Location:       Patient Position:       Pulse: 72 69 69 69   Resp:       Temp:       TempSrc:       SpO2: 91% 93% 93% 92%   Weight:           Active LDAs/IV Access:   Lines, Drains & Airways       Active LDAs       Name Placement date Placement time Site Days    Peripheral IV 05/29/25 1145 20 G Right Antecubital 05/29/25  1145  Antecubital  less than 1                    Labs (abnormal labs have a star):   Labs Reviewed   COMPREHENSIVE METABOLIC PANEL - Abnormal; Notable for the following components:       Result Value    Glucose 290 (*)     BUN 44.0 (*)     Creatinine 1.46 (*)     Alkaline Phosphatase 126 (*)     BUN/Creatinine Ratio 30.1 (*)     eGFR 34.3 (*)     All other components within normal limits    Narrative:     GFR Categories in Chronic Kidney Disease (CKD)              GFR Category          GFR (mL/min/1.73)    Interpretation  G1                    90 or greater        Normal or high (1)  G2                    60-89                 Mild decrease (1)  G3a                   45-59                Mild to moderate decrease  G3b                   30-44                Moderate to severe decrease  G4                    15-29                Severe decrease  G5                    14 or less           Kidney failure    (1)In the absence of evidence of kidney disease, neither GFR category G1 or G2 fulfill the criteria for CKD.    eGFR calculation 2021 CKD-EPI creatinine equation, which does not include race as a factor   URINALYSIS W/ MICROSCOPIC IF INDICATED (NO CULTURE) - Abnormal; Notable for the following components:    Blood, UA Trace (*)     Protein, UA Trace (*)     Leuk Esterase, UA Large (3+) (*)     All other components within normal limits   CBC WITH AUTO DIFFERENTIAL - Abnormal; Notable for the following components:    Hemoglobin 16.5 (*)     Hematocrit 50.9 (*)     MCV 97.7 (*)     Platelets 120 (*)     Lymphocyte % 14.6 (*)     Monocytes, Absolute 1.01 (*)     All other components within normal limits   URINALYSIS, MICROSCOPIC ONLY - Abnormal; Notable for the following components:    WBC, UA Too Numerous to Count (*)     All other components within normal limits   URINE CULTURE   CBC AND DIFFERENTIAL    Narrative:     The following orders were created for panel order CBC & Differential.  Procedure                               Abnormality         Status                     ---------                               -----------         ------                     CBC Auto Differential[[id number]]        Abnormal            Final result                 Please view results for these tests on the individual orders.       EKG:   No orders to display       Meds given in ED:   Medications   sodium chloride 0.9 % flush 10 mL (has no administration in time range)   ondansetron (ZOFRAN) injection 4 mg (4 mg Intravenous Given 5/29/25 1202)   sodium chloride 0.9 % bolus 500 mL (0 mL Intravenous Stopped 5/29/25 1430)   cefTRIAXone (ROCEPHIN) 1,000 mg in  sodium chloride 0.9 % 100 mL MBP (0 mg Intravenous Stopped 5/29/25 1430)       Imaging results:  No radiology results for the last day    Ambulatory status:   - up ad rosette      Social issues:   Social History     Socioeconomic History    Marital status:    Tobacco Use    Smoking status: Never    Smokeless tobacco: Never   Vaping Use    Vaping status: Never Used   Substance and Sexual Activity    Alcohol use: Never    Drug use: Never    Sexual activity: Defer       Peripheral Neurovascular  Peripheral Neurovascular (Adult)  Peripheral Neurovascular WDL: .WDL except, all  Capillary Refill, General: greater than 3 secs  Pulse Assessment: radial, popliteal  LUE Neurovascular Assessment  Temperature LUE: cool  Color LUE: pale  RUE Neurovascular Assessment  Temperature RUE: cool  Color RUE: pale  LLE Neurovascular Assessment  Temperature LLE: cool  Color LLE: pale  RLE Neurovascular Assessment  Temperature RLE: cool  Color RLE: pale    Neuro Cognitive  Neuro Cognitive (Adult)  Cognitive/Neuro/Behavioral WDL: WDL, all  Level of Consciousness: Alert  Arousal Level: opens eyes spontaneously  Orientation: oriented x 4  Speech: spontaneous, logical, clear  Mood/Behavior: restless    Learning  Learning Assessment  Learning Readiness and Ability: no barriers identified  Individualized Teaching Method: Salma danielle  Education Provided  Person Taught: patient  Teaching Method: verbal instruction  Teaching Focus: symptom/problem overview, medical device/equipment use  Education Outcome Evaluation: acceptance expressed, verbalizes understanding    Respiratory  Respiratory  Airway WDL: WDL  Respiratory WDL  Respiratory WDL: .WDL except, all  Rhythm/Pattern, Respiratory: shortness of breath, tachypneic, pattern regular, depth regular  Expansion/Accessory Muscles/Retractions: no use of accessory muscles, no retractions, expansion symmetric  Nailbeds: pale  Mucous Membranes: dry, cracked  Cough Frequency: frequent  Cough Type:  productive, good, congested    Abdominal Pain       Pain Assessments  Pain (Adult)  (0-10) Pain Rating: Rest: 0    NIH Stroke Scale       Luisana Mcnamara RN  05/29/25 15:12 EDT

## 2025-05-29 NOTE — ED PROVIDER NOTES
SHARED VISIT: This visit was performed by BOTH a physician and an APC. The substantive portion of the medical decision making was performed by this attesting physician who made or approved the management plan and takes responsibility for patient management. All studies in the APC note (if performed) were independently interpreted by me.      The CARLOS and I have discussed this patient's history, physical exam, and treatment plan.  I have reviewed the documentation and personally had a face to face interaction with the patient. I affirm the documentation and agree with the treatment and plan.  The attached note describes my personal findings.       I provided a substantive portion of the care of the patient.  I personally performed the physical exam in its entirety, and below are my findings.        History  89-year-old female with history of diabetes, COPD and heart failure presents with urinary frequency and dysuria.    Physical Exam  Vital Signs reviewed  GENERAL: Afebrile.  Blood pressure 155/80, heart rate in the 70s, O2 sats lower 90s on 3 L nasal cannula  HENT: nares patent  EYES: no scleral icterus  CV: regular rhythm, regular rate-no murmur  RESPIRATORY: normal effort, decreased breath sounds bilaterally-O2 sats upper 90s on room air  ABDOMEN: soft, nontender to palpation  MUSCULOSKELETAL: no deformity  NEURO: Strength sensation and coordination are grossly intact.  Speech and mentation are unremarkable  SKIN: warm, dry      Assessment and Data Review    ED Course as of 05/29/25 1721   Thu May 29, 2025   1205 WBC: 9.48 [DC]   1229 Leukocytes, UA(!): Large (3+) [DC]   1229 WBC, UA(!): Too Numerous to Count [DC]   1239 Discussed lab results with patient. Will obtain chest xray due to some continued coughing and recent pneumonia. She denies feeling any increase in her SOA. She lives at an assisted living facility and would feel comfortable being discharged home on antibiotics for UTI. [DC]   1340 XR shows  improvement from previous. Antibiotics sent to preferred pharmacy for continued outpatient treatment. Return precautions discussed. EMS did not bring patient's portable O2 and she will require transport with O2 back to facility. [DC]      ED Course User Index  [DC] Hina Monk PA       I did discuss treatment and evaluation of this patient with PURVI Monk.    I did independently interpret and evaluate ED testing which is notable for the following:    CBC without evidence of significant infection or anemia  CMP with BUN/creatinine 44 and 1.46 which is near baseline and represents chronic renal failure.  Blood glucose 290 and a known diabetic.  Bicarbonate, potassium and other electrolytes fairly unremarkable  Urinalysis does show to numerous count white blood cells worrisome for possible underlying infection with urinary symptoms.  Chest x-ray shows bilateral lower lobe densities, somewhat chronic in appearance.    Patient well-appearing with benign labs and vitals.  Will treat with single dose of IV Rocephin, cover with oral antibiotics.     Isidro Cisneros MD  05/29/25 4412

## 2025-05-30 LAB — BACTERIA SPEC AEROBE CULT: NO GROWTH

## (undated) DEVICE — TBG PENCL TELESCP MEGADYNE SMOKE EVAC 10FT

## (undated) DEVICE — INTRO SHEATH PRELUDE SNAP .038 9F 13CM W/SDPRT BLK

## (undated) DEVICE — Device

## (undated) DEVICE — ADHS SKIN SURG TISS VISC PREMIERPRO EXOFIN HI/VISC FAST/DRY

## (undated) DEVICE — LOU PACE DEFIB: Brand: MEDLINE INDUSTRIES, INC.

## (undated) DEVICE — INTRO SHEATH PRELUDE SNAP .038 7F 25CM W/SDPRT ORNG

## (undated) DEVICE — INTRO SHEATH PRELUDE SNAP .038 7F 13CM W/SDPRT

## (undated) DEVICE — GW INQWIRE FC PTFE J/3MM .035 180

## (undated) DEVICE — SLITTER CATH GUIDE ATTAIN ADJ

## (undated) DEVICE — CATH GUIDE RIGHTSITE C315HIS-02